# Patient Record
Sex: FEMALE | Race: WHITE | NOT HISPANIC OR LATINO | Employment: OTHER | URBAN - METROPOLITAN AREA
[De-identification: names, ages, dates, MRNs, and addresses within clinical notes are randomized per-mention and may not be internally consistent; named-entity substitution may affect disease eponyms.]

---

## 2017-02-21 ENCOUNTER — CONVERSION ENCOUNTER (OUTPATIENT)
Dept: OTHER | Facility: HOSPITAL | Age: 67
End: 2017-02-21

## 2017-02-22 ENCOUNTER — CONVERSION ENCOUNTER (OUTPATIENT)
Dept: OTHER | Facility: HOSPITAL | Age: 67
End: 2017-02-22

## 2017-05-02 ENCOUNTER — CONVERSION ENCOUNTER (OUTPATIENT)
Dept: OTHER | Facility: HOSPITAL | Age: 67
End: 2017-05-02

## 2017-06-14 ENCOUNTER — CONVERSION ENCOUNTER (OUTPATIENT)
Dept: OTHER | Facility: HOSPITAL | Age: 67
End: 2017-06-14

## 2017-06-14 ENCOUNTER — HOSPITAL ENCOUNTER (OUTPATIENT)
Dept: LAB | Facility: HOSPITAL | Age: 67
Discharge: HOME | End: 2017-06-14
Attending: NURSE PRACTITIONER | Admitting: NURSE PRACTITIONER

## 2017-06-14 LAB
ALBUMIN SERPL-MCNC: 4.1 G/DL (ref 3.5–4.8)
ALBUMIN/GLOB SERPL: 1.5 {RATIO} (ref 1–1.7)
ALP SERPL-CCNC: 117 IU/L (ref 32–91)
ALT SERPL-CCNC: 33 IU/L (ref 14–54)
ANION GAP SERPL CALC-SCNC: 14.6 MMOL/L (ref 10–20)
AST SERPL-CCNC: 35 IU/L (ref 15–41)
BASOPHILS # BLD AUTO: 0.1 10*3/UL (ref 0–0.2)
BASOPHILS NFR BLD AUTO: 1 % (ref 0–2)
BILIRUB SERPL-MCNC: 0.4 MG/DL (ref 0.3–1.2)
BUN SERPL-MCNC: 22 MG/DL (ref 8–20)
BUN/CREAT SERPL: ABNORMAL (ref 5.4–26.2)
CALCIUM SERPL-MCNC: 9.9 MG/DL (ref 8.9–10.3)
CHLORIDE SERPL-SCNC: 106 MMOL/L (ref 101–111)
CONV CO2: 28 MMOL/L (ref 22–32)
CONV TOTAL PROTEIN: 6.8 G/DL (ref 6.1–7.9)
CREAT UR-MCNC: ABNORMAL MG/DL (ref 0.4–1)
CRP SERPL-MCNC: 0.38 MG/DL (ref 0–0.7)
DIFFERENTIAL METHOD BLD: (no result)
EOSINOPHIL # BLD AUTO: 0.2 10*3/UL (ref 0–0.3)
EOSINOPHIL # BLD AUTO: 4 % (ref 0–3)
ERYTHROCYTE [DISTWIDTH] IN BLOOD BY AUTOMATED COUNT: 14.1 % (ref 11.5–14.5)
ERYTHROCYTE [SEDIMENTATION RATE] IN BLOOD BY WESTERGREN METHOD: 10 MM/HR (ref 0–30)
GLOBULIN UR ELPH-MCNC: 2.7 G/DL (ref 2.5–3.8)
GLUCOSE SERPL-MCNC: 89 MG/DL (ref 65–99)
HCT VFR BLD AUTO: 48.2 % (ref 35–49)
HGB BLD-MCNC: 16.1 G/DL (ref 12–15)
LYMPHOCYTES # BLD AUTO: 1.7 10*3/UL (ref 0.8–4.8)
LYMPHOCYTES NFR BLD AUTO: 26 % (ref 18–42)
MCH RBC QN AUTO: 31 PG (ref 26–32)
MCHC RBC AUTO-ENTMCNC: 33.4 G/DL (ref 32–36)
MCV RBC AUTO: 92.7 FL (ref 80–94)
MONOCYTES # BLD AUTO: 0.6 10*3/UL (ref 0.1–1.3)
MONOCYTES NFR BLD AUTO: 9 % (ref 2–11)
NEUTROPHILS # BLD AUTO: 4.2 10*3/UL (ref 2.3–8.6)
NEUTROPHILS NFR BLD AUTO: 60 % (ref 50–75)
NRBC BLD AUTO-RTO: 0 /100{WBCS}
NRBC/RBC NFR BLD MANUAL: 0 10*3/UL
PLATELET # BLD AUTO: 341 10*3/UL (ref 150–450)
PMV BLD AUTO: 8.9 FL (ref 7.4–10.4)
POTASSIUM SERPL-SCNC: 4.6 MMOL/L (ref 3.6–5.1)
RBC # BLD AUTO: 5.2 10*6/UL (ref 4–5.4)
SODIUM SERPL-SCNC: 144 MMOL/L (ref 136–144)
WBC # BLD AUTO: 6.8 10*3/UL (ref 4.5–11.5)

## 2017-06-16 LAB — CHROMATIN AB SERPL-ACNC: <20 [IU]/ML (ref 0–20)

## 2017-11-21 ENCOUNTER — HOSPITAL ENCOUNTER (OUTPATIENT)
Dept: ORTHOPEDIC SURGERY | Facility: CLINIC | Age: 67
Discharge: HOME | End: 2017-11-21
Attending: PHYSICIAN ASSISTANT | Admitting: PHYSICIAN ASSISTANT

## 2017-11-21 ENCOUNTER — CONVERSION ENCOUNTER (OUTPATIENT)
Dept: OTHER | Facility: HOSPITAL | Age: 67
End: 2017-11-21

## 2017-11-27 ENCOUNTER — CONVERSION ENCOUNTER (OUTPATIENT)
Dept: OTHER | Facility: HOSPITAL | Age: 67
End: 2017-11-27

## 2017-11-30 ENCOUNTER — HOSPITAL ENCOUNTER (OUTPATIENT)
Dept: CT IMAGING | Facility: HOSPITAL | Age: 67
Discharge: HOME | End: 2017-11-30
Attending: PHYSICIAN ASSISTANT | Admitting: PHYSICIAN ASSISTANT

## 2017-12-18 ENCOUNTER — CONVERSION ENCOUNTER (OUTPATIENT)
Dept: OTHER | Facility: HOSPITAL | Age: 67
End: 2017-12-18

## 2018-02-23 ENCOUNTER — HOSPITAL ENCOUNTER (OUTPATIENT)
Dept: ORTHOPEDIC SURGERY | Facility: CLINIC | Age: 68
Discharge: HOME | End: 2018-02-23
Attending: PHYSICIAN ASSISTANT | Admitting: PHYSICIAN ASSISTANT

## 2018-02-23 ENCOUNTER — CONVERSION ENCOUNTER (OUTPATIENT)
Dept: OTHER | Facility: HOSPITAL | Age: 68
End: 2018-02-23

## 2018-02-28 ENCOUNTER — CONVERSION ENCOUNTER (OUTPATIENT)
Dept: OTHER | Facility: HOSPITAL | Age: 68
End: 2018-02-28

## 2018-02-28 ENCOUNTER — HOSPITAL ENCOUNTER (OUTPATIENT)
Dept: LAB | Facility: HOSPITAL | Age: 68
Discharge: HOME | End: 2018-02-28
Attending: NURSE PRACTITIONER | Admitting: NURSE PRACTITIONER

## 2018-02-28 LAB
ALBUMIN SERPL-MCNC: 3.8 G/DL (ref 3.5–4.8)
ALBUMIN/GLOB SERPL: 1.7 {RATIO} (ref 1–1.7)
ALP SERPL-CCNC: 103 IU/L (ref 32–91)
ALT SERPL-CCNC: 23 IU/L (ref 14–54)
ANION GAP SERPL CALC-SCNC: 10.6 MMOL/L (ref 10–20)
AST SERPL-CCNC: 31 IU/L (ref 15–41)
BILIRUB SERPL-MCNC: 0.6 MG/DL (ref 0.3–1.2)
BUN SERPL-MCNC: 13 MG/DL (ref 8–20)
BUN/CREAT SERPL: 16.3 (ref 5.4–26.2)
CALCIUM SERPL-MCNC: 9.1 MG/DL (ref 8.9–10.3)
CHLORIDE SERPL-SCNC: 103 MMOL/L (ref 101–111)
CONV CO2: 29 MMOL/L (ref 22–32)
CONV TOTAL PROTEIN: 6.1 G/DL (ref 6.1–7.9)
CREAT UR-MCNC: 0.8 MG/DL (ref 0.4–1)
CRP SERPL-MCNC: 0.26 MG/DL (ref 0–0.7)
ERYTHROCYTE [DISTWIDTH] IN BLOOD BY AUTOMATED COUNT: 13.7 % (ref 11.5–14.5)
ERYTHROCYTE [SEDIMENTATION RATE] IN BLOOD BY WESTERGREN METHOD: 10 MM/HR (ref 0–30)
GLOBULIN UR ELPH-MCNC: 2.3 G/DL (ref 2.5–3.8)
GLUCOSE SERPL-MCNC: 90 MG/DL (ref 65–99)
HCT VFR BLD AUTO: 40.1 % (ref 35–49)
HGB BLD-MCNC: 13.7 G/DL (ref 12–15)
MCH RBC QN AUTO: 31.2 PG (ref 26–32)
MCHC RBC AUTO-ENTMCNC: 34.1 G/DL (ref 32–36)
MCV RBC AUTO: 91.6 FL (ref 80–94)
PLATELET # BLD AUTO: 260 10*3/UL (ref 150–450)
PMV BLD AUTO: 8.3 FL (ref 7.4–10.4)
POTASSIUM SERPL-SCNC: 3.6 MMOL/L (ref 3.6–5.1)
RBC # BLD AUTO: 4.38 10*6/UL (ref 4–5.4)
SODIUM SERPL-SCNC: 139 MMOL/L (ref 136–144)
WBC # BLD AUTO: 4.8 10*3/UL (ref 4.5–11.5)

## 2018-03-06 ENCOUNTER — CONVERSION ENCOUNTER (OUTPATIENT)
Dept: OTHER | Facility: HOSPITAL | Age: 68
End: 2018-03-06

## 2018-03-15 ENCOUNTER — CONVERSION ENCOUNTER (OUTPATIENT)
Dept: OTHER | Facility: HOSPITAL | Age: 68
End: 2018-03-15

## 2018-03-22 ENCOUNTER — CONVERSION ENCOUNTER (OUTPATIENT)
Dept: OTHER | Facility: HOSPITAL | Age: 68
End: 2018-03-22

## 2018-04-05 ENCOUNTER — HOSPITAL ENCOUNTER (OUTPATIENT)
Dept: ORTHOPEDIC SURGERY | Facility: CLINIC | Age: 68
Setting detail: SPECIMEN
Discharge: HOME | End: 2018-04-05
Attending: PHYSICIAN ASSISTANT | Admitting: PHYSICIAN ASSISTANT

## 2018-04-05 ENCOUNTER — CONVERSION ENCOUNTER (OUTPATIENT)
Dept: OTHER | Facility: HOSPITAL | Age: 68
End: 2018-04-05

## 2018-04-05 LAB
ALBUMIN SERPL-MCNC: 3.8 G/DL (ref 3.5–4.8)
ALBUMIN/GLOB SERPL: 1.7 {RATIO} (ref 1–1.7)
ALP SERPL-CCNC: 118 IU/L (ref 32–91)
ALT SERPL-CCNC: 28 IU/L (ref 14–54)
ANION GAP SERPL CALC-SCNC: 14.5 MMOL/L (ref 10–20)
AST SERPL-CCNC: 37 IU/L (ref 15–41)
BILIRUB SERPL-MCNC: 0.4 MG/DL (ref 0.3–1.2)
BUN SERPL-MCNC: 17 MG/DL (ref 8–20)
BUN/CREAT SERPL: 24.3 (ref 5.4–26.2)
CALCIUM SERPL-MCNC: 9.1 MG/DL (ref 8.9–10.3)
CHLORIDE SERPL-SCNC: 102 MMOL/L (ref 101–111)
CONV CO2: 26 MMOL/L (ref 22–32)
CONV TOTAL PROTEIN: 6.1 G/DL (ref 6.1–7.9)
CREAT UR-MCNC: 0.7 MG/DL (ref 0.4–1)
GLOBULIN UR ELPH-MCNC: 2.3 G/DL (ref 2.5–3.8)
GLUCOSE SERPL-MCNC: 97 MG/DL (ref 65–99)
POTASSIUM SERPL-SCNC: 3.5 MMOL/L (ref 3.6–5.1)
SODIUM SERPL-SCNC: 139 MMOL/L (ref 136–144)

## 2018-05-01 ENCOUNTER — CONVERSION ENCOUNTER (OUTPATIENT)
Dept: OTHER | Facility: HOSPITAL | Age: 68
End: 2018-05-01

## 2018-05-17 ENCOUNTER — CONVERSION ENCOUNTER (OUTPATIENT)
Dept: OTHER | Facility: HOSPITAL | Age: 68
End: 2018-05-17

## 2018-06-06 ENCOUNTER — HOSPITAL ENCOUNTER (OUTPATIENT)
Dept: LAB | Facility: HOSPITAL | Age: 68
Discharge: HOME | End: 2018-06-06
Attending: NURSE PRACTITIONER | Admitting: NURSE PRACTITIONER

## 2018-06-06 ENCOUNTER — CONVERSION ENCOUNTER (OUTPATIENT)
Dept: OTHER | Facility: HOSPITAL | Age: 68
End: 2018-06-06

## 2018-06-06 LAB
ALBUMIN SERPL-MCNC: 3.9 G/DL (ref 3.5–4.8)
ALBUMIN/GLOB SERPL: 1.7 {RATIO} (ref 1–1.7)
ALP SERPL-CCNC: 120 IU/L (ref 32–91)
ALT SERPL-CCNC: 27 IU/L (ref 14–54)
ANION GAP SERPL CALC-SCNC: 11 MMOL/L (ref 10–20)
AST SERPL-CCNC: 29 IU/L (ref 15–41)
BASOPHILS # BLD AUTO: 0.1 10*3/UL (ref 0–0.2)
BASOPHILS NFR BLD AUTO: 1 % (ref 0–2)
BILIRUB SERPL-MCNC: 0.4 MG/DL (ref 0.3–1.2)
BUN SERPL-MCNC: 20 MG/DL (ref 8–20)
BUN/CREAT SERPL: 22.2 (ref 5.4–26.2)
CALCIUM SERPL-MCNC: 9.2 MG/DL (ref 8.9–10.3)
CHLORIDE SERPL-SCNC: 105 MMOL/L (ref 101–111)
CONV CO2: 26 MMOL/L (ref 22–32)
CONV TOTAL PROTEIN: 6.2 G/DL (ref 6.1–7.9)
CREAT UR-MCNC: 0.9 MG/DL (ref 0.4–1)
CRP SERPL-MCNC: 0.14 MG/DL (ref 0–0.7)
DIFFERENTIAL METHOD BLD: (no result)
EOSINOPHIL # BLD AUTO: 0.2 10*3/UL (ref 0–0.3)
EOSINOPHIL # BLD AUTO: 4 % (ref 0–3)
ERYTHROCYTE [DISTWIDTH] IN BLOOD BY AUTOMATED COUNT: 13.8 % (ref 11.5–14.5)
ERYTHROCYTE [SEDIMENTATION RATE] IN BLOOD BY WESTERGREN METHOD: 9 MM/HR (ref 0–30)
GLOBULIN UR ELPH-MCNC: 2.3 G/DL (ref 2.5–3.8)
GLUCOSE SERPL-MCNC: 91 MG/DL (ref 65–99)
HCT VFR BLD AUTO: 43.2 % (ref 35–49)
HGB BLD-MCNC: 14.3 G/DL (ref 12–15)
LYMPHOCYTES # BLD AUTO: 1.6 10*3/UL (ref 0.8–4.8)
LYMPHOCYTES NFR BLD AUTO: 29 % (ref 18–42)
MCH RBC QN AUTO: 30.4 PG (ref 26–32)
MCHC RBC AUTO-ENTMCNC: 33.1 G/DL (ref 32–36)
MCV RBC AUTO: 91.8 FL (ref 80–94)
MONOCYTES # BLD AUTO: 0.6 10*3/UL (ref 0.1–1.3)
MONOCYTES NFR BLD AUTO: 12 % (ref 2–11)
NEUTROPHILS # BLD AUTO: 2.9 10*3/UL (ref 2.3–8.6)
NEUTROPHILS NFR BLD AUTO: 54 % (ref 50–75)
NRBC BLD AUTO-RTO: 0 /100{WBCS}
NRBC/RBC NFR BLD MANUAL: 0 10*3/UL
PLATELET # BLD AUTO: 284 10*3/UL (ref 150–450)
PMV BLD AUTO: 8.6 FL (ref 7.4–10.4)
POTASSIUM SERPL-SCNC: 4 MMOL/L (ref 3.6–5.1)
RBC # BLD AUTO: 4.71 10*6/UL (ref 4–5.4)
SODIUM SERPL-SCNC: 138 MMOL/L (ref 136–144)
WBC # BLD AUTO: 5.4 10*3/UL (ref 4.5–11.5)

## 2018-06-08 LAB
ANA SER QL IA: NORMAL

## 2018-09-06 ENCOUNTER — HOSPITAL ENCOUNTER (OUTPATIENT)
Dept: ORTHOPEDIC SURGERY | Facility: CLINIC | Age: 68
Setting detail: SPECIMEN
Discharge: HOME | End: 2018-09-06
Attending: PHYSICIAN ASSISTANT | Admitting: PHYSICIAN ASSISTANT

## 2018-09-06 ENCOUNTER — CONVERSION ENCOUNTER (OUTPATIENT)
Dept: OTHER | Facility: HOSPITAL | Age: 68
End: 2018-09-06

## 2018-09-06 LAB
ALBUMIN SERPL-MCNC: 3.6 G/DL (ref 3.5–4.8)
ALBUMIN/GLOB SERPL: 1.4 {RATIO} (ref 1–1.7)
ALP SERPL-CCNC: 121 IU/L (ref 32–91)
ALT SERPL-CCNC: 23 IU/L (ref 14–54)
ANION GAP SERPL CALC-SCNC: 11.6 MMOL/L (ref 10–20)
AST SERPL-CCNC: 30 IU/L (ref 15–41)
BILIRUB SERPL-MCNC: 0.1 MG/DL (ref 0.3–1.2)
BUN SERPL-MCNC: 10 MG/DL (ref 8–20)
BUN/CREAT SERPL: 12.5 (ref 5.4–26.2)
CALCIUM SERPL-MCNC: 8.9 MG/DL (ref 8.9–10.3)
CHLORIDE SERPL-SCNC: 105 MMOL/L (ref 101–111)
CONV CO2: 29 MMOL/L (ref 22–32)
CONV TOTAL PROTEIN: 6.2 G/DL (ref 6.1–7.9)
CREAT UR-MCNC: 0.8 MG/DL (ref 0.4–1)
GLOBULIN UR ELPH-MCNC: 2.6 G/DL (ref 2.5–3.8)
GLUCOSE SERPL-MCNC: 92 MG/DL (ref 65–99)
POTASSIUM SERPL-SCNC: 3.6 MMOL/L (ref 3.6–5.1)
SODIUM SERPL-SCNC: 142 MMOL/L (ref 136–144)

## 2018-10-10 ENCOUNTER — HOSPITAL ENCOUNTER (OUTPATIENT)
Dept: MAMMOGRAPHY | Facility: HOSPITAL | Age: 68
Discharge: HOME | End: 2018-10-10
Attending: PHYSICIAN ASSISTANT | Admitting: PHYSICIAN ASSISTANT

## 2018-10-22 ENCOUNTER — CONVERSION ENCOUNTER (OUTPATIENT)
Dept: OTHER | Facility: HOSPITAL | Age: 68
End: 2018-10-22

## 2018-11-14 ENCOUNTER — HOSPITAL ENCOUNTER (OUTPATIENT)
Dept: LAB | Facility: HOSPITAL | Age: 68
Discharge: HOME | End: 2018-11-14
Attending: NURSE PRACTITIONER | Admitting: NURSE PRACTITIONER

## 2018-11-14 ENCOUNTER — CONVERSION ENCOUNTER (OUTPATIENT)
Dept: OTHER | Facility: HOSPITAL | Age: 68
End: 2018-11-14

## 2018-11-14 LAB
ALBUMIN SERPL-MCNC: 3.9 G/DL (ref 3.5–4.8)
ALBUMIN/GLOB SERPL: 1.4 {RATIO} (ref 1–1.7)
ALP SERPL-CCNC: 135 IU/L (ref 32–91)
ALT SERPL-CCNC: 28 IU/L (ref 14–54)
ANION GAP SERPL CALC-SCNC: 13.9 MMOL/L (ref 10–20)
AST SERPL-CCNC: 33 IU/L (ref 15–41)
BASOPHILS # BLD AUTO: 0.1 10*3/UL (ref 0–0.2)
BASOPHILS NFR BLD AUTO: 1 % (ref 0–2)
BILIRUB SERPL-MCNC: 0.5 MG/DL (ref 0.3–1.2)
BUN SERPL-MCNC: 17 MG/DL (ref 8–20)
BUN/CREAT SERPL: 21.3 (ref 5.4–26.2)
CALCIUM SERPL-MCNC: 9.2 MG/DL (ref 8.9–10.3)
CHLORIDE SERPL-SCNC: 101 MMOL/L (ref 101–111)
CONV CO2: 27 MMOL/L (ref 22–32)
CONV TOTAL PROTEIN: 6.7 G/DL (ref 6.1–7.9)
CREAT UR-MCNC: 0.8 MG/DL (ref 0.4–1)
CRP SERPL-MCNC: 0.37 MG/DL (ref 0–0.7)
DIFFERENTIAL METHOD BLD: (no result)
EOSINOPHIL # BLD AUTO: 0.3 10*3/UL (ref 0–0.3)
EOSINOPHIL # BLD AUTO: 5 % (ref 0–3)
ERYTHROCYTE [DISTWIDTH] IN BLOOD BY AUTOMATED COUNT: 14.2 % (ref 11.5–14.5)
ERYTHROCYTE [SEDIMENTATION RATE] IN BLOOD BY WESTERGREN METHOD: 15 MM/HR (ref 0–30)
GLOBULIN UR ELPH-MCNC: 2.8 G/DL (ref 2.5–3.8)
GLUCOSE SERPL-MCNC: 98 MG/DL (ref 65–99)
HCT VFR BLD AUTO: 42.4 % (ref 35–49)
HGB BLD-MCNC: 14.3 G/DL (ref 12–15)
LYMPHOCYTES # BLD AUTO: 1.4 10*3/UL (ref 0.8–4.8)
LYMPHOCYTES NFR BLD AUTO: 30 % (ref 18–42)
MCH RBC QN AUTO: 31 PG (ref 26–32)
MCHC RBC AUTO-ENTMCNC: 33.8 G/DL (ref 32–36)
MCV RBC AUTO: 91.6 FL (ref 80–94)
MONOCYTES # BLD AUTO: 0.5 10*3/UL (ref 0.1–1.3)
MONOCYTES NFR BLD AUTO: 11 % (ref 2–11)
NEUTROPHILS # BLD AUTO: 2.6 10*3/UL (ref 2.3–8.6)
NEUTROPHILS NFR BLD AUTO: 53 % (ref 50–75)
NRBC BLD AUTO-RTO: 0 /100{WBCS}
NRBC/RBC NFR BLD MANUAL: 0 10*3/UL
PLATELET # BLD AUTO: 306 10*3/UL (ref 150–450)
PMV BLD AUTO: 8.8 FL (ref 7.4–10.4)
POTASSIUM SERPL-SCNC: 3.9 MMOL/L (ref 3.6–5.1)
RBC # BLD AUTO: 4.63 10*6/UL (ref 4–5.4)
SODIUM SERPL-SCNC: 138 MMOL/L (ref 136–144)
WBC # BLD AUTO: 4.9 10*3/UL (ref 4.5–11.5)

## 2018-11-16 LAB
ANA SER QL IA: NORMAL
CCP IGG ANTIBODIES: <0.4 U/ML
CHROMATIN AB SERPL-ACNC: <20 [IU]/ML (ref 0–20)

## 2019-03-01 ENCOUNTER — OFFICE VISIT (OUTPATIENT)
Dept: ORTHOPEDIC SURGERY | Facility: CLINIC | Age: 69
End: 2019-03-01

## 2019-03-01 VITALS — HEIGHT: 59 IN | WEIGHT: 207 LBS | TEMPERATURE: 97.4 F | BODY MASS INDEX: 41.73 KG/M2

## 2019-03-01 DIAGNOSIS — M75.102 TEAR OF LEFT ROTATOR CUFF, UNSPECIFIED TEAR EXTENT: Primary | ICD-10-CM

## 2019-03-01 PROCEDURE — 99203 OFFICE O/P NEW LOW 30 MIN: CPT | Performed by: ORTHOPAEDIC SURGERY

## 2019-03-01 RX ORDER — TRAMADOL HYDROCHLORIDE 50 MG/1
50 TABLET ORAL DAILY
COMMUNITY
Start: 2018-12-04 | End: 2019-06-14 | Stop reason: HOSPADM

## 2019-03-01 RX ORDER — GABAPENTIN 600 MG/1
600 TABLET ORAL TAKE AS DIRECTED
COMMUNITY
Start: 2018-10-21

## 2019-03-01 RX ORDER — HYDROXYCHLOROQUINE SULFATE 200 MG/1
TABLET, FILM COATED ORAL
COMMUNITY
Start: 2018-10-24 | End: 2019-06-07

## 2019-03-01 RX ORDER — AZELASTINE HYDROCHLORIDE, FLUTICASONE PROPIONATE 137; 50 UG/1; UG/1
1-2 SPRAY, METERED NASAL DAILY
COMMUNITY
Start: 2018-02-22 | End: 2021-09-02

## 2019-03-01 RX ORDER — DULOXETIN HYDROCHLORIDE 60 MG/1
CAPSULE, DELAYED RELEASE ORAL
Refills: 1 | COMMUNITY
Start: 2019-02-14 | End: 2019-06-07

## 2019-03-01 RX ORDER — ATORVASTATIN CALCIUM 40 MG/1
TABLET, FILM COATED ORAL
COMMUNITY
Start: 2018-11-02 | End: 2019-06-07

## 2019-03-01 RX ORDER — OMEPRAZOLE 20 MG/1
20 CAPSULE, DELAYED RELEASE ORAL NIGHTLY
COMMUNITY

## 2019-03-01 RX ORDER — IBUPROFEN 200 MG
1 CAPSULE ORAL
COMMUNITY

## 2019-03-01 RX ORDER — TRIAMCINOLONE ACETONIDE 0.25 MG/G
1 CREAM TOPICAL AS NEEDED
COMMUNITY

## 2019-03-01 RX ORDER — APIXABAN 5 MG/1
TABLET, FILM COATED ORAL
COMMUNITY
Start: 2019-01-18 | End: 2019-06-07

## 2019-03-01 RX ORDER — BUTALBITAL, ACETAMINOPHEN AND CAFFEINE 50; 325; 40 MG/1; MG/1; MG/1
1 TABLET ORAL EVERY 4 HOURS PRN
COMMUNITY
Start: 2018-11-15

## 2019-03-01 RX ORDER — PHENOL 1.4 %
1 AEROSOL, SPRAY (ML) MUCOUS MEMBRANE NIGHTLY
COMMUNITY

## 2019-03-01 RX ORDER — MULTIVIT WITH MINERALS/LUTEIN
1000 TABLET ORAL DAILY
COMMUNITY

## 2019-03-01 RX ORDER — ARMODAFINIL 250 MG/1
250 TABLET ORAL DAILY
Refills: 1 | COMMUNITY
Start: 2019-01-24 | End: 2019-07-26 | Stop reason: SDUPTHER

## 2019-03-01 RX ORDER — PROMETHAZINE HYDROCHLORIDE 25 MG/1
25 TABLET ORAL AS NEEDED
COMMUNITY
Start: 2019-01-18

## 2019-03-01 RX ORDER — METHOCARBAMOL 750 MG/1
1500 TABLET, FILM COATED ORAL NIGHTLY PRN
COMMUNITY
Start: 2018-11-11 | End: 2022-03-10

## 2019-03-01 NOTE — PROGRESS NOTES
New Left Shoulder      Patient: Annalisa Bhagat        YOB: 1950    Medical Record Number: [id number]        Chief Complaints: left shoulder pain  Chief Complaint   Patient presents with   • Left Shoulder - Establish Care           History of Present Illness: This is a 68-year-old female who presents complaining of left shoulder pain she is right-hand dominant been ongoing for 6+ months no history injury change in activity she seen physical therapy which helped at first but is no longer helping she has had 2 injections the first 1 helped the second 1 did not she does have night pain her symptoms are moderate to severe intermittent stabbing aching worse with sleeping better with ice rest and plus or minus with injection she is a retired RN past medical history is more for sleep apnea stroke COPD lupus rheumatoid arthritis depression anxiety      Allergies:   Allergies   Allergen Reactions   • Hydromorphone Itching and Rash   • Adhesive Tape Irritability   • Zolmitriptan Other (See Comments)     Hx of stroke    • Amoxicillin-Pot Clavulanate Itching and Rash       Medications:   Home Medications:  Current Outpatient Medications on File Prior to Visit   Medication Sig   • apixaban (ELIQUIS) 5 MG tablet tablet TAKE 1 TABLET BY MOUTH TWO TIMES DAILY   • atorvastatin (LIPITOR) 40 MG tablet TAKE 1 TABLET BY MOUTH EVERY DAY   • Azelastine-Fluticasone 137-50 MCG/ACT suspension 1-2 sprays into the nostril(s) as directed by provider.   • butalbital-acetaminophen-caffeine (FIORICET, ESGIC) -40 MG per tablet Take 1 tablet by mouth.   • Erenumab-aooe (AIMOVIG) 70 MG/ML prefilled syringe Inject 70 mg under the skin into the appropriate area as directed.   • gabapentin (NEURONTIN) 600 MG tablet Take 600 mg by mouth 4 (Four) Times a Day.   • hydrocortisone 2.5 % cream    • hydroxychloroquine (PLAQUENIL) 200 MG tablet TAKE 1 TABLET BY MOUTH TWICE A DAY   • methocarbamol (ROBAXIN) 750 MG tablet Take 750 mg by  mouth 3 (Three) Times a Day.   • promethazine (PHENERGAN) 25 MG tablet Take 25 mg by mouth.   • traMADol (ULTRAM) 50 MG tablet Take 50 mg by mouth.   • Armodafinil 250 MG tablet Take 250 mg by mouth Daily.   • B Complex Vitamins (VITAMIN B COMPLEX PO) Take 1 each by mouth Daily.   • calcium citrate (CALCITRATE) 950 MG tablet Take 1 tablet by mouth.   • DULoxetine (CYMBALTA) 60 MG capsule TAKE 1 CAPSULE BY MOUTH DAILY FOR 90 DAYS   • Melatonin 10 MG tablet Take 1 tablet by mouth Daily.   • omeprazole (priLOSEC) 20 MG capsule Take 20 mg by mouth.   • pyridoxine (VITAMIN B-6) 200 MG tablet Take 200 mg by mouth Daily.   • teriparatide (FORTEO) 600 MCG/2.4ML injection Inject 20 mcg under the skin into the appropriate area as directed Daily.   • triamcinolone (KENALOG) 0.025 % cream by Intratympanic route.   • vitamin C (ASCORBIC ACID) 250 MG tablet Take 1,000 mg by mouth Daily.     No current facility-administered medications on file prior to visit.      Current Medications:  Scheduled Meds:  Continuous Infusions:  No current facility-administered medications for this visit.   PRN Meds:.    Past Medical History:   Diagnosis Date   • Fibromyalgia    • Kidney stone    • Lupus    • Migraines    • Narcolepsy    • Numbness and tingling    • Recurrent UTI    • Rheumatoid arthritis (CMS/HCC)    • Scoliosis    • Seasonal allergies    • Sleep apnea    • Spinal stenosis    • Stress incontinence    • Stroke (CMS/HCC)         Past Surgical History:   Procedure Laterality Date   • BACK SURGERY     • CARDIAC CATHETERIZATION     • CARPAL TUNNEL RELEASE     • CYSTOSCOPY W/ URETEROSCOPY W/ LITHOTRIPSY     • HAND SURGERY     • HYSTERECTOMY     • RECTOVAGINAL FISTULA CLOSURE     • SACRAL NERVE STIMULATOR PLACEMENT          Social History     Occupational History   • Not on file   Tobacco Use   • Smoking status: Never Smoker   Substance and Sexual Activity   • Alcohol use: Yes   • Drug use: Not on file   • Sexual activity: Not on file     "  Social History     Social History Narrative   • Not on file        Family History   Problem Relation Age of Onset   • Hypotension Mother    • Hypotension Father    • Colon cancer Father    • Stroke Father    • Colon cancer Maternal Grandmother    • Heart disease Maternal Uncle    • Hypertension Paternal Aunt    • Diabetes Paternal Aunt    • Heart failure Paternal Aunt            Left  Review of Systems: 14 point review of systems are remarkable for the pertinent positives listed in the chart by the patient the remainder are negative    Review of Systems      Physical Exam: 68 y.o. female  General Appearance:    Alert, cooperative, in no acute distress                   Vitals:    03/01/19 1501   Temp: 97.4 °F (36.3 °C)   Weight: 93.9 kg (207 lb)   Height: 149.9 cm (59\")      Patient is alert and read ×3 no acute distress appears her above-listed at height weight and age.  Affect is normal respiratory rate is normal unlabored. Heart rate regular rate rhythm, sclera, dentition and hearing are normal for the purpose of this exam.    Ortho Exam Physical exam of the left shoulder reveals no overlying skin changes no lymphedema no lymphadenopathy.  Patient has active flexion 180 with mild symptoms abduction is similar external rotation is to 50 and internal rotation to the upper lumbar spine with mild symptoms.  Patient has good rotator cuff strength 4+ over 5 with isometric strength testing with pain.  Patient has a positive impingement and a positive Garcia sign.  Patient has good cervical range of motion which is full and asymptomatic no radicular symptoms.  Patient has a normal elbow exam.  Good distal pulses are presentPatient has pain with overhead activity and a positive Neer sign and a positive empty can sign  They have a positive drop arm any definitive painful arc    Procedures          Radiology:   AP, Scapular Y and Axillary Lateral of the left no compared to films these show some mild acromioclavicular " arthritis otherwise no acute bony pathology shoulder were ordered/reviewed to evauate shoulder pain.    Assessment/Plan: Left shoulder pain is been ongoing for several months she is on physical therapy injections rest strengthening all with no lasting improvement plan is to proceed with an MRI to rule out a rotator cuff tear which be high on my list

## 2019-03-14 ENCOUNTER — HOSPITAL ENCOUNTER (OUTPATIENT)
Dept: MRI IMAGING | Facility: HOSPITAL | Age: 69
Discharge: HOME | End: 2019-03-14
Admitting: ORTHOPAEDIC SURGERY

## 2019-03-14 DIAGNOSIS — M75.102 TEAR OF LEFT ROTATOR CUFF, UNSPECIFIED TEAR EXTENT: ICD-10-CM

## 2019-03-14 PROCEDURE — 73221 MRI JOINT UPR EXTREM W/O DYE: CPT

## 2019-03-20 DIAGNOSIS — M75.102 TEAR OF LEFT ROTATOR CUFF, UNSPECIFIED TEAR EXTENT: Primary | ICD-10-CM

## 2019-04-11 ENCOUNTER — CONVERSION ENCOUNTER (OUTPATIENT)
Dept: OTHER | Facility: HOSPITAL | Age: 69
End: 2019-04-11

## 2019-04-11 ENCOUNTER — HOSPITAL ENCOUNTER (OUTPATIENT)
Dept: ORTHOPEDIC SURGERY | Facility: CLINIC | Age: 69
Setting detail: SPECIMEN
Discharge: HOME | End: 2019-04-11
Attending: PHYSICIAN ASSISTANT | Admitting: PHYSICIAN ASSISTANT

## 2019-04-11 LAB
ALBUMIN SERPL-MCNC: 3.8 G/DL (ref 3.5–4.8)
ALBUMIN/GLOB SERPL: 1.7 {RATIO} (ref 1–1.7)
ALP SERPL-CCNC: 112 IU/L (ref 32–91)
ALT SERPL-CCNC: 24 IU/L (ref 14–54)
ANION GAP SERPL CALC-SCNC: 16.6 MMOL/L (ref 10–20)
AST SERPL-CCNC: 36 IU/L (ref 15–41)
BILIRUB SERPL-MCNC: 0.8 MG/DL (ref 0.3–1.2)
BUN SERPL-MCNC: 15 MG/DL (ref 8–20)
BUN/CREAT SERPL: 21.4 (ref 5.4–26.2)
CALCIUM SERPL-MCNC: 9.2 MG/DL (ref 8.9–10.3)
CHLORIDE SERPL-SCNC: 103 MMOL/L (ref 101–111)
CONV CO2: 24 MMOL/L (ref 22–32)
CONV TOTAL PROTEIN: 6.1 G/DL (ref 6.1–7.9)
CREAT UR-MCNC: 0.7 MG/DL (ref 0.4–1)
GLOBULIN UR ELPH-MCNC: 2.3 G/DL (ref 2.5–3.8)
GLUCOSE SERPL-MCNC: 89 MG/DL (ref 65–99)
POTASSIUM SERPL-SCNC: 3.6 MMOL/L (ref 3.6–5.1)
SODIUM SERPL-SCNC: 140 MMOL/L (ref 136–144)

## 2019-04-17 ENCOUNTER — OFFICE VISIT (OUTPATIENT)
Dept: ORTHOPEDIC SURGERY | Facility: CLINIC | Age: 69
End: 2019-04-17

## 2019-04-17 VITALS — TEMPERATURE: 97.2 F | HEIGHT: 59 IN | BODY MASS INDEX: 41.73 KG/M2 | WEIGHT: 207 LBS

## 2019-04-17 DIAGNOSIS — M75.102 TEAR OF LEFT ROTATOR CUFF, UNSPECIFIED TEAR EXTENT: Primary | ICD-10-CM

## 2019-04-17 PROCEDURE — 99214 OFFICE O/P EST MOD 30 MIN: CPT | Performed by: ORTHOPAEDIC SURGERY

## 2019-04-18 RX ORDER — MELOXICAM 7.5 MG/1
15 TABLET ORAL ONCE
Status: CANCELLED | OUTPATIENT
Start: 2019-04-18 | End: 2019-04-18

## 2019-04-18 RX ORDER — PREGABALIN 150 MG/1
150 CAPSULE ORAL ONCE
Status: CANCELLED | OUTPATIENT
Start: 2019-04-18 | End: 2019-04-18

## 2019-04-18 RX ORDER — ACETAMINOPHEN 325 MG/1
1000 TABLET ORAL ONCE
Status: CANCELLED | OUTPATIENT
Start: 2019-04-18 | End: 2019-04-18

## 2019-04-18 NOTE — PROGRESS NOTES
Patient: Annalisa Bhagat    YOB: 1950    Medical Record Number: [id number]    Chief Complaints:  Referral for left shoulder pain    History of Present Illness:     68 y.o. female patient who presents for evaluation of the right shoulder.  The patient is referred to me for further evaluation by Dr. Acevedo.  The patient has a long history of worsening left shoulder pain and dysfunction.  Pain is described as moderate to severe, constant and aching.  The patient has difficulty with overhead activities, reaching and lifting.  The patient has tried and failed conservative treatment including several previous injections and extensive formal physical therapy.  The patient was referred to me to discuss surgical options.    Allergies:   Allergies   Allergen Reactions   • Hydromorphone Itching and Rash   • Adhesive Tape Irritability   • Zolmitriptan Other (See Comments)     Hx of stroke    • Amoxicillin-Pot Clavulanate Itching and Rash       Home Medications:    Current Outpatient Medications:   •  apixaban (ELIQUIS) 5 MG tablet tablet, TAKE 1 TABLET BY MOUTH TWO TIMES DAILY, Disp: , Rfl:   •  Armodafinil 250 MG tablet, Take 250 mg by mouth Daily., Disp: , Rfl: 1  •  atorvastatin (LIPITOR) 40 MG tablet, TAKE 1 TABLET BY MOUTH EVERY DAY, Disp: , Rfl:   •  Azelastine-Fluticasone 137-50 MCG/ACT suspension, 1-2 sprays into the nostril(s) as directed by provider., Disp: , Rfl:   •  B Complex Vitamins (VITAMIN B COMPLEX PO), Take 1 each by mouth Daily., Disp: , Rfl:   •  butalbital-acetaminophen-caffeine (FIORICET, ESGIC) -40 MG per tablet, Take 1 tablet by mouth., Disp: , Rfl:   •  calcium citrate (CALCITRATE) 950 MG tablet, Take 1 tablet by mouth., Disp: , Rfl:   •  DULoxetine (CYMBALTA) 60 MG capsule, TAKE 1 CAPSULE BY MOUTH DAILY FOR 90 DAYS, Disp: , Rfl: 1  •  Erenumab-aooe (AIMOVIG) 70 MG/ML prefilled syringe, Inject 70 mg under the skin into the appropriate area as directed., Disp: , Rfl:   •   gabapentin (NEURONTIN) 600 MG tablet, Take 600 mg by mouth 4 (Four) Times a Day., Disp: , Rfl:   •  hydrocortisone 2.5 % cream, , Disp: , Rfl:   •  hydroxychloroquine (PLAQUENIL) 200 MG tablet, TAKE 1 TABLET BY MOUTH TWICE A DAY, Disp: , Rfl:   •  Melatonin 10 MG tablet, Take 1 tablet by mouth Daily., Disp: , Rfl:   •  methocarbamol (ROBAXIN) 750 MG tablet, Take 750 mg by mouth 3 (Three) Times a Day., Disp: , Rfl:   •  omeprazole (priLOSEC) 20 MG capsule, Take 20 mg by mouth., Disp: , Rfl:   •  promethazine (PHENERGAN) 25 MG tablet, Take 25 mg by mouth., Disp: , Rfl:   •  pyridoxine (VITAMIN B-6) 200 MG tablet, Take 200 mg by mouth Daily., Disp: , Rfl:   •  teriparatide (FORTEO) 600 MCG/2.4ML injection, Inject 20 mcg under the skin into the appropriate area as directed Daily., Disp: , Rfl:   •  traMADol (ULTRAM) 50 MG tablet, Take 50 mg by mouth., Disp: , Rfl:   •  triamcinolone (KENALOG) 0.025 % cream, by Intratympanic route., Disp: , Rfl:   •  vitamin C (ASCORBIC ACID) 250 MG tablet, Take 1,000 mg by mouth Daily., Disp: , Rfl:     Past Medical History:   Diagnosis Date   • Fibromyalgia    • Kidney stone    • Lupus    • Migraines    • Narcolepsy    • Numbness and tingling    • Recurrent UTI    • Rheumatoid arthritis (CMS/HCC)    • Scoliosis    • Seasonal allergies    • Sleep apnea    • Spinal stenosis    • Stress incontinence    • Stroke (CMS/HCC)        Past Surgical History:   Procedure Laterality Date   • BACK SURGERY     • CARDIAC CATHETERIZATION     • CARPAL TUNNEL RELEASE     • CYSTOSCOPY W/ URETEROSCOPY W/ LITHOTRIPSY     • HAND SURGERY     • HYSTERECTOMY     • RECTOVAGINAL FISTULA CLOSURE     • SACRAL NERVE STIMULATOR PLACEMENT         Social History     Occupational History   • Not on file   Tobacco Use   • Smoking status: Never Smoker   Substance and Sexual Activity   • Alcohol use: Yes   • Drug use: No   • Sexual activity: Not on file      Social History     Social History Narrative   • Not on file  "      Family History   Problem Relation Age of Onset   • Hypotension Mother    • Hypotension Father    • Colon cancer Father    • Stroke Father    • Colon cancer Maternal Grandmother    • Heart disease Maternal Uncle    • Hypertension Paternal Aunt    • Diabetes Paternal Aunt    • Heart failure Paternal Aunt        Review of Systems:      Constitutional: Denies fever, shaking or chills   Eyes: Denies change in visual acuity   HEENT: Denies nasal congestion or sore throat   Respiratory: Denies cough or shortness of breath   Cardiovascular: Denies chest pain or edema  Endocrine: Denies tremors, palpitations, intolerance of heat or cold, polyuria, polydipsia.  GI: Denies abdominal pain, nausea, vomiting, bloody stools or diarrhea  : Denies frequency, urgency, incontinence, retention, or nocturia.  Musculoskeletal: Denies numbness, tingling or loss of motor function except as above  Integument: Denies rash, lesion or ulceration   Neurologic: Denies headache or focal weakness, deficits  Heme: Denies spontaneous or excessive bleeding, epistaxis, hematuria, melena, fatigue, enlarged or tender lymph nodes.      All other pertinent positives and negatives as noted above in HPI.    Physical Exam: 68 y.o. female    Vitals:    04/17/19 1511   Temp: 97.2 °F (36.2 °C)   Weight: 93.9 kg (207 lb)   Height: 149.9 cm (59\")     General:  Patient is awake and alert.  Appears in no acute distress or discomfort.    Psych:  Affect and demeanor are appropriate.    Eyes:  Conjunctiva and sclera appear grossly normal.  Eyes track well and EOM seem to be intact.    Ears:  No gross abnormalities.  Hearing adequate for the exam.    Cardiovascular:  Regular rate and rhythm.    Lungs:  Good chest expansion.  Breathing unlabored.    Extremities: Left shoulder is examined. Skin is benign.  No obvious gross abnormalities.  No palpable masses or adenopathy.  Moderate tenderness noted over anterior glenohumeral joint and rotator interval.  Motion is " near full.  She struggles with mid arc elevation..  No instability.  She has pain and weakness with elevation in the scapular plane and external rotation.  Negative external rotation lag sign.  Good motor and sensory function in lower arm and hand.  Brisk capillary refill in hand.  Palpable radial pulse.  Good skin turgor.    Imaging:  Previous x-rays including AP, scapular Y and axillary views of the left shoulder are reviewed.  The x-rays show only mild degenerative changes.  MRI of the left shoulder is reviewed along with the associated report.  Findings are listed below.    quadrilateral space, nor in the visualized axilla.     IMPRESSION:  There is advanced, chronic-appearing degenerative change at  the left glenohumeral joint with a complex degenerative tear of the  glenoid labrum. There also appears to be a chronic tear of the long head  biceps tendon.     There is a full-thickness tear of the supraspinatus tendon with  partial-thickness tear extending along the more posterior portion of the  tendon. No musculotendinous retraction or muscle atrophy is present.    Assessment/Plan:  Left shoulder osteoarthritis and associated rotator cuff tear    We had a long discussion about conservative and surgical treatment options.  We talked about surgical options in detail and all that that would potentially entail in terms of surgery itself, rehabilitation and recovery.  Specifically, we discussed both an attempted arthroscopy with repair versus an arthroplasty.  Both options were presented because I really think that the scope is reasonable for her.  Her arthritis does not appear that bad on the x-rays although the MRI suggests that it is worse.  If that is the case, she may end up with an arthroplasty despite the scope.  Her son is just now going through rotator cuff repair and she does not feel that she is up for that.  She wants to pursue the arthroplasty.  She has friends who have had that and done quite well.   The risks, benefits and alternatives were discussed.  She wants to pursue that.  We will look at getting that scheduled for her.  I want to see her back for a preoperative visit.    Brice Mayes MD    04/17/2019

## 2019-04-23 ENCOUNTER — CONVERSION ENCOUNTER (OUTPATIENT)
Dept: OTHER | Facility: HOSPITAL | Age: 69
End: 2019-04-23

## 2019-05-15 ENCOUNTER — HOSPITAL ENCOUNTER (OUTPATIENT)
Dept: LAB | Facility: HOSPITAL | Age: 69
Discharge: HOME | End: 2019-05-15
Attending: NURSE PRACTITIONER | Admitting: NURSE PRACTITIONER

## 2019-05-15 LAB
CRP SERPL-MCNC: 0.25 MG/DL (ref 0–0.7)
ERYTHROCYTE [DISTWIDTH] IN BLOOD BY AUTOMATED COUNT: 14.6 % (ref 11.5–14.5)
HCT VFR BLD AUTO: 41.2 % (ref 35–49)
HGB BLD-MCNC: 13.5 G/DL (ref 12–15)
MCH RBC QN AUTO: 30.3 PG (ref 26–32)
MCHC RBC AUTO-ENTMCNC: 32.8 G/DL (ref 32–36)
MCV RBC AUTO: 92.4 FL (ref 80–94)
PLATELET # BLD AUTO: 320 10*3/UL (ref 150–450)
PMV BLD AUTO: 8.6 FL (ref 7.4–10.4)
RBC # BLD AUTO: 4.46 10*6/UL (ref 4–5.4)
T3FREE SERPL-MCNC: 3.01 PG/ML (ref 2.39–6.79)
T4 SERPL-MCNC: 5.16 UG/DL (ref 6.1–12.2)
THYROGLOB AB SERPL-ACNC: 1 [IU]/ML (ref 0–4)
THYROPEROXIDASE AB SERPL-ACNC: <1 [IU]/ML (ref 0–9)
TSH SERPL-ACNC: 3.21 UIU/ML (ref 0.34–5.6)
WBC # BLD AUTO: 5 10*3/UL (ref 4.5–11.5)

## 2019-05-17 LAB
C3 SERPL-MCNC: 121 MG/DL (ref 79–152)
C4 SERPL-MCNC: 22.1 MG/DL (ref 18–55)

## 2019-05-23 ENCOUNTER — HOSPITAL ENCOUNTER (OUTPATIENT)
Dept: LAB | Facility: HOSPITAL | Age: 69
Discharge: HOME | End: 2019-05-23
Attending: NURSE PRACTITIONER | Admitting: NURSE PRACTITIONER

## 2019-05-24 LAB — PTH-INTACT SERPL-MCNC: 43 PG/ML (ref 11–72)

## 2019-05-31 ENCOUNTER — TELEPHONE (OUTPATIENT)
Dept: ORTHOPEDIC SURGERY | Facility: CLINIC | Age: 69
End: 2019-05-31

## 2019-06-03 ENCOUNTER — PREP FOR SURGERY (OUTPATIENT)
Dept: OTHER | Facility: HOSPITAL | Age: 69
End: 2019-06-03

## 2019-06-03 DIAGNOSIS — M75.102 TEAR OF LEFT ROTATOR CUFF, UNSPECIFIED TEAR EXTENT: Primary | ICD-10-CM

## 2019-06-03 RX ORDER — CEFAZOLIN SODIUM 2 G/100ML
2 INJECTION, SOLUTION INTRAVENOUS ONCE
Status: CANCELLED | OUTPATIENT
Start: 2019-06-13 | End: 2019-06-03

## 2019-06-03 RX ORDER — MELOXICAM 15 MG/1
15 TABLET ORAL ONCE
Status: CANCELLED | OUTPATIENT
Start: 2019-06-13 | End: 2019-06-03

## 2019-06-03 RX ORDER — ACETAMINOPHEN 500 MG
1000 TABLET ORAL ONCE
Status: CANCELLED | OUTPATIENT
Start: 2019-06-13 | End: 2019-06-03

## 2019-06-03 RX ORDER — PREGABALIN 75 MG/1
150 CAPSULE ORAL ONCE
Status: CANCELLED | OUTPATIENT
Start: 2019-06-13 | End: 2019-06-03

## 2019-06-04 VITALS
DIASTOLIC BLOOD PRESSURE: 76 MMHG | HEIGHT: 62 IN | SYSTOLIC BLOOD PRESSURE: 122 MMHG | HEART RATE: 86 BPM | HEIGHT: 62 IN | BODY MASS INDEX: 36.44 KG/M2 | WEIGHT: 204 LBS | HEART RATE: 76 BPM | HEART RATE: 91 BPM | WEIGHT: 205 LBS | HEIGHT: 62 IN | DIASTOLIC BLOOD PRESSURE: 79 MMHG | HEIGHT: 62 IN | HEART RATE: 80 BPM | WEIGHT: 201 LBS | HEART RATE: 90 BPM | WEIGHT: 206 LBS | HEART RATE: 102 BPM | HEART RATE: 101 BPM | SYSTOLIC BLOOD PRESSURE: 149 MMHG | HEIGHT: 62 IN | SYSTOLIC BLOOD PRESSURE: 145 MMHG | SYSTOLIC BLOOD PRESSURE: 138 MMHG | BODY MASS INDEX: 36.99 KG/M2 | SYSTOLIC BLOOD PRESSURE: 140 MMHG | HEART RATE: 87 BPM | BODY MASS INDEX: 36.69 KG/M2 | SYSTOLIC BLOOD PRESSURE: 129 MMHG | DIASTOLIC BLOOD PRESSURE: 87 MMHG | HEIGHT: 62 IN | BODY MASS INDEX: 37.02 KG/M2 | HEIGHT: 62 IN | WEIGHT: 201 LBS | WEIGHT: 202 LBS | HEIGHT: 62 IN | SYSTOLIC BLOOD PRESSURE: 127 MMHG | WEIGHT: 203.5 LBS | SYSTOLIC BLOOD PRESSURE: 133 MMHG | BODY MASS INDEX: 36.25 KG/M2 | HEART RATE: 89 BPM | DIASTOLIC BLOOD PRESSURE: 80 MMHG | HEART RATE: 93 BPM | DIASTOLIC BLOOD PRESSURE: 104 MMHG | WEIGHT: 201 LBS | HEART RATE: 93 BPM | HEIGHT: 62 IN | SYSTOLIC BLOOD PRESSURE: 148 MMHG | HEIGHT: 62 IN | DIASTOLIC BLOOD PRESSURE: 78 MMHG | HEIGHT: 62 IN | DIASTOLIC BLOOD PRESSURE: 72 MMHG | HEART RATE: 93 BPM | DIASTOLIC BLOOD PRESSURE: 87 MMHG | SYSTOLIC BLOOD PRESSURE: 125 MMHG | WEIGHT: 204 LBS | BODY MASS INDEX: 37.54 KG/M2 | WEIGHT: 202.4 LBS | WEIGHT: 202 LBS | DIASTOLIC BLOOD PRESSURE: 84 MMHG | BODY MASS INDEX: 37.91 KG/M2 | SYSTOLIC BLOOD PRESSURE: 130 MMHG | BODY MASS INDEX: 37.54 KG/M2 | DIASTOLIC BLOOD PRESSURE: 80 MMHG | DIASTOLIC BLOOD PRESSURE: 81 MMHG | HEART RATE: 99 BPM | DIASTOLIC BLOOD PRESSURE: 77 MMHG | WEIGHT: 206.38 LBS | BODY MASS INDEX: 37.54 KG/M2 | HEART RATE: 93 BPM | SYSTOLIC BLOOD PRESSURE: 132 MMHG | DIASTOLIC BLOOD PRESSURE: 96 MMHG | BODY MASS INDEX: 37.17 KG/M2 | DIASTOLIC BLOOD PRESSURE: 83 MMHG | SYSTOLIC BLOOD PRESSURE: 136 MMHG | HEART RATE: 101 BPM | DIASTOLIC BLOOD PRESSURE: 89 MMHG | WEIGHT: 197 LBS | SYSTOLIC BLOOD PRESSURE: 134 MMHG | HEIGHT: 62 IN | WEIGHT: 204 LBS | HEART RATE: 83 BPM | DIASTOLIC BLOOD PRESSURE: 76 MMHG | BODY MASS INDEX: 37.25 KG/M2 | WEIGHT: 203 LBS | WEIGHT: 199.38 LBS | HEART RATE: 97 BPM | DIASTOLIC BLOOD PRESSURE: 83 MMHG | WEIGHT: 202.4 LBS | SYSTOLIC BLOOD PRESSURE: 127 MMHG | BODY MASS INDEX: 37.36 KG/M2 | HEIGHT: 62 IN | BODY MASS INDEX: 37.25 KG/M2 | DIASTOLIC BLOOD PRESSURE: 75 MMHG | DIASTOLIC BLOOD PRESSURE: 81 MMHG | SYSTOLIC BLOOD PRESSURE: 144 MMHG | SYSTOLIC BLOOD PRESSURE: 150 MMHG | BODY MASS INDEX: 37.17 KG/M2 | BODY MASS INDEX: 37.98 KG/M2 | SYSTOLIC BLOOD PRESSURE: 150 MMHG | HEART RATE: 99 BPM | BODY MASS INDEX: 37.45 KG/M2 | HEIGHT: 62 IN | HEART RATE: 86 BPM | WEIGHT: 204 LBS | SYSTOLIC BLOOD PRESSURE: 143 MMHG | WEIGHT: 198 LBS | HEIGHT: 62 IN | SYSTOLIC BLOOD PRESSURE: 137 MMHG | HEIGHT: 62 IN | HEART RATE: 97 BPM | BODY MASS INDEX: 36.99 KG/M2 | HEIGHT: 62 IN | DIASTOLIC BLOOD PRESSURE: 85 MMHG

## 2019-06-04 PROBLEM — M75.102 TEAR OF LEFT ROTATOR CUFF: Status: ACTIVE | Noted: 2019-06-04

## 2019-06-07 ENCOUNTER — APPOINTMENT (OUTPATIENT)
Dept: PREADMISSION TESTING | Facility: HOSPITAL | Age: 69
End: 2019-06-07

## 2019-06-07 VITALS
BODY MASS INDEX: 42.13 KG/M2 | RESPIRATION RATE: 18 BRPM | SYSTOLIC BLOOD PRESSURE: 124 MMHG | DIASTOLIC BLOOD PRESSURE: 71 MMHG | HEIGHT: 59 IN | HEART RATE: 96 BPM | TEMPERATURE: 98.3 F | OXYGEN SATURATION: 98 % | WEIGHT: 209 LBS

## 2019-06-07 DIAGNOSIS — M75.102 TEAR OF LEFT ROTATOR CUFF, UNSPECIFIED TEAR EXTENT: ICD-10-CM

## 2019-06-07 LAB
ANION GAP SERPL CALCULATED.3IONS-SCNC: 13.3 MMOL/L
BUN BLD-MCNC: 15 MG/DL (ref 8–23)
BUN/CREAT SERPL: 19.7 (ref 7–25)
CALCIUM SPEC-SCNC: 9.6 MG/DL (ref 8.6–10.5)
CHLORIDE SERPL-SCNC: 104 MMOL/L (ref 98–107)
CO2 SERPL-SCNC: 26.7 MMOL/L (ref 22–29)
CREAT BLD-MCNC: 0.76 MG/DL (ref 0.57–1)
DEPRECATED RDW RBC AUTO: 48.6 FL (ref 37–54)
ERYTHROCYTE [DISTWIDTH] IN BLOOD BY AUTOMATED COUNT: 14 % (ref 12.3–15.4)
GFR SERPL CREATININE-BSD FRML MDRD: 75 ML/MIN/1.73
GLUCOSE BLD-MCNC: 92 MG/DL (ref 65–99)
HCT VFR BLD AUTO: 39.6 % (ref 34–46.6)
HGB BLD-MCNC: 12.2 G/DL (ref 12–15.9)
MCH RBC QN AUTO: 29.3 PG (ref 26.6–33)
MCHC RBC AUTO-ENTMCNC: 30.8 G/DL (ref 31.5–35.7)
MCV RBC AUTO: 95.2 FL (ref 79–97)
PLATELET # BLD AUTO: 316 10*3/MM3 (ref 140–450)
PMV BLD AUTO: 10.1 FL (ref 6–12)
POTASSIUM BLD-SCNC: 4 MMOL/L (ref 3.5–5.2)
RBC # BLD AUTO: 4.16 10*6/MM3 (ref 3.77–5.28)
SODIUM BLD-SCNC: 144 MMOL/L (ref 136–145)
WBC NRBC COR # BLD: 6.47 10*3/MM3 (ref 3.4–10.8)

## 2019-06-07 PROCEDURE — 93005 ELECTROCARDIOGRAM TRACING: CPT

## 2019-06-07 PROCEDURE — 36415 COLL VENOUS BLD VENIPUNCTURE: CPT

## 2019-06-07 PROCEDURE — 93010 ELECTROCARDIOGRAM REPORT: CPT | Performed by: INTERNAL MEDICINE

## 2019-06-07 PROCEDURE — 85027 COMPLETE CBC AUTOMATED: CPT | Performed by: ORTHOPAEDIC SURGERY

## 2019-06-07 PROCEDURE — 80048 BASIC METABOLIC PNL TOTAL CA: CPT | Performed by: ORTHOPAEDIC SURGERY

## 2019-06-07 RX ORDER — HYDROXYCHLOROQUINE SULFATE 200 MG/1
TABLET, FILM COATED ORAL 2 TIMES DAILY
COMMUNITY

## 2019-06-07 RX ORDER — ATORVASTATIN CALCIUM 40 MG/1
40 TABLET, FILM COATED ORAL DAILY
COMMUNITY

## 2019-06-07 RX ORDER — CHLORHEXIDINE GLUCONATE 500 MG/1
1 CLOTH TOPICAL TAKE AS DIRECTED
COMMUNITY
End: 2019-06-14 | Stop reason: HOSPADM

## 2019-06-07 RX ORDER — DULOXETIN HYDROCHLORIDE 60 MG/1
60 CAPSULE, DELAYED RELEASE ORAL NIGHTLY
COMMUNITY

## 2019-06-07 NOTE — DISCHARGE INSTRUCTIONS
Take the following medications the morning of surgery with a small sip of water:  GABAPENTIN    ARRIVE AT 0900.        General Instructions:  • Do not eat solid food after midnight the night before surgery.  • You may drink clear liquids day of surgery but must stop at least one hour before your hospital arrival time.  • It is beneficial for you to have a clear drink that contains carbohydrates the day of surgery.  We suggest a 12 to 20 ounce bottle of Gatorade or Powerade for non-diabetic patients or a 12 to 20 ounce bottle of G2 or Powerade Zero for diabetic patients. (Pediatric patients, are not advised to drink a 12 to 20 ounce carbohydrate drink)    Clear liquids are liquids you can see through.  Nothing red in color.     Plain water                               Sports drinks  Sodas                                   Gelatin (Jell-O)  Fruit juices without pulp such as white grape juice and apple juice  Popsicles that contain no fruit or yogurt  Tea or coffee (no cream or milk added)  Gatorade / Powerade  G2 / Powerade Zero    • Infants may have breast milk up to four hours before surgery.  • Infants drinking formula may drink formula up to six hours before surgery.   • Patients who avoid smoking, chewing tobacco and alcohol for 4 weeks prior to surgery have a reduced risk of post-operative complications.  Quit smoking as many days before surgery as you can.  • Do not smoke, use chewing tobacco or drink alcohol the day of surgery.   • If applicable bring your C-PAP/ BI-PAP machine.  • Bring any papers given to you in the doctor’s office.  • Wear clean comfortable clothes and socks.  • Do not wear contact lenses, false eyelashes or make-up.  Bring a case for your glasses.   • Bring crutches or walker if applicable.  • Remove all piercings.  Leave jewelry and any other valuables at home.  • Hair extensions with metal clips must be removed prior to surgery.  • The Pre-Admission Testing nurse will instruct you to  bring medications if unable to obtain an accurate list in Pre-Admission Testing.        If you were given a blood bank ID arm band remember to bring it with you the day of surgery.    Preventing a Surgical Site Infection:  • For 2 to 3 days before surgery, avoid shaving with a razor because the razor can irritate skin and make it easier to develop an infection.    • Any areas of open skin can increase the risk of a post-operative wound infection by allowing bacteria to enter and travel throughout the body.  Notify your surgeon if you have any skin wounds / rashes even if it is not near the expected surgical site.  The area will need assessed to determine if surgery should be delayed until it is healed.  • The night prior to surgery sleep in a clean bed with clean clothing.  Do not allow pets to sleep with you.  • Shower on the morning of surgery using a fresh bar of anti-bacterial soap (such as Dial) and clean washcloth.  Dry with a clean towel and dress in clean clothing.  • Ask your surgeon if you will be receiving antibiotics prior to surgery.  • Make sure you, your family, and all healthcare providers clean their hands with soap and water or an alcohol based hand  before caring for you or your wound.    Day of surgery:  Upon arrival, a Pre-op nurse and Anesthesiologist will review your health history, obtain vital signs, and answer questions you may have.  The only belongings needed at this time will be a list of your home medications and if applicable your C-PAP/BI-PAP machine.  If you are staying overnight your family can leave the rest of your belongings in the car and bring them to your room later.  A Pre-op nurse will start an IV and you may receive medication in preparation for surgery, including something to help you relax.  Your family will be able to see you in the Pre-op area.  While you are in surgery your family should notify the waiting room  if they leave the waiting room area  and provide a contact phone number.    Please be aware that surgery does come with discomfort.  We want to make every effort to control your discomfort so please discuss any uncontrolled symptoms with your nurse.   Your doctor will most likely have prescribed pain medications.      If you are going home after surgery you will receive individualized written care instructions before being discharged.  A responsible adult must drive you to and from the hospital on the day of your surgery and stay with you for 24 hours.    If you are staying overnight following surgery, you will be transported to your hospital room following the recovery period.  [facility] has all private rooms.    You have received a list of surgical assistants for your reference.  If you have any questions please call Pre-Admission Testing at [phone].  Deductibles and co-payments are collected on the day of service. Please be prepared to pay the required co-pay, deductible or deposit on the day of service as defined by your plan.    2% CHLORAHEXIDINE GLUCONATE* CLOTH  Preparing or “prepping” skin before surgery can reduce the risk of infection at the surgical site. To make the process easier, [facility] has chosen disposable cloths moistened with a rinse-free, 2% Chlorhexidine Gluconate (CHG) antiseptic solution. The steps below outline the prepping process and should be carefully followed.        Use the prep cloth on the area that is circled in the diagram             Directions Night before Surgery  1) Shower using a fresh bar of anti-bacterial soap (such as Dial) and clean washcloth.  Use a clean towel to completely dry your skin.  2) Do not use any lotions, oils or creams on your skin.  3) Open the package and remove 1 cloth, wipe your skin for 30 seconds in a circular motion.  Allow to dry for 3 minutes.  4) Repeat #3 with second cloth.  5) Do not touch your eyes, ears, or mouth with the prep cloth.  6) Allow the  wet prep solution to air dry.  7) Discard the prep cloth and wash your hands with soap and water.   8) Dress in clean bed clothes and sleep on fresh clean bed sheets.   9) You may experience some temporary itching after the prep.    Directions Day of Surgery  1) Repeat steps 1,2,3,4,5,6,7, and 9.   2) Dress in clean clothes before coming to the hospital.    BACTROBAN NASAL OINTMENT  There are many germs normally in your nose. Bactroban is an ointment that will help reduce these germs. Please follow these instructions for Bactroban use:      ____The day before surgery in the morning  Date________    ____The day before surgery in the evening              Date________    ____The day of surgery in the morning    Date________    **Squirt ½ package of Bactroban Ointment onto a cotton applicator and apply to inside of 1st nostril.  Squirt the remaining Bactroban and apply to the inside of the other nostril.    PERIDEX- ORAL:  Use only if your surgeon has ordered  Use the night before and morning of surgery - Swish, gargle, and spit - do not swallow.

## 2019-06-10 ENCOUNTER — TELEPHONE (OUTPATIENT)
Dept: ORTHOPEDIC SURGERY | Facility: CLINIC | Age: 69
End: 2019-06-10

## 2019-06-10 NOTE — TELEPHONE ENCOUNTER
PT has a UTI and is being treated by her PCP. She was questioning if she could still have surgery Thursday. PLease call.

## 2019-06-11 NOTE — TELEPHONE ENCOUNTER
Treatment is typically 3 days.  Will her treatment be completed by then?  If not, we should delay her surgery.  If her treatment will be completed then we do not have to reschedule.

## 2019-06-12 ENCOUNTER — OFFICE VISIT (OUTPATIENT)
Dept: ORTHOPEDIC SURGERY | Facility: CLINIC | Age: 69
End: 2019-06-12

## 2019-06-12 VITALS — BODY MASS INDEX: 41.33 KG/M2 | HEIGHT: 59 IN | WEIGHT: 205 LBS | TEMPERATURE: 98.3 F

## 2019-06-12 DIAGNOSIS — Z01.818 PREOP EXAMINATION: Primary | ICD-10-CM

## 2019-06-12 PROCEDURE — S0260 H&P FOR SURGERY: HCPCS | Performed by: NURSE PRACTITIONER

## 2019-06-12 RX ORDER — NITROFURANTOIN 25; 75 MG/1; MG/1
100 CAPSULE ORAL 2 TIMES DAILY
COMMUNITY
Start: 2019-06-10 | End: 2019-06-17

## 2019-06-12 NOTE — PROGRESS NOTES
History & Physical         Patient: Annalisa Bhagat    YOB: 1950    Medical Record Number: [id number]    Chief Complaints: No chief complaint on file.      History of Present Illness: 69 y.o. female comes in today of upcoming shoulder replacement surgery. Reports a long history of progressively worsening pain, motion loss, and dysfunction.  Describes current pain as severe.  Has failed prolonged conservative treatment.  Denies any new complaints or issues.    Allergies:   Allergies   Allergen Reactions   • Hydromorphone Itching and Rash   • Adhesive Tape Irritability   • Zolmitriptan Other (See Comments)     Hx of stroke    • Amoxicillin-Pot Clavulanate Itching and Rash       Medications:   Home Medications:    Current Outpatient Medications:   •  apixaban (ELIQUIS) 5 MG tablet tablet, Take 5 mg by mouth 2 (Two) Times a Day. HOLD PER MD INSTR, Disp: , Rfl:   •  Armodafinil 250 MG tablet, Take 250 mg by mouth Daily., Disp: , Rfl: 1  •  atorvastatin (LIPITOR) 40 MG tablet, Take 40 mg by mouth Daily., Disp: , Rfl:   •  Azelastine-Fluticasone 137-50 MCG/ACT suspension, 1-2 sprays into the nostril(s) as directed by provider., Disp: , Rfl:   •  B Complex Vitamins (VITAMIN B COMPLEX PO), Take 1 each by mouth Daily., Disp: , Rfl:   •  butalbital-acetaminophen-caffeine (FIORICET, ESGIC) -40 MG per tablet, Take 1 tablet by mouth Every 4 (Four) Hours As Needed., Disp: , Rfl:   •  calcium citrate (CALCITRATE) 950 MG tablet, Take 1 tablet by mouth., Disp: , Rfl:   •  Chlorhexidine Gluconate Cloth 2 % pads, Apply  topically., Disp: , Rfl:   •  DULoxetine (CYMBALTA) 60 MG capsule, Take 60 mg by mouth Every Night., Disp: , Rfl:   •  Erenumab-aooe (AIMOVIG) 70 MG/ML prefilled syringe, Inject 70 mg under the skin into the appropriate area as directed. HASN'T STARTED YET., Disp: , Rfl:   •  gabapentin (NEURONTIN) 600 MG tablet, Take 600 mg by mouth 4 (Four) Times a Day., Disp: , Rfl:   •  hydrocortisone 2.5 %  cream, , Disp: , Rfl:   •  hydroxychloroquine (PLAQUENIL) 200 MG tablet, Take  by mouth 2 (Two) Times a Day., Disp: , Rfl:   •  Melatonin 10 MG tablet, Take 1 tablet by mouth Daily., Disp: , Rfl:   •  methocarbamol (ROBAXIN) 750 MG tablet, Take 750 mg by mouth 3 (Three) Times a Day., Disp: , Rfl:   •  mupirocin (BACTROBAN) 2 % nasal ointment, into the nostril(s) as directed by provider 2 (Two) Times a Day., Disp: , Rfl:   •  omeprazole (priLOSEC) 20 MG capsule, Take 20 mg by mouth Daily., Disp: , Rfl:   •  promethazine (PHENERGAN) 25 MG tablet, Take 25 mg by mouth As Needed (MIGRAINES)., Disp: , Rfl:   •  pyridoxine (VITAMIN B-6) 200 MG tablet, Take 200 mg by mouth Daily., Disp: , Rfl:   •  teriparatide (FORTEO) 600 MCG/2.4ML injection, Inject 20 mcg under the skin into the appropriate area as directed Daily., Disp: , Rfl:   •  traMADol (ULTRAM) 50 MG tablet, Take 50 mg by mouth Daily., Disp: , Rfl:   •  triamcinolone (KENALOG) 0.025 % cream, by Intratympanic route., Disp: , Rfl:   •  vitamin C (ASCORBIC ACID) 250 MG tablet, Take 1,000 mg by mouth Daily., Disp: , Rfl:   No current facility-administered medications for this visit.     Facility-Administered Medications Ordered in Other Visits:   •  mupirocin (BACTROBAN) 2 % nasal ointment, , Nasal, BID, Joeleston, Lindy L, APRN    Past Medical History:   Diagnosis Date   • Fibromyalgia    • GERD (gastroesophageal reflux disease)    • Hyperlipidemia    • Kidney stone    • Lupus    • Migraines    • Narcolepsy    • Numbness and tingling    • Recurrent UTI    • Rheumatoid arthritis (CMS/HCC)    • Scoliosis    • Seasonal allergies    • Shoulder pain, left    • Sleep apnea    • Spinal stenosis    • Stress incontinence    • Stroke (CMS/HCC)         Past Surgical History:   Procedure Laterality Date   • BACK SURGERY     • CARDIAC CATHETERIZATION     • CARPAL TUNNEL RELEASE     • CYSTOSCOPY W/ URETEROSCOPY W/ LITHOTRIPSY     • HAND SURGERY     • HYSTERECTOMY     • RECTOVAGINAL  "FISTULA CLOSURE     • SACRAL NERVE STIMULATOR PLACEMENT          Social History     Occupational History   • Not on file   Tobacco Use   • Smoking status: Never Smoker   Substance and Sexual Activity   • Alcohol use: Yes     Comment: OCC   • Drug use: No   • Sexual activity: Not on file      Social History     Social History Narrative   • Not on file          Family History   Problem Relation Age of Onset   • Hypotension Mother    • Hypotension Father    • Colon cancer Father    • Stroke Father    • Colon cancer Maternal Grandmother    • Heart disease Maternal Uncle    • Hypertension Paternal Aunt    • Diabetes Paternal Aunt    • Heart failure Paternal Aunt    • Malig Hyperthermia Neg Hx        Review of Systems:     Constitutional:  Denies fever, shaking or chills   Eyes:  Denies change in visual acuity   HEENT:  Denies nasal congestion or sore throat   Respiratory:  Denies cough or shortness of breath   Cardiovascular:  Denies chest pain or edema   GI:  Denies abdominal pain, nausea, vomiting, bloody stools or diarrhea   Musculoskeletal:  Denies numbness tingling or loss of motor function except as outlined above in history of present illness.  Integument:  Denies rash, lesion or ulceration   Neurologic:  Denies headache or focal weakness, deficits      All other pertinent positives and negatives as noted above in HPI.    Physical Exam: 69 y.o. female    Vitals:    06/12/19 1127   Temp: 98.3 °F (36.8 °C)   Weight: 93 kg (205 lb)   Height: 149.9 cm (59\")     General:  Patient is awake and alert.  Appears in no acute distress or discomfort.    Psych:  Affect and demeanor are appropriate.    Eyes:  Conjunctiva and sclera appear grossly normal.  Eyes track well and EOM seem to be intact.    Ears:  No gross abnormalities.  Hearing adequate for the exam.    Cardiovascular:  Regular rate and rhythm.    Lungs:  Good chest expansion.  Breathing unlabored.    Lymph:  No palpable adenopathy about neck or axilla.    Neck:  " Supple.  Normal ROM.  Negative Spurling's for shoulder or arm pain.    Left upper extremity:  Skin benign and intact without evidence for swelling, masses or atrophy.  No palpable masses.  Moderate anterior tenderness.  Shoulder ROM limited and uncomfortable.  No evident instability or apprehension.  Weakness with elevation in the scapular plane and external rotation.  Good strength in deltoid, wrist and hand.  Intact sensation in arm, hand.  Palpable radial pulse with brisk cap refill.    Diagnostic Tests:  Lab Results   Component Value Date    GLUCOSE 92 06/07/2019    CALCIUM 9.6 06/07/2019     06/07/2019    K 4.0 06/07/2019    CO2 26.7 06/07/2019     06/07/2019    BUN 15 06/07/2019    CREATININE 0.76 06/07/2019    EGFRIFAFRI >60 10/17/2017    EGFRIFNONA 75 06/07/2019    BCR 19.7 06/07/2019    ANIONGAP 13.3 06/07/2019     Lab Results   Component Value Date    WBC 6.47 06/07/2019    HGB 12.2 06/07/2019    HCT 39.6 06/07/2019    MCV 95.2 06/07/2019     06/07/2019     Lab Results   Component Value Date    INR 1.0 10/19/2017    INR 1.0 (L) 10/19/2017    INR 0.9 10/17/2017    PROTIME 11.1 10/19/2017    PROTIME 11.1 (L) 10/19/2017    PROTIME 10.3 10/17/2017     Imaging:  Previous x-rays of the shoulder are reviewed.  The x-rays show only mild degenerative changes.  MRI of the left shoulder is reviewed along with the associated report.  Findings are listed below.     IMPRESSION:  There is advanced, chronic-appearing degenerative change at  the left glenohumeral joint with a complex degenerative tear of the  glenoid labrum. There also appears to be a chronic tear of the long head  biceps tendon.     There is a full-thickness tear of the supraspinatus tendon with  partial-thickness tear extending along the more posterior portion of the  tendon. No musculotendinous retraction or muscle atrophy is present.    Assessment:  Left shoulder rotator cuff tear arthropathy    Plan: We had a thorough discussion  regarding the risks, benefits and alternatives to an arthroplasty and non-surgical management versus surgery.  I explained that surgical risks include infection, hematoma, hardware related complications including failure of fixation, loosening, fracture, persistent pain and/or loss of motion, iatrogenic nerve and/or blood vessel injury resulting in permanent weakness, numbness or dysfunction, DVT, PE, positioning related neuropraxia, and anesthesia related complications resulting in death.  We discussed the indication for a reverse as opposed to a standard total shoulder and the risks inherent to the reverse including notching, glenoid loosening, instability, and traction related neuropraxia, any of which could result in persistent pain or problems requiring further surgery.  Lastly, we discussed the rehab and all that will be expected of the patient post operatively to ensure an optimal outcome.  The patient voiced understanding of the risks, benefits, and alternative forms of treatment that were discussed and the patient consents to proceed with the reverse arthroplasty.      Dr. Mayes spoke to the patient briefly and is aware of her recent diagnosis of UTI and treatment.  He is okay to proceed with surgery as she is no longer symptomatic.    Date: 6/12/2019    JENNY Nuñez

## 2019-06-12 NOTE — H&P (VIEW-ONLY)
History & Physical         Patient: Annalisa Bhagat    YOB: 1950    Medical Record Number: [id number]    Chief Complaints: No chief complaint on file.      History of Present Illness: 69 y.o. female comes in today of upcoming shoulder replacement surgery. Reports a long history of progressively worsening pain, motion loss, and dysfunction.  Describes current pain as severe.  Has failed prolonged conservative treatment.  Denies any new complaints or issues.    Allergies:   Allergies   Allergen Reactions   • Hydromorphone Itching and Rash   • Adhesive Tape Irritability   • Zolmitriptan Other (See Comments)     Hx of stroke    • Amoxicillin-Pot Clavulanate Itching and Rash       Medications:   Home Medications:    Current Outpatient Medications:   •  apixaban (ELIQUIS) 5 MG tablet tablet, Take 5 mg by mouth 2 (Two) Times a Day. HOLD PER MD INSTR, Disp: , Rfl:   •  Armodafinil 250 MG tablet, Take 250 mg by mouth Daily., Disp: , Rfl: 1  •  atorvastatin (LIPITOR) 40 MG tablet, Take 40 mg by mouth Daily., Disp: , Rfl:   •  Azelastine-Fluticasone 137-50 MCG/ACT suspension, 1-2 sprays into the nostril(s) as directed by provider., Disp: , Rfl:   •  B Complex Vitamins (VITAMIN B COMPLEX PO), Take 1 each by mouth Daily., Disp: , Rfl:   •  butalbital-acetaminophen-caffeine (FIORICET, ESGIC) -40 MG per tablet, Take 1 tablet by mouth Every 4 (Four) Hours As Needed., Disp: , Rfl:   •  calcium citrate (CALCITRATE) 950 MG tablet, Take 1 tablet by mouth., Disp: , Rfl:   •  Chlorhexidine Gluconate Cloth 2 % pads, Apply  topically., Disp: , Rfl:   •  DULoxetine (CYMBALTA) 60 MG capsule, Take 60 mg by mouth Every Night., Disp: , Rfl:   •  Erenumab-aooe (AIMOVIG) 70 MG/ML prefilled syringe, Inject 70 mg under the skin into the appropriate area as directed. HASN'T STARTED YET., Disp: , Rfl:   •  gabapentin (NEURONTIN) 600 MG tablet, Take 600 mg by mouth 4 (Four) Times a Day., Disp: , Rfl:   •  hydrocortisone 2.5 %  cream, , Disp: , Rfl:   •  hydroxychloroquine (PLAQUENIL) 200 MG tablet, Take  by mouth 2 (Two) Times a Day., Disp: , Rfl:   •  Melatonin 10 MG tablet, Take 1 tablet by mouth Daily., Disp: , Rfl:   •  methocarbamol (ROBAXIN) 750 MG tablet, Take 750 mg by mouth 3 (Three) Times a Day., Disp: , Rfl:   •  mupirocin (BACTROBAN) 2 % nasal ointment, into the nostril(s) as directed by provider 2 (Two) Times a Day., Disp: , Rfl:   •  omeprazole (priLOSEC) 20 MG capsule, Take 20 mg by mouth Daily., Disp: , Rfl:   •  promethazine (PHENERGAN) 25 MG tablet, Take 25 mg by mouth As Needed (MIGRAINES)., Disp: , Rfl:   •  pyridoxine (VITAMIN B-6) 200 MG tablet, Take 200 mg by mouth Daily., Disp: , Rfl:   •  teriparatide (FORTEO) 600 MCG/2.4ML injection, Inject 20 mcg under the skin into the appropriate area as directed Daily., Disp: , Rfl:   •  traMADol (ULTRAM) 50 MG tablet, Take 50 mg by mouth Daily., Disp: , Rfl:   •  triamcinolone (KENALOG) 0.025 % cream, by Intratympanic route., Disp: , Rfl:   •  vitamin C (ASCORBIC ACID) 250 MG tablet, Take 1,000 mg by mouth Daily., Disp: , Rfl:   No current facility-administered medications for this visit.     Facility-Administered Medications Ordered in Other Visits:   •  mupirocin (BACTROBAN) 2 % nasal ointment, , Nasal, BID, Joeleston, Lindy L, APRN    Past Medical History:   Diagnosis Date   • Fibromyalgia    • GERD (gastroesophageal reflux disease)    • Hyperlipidemia    • Kidney stone    • Lupus    • Migraines    • Narcolepsy    • Numbness and tingling    • Recurrent UTI    • Rheumatoid arthritis (CMS/HCC)    • Scoliosis    • Seasonal allergies    • Shoulder pain, left    • Sleep apnea    • Spinal stenosis    • Stress incontinence    • Stroke (CMS/HCC)         Past Surgical History:   Procedure Laterality Date   • BACK SURGERY     • CARDIAC CATHETERIZATION     • CARPAL TUNNEL RELEASE     • CYSTOSCOPY W/ URETEROSCOPY W/ LITHOTRIPSY     • HAND SURGERY     • HYSTERECTOMY     • RECTOVAGINAL  "FISTULA CLOSURE     • SACRAL NERVE STIMULATOR PLACEMENT          Social History     Occupational History   • Not on file   Tobacco Use   • Smoking status: Never Smoker   Substance and Sexual Activity   • Alcohol use: Yes     Comment: OCC   • Drug use: No   • Sexual activity: Not on file      Social History     Social History Narrative   • Not on file          Family History   Problem Relation Age of Onset   • Hypotension Mother    • Hypotension Father    • Colon cancer Father    • Stroke Father    • Colon cancer Maternal Grandmother    • Heart disease Maternal Uncle    • Hypertension Paternal Aunt    • Diabetes Paternal Aunt    • Heart failure Paternal Aunt    • Malig Hyperthermia Neg Hx        Review of Systems:     Constitutional:  Denies fever, shaking or chills   Eyes:  Denies change in visual acuity   HEENT:  Denies nasal congestion or sore throat   Respiratory:  Denies cough or shortness of breath   Cardiovascular:  Denies chest pain or edema   GI:  Denies abdominal pain, nausea, vomiting, bloody stools or diarrhea   Musculoskeletal:  Denies numbness tingling or loss of motor function except as outlined above in history of present illness.  Integument:  Denies rash, lesion or ulceration   Neurologic:  Denies headache or focal weakness, deficits      All other pertinent positives and negatives as noted above in HPI.    Physical Exam: 69 y.o. female    Vitals:    06/12/19 1127   Temp: 98.3 °F (36.8 °C)   Weight: 93 kg (205 lb)   Height: 149.9 cm (59\")     General:  Patient is awake and alert.  Appears in no acute distress or discomfort.    Psych:  Affect and demeanor are appropriate.    Eyes:  Conjunctiva and sclera appear grossly normal.  Eyes track well and EOM seem to be intact.    Ears:  No gross abnormalities.  Hearing adequate for the exam.    Cardiovascular:  Regular rate and rhythm.    Lungs:  Good chest expansion.  Breathing unlabored.    Lymph:  No palpable adenopathy about neck or axilla.    Neck:  " Supple.  Normal ROM.  Negative Spurling's for shoulder or arm pain.    Left upper extremity:  Skin benign and intact without evidence for swelling, masses or atrophy.  No palpable masses.  Moderate anterior tenderness.  Shoulder ROM limited and uncomfortable.  No evident instability or apprehension.  Weakness with elevation in the scapular plane and external rotation.  Good strength in deltoid, wrist and hand.  Intact sensation in arm, hand.  Palpable radial pulse with brisk cap refill.    Diagnostic Tests:  Lab Results   Component Value Date    GLUCOSE 92 06/07/2019    CALCIUM 9.6 06/07/2019     06/07/2019    K 4.0 06/07/2019    CO2 26.7 06/07/2019     06/07/2019    BUN 15 06/07/2019    CREATININE 0.76 06/07/2019    EGFRIFAFRI >60 10/17/2017    EGFRIFNONA 75 06/07/2019    BCR 19.7 06/07/2019    ANIONGAP 13.3 06/07/2019     Lab Results   Component Value Date    WBC 6.47 06/07/2019    HGB 12.2 06/07/2019    HCT 39.6 06/07/2019    MCV 95.2 06/07/2019     06/07/2019     Lab Results   Component Value Date    INR 1.0 10/19/2017    INR 1.0 (L) 10/19/2017    INR 0.9 10/17/2017    PROTIME 11.1 10/19/2017    PROTIME 11.1 (L) 10/19/2017    PROTIME 10.3 10/17/2017     Imaging:  Previous x-rays of the shoulder are reviewed.  The x-rays show only mild degenerative changes.  MRI of the left shoulder is reviewed along with the associated report.  Findings are listed below.     IMPRESSION:  There is advanced, chronic-appearing degenerative change at  the left glenohumeral joint with a complex degenerative tear of the  glenoid labrum. There also appears to be a chronic tear of the long head  biceps tendon.     There is a full-thickness tear of the supraspinatus tendon with  partial-thickness tear extending along the more posterior portion of the  tendon. No musculotendinous retraction or muscle atrophy is present.    Assessment:  Left shoulder rotator cuff tear arthropathy    Plan: We had a thorough discussion  regarding the risks, benefits and alternatives to an arthroplasty and non-surgical management versus surgery.  I explained that surgical risks include infection, hematoma, hardware related complications including failure of fixation, loosening, fracture, persistent pain and/or loss of motion, iatrogenic nerve and/or blood vessel injury resulting in permanent weakness, numbness or dysfunction, DVT, PE, positioning related neuropraxia, and anesthesia related complications resulting in death.  We discussed the indication for a reverse as opposed to a standard total shoulder and the risks inherent to the reverse including notching, glenoid loosening, instability, and traction related neuropraxia, any of which could result in persistent pain or problems requiring further surgery.  Lastly, we discussed the rehab and all that will be expected of the patient post operatively to ensure an optimal outcome.  The patient voiced understanding of the risks, benefits, and alternative forms of treatment that were discussed and the patient consents to proceed with the reverse arthroplasty.      Dr. Mayes spoke to the patient briefly and is aware of her recent diagnosis of UTI and treatment.  He is okay to proceed with surgery as she is no longer symptomatic.    Date: 6/12/2019    JENNY Nuñez

## 2019-06-13 ENCOUNTER — ANESTHESIA EVENT (OUTPATIENT)
Dept: PERIOP | Facility: HOSPITAL | Age: 69
End: 2019-06-13

## 2019-06-13 ENCOUNTER — APPOINTMENT (OUTPATIENT)
Dept: GENERAL RADIOLOGY | Facility: HOSPITAL | Age: 69
End: 2019-06-13

## 2019-06-13 ENCOUNTER — HOSPITAL ENCOUNTER (INPATIENT)
Facility: HOSPITAL | Age: 69
LOS: 1 days | Discharge: HOME | End: 2019-06-14
Attending: ORTHOPAEDIC SURGERY | Admitting: ORTHOPAEDIC SURGERY

## 2019-06-13 ENCOUNTER — ANESTHESIA (OUTPATIENT)
Dept: PERIOP | Facility: HOSPITAL | Age: 69
End: 2019-06-13

## 2019-06-13 DIAGNOSIS — M75.102 TEAR OF LEFT ROTATOR CUFF, UNSPECIFIED TEAR EXTENT: ICD-10-CM

## 2019-06-13 PROCEDURE — 63710000001 PROMETHAZINE PER 25 MG: Performed by: ORTHOPAEDIC SURGERY

## 2019-06-13 PROCEDURE — 25010000003 CEFAZOLIN IN DEXTROSE 2-4 GM/100ML-% SOLUTION: Performed by: NURSE PRACTITIONER

## 2019-06-13 PROCEDURE — 25010000002 ONDANSETRON PER 1 MG: Performed by: ANESTHESIOLOGY

## 2019-06-13 PROCEDURE — 25010000002 PHENYLEPHRINE PER 1 ML: Performed by: ANESTHESIOLOGY

## 2019-06-13 PROCEDURE — 25010000002 MIDAZOLAM PER 1 MG: Performed by: ANESTHESIOLOGY

## 2019-06-13 PROCEDURE — C1776 JOINT DEVICE (IMPLANTABLE): HCPCS | Performed by: ORTHOPAEDIC SURGERY

## 2019-06-13 PROCEDURE — 73020 X-RAY EXAM OF SHOULDER: CPT

## 2019-06-13 PROCEDURE — 25010000002 FENTANYL CITRATE (PF) 100 MCG/2ML SOLUTION: Performed by: ANESTHESIOLOGY

## 2019-06-13 PROCEDURE — 23472 RECONSTRUCT SHOULDER JOINT: CPT | Performed by: ORTHOPAEDIC SURGERY

## 2019-06-13 PROCEDURE — 25010000002 PROPOFOL 10 MG/ML EMULSION: Performed by: ANESTHESIOLOGY

## 2019-06-13 PROCEDURE — 25010000003 CEFAZOLIN IN DEXTROSE 2-4 GM/100ML-% SOLUTION: Performed by: ORTHOPAEDIC SURGERY

## 2019-06-13 PROCEDURE — C9290 INJ, BUPIVACAINE LIPOSOME: HCPCS | Performed by: ANESTHESIOLOGY

## 2019-06-13 PROCEDURE — 25010000002 VANCOMYCIN 10 G RECONSTITUTED SOLUTION: Performed by: NURSE PRACTITIONER

## 2019-06-13 PROCEDURE — C1713 ANCHOR/SCREW BN/BN,TIS/BN: HCPCS | Performed by: ORTHOPAEDIC SURGERY

## 2019-06-13 PROCEDURE — 25010000002 VANCOMYCIN 10 G RECONSTITUTED SOLUTION: Performed by: ORTHOPAEDIC SURGERY

## 2019-06-13 PROCEDURE — 25010000002 NEOSTIGMINE PER 0.5 MG: Performed by: ANESTHESIOLOGY

## 2019-06-13 PROCEDURE — 25010000003 MEPIVACAINE PER 10 ML: Performed by: ANESTHESIOLOGY

## 2019-06-13 PROCEDURE — 25010000002 ROPIVACAINE PER 1 MG: Performed by: ANESTHESIOLOGY

## 2019-06-13 PROCEDURE — 25010000003 BUPIVACAINE LIPOSOME 1.3 % SUSPENSION: Performed by: ANESTHESIOLOGY

## 2019-06-13 PROCEDURE — 0RRK00Z REPLACEMENT OF LEFT SHOULDER JOINT WITH REVERSE BALL AND SOCKET SYNTHETIC SUBSTITUTE, OPEN APPROACH: ICD-10-PCS | Performed by: ORTHOPAEDIC SURGERY

## 2019-06-13 DEVICE — SCRW COMPRNSV CNTRL 6.5X35MM REUS: Type: IMPLANTABLE DEVICE | Site: SHOULDER | Status: FUNCTIONAL

## 2019-06-13 DEVICE — TRY HUM/SHLDR COMPREHENSIVE/REVERSE MINI COCR STD PLS3MM 40M: Type: IMPLANTABLE DEVICE | Site: SHOULDER | Status: FUNCTIONAL

## 2019-06-13 DEVICE — SCRW FIX LK HEX 4.75X20MM: Type: IMPLANTABLE DEVICE | Site: SHOULDER | Status: FUNCTIONAL

## 2019-06-13 DEVICE — BASEPLT GLEN COMPR MINI W TPR ADAPTR 25: Type: IMPLANTABLE DEVICE | Site: SHOULDER | Status: FUNCTIONAL

## 2019-06-13 DEVICE — IMPLANTABLE DEVICE: Type: IMPLANTABLE DEVICE | Site: SHOULDER | Status: FUNCTIONAL

## 2019-06-13 DEVICE — GLENOSPHERE VERSA DIAL STD TI 36MM: Type: IMPLANTABLE DEVICE | Site: SHOULDER | Status: FUNCTIONAL

## 2019-06-13 DEVICE — BEAR HUM PROLNG STD 36MM: Type: IMPLANTABLE DEVICE | Site: SHOULDER | Status: FUNCTIONAL

## 2019-06-13 DEVICE — SCRW FIX LK HEX 4.75X15MM: Type: IMPLANTABLE DEVICE | Site: SHOULDER | Status: FUNCTIONAL

## 2019-06-13 DEVICE — STEM HUM/SHLDR COMPREHENSIVE MINI 11X83MM: Type: IMPLANTABLE DEVICE | Site: SHOULDER | Status: FUNCTIONAL

## 2019-06-13 RX ORDER — CEFAZOLIN SODIUM 2 G/100ML
2 INJECTION, SOLUTION INTRAVENOUS ONCE
Status: COMPLETED | OUTPATIENT
Start: 2019-06-13 | End: 2019-06-13

## 2019-06-13 RX ORDER — MIDAZOLAM HYDROCHLORIDE 1 MG/ML
2 INJECTION INTRAMUSCULAR; INTRAVENOUS
Status: DISCONTINUED | OUTPATIENT
Start: 2019-06-13 | End: 2019-06-13 | Stop reason: HOSPADM

## 2019-06-13 RX ORDER — ONDANSETRON 4 MG/1
4 TABLET, FILM COATED ORAL EVERY 6 HOURS PRN
Status: DISCONTINUED | OUTPATIENT
Start: 2019-06-13 | End: 2019-06-14 | Stop reason: HOSPADM

## 2019-06-13 RX ORDER — NALOXONE HCL 0.4 MG/ML
0.1 VIAL (ML) INJECTION
Status: DISCONTINUED | OUTPATIENT
Start: 2019-06-13 | End: 2019-06-14 | Stop reason: HOSPADM

## 2019-06-13 RX ORDER — SODIUM CHLORIDE 0.9 % (FLUSH) 0.9 %
1-10 SYRINGE (ML) INJECTION AS NEEDED
Status: DISCONTINUED | OUTPATIENT
Start: 2019-06-13 | End: 2019-06-13 | Stop reason: HOSPADM

## 2019-06-13 RX ORDER — DIPHENHYDRAMINE HYDROCHLORIDE 50 MG/ML
12.5 INJECTION INTRAMUSCULAR; INTRAVENOUS
Status: DISCONTINUED | OUTPATIENT
Start: 2019-06-13 | End: 2019-06-13 | Stop reason: HOSPADM

## 2019-06-13 RX ORDER — MEPERIDINE HYDROCHLORIDE 25 MG/ML
12.5 INJECTION INTRAMUSCULAR; INTRAVENOUS; SUBCUTANEOUS
Status: DISCONTINUED | OUTPATIENT
Start: 2019-06-13 | End: 2019-06-13 | Stop reason: HOSPADM

## 2019-06-13 RX ORDER — ACETAMINOPHEN 325 MG/1
650 TABLET ORAL EVERY 4 HOURS PRN
Status: DISCONTINUED | OUTPATIENT
Start: 2019-06-13 | End: 2019-06-14 | Stop reason: HOSPADM

## 2019-06-13 RX ORDER — ROPIVACAINE HYDROCHLORIDE 5 MG/ML
INJECTION, SOLUTION EPIDURAL; INFILTRATION; PERINEURAL
Status: COMPLETED | OUTPATIENT
Start: 2019-06-13 | End: 2019-06-13

## 2019-06-13 RX ORDER — LIDOCAINE HYDROCHLORIDE 10 MG/ML
0.5 INJECTION, SOLUTION EPIDURAL; INFILTRATION; INTRACAUDAL; PERINEURAL ONCE AS NEEDED
Status: DISCONTINUED | OUTPATIENT
Start: 2019-06-13 | End: 2019-06-13 | Stop reason: HOSPADM

## 2019-06-13 RX ORDER — LIDOCAINE HYDROCHLORIDE 20 MG/ML
INJECTION, SOLUTION INFILTRATION; PERINEURAL AS NEEDED
Status: DISCONTINUED | OUTPATIENT
Start: 2019-06-13 | End: 2019-06-13 | Stop reason: SURG

## 2019-06-13 RX ORDER — PROPOFOL 10 MG/ML
VIAL (ML) INTRAVENOUS AS NEEDED
Status: DISCONTINUED | OUTPATIENT
Start: 2019-06-13 | End: 2019-06-13 | Stop reason: SURG

## 2019-06-13 RX ORDER — GLYCOPYRROLATE 0.2 MG/ML
INJECTION INTRAMUSCULAR; INTRAVENOUS AS NEEDED
Status: DISCONTINUED | OUTPATIENT
Start: 2019-06-13 | End: 2019-06-13 | Stop reason: SURG

## 2019-06-13 RX ORDER — MAGNESIUM HYDROXIDE 1200 MG/15ML
LIQUID ORAL AS NEEDED
Status: DISCONTINUED | OUTPATIENT
Start: 2019-06-13 | End: 2019-06-13 | Stop reason: HOSPADM

## 2019-06-13 RX ORDER — PROMETHAZINE HYDROCHLORIDE 25 MG/1
25 TABLET ORAL AS NEEDED
Status: DISCONTINUED | OUTPATIENT
Start: 2019-06-13 | End: 2019-06-14 | Stop reason: HOSPADM

## 2019-06-13 RX ORDER — ACETAMINOPHEN 325 MG/1
650 TABLET ORAL ONCE AS NEEDED
Status: DISCONTINUED | OUTPATIENT
Start: 2019-06-13 | End: 2019-06-13 | Stop reason: HOSPADM

## 2019-06-13 RX ORDER — ONDANSETRON 2 MG/ML
4 INJECTION INTRAMUSCULAR; INTRAVENOUS EVERY 6 HOURS PRN
Status: DISCONTINUED | OUTPATIENT
Start: 2019-06-13 | End: 2019-06-14 | Stop reason: HOSPADM

## 2019-06-13 RX ORDER — ROCURONIUM BROMIDE 10 MG/ML
INJECTION, SOLUTION INTRAVENOUS AS NEEDED
Status: DISCONTINUED | OUTPATIENT
Start: 2019-06-13 | End: 2019-06-13 | Stop reason: SURG

## 2019-06-13 RX ORDER — FAMOTIDINE 10 MG/ML
20 INJECTION, SOLUTION INTRAVENOUS ONCE
Status: COMPLETED | OUTPATIENT
Start: 2019-06-13 | End: 2019-06-13

## 2019-06-13 RX ORDER — HYDROCODONE BITARTRATE AND ACETAMINOPHEN 7.5; 325 MG/1; MG/1
1 TABLET ORAL EVERY 4 HOURS PRN
Status: DISCONTINUED | OUTPATIENT
Start: 2019-06-13 | End: 2019-06-14 | Stop reason: HOSPADM

## 2019-06-13 RX ORDER — PANTOPRAZOLE SODIUM 40 MG/1
40 TABLET, DELAYED RELEASE ORAL EVERY MORNING
Status: DISCONTINUED | OUTPATIENT
Start: 2019-06-14 | End: 2019-06-14 | Stop reason: HOSPADM

## 2019-06-13 RX ORDER — MIDAZOLAM HYDROCHLORIDE 1 MG/ML
1 INJECTION INTRAMUSCULAR; INTRAVENOUS
Status: DISCONTINUED | OUTPATIENT
Start: 2019-06-13 | End: 2019-06-13 | Stop reason: HOSPADM

## 2019-06-13 RX ORDER — PREGABALIN 75 MG/1
150 CAPSULE ORAL ONCE
Status: COMPLETED | OUTPATIENT
Start: 2019-06-13 | End: 2019-06-13

## 2019-06-13 RX ORDER — HYDROXYCHLOROQUINE SULFATE 200 MG/1
200 TABLET, FILM COATED ORAL 2 TIMES DAILY
Status: DISCONTINUED | OUTPATIENT
Start: 2019-06-13 | End: 2019-06-14 | Stop reason: HOSPADM

## 2019-06-13 RX ORDER — LABETALOL HYDROCHLORIDE 5 MG/ML
5 INJECTION, SOLUTION INTRAVENOUS
Status: DISCONTINUED | OUTPATIENT
Start: 2019-06-13 | End: 2019-06-13 | Stop reason: HOSPADM

## 2019-06-13 RX ORDER — FENTANYL CITRATE 50 UG/ML
50 INJECTION, SOLUTION INTRAMUSCULAR; INTRAVENOUS
Status: DISCONTINUED | OUTPATIENT
Start: 2019-06-13 | End: 2019-06-13 | Stop reason: HOSPADM

## 2019-06-13 RX ORDER — MELOXICAM 15 MG/1
15 TABLET ORAL ONCE
Status: COMPLETED | OUTPATIENT
Start: 2019-06-13 | End: 2019-06-13

## 2019-06-13 RX ORDER — DULOXETIN HYDROCHLORIDE 60 MG/1
60 CAPSULE, DELAYED RELEASE ORAL NIGHTLY
Status: DISCONTINUED | OUTPATIENT
Start: 2019-06-13 | End: 2019-06-14 | Stop reason: HOSPADM

## 2019-06-13 RX ORDER — ACETAMINOPHEN 500 MG
1000 TABLET ORAL ONCE
Status: COMPLETED | OUTPATIENT
Start: 2019-06-13 | End: 2019-06-13

## 2019-06-13 RX ORDER — HYDROMORPHONE HYDROCHLORIDE 1 MG/ML
0.5 INJECTION, SOLUTION INTRAMUSCULAR; INTRAVENOUS; SUBCUTANEOUS
Status: DISCONTINUED | OUTPATIENT
Start: 2019-06-13 | End: 2019-06-14 | Stop reason: HOSPADM

## 2019-06-13 RX ORDER — METHOCARBAMOL 750 MG/1
1500 TABLET, FILM COATED ORAL 2 TIMES DAILY
Status: DISCONTINUED | OUTPATIENT
Start: 2019-06-13 | End: 2019-06-14 | Stop reason: HOSPADM

## 2019-06-13 RX ORDER — NITROFURANTOIN 25; 75 MG/1; MG/1
100 CAPSULE ORAL 2 TIMES DAILY
Status: DISCONTINUED | OUTPATIENT
Start: 2019-06-13 | End: 2019-06-14 | Stop reason: HOSPADM

## 2019-06-13 RX ORDER — GABAPENTIN 300 MG/1
600 CAPSULE ORAL NIGHTLY
Status: DISCONTINUED | OUTPATIENT
Start: 2019-06-13 | End: 2019-06-14 | Stop reason: HOSPADM

## 2019-06-13 RX ORDER — HYDROCODONE BITARTRATE AND ACETAMINOPHEN 7.5; 325 MG/1; MG/1
2 TABLET ORAL EVERY 4 HOURS PRN
Status: DISCONTINUED | OUTPATIENT
Start: 2019-06-13 | End: 2019-06-14 | Stop reason: HOSPADM

## 2019-06-13 RX ORDER — TRANEXAMIC ACID 100 MG/ML
INJECTION, SOLUTION INTRAVENOUS AS NEEDED
Status: DISCONTINUED | OUTPATIENT
Start: 2019-06-13 | End: 2019-06-13 | Stop reason: SURG

## 2019-06-13 RX ORDER — SODIUM CHLORIDE, SODIUM LACTATE, POTASSIUM CHLORIDE, CALCIUM CHLORIDE 600; 310; 30; 20 MG/100ML; MG/100ML; MG/100ML; MG/100ML
100 INJECTION, SOLUTION INTRAVENOUS CONTINUOUS
Status: DISCONTINUED | OUTPATIENT
Start: 2019-06-13 | End: 2019-06-14 | Stop reason: HOSPADM

## 2019-06-13 RX ORDER — ONDANSETRON 2 MG/ML
INJECTION INTRAMUSCULAR; INTRAVENOUS AS NEEDED
Status: DISCONTINUED | OUTPATIENT
Start: 2019-06-13 | End: 2019-06-13 | Stop reason: SURG

## 2019-06-13 RX ORDER — CEFAZOLIN SODIUM 2 G/100ML
2 INJECTION, SOLUTION INTRAVENOUS EVERY 8 HOURS
Status: COMPLETED | OUTPATIENT
Start: 2019-06-13 | End: 2019-06-14

## 2019-06-13 RX ORDER — BUTALBITAL, ACETAMINOPHEN AND CAFFEINE 50; 325; 40 MG/1; MG/1; MG/1
1 TABLET ORAL EVERY 4 HOURS PRN
Status: DISCONTINUED | OUTPATIENT
Start: 2019-06-13 | End: 2019-06-14 | Stop reason: HOSPADM

## 2019-06-13 RX ORDER — BISACODYL 10 MG
10 SUPPOSITORY, RECTAL RECTAL DAILY PRN
Status: DISCONTINUED | OUTPATIENT
Start: 2019-06-13 | End: 2019-06-14 | Stop reason: HOSPADM

## 2019-06-13 RX ORDER — DOCUSATE SODIUM 100 MG/1
100 CAPSULE, LIQUID FILLED ORAL 2 TIMES DAILY
Status: DISCONTINUED | OUTPATIENT
Start: 2019-06-13 | End: 2019-06-14 | Stop reason: HOSPADM

## 2019-06-13 RX ORDER — HYDRALAZINE HYDROCHLORIDE 20 MG/ML
5 INJECTION INTRAMUSCULAR; INTRAVENOUS
Status: DISCONTINUED | OUTPATIENT
Start: 2019-06-13 | End: 2019-06-13 | Stop reason: HOSPADM

## 2019-06-13 RX ORDER — SODIUM CHLORIDE, SODIUM LACTATE, POTASSIUM CHLORIDE, CALCIUM CHLORIDE 600; 310; 30; 20 MG/100ML; MG/100ML; MG/100ML; MG/100ML
9 INJECTION, SOLUTION INTRAVENOUS CONTINUOUS
Status: DISCONTINUED | OUTPATIENT
Start: 2019-06-13 | End: 2019-06-13 | Stop reason: HOSPADM

## 2019-06-13 RX ADMIN — MELOXICAM 15 MG: 15 TABLET ORAL at 09:44

## 2019-06-13 RX ADMIN — PHENYLEPHRINE HYDROCHLORIDE 100 MCG: 10 INJECTION INTRAVENOUS at 11:24

## 2019-06-13 RX ADMIN — BUPIVACAINE 10 ML: 13.3 INJECTION, SUSPENSION, LIPOSOMAL INFILTRATION at 10:04

## 2019-06-13 RX ADMIN — DOCUSATE SODIUM 100 MG: 100 CAPSULE, LIQUID FILLED ORAL at 20:19

## 2019-06-13 RX ADMIN — GABAPENTIN 600 MG: 300 CAPSULE ORAL at 20:19

## 2019-06-13 RX ADMIN — LIDOCAINE HYDROCHLORIDE 40 MG: 20 INJECTION, SOLUTION INFILTRATION; PERINEURAL at 10:51

## 2019-06-13 RX ADMIN — PHENYLEPHRINE HYDROCHLORIDE 200 MCG: 10 INJECTION INTRAVENOUS at 11:45

## 2019-06-13 RX ADMIN — ACETAMINOPHEN 1000 MG: 500 TABLET, FILM COATED ORAL at 09:45

## 2019-06-13 RX ADMIN — VANCOMYCIN HYDROCHLORIDE 1250 MG: 10 INJECTION, POWDER, LYOPHILIZED, FOR SOLUTION INTRAVENOUS at 09:35

## 2019-06-13 RX ADMIN — PHENYLEPHRINE HYDROCHLORIDE 100 MCG: 10 INJECTION INTRAVENOUS at 11:09

## 2019-06-13 RX ADMIN — PROMETHAZINE HYDROCHLORIDE 25 MG: 25 TABLET ORAL at 22:27

## 2019-06-13 RX ADMIN — NITROFURANTOIN MONOHYDRATE/MACROCRYSTALLINE 100 MG: 25; 75 CAPSULE ORAL at 20:19

## 2019-06-13 RX ADMIN — PHENYLEPHRINE HYDROCHLORIDE 100 MCG: 10 INJECTION INTRAVENOUS at 10:59

## 2019-06-13 RX ADMIN — ONDANSETRON 4 MG: 2 INJECTION INTRAMUSCULAR; INTRAVENOUS at 12:00

## 2019-06-13 RX ADMIN — PHENYLEPHRINE HYDROCHLORIDE 100 MCG: 10 INJECTION INTRAVENOUS at 12:03

## 2019-06-13 RX ADMIN — PROPOFOL 150 MG: 10 INJECTION, EMULSION INTRAVENOUS at 10:51

## 2019-06-13 RX ADMIN — HYDROXYCHLOROQUINE SULFATE 200 MG: 200 TABLET, FILM COATED ORAL at 20:19

## 2019-06-13 RX ADMIN — METHOCARBAMOL TABLETS 1500 MG: 750 TABLET, COATED ORAL at 20:19

## 2019-06-13 RX ADMIN — VANCOMYCIN HYDROCHLORIDE 1500 MG: 10 INJECTION, POWDER, LYOPHILIZED, FOR SOLUTION INTRAVENOUS at 22:23

## 2019-06-13 RX ADMIN — SODIUM CHLORIDE, POTASSIUM CHLORIDE, SODIUM LACTATE AND CALCIUM CHLORIDE 100 ML/HR: 600; 310; 30; 20 INJECTION, SOLUTION INTRAVENOUS at 18:11

## 2019-06-13 RX ADMIN — TRANEXAMIC ACID 1000 MG: 100 INJECTION, SOLUTION INTRAVENOUS at 11:05

## 2019-06-13 RX ADMIN — PHENYLEPHRINE HYDROCHLORIDE 100 MCG: 10 INJECTION INTRAVENOUS at 12:11

## 2019-06-13 RX ADMIN — ROCURONIUM BROMIDE 50 MG: 10 INJECTION INTRAVENOUS at 10:51

## 2019-06-13 RX ADMIN — ROPIVACAINE HYDROCHLORIDE 10 ML: 5 INJECTION, SOLUTION EPIDURAL; INFILTRATION; PERINEURAL at 10:04

## 2019-06-13 RX ADMIN — CEFAZOLIN SODIUM 2 G: 2 INJECTION, SOLUTION INTRAVENOUS at 10:55

## 2019-06-13 RX ADMIN — NEOSTIGMINE METHYLSULFATE 3 MG: 1 INJECTION INTRAMUSCULAR; INTRAVENOUS; SUBCUTANEOUS at 12:11

## 2019-06-13 RX ADMIN — FAMOTIDINE 20 MG: 10 INJECTION INTRAVENOUS at 10:11

## 2019-06-13 RX ADMIN — PREGABALIN 150 MG: 75 CAPSULE ORAL at 09:45

## 2019-06-13 RX ADMIN — SODIUM CHLORIDE, POTASSIUM CHLORIDE, SODIUM LACTATE AND CALCIUM CHLORIDE: 600; 310; 30; 20 INJECTION, SOLUTION INTRAVENOUS at 09:33

## 2019-06-13 RX ADMIN — FENTANYL CITRATE 50 MCG: 50 INJECTION INTRAMUSCULAR; INTRAVENOUS at 10:10

## 2019-06-13 RX ADMIN — GLYCOPYRROLATE 0.4 MG: 0.2 INJECTION INTRAMUSCULAR; INTRAVENOUS at 12:11

## 2019-06-13 RX ADMIN — CEFAZOLIN SODIUM 2 G: 2 INJECTION, SOLUTION INTRAVENOUS at 18:11

## 2019-06-13 RX ADMIN — MIDAZOLAM 2 MG: 1 INJECTION INTRAMUSCULAR; INTRAVENOUS at 10:11

## 2019-06-13 RX ADMIN — PHENYLEPHRINE HYDROCHLORIDE 100 MCG: 10 INJECTION INTRAVENOUS at 11:30

## 2019-06-13 RX ADMIN — DULOXETINE HYDROCHLORIDE 60 MG: 60 CAPSULE, DELAYED RELEASE ORAL at 20:19

## 2019-06-13 RX ADMIN — MEPIVACAINE HYDROCHLORIDE 10 ML: 15 INJECTION, SOLUTION EPIDURAL; INFILTRATION at 10:04

## 2019-06-13 RX ADMIN — BUTALBITAL, ACETAMINOPHEN, AND CAFFEINE 1 TABLET: 50; 325; 40 TABLET ORAL at 23:56

## 2019-06-13 RX ADMIN — MUPIROCIN 1 APPLICATION: 20 OINTMENT TOPICAL at 20:19

## 2019-06-13 NOTE — PERIOPERATIVE NURSING NOTE
Dr. Mayes notified of Amoxicillin allergy and ok for surgery today per MD.  Pt denies s/s UTI today.

## 2019-06-13 NOTE — ANESTHESIA PREPROCEDURE EVALUATION
Anesthesia Evaluation     Patient summary reviewed and Nursing notes reviewed                Airway   Mallampati: III  Possible difficult intubation  Dental    (+) upper dentures and partials    Pulmonary    (+) sleep apnea,   Cardiovascular     ECG reviewed  Rhythm: regular  Rate: normal    (+) hyperlipidemia,       Neuro/Psych  (+) CVA, headaches, numbness,     GI/Hepatic/Renal/Endo    (+) morbid obesity, GERD,  renal disease stones,     Musculoskeletal     Abdominal    Substance History - negative use     OB/GYN negative ob/gyn ROS         Other   (+) arthritis                     Anesthesia Plan    ASA 3     general with block   (BMI  Partial upper dentures out  Left ISB with exparel PSR)  intravenous induction   Anesthetic plan, all risks, benefits, and alternatives have been provided, discussed and informed consent has been obtained with: patient.

## 2019-06-13 NOTE — BRIEF OP NOTE
TOTAL SHOULDER REVERSE ARTHROPLASTY  Progress Note    Annalisa Bhagat  6/13/2019    Pre-op Diagnosis:   Tear of left rotator cuff, unspecified tear extent [M75.102]       Post-Op Diagnosis Codes:     * Tear of left rotator cuff, unspecified tear extent [M75.102]    Procedure/CPT® Codes:      Procedure(s):  TOTAL SHOULDER REVERSE ARTHROPLASTY    Surgeon(s):  Brice Mayes MD    Anesthesia: General with Block    Staff:   Circulator: Yaritza Doyle RN  Scrub Person: Sommer Amaya  Vendor Representative: Jamel Curtis  Assistant: Mitch Reynoso CSA    Estimated Blood Loss: 100ml    Urine Voided: * No values recorded between 6/13/2019 10:40 AM and 6/13/2019 12:13 PM *    Specimens:                None          Drains:      Findings: see dictation    Complications: none      Brice Mayes MD     Date: 6/13/2019  Time: 12:13 PM

## 2019-06-13 NOTE — ANESTHESIA POSTPROCEDURE EVALUATION
Patient: Annalisa Bhagat    Procedure Summary     Date:  06/13/19 Room / Location:  [facility] OR  / [facility] MAIN OR    Anesthesia Start:  1045 Anesthesia Stop:  1229    Procedure:  TOTAL SHOULDER REVERSE ARTHROPLASTY (Left Shoulder) Diagnosis:       Tear of left rotator cuff, unspecified tear extent      (Tear of left rotator cuff, unspecified tear extent [M75.102])    Surgeon:  Brice Mayes MD Provider:  Dany Perdue MD    Anesthesia Type:  general with block ASA Status:  3          Anesthesia Type: general with block  Last vitals  BP   119/58 (06/13/19 1245)   Temp   36.6 °C (97.8 °F) (06/13/19 1225)   Pulse   74 (06/13/19 1245)   Resp   16 (06/13/19 1245)     SpO2   97 % (06/13/19 1245)     Post Anesthesia Care and Evaluation    Patient location during evaluation: PACU  Patient participation: complete - patient participated  Level of consciousness: awake and alert  Pain management: adequate  Airway patency: patent  Anesthetic complications: No anesthetic complications    Cardiovascular status: acceptable  Respiratory status: acceptable  Hydration status: acceptable    Comments: --------------------            06/13/19               1245     --------------------   BP:       119/58     Pulse:      74       Resp:       16       Temp:                SpO2:      97%      --------------------

## 2019-06-13 NOTE — ANESTHESIA PROCEDURE NOTES
Airway  Urgency: elective      General Information and Staff    Patient location during procedure: OR  Anesthesiologist: Dany Perdue MD    Indications and Patient Condition  Indications for airway management: airway protection    Preoxygenated: yes  MILS maintained throughout  Mask difficulty assessment: 1 - vent by mask    Final Airway Details  Final airway type: endotracheal airway      Successful airway: ETT  Cuffed: yes   Successful intubation technique: direct laryngoscopy  Endotracheal tube insertion site: oral  Blade: Mckayla  Blade size: 3  ETT size (mm): 7.0  Cormack-Lehane Classification: grade I - full view of glottis  Placement verified by: chest auscultation   Measured from: lips  ETT to lips (cm): 21  Number of attempts at approach: 1

## 2019-06-13 NOTE — ANESTHESIA PROCEDURE NOTES
Peripheral Block    Pre-sedation assessment completed: 6/13/2019 10:04 AM    Patient reassessed immediately prior to procedure    Patient location during procedure: holding area  Start time: 6/13/2019 10:04 AM  Stop time: 6/13/2019 10:14 AM  Reason for block: at surgeon's request and post-op pain management  Performed by  Anesthesiologist: Desmond Shea MD  Preanesthetic Checklist  Completed: patient identified, site marked, surgical consent, pre-op evaluation, timeout performed, IV checked, risks and benefits discussed and monitors and equipment checked  Prep:  Sterile barriers:cap, gloves, gown, mask and sterile barriers  Prep: ChloraPrep  Patient monitoring: blood pressure monitoring, continuous pulse oximetry and EKG  Procedure  Sedation:yes    Guidance:ultrasound guided  ULTRASOUND INTERPRETATION.  Using ultrasound guidance a 21 G gauge needle was placed in close proximity to the brachial plexus nerve, at which point, under ultrasound guidance anesthetic was injected in the area of the nerve and spread of the anesthesia was seen on ultrasound in close proximity thereto.  There were no abnormalities seen on ultrasound; a digital image was taken; and the patient tolerated the procedure with no complications. Images:still images obtained    Laterality:left  Block Type:interscalene  Injection Technique:single-shot  Needle Type:echogenic  Needle Gauge:21 G      Medications Used: mepivacaine (CARBOCAINE) 1.5 % injection, 10 mL  bupivacaine liposome (EXPAREL) 1.3 % injection, 10 mL  ropivacaine (NAROPIN) 0.5 % injection, 10 mL      Post Assessment  Injection Assessment: negative aspiration for heme, no paresthesia on injection and incremental injection  Patient Tolerance:comfortable throughout block  Complications:no

## 2019-06-13 NOTE — PLAN OF CARE
Problem: Patient Care Overview  Goal: Plan of Care Review  Outcome: Ongoing (interventions implemented as appropriate)   06/13/19 1500 06/13/19 0600   Plan of Care Review   Progress --  improving   OTHER   Outcome Summary --  pt admitted to unit from PACU at 1500. sling in place. block in effect. VSS. Cap refill WNL. pt voiding per BRP. pain is controlled no pain medication given at this time, pt plans to D/C home in am. Educated on the importance of O2 Monitoring and the use of CPAP as needed for HO LILY. Verbalized understanding.    Coping/Psychosocial   Plan of Care Reviewed With patient --      Goal: Individualization and Mutuality  Outcome: Ongoing (interventions implemented as appropriate)    Goal: Discharge Needs Assessment  Outcome: Ongoing (interventions implemented as appropriate)    Goal: Interprofessional Rounds/Family Conf  Outcome: Ongoing (interventions implemented as appropriate)      Problem: Pain, Chronic (Adult)  Goal: Identify Related Risk Factors and Signs and Symptoms  Outcome: Ongoing (interventions implemented as appropriate)    Goal: Acceptable Pain/Comfort Level and Functional Ability  Outcome: Ongoing (interventions implemented as appropriate)      Problem: Fall Risk (Adult)  Goal: Identify Related Risk Factors and Signs and Symptoms  Outcome: Ongoing (interventions implemented as appropriate)    Goal: Absence of Fall  Outcome: Ongoing (interventions implemented as appropriate)      Problem: Shoulder Arthroplasty (Adult)  Goal: Signs and Symptoms of Listed Potential Problems Will be Absent, Minimized or Managed (Shoulder Arthroplasty)  Outcome: Ongoing (interventions implemented as appropriate)    Goal: Anesthesia/Sedation Recovery  Outcome: Ongoing (interventions implemented as appropriate)

## 2019-06-14 ENCOUNTER — TELEPHONE (OUTPATIENT)
Dept: ORTHOPEDIC SURGERY | Facility: CLINIC | Age: 69
End: 2019-06-14

## 2019-06-14 VITALS
SYSTOLIC BLOOD PRESSURE: 167 MMHG | OXYGEN SATURATION: 95 % | DIASTOLIC BLOOD PRESSURE: 89 MMHG | HEART RATE: 84 BPM | TEMPERATURE: 99.3 F | HEIGHT: 59 IN | BODY MASS INDEX: 41.69 KG/M2 | RESPIRATION RATE: 16 BRPM | WEIGHT: 206.79 LBS

## 2019-06-14 PROCEDURE — 63710000001 PROMETHAZINE PER 25 MG: Performed by: ORTHOPAEDIC SURGERY

## 2019-06-14 PROCEDURE — 97161 PT EVAL LOW COMPLEX 20 MIN: CPT

## 2019-06-14 PROCEDURE — 25010000003 CEFAZOLIN IN DEXTROSE 2-4 GM/100ML-% SOLUTION: Performed by: ORTHOPAEDIC SURGERY

## 2019-06-14 RX ORDER — PSEUDOEPHEDRINE HCL 30 MG
100 TABLET ORAL 2 TIMES DAILY
Qty: 60 EACH | Refills: 0 | Status: SHIPPED | OUTPATIENT
Start: 2019-06-14 | End: 2019-10-08

## 2019-06-14 RX ORDER — GABAPENTIN 300 MG/1
600 CAPSULE ORAL ONCE
Status: COMPLETED | OUTPATIENT
Start: 2019-06-14 | End: 2019-06-14

## 2019-06-14 RX ORDER — HYDROCODONE BITARTRATE AND ACETAMINOPHEN 7.5; 325 MG/1; MG/1
TABLET ORAL
Qty: 42 TABLET | Refills: 0
Start: 2019-06-14 | End: 2019-10-08

## 2019-06-14 RX ADMIN — POLYETHYLENE GLYCOL 3350 17 G: 17 POWDER, FOR SOLUTION ORAL at 08:02

## 2019-06-14 RX ADMIN — PROMETHAZINE HYDROCHLORIDE 25 MG: 25 TABLET ORAL at 08:03

## 2019-06-14 RX ADMIN — GABAPENTIN 600 MG: 300 CAPSULE ORAL at 09:42

## 2019-06-14 RX ADMIN — CEFAZOLIN SODIUM 2 G: 2 INJECTION, SOLUTION INTRAVENOUS at 02:33

## 2019-06-14 RX ADMIN — HYDROCODONE BITARTRATE AND ACETAMINOPHEN 2 TABLET: 7.5; 325 TABLET ORAL at 15:42

## 2019-06-14 RX ADMIN — BUTALBITAL, ACETAMINOPHEN, AND CAFFEINE 1 TABLET: 50; 325; 40 TABLET ORAL at 08:03

## 2019-06-14 RX ADMIN — NITROFURANTOIN MONOHYDRATE/MACROCRYSTALLINE 100 MG: 25; 75 CAPSULE ORAL at 08:03

## 2019-06-14 RX ADMIN — PANTOPRAZOLE SODIUM 40 MG: 40 TABLET, DELAYED RELEASE ORAL at 04:49

## 2019-06-14 RX ADMIN — METHOCARBAMOL TABLETS 1500 MG: 750 TABLET, COATED ORAL at 08:03

## 2019-06-14 RX ADMIN — HYDROCODONE BITARTRATE AND ACETAMINOPHEN 2 TABLET: 7.5; 325 TABLET ORAL at 10:58

## 2019-06-14 RX ADMIN — DOCUSATE SODIUM 100 MG: 100 CAPSULE, LIQUID FILLED ORAL at 08:03

## 2019-06-14 RX ADMIN — MUPIROCIN 1 APPLICATION: 20 OINTMENT TOPICAL at 08:03

## 2019-06-14 RX ADMIN — HYDROXYCHLOROQUINE SULFATE 200 MG: 200 TABLET, FILM COATED ORAL at 08:03

## 2019-06-14 RX ADMIN — HYDROCODONE BITARTRATE AND ACETAMINOPHEN 1 TABLET: 7.5; 325 TABLET ORAL at 04:50

## 2019-06-14 NOTE — THERAPY EVALUATION
Acute Care - Physical Therapy Initial Evaluation  [facility]     Patient Name: Annalisa Bhagat  : 1950  MRN: [id number]  Today's Date: 2019   Onset of Illness/Injury or Date of Surgery: 19  Date of Referral to PT: 19  Referring Physician: Gerber      Admit Date: 2019    Visit Dx:     ICD-10-CM ICD-9-CM   1. Tear of left rotator cuff, unspecified tear extent M75.102 840.4     Patient Active Problem List   Diagnosis   • Tear of left rotator cuff     Past Medical History:   Diagnosis Date   • Fibromyalgia    • GERD (gastroesophageal reflux disease)    • Hyperlipidemia    • Kidney stone    • Lupus    • Migraines    • Narcolepsy    • Numbness and tingling    • Recurrent UTI    • Rheumatoid arthritis (CMS/HCC)    • Scoliosis    • Seasonal allergies    • Shoulder pain, left    • Sleep apnea    • Spinal stenosis    • Stress incontinence    • Stroke (CMS/HCC)      Past Surgical History:   Procedure Laterality Date   • BACK SURGERY     • CARDIAC CATHETERIZATION     • CARPAL TUNNEL RELEASE     • CYSTOSCOPY W/ URETEROSCOPY W/ LITHOTRIPSY     • HAND SURGERY     • HYSTERECTOMY     • RECTOVAGINAL FISTULA CLOSURE     • SACRAL NERVE STIMULATOR PLACEMENT          PT ASSESSMENT (last 12 hours)      Physical Therapy Evaluation     Row Name 19 1056          PT Evaluation Time/Intention    Subjective Information  complains of;pain  -SV     Document Type  evaluation  -SV     Mode of Treatment  individual therapy;physical therapy  -SV     Patient Effort  good  -SV     Symptoms Noted During/After Treatment  none  -SV     Comment  hx scoliosis  -SV     Row Name 19 1053          General Information    Patient Profile Reviewed?  yes  -SV     Onset of Illness/Injury or Date of Surgery  19  -SV     Referring Physician  Gerber  -SV     Patient Observations  alert;cooperative;agree to therapy  -SV     Patient/Family Observations  no family present  -SV     General Observations of Patient  sling  LUE  -SV     Prior Level of Function  independent:  -SV     Equipment Currently Used at Home  cane, straight;rollator  -SV     Pertinent History of Current Functional Problem  Pt hx of left RTC tear and is now s/p left shoulder Reverse TSA.   -SV     Existing Precautions/Restrictions  -- NWB LUE  -SV     Row Name 06/14/19 1056          Cognitive Assessment/Intervention- PT/OT    Orientation Status (Cognition)  oriented x 4  -SV     Follows Commands (Cognition)  WFL  -SV     Row Name 06/14/19 1056          Safety Issues, Functional Mobility    Comment, Safety Issues/Impairments (Mobility)  NWB LUE  -SV     Row Name 06/14/19 1056          Bed Mobility Assessment/Treatment    Comment (Bed Mobility)  N T due to up to eob with nsg  -SV     Row Name 06/14/19 1056          Transfer Assessment/Treatment    Transfer Assessment/Treatment  sit-stand transfer;stand-sit transfer  -SV     Sit-Stand Rives (Transfers)  supervision;contact guard  -SV     Stand-Sit Rives (Transfers)  supervision;contact guard  -SV     Row Name 06/14/19 1056          Gait/Stairs Assessment/Training    Rives Level (Gait)  contact guard  -SV     Assistive Device (Gait)  -- HHA  -SV     Distance in Feet (Gait)  20   -SV     Row Name 06/14/19 1056          General ROM    LT Upper Ext  -- shoulder NT , elbow limited 25%, wrist/hand wfl  -SV     GENERAL ROM COMMENTS  BLE arom wfl as observed with mobility  -SV     Row Name 06/14/19 1056          MMT (Manual Muscle Testing)    Lt Upper Ext  -- shoulder NT, elbow 3-/5, hand 3/5  -SV     General MMT Comments  BLE and RUE appear >3/5 grossly  -SV     Row Name 06/14/19 1056          Motor Assessment/Intervention    Additional Documentation  -- 10 reps TSA protocol performed with vc/sup  -SV     Row Name 06/14/19 1056          Pain Assessment    Additional Documentation  -- increased pain with ex  -SV     Row Name             Wound 06/13/19 1152 Left shoulder incision    Wound - Properties  "Group Date first assessed: 06/13/19  -TL Time first assessed: 1152  -TL Side: Left  -TL Location: shoulder  -TL Type: incision  -TL    Row Name 06/14/19 1056          Physical Therapy Clinical Impression    Date of Referral to PT  06/13/19  -SV     Functional Level at Time of Evaluation (PT Clinical Impression)  -- cga HHA 20' inside room, also amb to BR with nsg  -SV     Row Name 06/14/19 1056          Vital Signs    Pre Patient Position  Sitting  -SV     Intra Patient Position  Standing  -SV     Post Patient Position  Sitting  -SV       User Key  (r) = Recorded By, (t) = Taken By, (c) = Cosigned By    Initials Name Provider Type    Yaritza Fowler, RN Registered Nurse    Zeynep Trinidad, PT Physical Therapist          PT Recommendation and Plan  Therapy Frequency (PT Clinical Impression): evaluation only  Plan of Care Reviewed With: (P) patient  Outcome Summary: (P) Pt is s/p left reverse TSA with PT orders for pendulum ex . Pt reports amb with rwx in community and stc inside her home. She was educated on sling usage/fit and NWB LUE. Pt will be unable to use rolling wx due to weight bearing restriction. Pt advised to use stc for limited distances. Pt edcuated on TSA HEP with pendulum ex as well as elbow/wrist/hand ex. She was able to perform 10 reps each with sup/vc. Pt reports her 90 year old mother will be \"helping\" her at home. Pt may benefit from cont PT/OT at home to make sure all needs are addressed.      Time Calculation:   PT Charges     Row Name 06/14/19 1405             Time Calculation    Start Time  1056  -SV      Stop Time  1113  -SV      Time Calculation (min)  17 min  -SV      PT Received On  06/14/19  -SV        User Key  (r) = Recorded By, (t) = Taken By, (c) = Cosigned By    Initials Name Provider Type    Zeynep Trinidad, PT Physical Therapist        Therapy Charges for Today     Code Description Service Date Service Provider Modifiers Qty    [id number]  PT EVAL LOW " COMPLEXITY 1 6/14/2019 Zeynep Chong, PT GP 1                 Zeynep Chong, PT  6/14/2019

## 2019-06-14 NOTE — PLAN OF CARE
Problem: Patient Care Overview  Goal: Plan of Care Review  Outcome: Ongoing (interventions implemented as appropriate)   06/14/19 0157 06/14/19 0803 06/14/19 1118   Plan of Care Review   Progress improving --  --    OTHER   Outcome Summary --  --  pt POD 1 L shoulder. Sling in place. Dressing CDI. NVI. Voiding per BRP. C/O H/A medication given. Mild pain to shoulder. Controlled with PO pain meds. Plan to D/C home today. Awaiting PT eval. HV D/C'd minimal drainage noted. Educted on the importance of C Pap use with H/O LILY. Verbalized understanding. will monitor.   Coping/Psychosocial   Plan of Care Reviewed With --  patient --      Goal: Individualization and Mutuality  Outcome: Ongoing (interventions implemented as appropriate)    Goal: Discharge Needs Assessment  Outcome: Ongoing (interventions implemented as appropriate)    Goal: Interprofessional Rounds/Family Conf  Outcome: Ongoing (interventions implemented as appropriate)      Problem: Fall Risk (Adult)  Goal: Identify Related Risk Factors and Signs and Symptoms  Outcome: Ongoing (interventions implemented as appropriate)    Goal: Absence of Fall  Outcome: Ongoing (interventions implemented as appropriate)      Problem: Shoulder Arthroplasty (Adult)  Goal: Signs and Symptoms of Listed Potential Problems Will be Absent, Minimized or Managed (Shoulder Arthroplasty)  Outcome: Ongoing (interventions implemented as appropriate)    Goal: Anesthesia/Sedation Recovery  Outcome: Ongoing (interventions implemented as appropriate)

## 2019-06-14 NOTE — DISCHARGE SUMMARY
Date of Admission:  6/13/2019    Date of Discharge:  6/14/2019    Discharge Diagnosis: s/p Left reverse total shoulder arthroplasty    Admitting Physician: Brice Mayes    Consults: none    DETAILS OF HOSPITAL STAY:  Patient is a 69 y.o. female was admitted to the floor following a reverse total shoulder arthroplasty.  Post-operatively the patient was transferred to the post-operative floor where the patient underwent physical therapy including both active and passive ROM exercises. Opioids were titrated to achieve appropriate pain management to allow for participation in mobilization exercises. Vital signs are now stable. The incision is benign without signs or symptoms of infection. Operative extremity neurovascular status remains intact. Appropriate education re: incision care, activity levels, medications, and follow up visits was completed and all questions were answered. The patient is now deemed stable for discharge to home.    Condition on Discharge:  Stable    Discharge Medications     Discharge Medications      New Medications      Instructions Start Date   docusate sodium 100 MG capsule   100 mg, Oral, 2 Times Daily      HYDROcodone-acetaminophen 7.5-325 MG per tablet  Commonly known as:  NORCO   Take 1-2 tabs po q 4 hours prn pain         Changes to Medications      Instructions Start Date   apixaban 5 MG tablet tablet  Commonly known as:  ELIQUIS  What changed:    · when to take this  · additional instructions   5 mg, Oral, Every 12 Hours Scheduled, Resume 6/15/29         Continue These Medications      Instructions Start Date   Armodafinil 250 MG tablet   250 mg, Oral, Daily      atorvastatin 40 MG tablet  Commonly known as:  LIPITOR   40 mg, Oral, Daily      Azelastine-Fluticasone 137-50 MCG/ACT suspension   1-2 sprays, Nasal, Daily      butalbital-acetaminophen-caffeine -40 MG per tablet  Commonly known as:  FIORICET, ESGIC   1 tablet, Oral, Every 4 Hours PRN      calcium citrate 950 MG  tablet  Commonly known as:  CALCITRATE   1 tablet, Oral      DULoxetine 60 MG capsule  Commonly known as:  CYMBALTA   60 mg, Oral, Nightly      Erenumab-aooe 70 MG/ML prefilled syringe  Commonly known as:  AIMOVIG   70 mg, Subcutaneous, HASN'T STARTED YET.      FORTEO 600 MCG/2.4ML injection  Generic drug:  teriparatide   20 mcg, Subcutaneous, Nightly      gabapentin 600 MG tablet  Commonly known as:  NEURONTIN   600 mg, Oral, 4 Times Daily      hydrocortisone 2.5 % cream   1 application, Topical, As Needed      hydroxychloroquine 200 MG tablet  Commonly known as:  PLAQUENIL   Oral, 2 Times Daily      Melatonin 10 MG tablet   1 tablet, Oral, Nightly      methocarbamol 750 MG tablet  Commonly known as:  ROBAXIN   1,500 mg, Oral, 2 Times Daily      nitrofurantoin (macrocrystal-monohydrate) 100 MG capsule  Commonly known as:  MACROBID   100 mg, Oral, 2 Times Daily, Taking for UTI.      omeprazole 20 MG capsule  Commonly known as:  priLOSEC   20 mg, Oral, Nightly      promethazine 25 MG tablet  Commonly known as:  PHENERGAN   25 mg, Oral, As Needed      pyridoxine 200 MG tablet  Commonly known as:  VITAMIN B-6   200 mg, Oral, Daily      triamcinolone 0.025 % cream  Commonly known as:  KENALOG   1 application, Topical, As Needed      VITAMIN B COMPLEX PO   1 each, Oral, Daily      vitamin C 250 MG tablet  Commonly known as:  ASCORBIC ACID   1,000 mg, Oral, Daily         Stop These Medications    Chlorhexidine Gluconate Cloth 2 % pads     mupirocin 2 % nasal ointment  Commonly known as:  BACTROBAN     traMADol 50 MG tablet  Commonly known as:  ULTRAM            Discharge Diet: regular    Activity at Discharge: as tolerated    Discharge Instructions:   1)  Patient is to continue with physical therapy exercises daily and continue working with the physical therapist as ordered.  2)  Continue to follow precautions as instructed.   3)  Patient is instructed to continue use of the sling except when performing their physical  therapy exercising and while dressing or showering.  4)  Continue to ice regularly. Patient also instructed on incentive spirometer during hospitalization and encouraged to continue to use at home regularly.   5)  The dressing should be left in place. If waterproof dressing is intact the patient may shower immediately following discharge. If the dressing becomes disloged or saturated it should be changed and patient must wait until POD #5 to shower. If dressing is changed, apply dry sterile dressing after showering.  6)  Follow up appointment in 2 weeks - patient to call the office at [phone] to schedule.     Follow-up Appointments  2 weeks with Dr Gerber Chávez, ROSANNA  06/14/19  10:11 AM

## 2019-06-14 NOTE — PLAN OF CARE
Problem: Patient Care Overview  Goal: Plan of Care Review  Outcome: Ongoing (interventions implemented as appropriate)   06/14/19 0157   Plan of Care Review   Progress improving   OTHER   Outcome Summary patient ambulating with assistance, pain controlled at this time, patient having migraine this evening, MD on call notified and reordered patient's home Fioricet, migraine decreased at this time, educated on CPAP use at night   Coping/Psychosocial   Plan of Care Reviewed With patient     Goal: Individualization and Mutuality  Outcome: Ongoing (interventions implemented as appropriate)    Goal: Discharge Needs Assessment  Outcome: Ongoing (interventions implemented as appropriate)    Goal: Interprofessional Rounds/Family Conf  Outcome: Ongoing (interventions implemented as appropriate)      Problem: Fall Risk (Adult)  Goal: Identify Related Risk Factors and Signs and Symptoms  Outcome: Outcome(s) achieved Date Met: 06/14/19    Goal: Absence of Fall  Outcome: Ongoing (interventions implemented as appropriate)      Problem: Shoulder Arthroplasty (Adult)  Goal: Signs and Symptoms of Listed Potential Problems Will be Absent, Minimized or Managed (Shoulder Arthroplasty)  Outcome: Ongoing (interventions implemented as appropriate)    Goal: Anesthesia/Sedation Recovery  Outcome: Ongoing (interventions implemented as appropriate)

## 2019-06-14 NOTE — PLAN OF CARE
"Problem: Patient Care Overview  Goal: Plan of Care Review   06/14/19 1400   OTHER   Outcome Summary Pt is s/p left reverse TSA with PT orders for pendulum ex . Pt reports amb with rwx in community and stc inside her home. She was educated on sling usage/fit and NWB LUE. Pt will be unable to use rolling wx due to weight bearing restriction. Pt advised to use stc for limited distances. Pt edcuated on TSA HEP with pendulum ex as well as elbow/wrist/hand ex. She was able to perform 10 reps each with sup/vc. Pt reports her 90 year old mother will be \"helping\" her at home. Pt may benefit from cont PT/OT at home to make sure all needs are addressed.    Coping/Psychosocial   Plan of Care Reviewed With   06/14/19 1400   OTHER   Outcome Summary Pt is s/p left reverse TSA with PT orders for pendulum ex . Pt reports amb with rwx in community and stc inside her home. She was educated on sling usage/fit and NWB LUE. Pt will be unable to use rolling wx due to weight bearing restriction. Pt advised to use stc for limited distances. Pt edcuated on TSA HEP with pendulum ex as well as elbow/wrist/hand ex. She was able to perform 10 reps each with sup/vc. Pt reports her 90 year old mother will be \"helping\" her at home. Pt may benefit from cont PT/OT at home to make sure all needs are addressed.    Coping/Psychosocial   Plan of Care Reviewed With patient    patient         "

## 2019-06-17 ENCOUNTER — TELEPHONE (OUTPATIENT)
Dept: ORTHOPEDIC SURGERY | Facility: CLINIC | Age: 69
End: 2019-06-17

## 2019-06-17 DIAGNOSIS — Z01.818 PREOP EXAMINATION: ICD-10-CM

## 2019-06-17 DIAGNOSIS — M75.102 TEAR OF LEFT ROTATOR CUFF, UNSPECIFIED TEAR EXTENT: Primary | ICD-10-CM

## 2019-06-17 RX ORDER — FLUCONAZOLE 150 MG/1
150 TABLET ORAL ONCE
Qty: 1 TABLET | Refills: 0 | Status: SHIPPED | OUTPATIENT
Start: 2019-06-17 | End: 2019-06-17

## 2019-06-28 ENCOUNTER — OFFICE VISIT (OUTPATIENT)
Dept: ORTHOPEDIC SURGERY | Facility: CLINIC | Age: 69
End: 2019-06-28

## 2019-06-28 VITALS — BODY MASS INDEX: 41.33 KG/M2 | HEIGHT: 59 IN | WEIGHT: 205 LBS | TEMPERATURE: 98.2 F

## 2019-06-28 DIAGNOSIS — Z96.612 HISTORY OF LEFT SHOULDER REPLACEMENT: ICD-10-CM

## 2019-06-28 DIAGNOSIS — Z09 SURGERY FOLLOW-UP: ICD-10-CM

## 2019-06-28 DIAGNOSIS — M25.512 CHRONIC LEFT SHOULDER PAIN: Primary | ICD-10-CM

## 2019-06-28 DIAGNOSIS — G89.29 CHRONIC LEFT SHOULDER PAIN: Primary | ICD-10-CM

## 2019-06-28 PROCEDURE — 73030 X-RAY EXAM OF SHOULDER: CPT | Performed by: ORTHOPAEDIC SURGERY

## 2019-06-28 PROCEDURE — 99024 POSTOP FOLLOW-UP VISIT: CPT | Performed by: ORTHOPAEDIC SURGERY

## 2019-06-28 RX ORDER — TRAMADOL HYDROCHLORIDE 50 MG/1
100 TABLET ORAL
COMMUNITY

## 2019-06-28 RX ORDER — METHYLPREDNISOLONE 4 MG/1
TABLET ORAL
Qty: 21 TABLET | Refills: 0 | Status: SHIPPED | OUTPATIENT
Start: 2019-06-28 | End: 2019-10-08

## 2019-06-29 NOTE — PROGRESS NOTES
"Annalisa Bhagta : 1950 MRN: [id number] DATE: 2019    DIAGNOSIS:  2 week follow up left shoulder arthroplasty      SUBJECTIVE:  Patient returns today for 2 week follow up of left shoulder replacement. Patient reports doing well with no unusual complaints.      OBJECTIVE:    Temp 98.2 °F (36.8 °C) (Temporal)   Ht 149.9 cm (59\")   Wt 93 kg (205 lb)   BMI 41.40 kg/m²     Exam:. The dressing was then placed and removed.  She appears to have had an allergic reaction to the Telfa.  There is a rash in the exact pattern of the Telfa bandage. No erythema extending beyond this.  No purulence or drainage. Shoulder moves fluidly with pendulums . The arm is soft and nontender.  Good strength in hand and wrist.  Distal sensation intact.  Palpable radial pulse.    DIAGNOSTIC STUDIES    Xrays: 2 views of the left shoulder (AP, scapular Y) were ordered and reviewed for evaluation of shoulder replacement.  They demonstrate a well positioned, well aligned replacement without complicating factors noted.  In comparison with previous films, there has been no change.    ASSESSMENT: 2 week follow up left shoulder replacement.    PLAN:   1.  Begin PT per protocol--prescription given along with 2 copies of my protocol.  2.  Continue sling until next visit.  3.  Counseled patient about appropriate activity modifications and restrictions, including no driving at this point.  4.  This does not appear to be an infection but rather an allergic reaction.  I also gave her a prescription for Medrol Dosepak.  I will see her back in 3 weeks.    Brice Mayes MD    "

## 2019-07-26 ENCOUNTER — OFFICE VISIT (OUTPATIENT)
Dept: ORTHOPEDIC SURGERY | Facility: CLINIC | Age: 69
End: 2019-07-26

## 2019-07-26 VITALS — HEIGHT: 59 IN | TEMPERATURE: 97.2 F | WEIGHT: 205 LBS | BODY MASS INDEX: 41.33 KG/M2

## 2019-07-26 DIAGNOSIS — Z96.612 S/P SHOULDER REPLACEMENT, LEFT: Primary | ICD-10-CM

## 2019-07-26 PROCEDURE — 99024 POSTOP FOLLOW-UP VISIT: CPT | Performed by: NURSE PRACTITIONER

## 2019-07-26 PROCEDURE — 73030 X-RAY EXAM OF SHOULDER: CPT | Performed by: NURSE PRACTITIONER

## 2019-07-26 NOTE — PROGRESS NOTES
"Annalisa Bhagat : 1950 MRN: [id number] DATE: 2019    DIAGNOSIS: 6 week follow up left shoulder arthroplasty      SUBJECTIVE:  Patient returns today for 6 week follow up of left shoulder replacement. Patient reports doing well with no unusual complaints.     OBJECTIVE:    Temp 97.2 °F (36.2 °C) (Temporal)   Ht 149.9 cm (59\")   Wt 93 kg (205 lb)   BMI 41.40 kg/m²     Exam:. The incision is well healed. No erythema or drainage. Shoulder moves fluidly with pendulums.  Motion is on track per protocol.  The arm is soft and nontender.  Good motor and sensory function.  Palpable distal pulses.     DIAGNOSTIC STUDIES    Xrays: 2 views of the left shoulder (AP, scapular Y) were ordered and reviewed for evaluation of shoulder replacement. They demonstrate a well positioned, well aligned replacement without complicating factors noted. In comparison with previous films there has been no change.    ASSESSMENT: 6 week follow up left shoulder replacement.    PLAN:   1.  Continue PT per protocol.  2.  Discontinue sling and begin working on progressing ROM as tolerated.  3.  Counseled patient about appropriate activity modifications and restrictions--released to drive at this point.  4.  Follow up in 6 weeks.     JENNY Nuñez     2019      "

## 2019-07-30 RX ORDER — ARMODAFINIL 250 MG/1
TABLET ORAL
Qty: 90 TABLET | Refills: 1 | Status: SHIPPED | OUTPATIENT
Start: 2019-07-30 | End: 2020-01-27

## 2019-09-06 ENCOUNTER — OFFICE VISIT (OUTPATIENT)
Dept: ORTHOPEDIC SURGERY | Facility: CLINIC | Age: 69
End: 2019-09-06

## 2019-09-06 VITALS — WEIGHT: 200 LBS | BODY MASS INDEX: 40.32 KG/M2 | TEMPERATURE: 98.1 F | HEIGHT: 59 IN

## 2019-09-06 DIAGNOSIS — Z96.612 S/P SHOULDER REPLACEMENT, LEFT: Primary | ICD-10-CM

## 2019-09-06 PROCEDURE — 99024 POSTOP FOLLOW-UP VISIT: CPT | Performed by: ORTHOPAEDIC SURGERY

## 2019-09-06 PROCEDURE — 73030 X-RAY EXAM OF SHOULDER: CPT | Performed by: ORTHOPAEDIC SURGERY

## 2019-09-06 NOTE — PROGRESS NOTES
"Annalisa Bhagat : 1950 MRN: [id number] DATE: 2019    DIAGNOSIS: 3 month follow up left shoulder arthroplasty      SUBJECTIVE:  Patient returns today for 3 month follow up of left shoulder replacement. Patient reports doing well with no unusual complaints. Still in PT and reports making good progress.    OBJECTIVE:    Temp 98.1 °F (36.7 °C) (Temporal)   Ht 149.9 cm (59\")   Wt 90.7 kg (200 lb)   BMI 40.40 kg/m²     Review of Systems negative except as above    Exam:. The incision is well healed. No effusion.  Range of motion is measured at , ER 50, IR to back pocket. The arm is soft and nontender.  Good strength with elevation and abduction.  Sensation intact distally.  Brisk cap refill.    DIAGNOSTIC STUDIES    Xrays: 2 views of the left shoulder (AP, scapular Y) were ordered and reviewed for evaluation of shoulder replacement. They demonstrate a well positioned, well aligned replacement without complicating factors noted. In comparison with previous films there has been no change.    ASSESSMENT: 3 month follow up left shoulder replacement.    PLAN:   1. Continue activities as tolerated.  2.  Continue PT per protocol.  3.  I explained that one can expect continued improvement in motion, function, and any residual discomfort for up to 1 year after surgery.  4.  Follow up inn 6 months unless experiencing any new or concerning symptoms.    Brice Mayes MD    "

## 2019-09-18 ENCOUNTER — APPOINTMENT (OUTPATIENT)
Dept: GENERAL RADIOLOGY | Facility: HOSPITAL | Age: 69
End: 2019-09-18

## 2019-09-18 ENCOUNTER — HOSPITAL ENCOUNTER (EMERGENCY)
Facility: HOSPITAL | Age: 69
Discharge: HOME | End: 2019-09-18
Admitting: EMERGENCY MEDICINE

## 2019-09-18 VITALS
HEART RATE: 98 BPM | WEIGHT: 205.25 LBS | TEMPERATURE: 98.1 F | HEIGHT: 60 IN | SYSTOLIC BLOOD PRESSURE: 123 MMHG | RESPIRATION RATE: 14 BRPM | DIASTOLIC BLOOD PRESSURE: 74 MMHG | BODY MASS INDEX: 40.3 KG/M2 | OXYGEN SATURATION: 98 %

## 2019-09-18 DIAGNOSIS — S63.045A DISLOCATION OF CARPOMETACARPAL JOINT OF LEFT THUMB, INITIAL ENCOUNTER: ICD-10-CM

## 2019-09-18 DIAGNOSIS — M25.522 LEFT ELBOW PAIN: ICD-10-CM

## 2019-09-18 DIAGNOSIS — M25.532 LEFT WRIST PAIN: Primary | ICD-10-CM

## 2019-09-18 PROCEDURE — 73030 X-RAY EXAM OF SHOULDER: CPT

## 2019-09-18 PROCEDURE — 73070 X-RAY EXAM OF ELBOW: CPT

## 2019-09-18 PROCEDURE — 99283 EMERGENCY DEPT VISIT LOW MDM: CPT

## 2019-09-18 PROCEDURE — 73080 X-RAY EXAM OF ELBOW: CPT

## 2019-09-18 PROCEDURE — 73110 X-RAY EXAM OF WRIST: CPT

## 2019-09-18 RX ORDER — HYDROCODONE BITARTRATE AND ACETAMINOPHEN 7.5; 325 MG/1; MG/1
1-2 TABLET ORAL EVERY 6 HOURS PRN
Qty: 6 TABLET | Refills: 0 | Status: SHIPPED | OUTPATIENT
Start: 2019-09-18 | End: 2019-10-08

## 2019-09-18 RX ORDER — HYDROCODONE BITARTRATE AND ACETAMINOPHEN 5; 325 MG/1; MG/1
1 TABLET ORAL ONCE AS NEEDED
Status: DISCONTINUED | OUTPATIENT
Start: 2019-09-18 | End: 2019-09-18 | Stop reason: HOSPADM

## 2019-09-18 RX ADMIN — HYDROCODONE BITARTRATE AND ACETAMINOPHEN 1 TABLET: 5; 325 TABLET ORAL at 14:31

## 2019-09-18 NOTE — DISCHARGE INSTRUCTIONS
Patient was given short-term supply of Norco for pain relief.  Dates that she has taken this before and has previously previously tolerated it.  Patient may use Tylenol also for pain.  Patient to keep splint on, keep wrist elevated.  May apply ice in 20-minute on and off increments.    Patient to follow-up with primary care this week.  Patient to call Dr. Maec's office today to schedule appointment to be seen tomorrow.     May return to the ER for new or worsening symptoms, increased pain, swelling, numbness or tingling.

## 2019-09-18 NOTE — ED PROVIDER NOTES
Subjective   Patient is a 69-year-old female who reports the ER complaining of left wrist pain.  She states that she fell backwards 2 days ago landing on her bottom but catching herself by putting her arms back behind her.  She states that she had some pain on Monday but the pain has worsened over the past 2 days.  She is complaining of some swelling in her left wrist and pain with movement.  She is also complaining of some pain with movement of her left elbow and her left shoulder.  She reports that she had a left shoulder replacement in June of this year.  She states that she takes tramadol 50 mg and Norco 5/325 regularly for pain.  She states she took her tramadol and 2 Norco's earlier this morning because of the pain, but states there was little relief. Reports hitting her head when she fell, no syncope, no loss of consciousness.  She has no other complaints        History provided by:  Patient      Review of Systems   Constitutional: Negative for fever.   HENT: Negative for sore throat and trouble swallowing.    Respiratory: Negative for shortness of breath and wheezing.    Cardiovascular: Negative for chest pain.   Gastrointestinal: Negative for abdominal pain.   Genitourinary: Negative for dysuria.   Musculoskeletal: Positive for arthralgias and joint swelling. Negative for back pain, neck pain and neck stiffness.        Left wrist pain and swelling, left elbow pain with movement, left shoulder tenderness   Skin: Negative for color change and rash.        No ecchymosis   Neurological: Negative for dizziness, syncope, weakness and headaches.   Psychiatric/Behavioral: Negative for behavioral problems.   All other systems reviewed and are negative.      Past Medical History:   Diagnosis Date   • Fibromyalgia    • GERD (gastroesophageal reflux disease)    • Hyperlipidemia    • Kidney stone    • Lupus    • Migraines    • Narcolepsy    • Numbness and tingling    • Recurrent UTI    • Rheumatoid arthritis (CMS/[facility])     • Scoliosis    • Seasonal allergies    • Shoulder pain, left    • Sleep apnea    • Spinal stenosis    • Stress incontinence    • Stroke (CMS/HCC)        Allergies   Allergen Reactions   • Zolmitriptan Other (See Comments)     Hx of stroke    • Adhesive Tape Itching     Paper tape - redness and itching.    • Hydromorphone Itching and Rash   • Amoxicillin-Pot Clavulanate Itching and Rash       Past Surgical History:   Procedure Laterality Date   • BACK SURGERY     • CARDIAC CATHETERIZATION     • CARPAL TUNNEL RELEASE     • CYSTOSCOPY W/ URETEROSCOPY W/ LITHOTRIPSY     • HAND SURGERY     • HYSTERECTOMY     • RECTOVAGINAL FISTULA CLOSURE     • SACRAL NERVE STIMULATOR PLACEMENT     • TOTAL SHOULDER ARTHROPLASTY W/ DISTAL CLAVICLE EXCISION Left 6/13/2019    Procedure: TOTAL SHOULDER REVERSE ARTHROPLASTY;  Surgeon: Brice Mayes MD;  Location: [facility];  Service: Orthopedics       Family History   Problem Relation Age of Onset   • Hypotension Mother    • Hypotension Father    • Colon cancer Father    • Stroke Father    • Colon cancer Maternal Grandmother    • Heart disease Maternal Uncle    • Hypertension Paternal Aunt    • Diabetes Paternal Aunt    • Heart failure Paternal Aunt    • Malig Hyperthermia Neg Hx        Social History     Socioeconomic History   • Marital status:      Spouse name: Not on file   • Number of children: Not on file   • Years of education: Not on file   • Highest education level: Not on file   Tobacco Use   • Smoking status: Never Smoker   • Smokeless tobacco: Never Used   Substance and Sexual Activity   • Alcohol use: Yes     Comment: OCC   • Drug use: No   • Sexual activity: Defer           Objective   Physical Exam    Splint - Cast - Strapping  Date/Time: 9/18/2019 4:14 PM  Performed by: Mary Ellen Kauffman PA  Authorized by: Mary Ellen Kauffman PA     Consent:     Consent obtained:  Verbal    Consent given by:  Patient    Risks discussed:  Discoloration, numbness, pain and  "swelling    Alternatives discussed:  No treatment  Pre-procedure details:     Sensation:  Normal  Procedure details:     Laterality:  Left    Location:  Wrist    Wrist:  L wrist    Splint type:  Thumb spica  Post-procedure details:     Pain:  Unchanged    Sensation:  Normal    Patient tolerance of procedure:  Tolerated well, no immediate complications  Comments:      Patient was neurovascularly intact post thumb spica placement.                 ED Course    /78 (BP Location: Left arm, Patient Position: Sitting)   Pulse 101   Temp 98.6 °F (37 °C) (Oral)   Resp 14   Ht 152.4 cm (60\")   Wt 93.1 kg (205 lb 4 oz)   SpO2 96%   BMI 40.08 kg/m²   Labs Reviewed - No data to display  Medications   HYDROcodone-acetaminophen (NORCO) 5-325 MG per tablet 1 tablet (1 tablet Oral Given 9/18/19 1431)     Xr Shoulder 2+ View Left    Result Date: 9/18/2019  No acute findings.  Electronically Signed By-Panchito Funes On:9/18/2019 3:39 PM This report was finalized on [id number] by  Panchito Funes, .    Xr Elbow 2 View Left    Result Date: 9/18/2019  No acute findings.  Electronically Signed By-Panchito Funes On:9/18/2019 3:40 PM This report was finalized on [id number] by  Panchito Funes, .    Xr Wrist 3+ View Left    Result Date: 9/18/2019  Fracture dislocation of the first carpal metacarpal joint, with slight radial subluxation of the base of the first metacarpal.  Small bony fragment interposed between the first carpal metacarpal joint may represent a cortical avulsion of unknown donor site.  Advanced degenerative change of the first carpal metacarpal joint.  Electronically Signed By-Dr. Alisha Dennison MD On:9/18/2019 3:40 PM This report was finalized on [id number] by Dr. Alisha Dennison MD.                  MDM  Number of Diagnoses or Management Options  Dislocation of carpometacarpal joint of left thumb, initial encounter:   Left elbow pain:   Left wrist pain:   Diagnosis management comments: MEDICAL DECISION  Comorbidities: " Fibromyalgia, spinal stenosis, history of stroke, hx left shoulder replacement  Differentials: Wrist fracture, wrist strain, elbow fracture; this list is not all inclusive and does not constitute the entirety of considered causes  Radiology interpretation:  Images reviewed by me and interpreted by radiologist,   PROCEDURE:  XR WRIST 3+ VW LEFT-     INDICATIONS:  Fall. Pain.     COMPARISON:  No Comparisons Available     TECHNIQUE:   A minimum of three radiologic views of the left wrist were obtained.     FINDINGS:  Acute fracture and dislocation at the first carpal metacarpal joint. The  proximal first metacarpal is subluxed in a radial direction, and there  is a free fracture fragment measuring 5 mm adjacent to the joint,  suggesting cortical avulsion of unknown donor site. Advanced  osteoarthritic changes are also suggested at the first carpometacarpal  joint with osteophyte formation.    PROCEDURE:  XR ELBOW 2 VW LEFT-     INDICATIONS:  Fall, left elbow pain.     COMPARISON:  No Comparisons Available     TECHNIQUE:   Two Radiologic views of the left elbow were obtained.     FINDINGS:  There is no acute fracture or dislocation. There are no displaced fat  pads indicating a joint effusion. There are mild degenerative changes of  the radial-capitellar joint.     PROCEDURE:  XR SHOULDER 2+ VW LEFT-     INDICATIONS:  Fall, left shoulder pain, previous arthroplasty.     COMPARISON:  2/23/2018.     TECHNIQUE:   A minimum of two radiologic views of the left shoulder were obtained.    FINDINGS:  The patient has an intact reverse left shoulder arthroplasty. The  acromioclavicular joint is intact. There is no acute fracture or  dislocation. The soft tissues are unremarkable.     Patient's x-rays were reviewed and consult to Dr. Mace was called.  Spoke with Dr. Mace regarding fracture dislocation of the first carpometacarpal joint with slight radial subluxation of the base of the first metacarpal.  Dr. Mace recommended  placing the patient in splint and have her follow-up at his office tomorrow.  Patient's x-ray imaging was shown to Dr. Snell.  It was felt that since the injury happened 2 days ago and the nature of injury, that reduction was not needed.  It was felt that the placement of the left thumb and carpal joint may not stay reduced due to nature of injury.  Patient was placed in a thumb spica splint.  Patient tolerated procedure, patient was distally neurovascularly intact following splint placement.  Capillary refill is less than 2 seconds.  She will follow-up with Dr. Mace this week.  Inspect was queried.  She will be discharged with short-term Norco for pain.         Amount and/or Complexity of Data Reviewed  Tests in the radiology section of CPT®: reviewed and ordered        Final diagnoses:   Left wrist pain   Left elbow pain   Dislocation of carpometacarpal joint of left thumb, initial encounter              Mary Ellen Kauffman PA  09/18/19 8491

## 2019-09-18 NOTE — ED NOTES
Stepped backwards and lost her balance. Fell backwards but tried to catch herself with left hand. Pain continues to her left wrist     Katelyn Roe RN  09/18/19 9838

## 2019-09-23 ENCOUNTER — TELEPHONE (OUTPATIENT)
Dept: ORTHOPEDIC SURGERY | Facility: CLINIC | Age: 69
End: 2019-09-23

## 2019-09-23 RX ORDER — CLINDAMYCIN HYDROCHLORIDE 300 MG/1
CAPSULE ORAL
Qty: 2 CAPSULE | Refills: 2 | Status: SHIPPED | OUTPATIENT
Start: 2019-09-23 | End: 2020-04-20

## 2019-09-23 NOTE — TELEPHONE ENCOUNTER
Patient had LEFT TSRA 6/13/19 by [facility]. Patient has a dental appointment tomorrow Tues 9/24/19 to make an impression of her tooth - patient unclear if she needs antibiotics for tomorrow's impression - as well as dental appointment Wed 9/25/19 for a dental cleaning. Patient stated she is Allergic to Amoxicillin and uses [id number] in [place], [place] phone: [phone] for antibiotic premeds. Patient can be reached at [phone] cell but can leave a message at home number [phone] Thanks /srh

## 2019-09-23 NOTE — TELEPHONE ENCOUNTER
Prescribed a new prescription to [facility] pharmacy at [phone] clindamycin 300 mg, #2, directions are to take both capsules 1 hour prior to her procedure.  Refill x2 per [facility]

## 2019-09-23 NOTE — TELEPHONE ENCOUNTER
Notified patient that [facility] Prescribed a new prescription to [facility] pharmacy at [phone] clindamycin 300 mg, #2, directions are to take both capsules 1 hour prior to her procedure.  Refill x2 per [facility]     Confirmed with clinical medical assistant CLD that patient does not need to take antibiotic medication prior to tomorrow's dental appointment to make an impression of her tooth / teeth, but that patient does Need to take antibiotics prior to Wednesday 9/25 dental appointment.     Patient verbalized understanding, stated she thought the same thing & appreciated the call.

## 2019-10-08 ENCOUNTER — LAB (OUTPATIENT)
Dept: LAB | Facility: HOSPITAL | Age: 69
End: 2019-10-08

## 2019-10-08 ENCOUNTER — OFFICE VISIT (OUTPATIENT)
Dept: ORTHOPEDIC SURGERY | Facility: CLINIC | Age: 69
End: 2019-10-08

## 2019-10-08 VITALS
WEIGHT: 202.2 LBS | HEIGHT: 59 IN | DIASTOLIC BLOOD PRESSURE: 80 MMHG | SYSTOLIC BLOOD PRESSURE: 121 MMHG | HEART RATE: 91 BPM | BODY MASS INDEX: 40.76 KG/M2

## 2019-10-08 DIAGNOSIS — Z79.899 HIGH RISK MEDICATION USE: ICD-10-CM

## 2019-10-08 DIAGNOSIS — Z87.311 HX OF PATHOLOGICAL FRACTURE: ICD-10-CM

## 2019-10-08 DIAGNOSIS — M81.0 AGE-RELATED OSTEOPOROSIS WITHOUT CURRENT PATHOLOGICAL FRACTURE: Primary | ICD-10-CM

## 2019-10-08 DIAGNOSIS — E55.9 VITAMIN D DEFICIENCY: ICD-10-CM

## 2019-10-08 DIAGNOSIS — M81.0 AGE-RELATED OSTEOPOROSIS WITHOUT CURRENT PATHOLOGICAL FRACTURE: ICD-10-CM

## 2019-10-08 PROBLEM — R06.83 SNORING: Status: ACTIVE | Noted: 2019-10-08

## 2019-10-08 PROBLEM — G47.30 SLEEP APNEA: Status: ACTIVE | Noted: 2019-10-08

## 2019-10-08 PROBLEM — R53.83 FATIGUE: Status: ACTIVE | Noted: 2017-02-23

## 2019-10-08 PROBLEM — Z51.81 ENCOUNTER FOR THERAPEUTIC DRUG MONITORING: Status: ACTIVE | Noted: 2018-04-05

## 2019-10-08 PROBLEM — G62.9 NEUROPATHY: Status: ACTIVE | Noted: 2019-10-08

## 2019-10-08 PROBLEM — M75.102 ROTATOR CUFF SYNDROME, LEFT: Status: ACTIVE | Noted: 2017-11-21

## 2019-10-08 PROBLEM — L40.9 PSORIASIS: Status: ACTIVE | Noted: 2017-06-14

## 2019-10-08 PROBLEM — R89.9 ABNORMAL LABORATORY TEST: Status: ACTIVE | Noted: 2019-05-15

## 2019-10-08 PROBLEM — F32.A DEPRESSION: Status: ACTIVE | Noted: 2019-10-08

## 2019-10-08 PROBLEM — M48.061 SPINAL STENOSIS, LUMBAR: Status: ACTIVE | Noted: 2019-10-08

## 2019-10-08 PROBLEM — M06.9 RHEUMATOID ARTHRITIS: Status: ACTIVE | Noted: 2019-10-08

## 2019-10-08 PROBLEM — M41.9 SCOLIOSIS: Status: ACTIVE | Noted: 2019-10-08

## 2019-10-08 PROBLEM — M79.7 FIBROMYALGIA: Status: ACTIVE | Noted: 2018-06-06

## 2019-10-08 PROBLEM — Z86.73 HISTORY OF STROKE: Status: ACTIVE | Noted: 2017-11-27

## 2019-10-08 PROBLEM — Z82.3 FAMILY HISTORY OF CEREBROVASCULAR ACCIDENT (CVA): Status: ACTIVE | Noted: 2019-10-08

## 2019-10-08 PROBLEM — N39.3 STRESS INCONTINENCE: Status: ACTIVE | Noted: 2019-10-08

## 2019-10-08 PROBLEM — M54.2 NECK PAIN: Status: ACTIVE | Noted: 2017-11-21

## 2019-10-08 PROBLEM — Z83.3 FAMILY HISTORY OF DIABETES MELLITUS: Status: ACTIVE | Noted: 2019-10-08

## 2019-10-08 PROBLEM — N39.0 RECURRENT UTI: Status: ACTIVE | Noted: 2019-10-08

## 2019-10-08 PROBLEM — G47.419 NARCOLEPSY: Status: ACTIVE | Noted: 2019-10-08

## 2019-10-08 PROBLEM — M25.511 SHOULDER PAIN, RIGHT: Status: ACTIVE | Noted: 2019-02-05

## 2019-10-08 PROBLEM — T84.298D: Status: ACTIVE | Noted: 2017-11-21

## 2019-10-08 PROBLEM — G43.909 MIGRAINE HEADACHE: Status: ACTIVE | Noted: 2019-10-08

## 2019-10-08 PROBLEM — R09.02 HYPOXEMIA: Status: ACTIVE | Noted: 2017-12-04

## 2019-10-08 PROBLEM — E66.9 OBESITY: Status: ACTIVE | Noted: 2019-10-08

## 2019-10-08 PROBLEM — G47.10 HYPERSOMNIA: Status: ACTIVE | Noted: 2017-05-02

## 2019-10-08 PROBLEM — N20.0 NEPHROLITHIASIS: Status: ACTIVE | Noted: 2019-10-08

## 2019-10-08 PROBLEM — Z99.89 CPAP (CONTINUOUS POSITIVE AIRWAY PRESSURE) DEPENDENCE: Status: ACTIVE | Noted: 2019-10-08

## 2019-10-08 PROBLEM — J30.2 SEASONAL ALLERGIES: Status: ACTIVE | Noted: 2019-10-08

## 2019-10-08 PROBLEM — K59.00 CONSTIPATION: Status: ACTIVE | Noted: 2019-10-08

## 2019-10-08 PROBLEM — I34.1 MITRAL PROLAPSE: Status: ACTIVE | Noted: 2019-10-08

## 2019-10-08 PROBLEM — R32 INCONTINENCE: Status: ACTIVE | Noted: 2019-10-08

## 2019-10-08 PROBLEM — IMO0002 PSEUDOARTHROSIS: Status: ACTIVE | Noted: 2017-11-21

## 2019-10-08 LAB
ALBUMIN SERPL-MCNC: 3.7 G/DL (ref 3.5–4.8)
ALBUMIN/GLOB SERPL: 1.5 G/DL (ref 1–1.7)
ALP SERPL-CCNC: 122 U/L (ref 32–91)
ALT SERPL W P-5'-P-CCNC: 26 U/L (ref 14–54)
ANION GAP SERPL CALCULATED.3IONS-SCNC: 13.9 MMOL/L (ref 5–15)
AST SERPL-CCNC: 31 U/L (ref 15–41)
BILIRUB SERPL-MCNC: 0.6 MG/DL (ref 0.3–1.2)
BUN BLD-MCNC: 12 MG/DL (ref 8–20)
BUN/CREAT SERPL: 20 (ref 5.4–26.2)
CALCIUM SPEC-SCNC: 9.4 MG/DL (ref 8.9–10.3)
CHLORIDE SERPL-SCNC: 103 MMOL/L (ref 101–111)
CO2 SERPL-SCNC: 29 MMOL/L (ref 22–32)
CREAT BLD-MCNC: 0.6 MG/DL (ref 0.4–1)
GFR SERPL CREATININE-BSD FRML MDRD: 99 ML/MIN/1.73
GLOBULIN UR ELPH-MCNC: 2.5 GM/DL (ref 2.5–3.8)
GLUCOSE BLD-MCNC: 93 MG/DL (ref 65–99)
POTASSIUM BLD-SCNC: 3.9 MMOL/L (ref 3.6–5.1)
PROT SERPL-MCNC: 6.2 G/DL (ref 6.1–7.9)
SODIUM BLD-SCNC: 142 MMOL/L (ref 136–144)

## 2019-10-08 PROCEDURE — 82306 VITAMIN D 25 HYDROXY: CPT

## 2019-10-08 PROCEDURE — 80053 COMPREHEN METABOLIC PANEL: CPT

## 2019-10-08 PROCEDURE — 36415 COLL VENOUS BLD VENIPUNCTURE: CPT

## 2019-10-08 PROCEDURE — 99214 OFFICE O/P EST MOD 30 MIN: CPT | Performed by: PHYSICIAN ASSISTANT

## 2019-10-08 NOTE — PROGRESS NOTES
ORTHO FOLLOW UP       Subjective:    HPI:   Annalisa Bhagat is a 69 y.o. female who presents in follow-up for osteoporosis.  She is currently on Forteo and reports that she is doing well with no noticeable side effects.  She does report a recent increase in back pain, however she has had a recent long car ride, recent fall, and recent increase activity with her grandchildren.  She is not yet seen her spine surgeon for follow-up for this increased back pain.  She does continue her calcium and vitamin D.  She denies any new fractures since last office visit.  Previous labs were reviewed.  She is due for DEXA scan.    Past Medical History:   Diagnosis Date   • Fibromyalgia    • GERD (gastroesophageal reflux disease)    • Hyperlipidemia    • Kidney stone    • Lupus (CMS/HCC)    • Migraines    • Narcolepsy    • Numbness and tingling    • Recurrent UTI    • Rheumatoid arthritis (CMS/HCC)    • Scoliosis    • Seasonal allergies    • Shoulder pain, left    • Sleep apnea    • Spinal stenosis    • Stress incontinence    • Stroke (CMS/HCC)        Past Surgical History:   Procedure Laterality Date   • BACK SURGERY     • CARDIAC CATHETERIZATION     • CARPAL TUNNEL RELEASE     • CYSTOSCOPY W/ URETEROSCOPY W/ LITHOTRIPSY     • HAND SURGERY     • HYSTERECTOMY     • RECTOVAGINAL FISTULA CLOSURE     • SACRAL NERVE STIMULATOR PLACEMENT     • TOTAL SHOULDER ARTHROPLASTY W/ DISTAL CLAVICLE EXCISION Left 6/13/2019    Procedure: TOTAL SHOULDER REVERSE ARTHROPLASTY;  Surgeon: Brice Mayes MD;  Location: [facility];  Service: Orthopedics       Social History     Occupational History   • Not on file   Tobacco Use   • Smoking status: Never Smoker   • Smokeless tobacco: Never Used   Substance and Sexual Activity   • Alcohol use: Yes     Comment: OCC   • Drug use: No   • Sexual activity: Defer        Medications:    Current Outpatient Medications:   •  apixaban (ELIQUIS) 5 MG tablet tablet, Take 1 tablet by mouth Every 12 (Twelve)  Hours. Resume 6/15/29, Disp: 60 tablet, Rfl:   •  Armodafinil 250 MG tablet, TAKE 1 TABLET BY MOUTH EVERY DAY, Disp: 90 tablet, Rfl: 1  •  atorvastatin (LIPITOR) 40 MG tablet, Take 40 mg by mouth Daily., Disp: , Rfl:   •  Azelastine-Fluticasone 137-50 MCG/ACT suspension, 1-2 sprays into the nostril(s) as directed by provider Daily., Disp: , Rfl:   •  B Complex Vitamins (VITAMIN B COMPLEX PO), Take 1 each by mouth Daily., Disp: , Rfl:   •  butalbital-acetaminophen-caffeine (FIORICET, ESGIC) -40 MG per tablet, Take 1 tablet by mouth Every 4 (Four) Hours As Needed., Disp: , Rfl:   •  calcium citrate (CALCITRATE) 950 MG tablet, Take 1 tablet by mouth., Disp: , Rfl:   •  clindamycin (CLEOCIN) 300 MG capsule, Take both caps 1 hour prior to procedure, Disp: 2 capsule, Rfl: 2  •  DULoxetine (CYMBALTA) 60 MG capsule, Take 60 mg by mouth Every Night., Disp: , Rfl:   •  Erenumab-aooe (AIMOVIG) 70 MG/ML prefilled syringe, Inject 70 mg under the skin into the appropriate area as directed. HASN'T STARTED YET., Disp: , Rfl:   •  gabapentin (NEURONTIN) 600 MG tablet, Take 600 mg by mouth 4 (Four) Times a Day., Disp: , Rfl:   •  hydrocortisone 2.5 % cream, Apply 1 application topically to the appropriate area as directed As Needed., Disp: , Rfl:   •  hydroxychloroquine (PLAQUENIL) 200 MG tablet, Take  by mouth 2 (Two) Times a Day., Disp: , Rfl:   •  Melatonin 10 MG tablet, Take 1 tablet by mouth Every Night., Disp: , Rfl:   •  methocarbamol (ROBAXIN) 750 MG tablet, Take 1,500 mg by mouth 2 (Two) Times a Day., Disp: , Rfl:   •  omeprazole (priLOSEC) 20 MG capsule, Take 20 mg by mouth Every Night., Disp: , Rfl:   •  promethazine (PHENERGAN) 25 MG tablet, Take 25 mg by mouth As Needed (MIGRAINES)., Disp: , Rfl:   •  pyridoxine (VITAMIN B-6) 200 MG tablet, Take 200 mg by mouth Daily., Disp: , Rfl:   •  teriparatide (FORTEO) 600 MCG/2.4ML injection, Inject 20 mcg under the skin into the appropriate area as directed Every Night.,  "Disp: , Rfl:   •  traMADol (ULTRAM) 50 MG tablet, Take 50 mg by mouth Every 6 (Six) Hours As Needed for Moderate Pain ., Disp: , Rfl:   •  triamcinolone (KENALOG) 0.025 % cream, Apply 1 application topically to the appropriate area as directed As Needed., Disp: , Rfl:   •  vitamin C (ASCORBIC ACID) 250 MG tablet, Take 1,000 mg by mouth Daily., Disp: , Rfl:   No current facility-administered medications for this visit.     Facility-Administered Medications Ordered in Other Visits:   •  mupirocin (BACTROBAN) 2 % nasal ointment, , Nasal, BID, Hueston, Lindy L, APRN    Allergies:  Allergies   Allergen Reactions   • Zolmitriptan Other (See Comments)     Hx of stroke    • Adhesive Tape Itching     Paper tape - redness and itching.    • Hydromorphone Itching and Rash   • Amoxicillin-Pot Clavulanate Itching and Rash       Review of Systems:  Gen -no fever, chills , sweats, headache   Eyes - no irritation or discharge   ENT -  no ear pain , runny nose , sore throat , difficulty swallowing   Resp - no cough , congestion , excessive expectoration   CVS - no chest pain , palpitations.   Abd - no pain , nausea , vomiting , diarrhea   Skin - no rash , lesions.   Neuro - no dizziness    Please see HPI for any other pertinent positives.  All other systems were reviewed and are negative.       Objective   Objective:    /80   Pulse 91   Ht 149.9 cm (59\")   Wt 91.7 kg (202 lb 3.2 oz)   BMI 40.84 kg/m²     Physical Examination:  Morbidly obese individual in no acute distress, patient is alert and cooperative with the exam, appears to have normal mood and affect with a normal attention span and concentration, ambulating with the assistance of a walker  normocephalic, atraumatic, extraocular movements intact, conjunctiva and sclera are clear without nystagmus, normal canals with grossly normal hearing, no nasal deformity, discharge, inflammation, or lesions, no mouth deformity or lesions  no lymphadenopathy or " thyromegaly  normal respiratory effort  abdomen is nontender, without guarding or rebound and nondistended  Appears slightly kyphotic  pulses normal in all 4 extremities, no clubbing, cyanosis, or edema noted  cranial nerves II-XII grossly intact with no focal defects  skin intact without lesions or rashes visible             Imaging:  no diagnostic testing performed this visit            Assessment:  1. Age-related osteoporosis without current pathological fracture    2. Hx of pathological fracture    3. Vitamin D deficiency    4. High risk medication use    History of loosening/broken spinal hardware    Currently on Forteo for 19 months          Plan:  Today, we will check a CMP and vitamin D, as well as schedule her for a new DEXA scan.  She will continue her Forteo daily, as well as her calcium and vitamin D.  We have discussed starting Prolia in 5 months when she completes her Forteo.  We also discussed starting the sooner based on her DEXA results.  Today we did discuss the risks and benefits of this medication, including osteonecrosis of the jaw, atypical femur fractures, and risk of multiple vertebral fractures upon discontinuation, and she voiced understanding.  She is in agreement would like to proceed.  I will plan to see her back in 5 months for recheck.             CHEL Diehl  10/08/19  2:50 PM

## 2019-10-08 NOTE — PATIENT INSTRUCTIONS
Osteoporosis    Osteoporosis happens when your bones get thin and weak. This can cause your bones to break (fracture) more easily. You can do things at home to make your bones stronger.  Follow these instructions at home:    Activity  · Exercise as told by your doctor. Ask your doctor what activities are safe for you. You should do:  ? Exercises that make your muscles work to hold your body weight up (weight-bearing exercises). These include augustine chi, yoga, and walking.  ? Exercises to make your muscles stronger. One example is lifting weights.  Lifestyle  · Limit alcohol intake to no more than 1 drink a day for nonpregnant women and 2 drinks a day for men. One drink equals 12 oz of beer, 5 oz of wine, or 1½ oz of hard liquor.  · Do not use any products that have nicotine or tobacco in them. These include cigarettes and e-cigarettes. If you need help quitting, ask your doctor.  Preventing falls  · Use tools to help you move around (mobility aids) as needed. These include canes, walkers, scooters, and crutches.  · Keep rooms well-lit and free of clutter.  · Put away things that could make you trip. These include cords and rugs.  · Install safety rails on stairs. Install grab bars in bathrooms.  · Use rubber mats in slippery areas, like bathrooms.  · Wear shoes that:  ? Fit you well.  ? Support your feet.  ? Have closed toes.  ? Have rubber soles or low heels.  · Tell your doctor about all of the medicines you are taking. Some medicines can make you more likely to fall.  General instructions  · Eat plenty of calcium and vitamin D. These nutrients are good for your bones. Good sources of calcium and vitamin D include:  ? Some fatty fish, such as salmon and tuna.  ? Foods that have calcium and vitamin D added to them (fortified foods). For example, some breakfast cereals are fortified with calcium and vitamin D.  ? Egg yolks.  ? Cheese.  ? Liver.  · Take over-the-counter and prescription medicines only as told by your  doctor.  · Keep all follow-up visits as told by your doctor. This is important.  Contact a doctor if:  · You have not been tested (screened) for osteoporosis and you are:  ? A woman who is age 65 or older.  ? A man who is age 70 or older.  Get help right away if:  · You fall.  · You get hurt.  Summary  · Osteoporosis happens when your bones get thin and weak.  · Weak bones can break (fracture) more easily.  · Eat plenty of calcium and vitamin D. These nutrients are good for your bones.  · Tell your doctor about all of the medicines that you take.  This information is not intended to replace advice given to you by your health care provider. Make sure you discuss any questions you have with your health care provider.  Document Released: 03/11/2013 Document Revised: 10/12/2018 Document Reviewed: 10/12/2018  [employer] Interactive Patient Education © 2019 [employer]

## 2019-10-09 LAB — 25(OH)D3 SERPL-MCNC: 49.6 NG/ML (ref 30–100)

## 2019-10-18 ENCOUNTER — HOSPITAL ENCOUNTER (OUTPATIENT)
Dept: BONE DENSITY | Facility: HOSPITAL | Age: 69
Discharge: HOME | End: 2019-10-18
Admitting: PHYSICIAN ASSISTANT

## 2019-10-18 DIAGNOSIS — E55.9 VITAMIN D DEFICIENCY: ICD-10-CM

## 2019-10-18 DIAGNOSIS — M81.0 AGE-RELATED OSTEOPOROSIS WITHOUT CURRENT PATHOLOGICAL FRACTURE: ICD-10-CM

## 2019-10-18 DIAGNOSIS — Z87.311 HX OF PATHOLOGICAL FRACTURE: ICD-10-CM

## 2019-10-18 DIAGNOSIS — Z79.899 HIGH RISK MEDICATION USE: ICD-10-CM

## 2019-10-18 PROCEDURE — 77080 DXA BONE DENSITY AXIAL: CPT

## 2019-11-05 ENCOUNTER — OFFICE VISIT (OUTPATIENT)
Dept: ORTHOPEDIC SURGERY | Facility: CLINIC | Age: 69
End: 2019-11-05

## 2019-11-05 VITALS
HEART RATE: 106 BPM | HEIGHT: 59 IN | WEIGHT: 202 LBS | SYSTOLIC BLOOD PRESSURE: 133 MMHG | BODY MASS INDEX: 40.72 KG/M2 | DIASTOLIC BLOOD PRESSURE: 80 MMHG

## 2019-11-05 DIAGNOSIS — M40.14 OTHER SECONDARY KYPHOSIS, THORACIC REGION: Primary | ICD-10-CM

## 2019-11-05 DIAGNOSIS — T84.9XXA COMPLICATIONS OF INTERNAL ORTHOPEDIC DEVICE, IMPLANT, AND GRAFT (HCC): ICD-10-CM

## 2019-11-05 DIAGNOSIS — M96.0 PSEUDARTHROSIS AFTER FUSION OR ARTHRODESIS: ICD-10-CM

## 2019-11-05 PROCEDURE — 99214 OFFICE O/P EST MOD 30 MIN: CPT | Performed by: PHYSICIAN ASSISTANT

## 2019-11-05 RX ORDER — LORATADINE 10 MG/1
10 TABLET ORAL AS NEEDED
COMMUNITY

## 2019-11-05 RX ORDER — LANOLIN ALCOHOL/MO/W.PET/CERES
1000 CREAM (GRAM) TOPICAL
COMMUNITY

## 2019-11-05 RX ORDER — PHENAZOPYRIDINE HYDROCHLORIDE 100 MG/1
100 TABLET, FILM COATED ORAL 3 TIMES DAILY PRN
COMMUNITY

## 2019-11-05 RX ORDER — FUROSEMIDE 40 MG/1
40 TABLET ORAL 2 TIMES DAILY PRN
COMMUNITY

## 2019-11-05 NOTE — PROGRESS NOTES
Orthopedic Spine Follow Up    Referring Provider: No ref. provider found  Primary Care Provider: Caleb Whitney MD    Patient Name: Annalisa Bhagat  Patient Age: 69 y.o.  Chief Complaint: had concerns including Pain and Follow-up of the Lumbar Spine and Pain and Follow-up of the Right Leg.    History of Present Illness: Annalisa Bhagat is a 69 y.o. year old female here in  Follow up today with chief complaint of had concerns including Pain and Follow-up of the Lumbar Spine and Pain and Follow-up of the Right Leg..status post thoracic fusion for burst fracture of T12. Has known fracccture of cony at T12. She has some worsening back pain which she describes as terrible. Has had gradual increase kyphosis around the area of her fracture. Has hx of stroke. Her main concern is her back pain. She has had some right leg pain in the past but that is improved. No weakness in legs. numbness in her right foot changes with change in position of her back.     Imaging:  X-rays of thoracic spine show worsening focal kyphosis around her corpectomy cage with lateral line fracture, the lateral cony  has backed out of the screws above the fracture area.      Assessment:   1. Other secondary kyphosis, thoracic region    2. Pseudarthrosis after fusion or arthrodesis    3. Complications of internal orthopedic device, implant, and graft (CMS/HCC)        Plan:  Because this patient's pain is worsening, her kyphosis is worsening and her implants are obviously failed plan is to proceed with a CT scan of thoracic spine for surgical planning.  Return after CAT scan.    Subjective     Review of Systems   Constitutional: Positive for activity change. Negative for fatigue and fever.   HENT: Negative for sore throat.    Eyes: Negative for double vision.   Respiratory: Negative for choking.    Gastrointestinal: Negative for abdominal pain and constipation.   Genitourinary: Negative for dysuria.   Musculoskeletal: Positive for back pain and gait  problem.   Skin: Negative for rash.   Neurological: Positive for weakness and numbness.   Hematological: Does not bruise/bleed easily.   Psychiatric/Behavioral: Positive for sleep disturbance.       The following portions of the patient's history were reviewed and updated as appropriate: allergies, current medications, past family history, past medical history, past social history, past surgical history and problem list.    Objective     Physical Exam   Constitutional: She is oriented to person, place, and time. She appears well-developed.   HENT:   Head: Normocephalic and atraumatic.   Eyes: Conjunctivae are normal.   Neck: Normal range of motion.   Cardiovascular: Normal rate.   Pulmonary/Chest: Effort normal.   Abdominal: There is no guarding.   Musculoskeletal: She exhibits tenderness.        Right hip: Normal.        Left hip: Normal.        Lumbar back: She exhibits decreased range of motion, tenderness and pain.        Arms:  Neurological: She is oriented to person, place, and time.   Skin: Skin is warm.   Psychiatric: Her behavior is normal.   Vitals reviewed.    Ortho Exam      1. Other secondary kyphosis, thoracic region    2. Pseudarthrosis after fusion or arthrodesis    3. Complications of internal orthopedic device, implant, and graft (CMS/HCC)         Orders Placed This Encounter   Procedures   • XR Spine Thoracic 2 View     Scheduling Instructions:      Ap and lateral     Order Specific Question:   Reason for Exam:     Answer:   kyphosis   • CT Thoracic Spine Without Contrast     Standing Status:   Future     Standing Expiration Date:   11/5/2020       Procedures

## 2019-11-06 ENCOUNTER — HOSPITAL ENCOUNTER (OUTPATIENT)
Dept: CT IMAGING | Facility: HOSPITAL | Age: 69
Discharge: HOME | End: 2019-11-06
Admitting: PHYSICIAN ASSISTANT

## 2019-11-06 DIAGNOSIS — M40.14 OTHER SECONDARY KYPHOSIS, THORACIC REGION: ICD-10-CM

## 2019-11-06 DIAGNOSIS — M96.0 PSEUDARTHROSIS AFTER FUSION OR ARTHRODESIS: ICD-10-CM

## 2019-11-06 DIAGNOSIS — T84.9XXA COMPLICATIONS OF INTERNAL ORTHOPEDIC DEVICE, IMPLANT, AND GRAFT (HCC): ICD-10-CM

## 2019-11-06 PROCEDURE — 72128 CT CHEST SPINE W/O DYE: CPT

## 2019-11-13 ENCOUNTER — OFFICE VISIT (OUTPATIENT)
Dept: RHEUMATOLOGY | Facility: CLINIC | Age: 69
End: 2019-11-13

## 2019-11-13 ENCOUNTER — LAB (OUTPATIENT)
Dept: LAB | Facility: HOSPITAL | Age: 69
End: 2019-11-13

## 2019-11-13 VITALS
HEIGHT: 59 IN | BODY MASS INDEX: 41.12 KG/M2 | HEART RATE: 84 BPM | SYSTOLIC BLOOD PRESSURE: 120 MMHG | WEIGHT: 204 LBS | DIASTOLIC BLOOD PRESSURE: 64 MMHG

## 2019-11-13 DIAGNOSIS — M06.4 INFLAMMATORY POLYARTHROPATHY (HCC): ICD-10-CM

## 2019-11-13 DIAGNOSIS — R53.83 OTHER FATIGUE: ICD-10-CM

## 2019-11-13 DIAGNOSIS — M35.01 SJOGREN'S SYNDROME WITH KERATOCONJUNCTIVITIS SICCA (HCC): ICD-10-CM

## 2019-11-13 DIAGNOSIS — M51.36 DEGENERATION OF INTERVERTEBRAL DISC OF LUMBAR REGION: ICD-10-CM

## 2019-11-13 DIAGNOSIS — M19.90 INFLAMMATORY ARTHRITIS: ICD-10-CM

## 2019-11-13 DIAGNOSIS — R76.8 POSITIVE ANA (ANTINUCLEAR ANTIBODY): ICD-10-CM

## 2019-11-13 DIAGNOSIS — M19.90 INFLAMMATORY ARTHRITIS: Primary | ICD-10-CM

## 2019-11-13 LAB
ALBUMIN SERPL-MCNC: 4 G/DL (ref 3.5–5.2)
ALBUMIN/GLOB SERPL: 1.7 G/DL
ALP SERPL-CCNC: 137 U/L (ref 39–117)
ALT SERPL W P-5'-P-CCNC: 22 U/L (ref 1–33)
ANION GAP SERPL CALCULATED.3IONS-SCNC: 11.2 MMOL/L (ref 5–15)
AST SERPL-CCNC: 22 U/L (ref 1–32)
BASOPHILS # BLD AUTO: 0.05 10*3/MM3 (ref 0–0.2)
BASOPHILS NFR BLD AUTO: 1 % (ref 0–1.5)
BILIRUB SERPL-MCNC: 0.2 MG/DL (ref 0.2–1.2)
BUN BLD-MCNC: 14 MG/DL (ref 8–23)
BUN/CREAT SERPL: 22.2 (ref 7–25)
C3 SERPL-MCNC: 140 MG/DL (ref 82–167)
C4 SERPL-MCNC: 27 MG/DL (ref 14–44)
CALCIUM SPEC-SCNC: 9.1 MG/DL (ref 8.6–10.5)
CHLORIDE SERPL-SCNC: 99 MMOL/L (ref 98–107)
CO2 SERPL-SCNC: 28.8 MMOL/L (ref 22–29)
CREAT BLD-MCNC: 0.63 MG/DL (ref 0.57–1)
CRP SERPL-MCNC: 0.5 MG/DL (ref 0–0.5)
DEPRECATED RDW RBC AUTO: 45.1 FL (ref 37–54)
EOSINOPHIL # BLD AUTO: 0.36 10*3/MM3 (ref 0–0.4)
EOSINOPHIL NFR BLD AUTO: 6.9 % (ref 0.3–6.2)
ERYTHROCYTE [DISTWIDTH] IN BLOOD BY AUTOMATED COUNT: 14.3 % (ref 12.3–15.4)
ERYTHROCYTE [SEDIMENTATION RATE] IN BLOOD: 21 MM/HR (ref 0–30)
GFR SERPL CREATININE-BSD FRML MDRD: 93 ML/MIN/1.73
GLOBULIN UR ELPH-MCNC: 2.4 GM/DL
GLUCOSE BLD-MCNC: 90 MG/DL (ref 65–99)
HCT VFR BLD AUTO: 34.2 % (ref 34–46.6)
HGB BLD-MCNC: 11.4 G/DL (ref 12–15.9)
IMM GRANULOCYTES # BLD AUTO: 0.02 10*3/MM3 (ref 0–0.05)
IMM GRANULOCYTES NFR BLD AUTO: 0.4 % (ref 0–0.5)
LYMPHOCYTES # BLD AUTO: 1.08 10*3/MM3 (ref 0.7–3.1)
LYMPHOCYTES NFR BLD AUTO: 20.8 % (ref 19.6–45.3)
MCH RBC QN AUTO: 29.2 PG (ref 26.6–33)
MCHC RBC AUTO-ENTMCNC: 33.3 G/DL (ref 31.5–35.7)
MCV RBC AUTO: 87.7 FL (ref 79–97)
MONOCYTES # BLD AUTO: 0.58 10*3/MM3 (ref 0.1–0.9)
MONOCYTES NFR BLD AUTO: 11.2 % (ref 5–12)
NEUTROPHILS # BLD AUTO: 3.11 10*3/MM3 (ref 1.7–7)
NEUTROPHILS NFR BLD AUTO: 59.7 % (ref 42.7–76)
NRBC BLD AUTO-RTO: 0 /100 WBC (ref 0–0.2)
PLATELET # BLD AUTO: 295 10*3/MM3 (ref 140–450)
PMV BLD AUTO: 10.2 FL (ref 6–12)
POTASSIUM BLD-SCNC: 3.9 MMOL/L (ref 3.5–5.2)
PROT SERPL-MCNC: 6.4 G/DL (ref 6–8.5)
RBC # BLD AUTO: 3.9 10*6/MM3 (ref 3.77–5.28)
SODIUM BLD-SCNC: 139 MMOL/L (ref 136–145)
TSH SERPL DL<=0.05 MIU/L-ACNC: 2.01 UIU/ML (ref 0.27–4.2)
WBC NRBC COR # BLD: 5.2 10*3/MM3 (ref 3.4–10.8)

## 2019-11-13 PROCEDURE — 36415 COLL VENOUS BLD VENIPUNCTURE: CPT

## 2019-11-13 PROCEDURE — 85652 RBC SED RATE AUTOMATED: CPT

## 2019-11-13 PROCEDURE — 84443 ASSAY THYROID STIM HORMONE: CPT

## 2019-11-13 PROCEDURE — 86160 COMPLEMENT ANTIGEN: CPT

## 2019-11-13 PROCEDURE — 80053 COMPREHEN METABOLIC PANEL: CPT

## 2019-11-13 PROCEDURE — 86140 C-REACTIVE PROTEIN: CPT

## 2019-11-13 PROCEDURE — 85025 COMPLETE CBC W/AUTO DIFF WBC: CPT

## 2019-11-13 PROCEDURE — 99214 OFFICE O/P EST MOD 30 MIN: CPT | Performed by: NURSE PRACTITIONER

## 2019-11-13 PROCEDURE — 81001 URINALYSIS AUTO W/SCOPE: CPT

## 2019-11-13 RX ORDER — CYCLOBENZAPRINE HCL 10 MG
10 TABLET ORAL
COMMUNITY
Start: 2019-11-11 | End: 2019-11-21

## 2019-11-13 NOTE — PATIENT INSTRUCTIONS
Stable in terms of her inflammatory arthritis, will continue plaquenil and check labs.   Discussed the importance of eye examination with plaquenil use. Pt verbalizes understanding.   RTO in 5-6 months or sooner if needed.

## 2019-11-13 NOTE — PROGRESS NOTES
Subjective   Annalisa Bhagat is a 69 y.o. female.     History of Present Illness     Pt is a 69 y.o. female pt here for follow up today for the management of her inflammatory arthritis and positive CHET. She has osteoporosis and is on forteo, she follows osteoporosis clinic.   . She is taking plaquenil 1 tab po BID and gabapentin 800 mg po TID for neuropathic pain. Takes tramadol for her arthralgias.       Pt was last seen in May 2019 and is here for follow up today. She is up to date on her eye examination. Reports that in terms of her arthritis she is stable. She is following spine for her low back & scoliosis. She took a fall in September and reports this exacerbated her back pain. She had recent CT & plans to follow up w/ spine. When falling in September she fractured her left thumb; seen Dr. Mace is now better & healed.      AM stiffness lasts 15 min. Low back hurts. Denies joint swelling. She did have left shoulder surgery in June  & is better. Reports her pain is 'much better'     Review of systems: She denies fever/chills, nasal or oral sores. Denies chest pain or SOA. Energy is low, wondering about her thyroid. Family history of thyroid disease. She has swelling in the legs & will take lasix as needed. Denies hairloss/weightloss. No bloody foamy urine. The remainder of the review of systems reviewed and (-) Follows neurology for her sleep apnea. She is followed at the osteoporosis clinic for her boneloss.   Taking forteo.          The following portions of the patient's history were reviewed and updated as appropriate: allergies, current medications, past family history, past medical history, past social history, past surgical history and problem list.    Review of Systems   Constitutional:        See HPI       Objective   Physical Exam   Constitutional: She is oriented to person, place, and time. She appears well-developed and well-nourished.   HENT:   Head: Normocephalic and atraumatic.   Nose: Nose  normal.   Dry mucus membranes   Eyes: EOM are normal. Pupils are equal, round, and reactive to light.   Neck: Normal range of motion. No thyromegaly present.   Cardiovascular: Normal rate and regular rhythm.   Pulmonary/Chest: Effort normal and breath sounds normal.   Musculoskeletal:   She has mild decreased ROM over the bilateral shoulders, lumbar spine,  She is tender over the lumbar spine and paraspinal muscles.   No synovitis is noted. She has mild BLE on exam.   Neurological: She is alert and oriented to person, place, and time.   Skin: Skin is warm and dry. No rash noted.   Psychiatric: She has a normal mood and affect.         Assessment/Plan   Annalisa was seen today for joint pain.    Diagnoses and all orders for this visit:    Inflammatory arthritis  Comments:  Due for labs today  Stable, no synovitis on exam  She will continue plaquenil  Orders:  -     CBC & Differential; Future  -     C-reactive Protein; Future  -     Comprehensive Metabolic Panel; Future  -     Sedimentation Rate; Future  -     C3 Complement; Future  -     C4 Complement; Future  -     Urinalysis With Culture If Indicated -; Future    Positive CHET (antinuclear antibody)  Comments:  Check labs today  Continue plaquenil 400 mg/d  Orders:  -     C3 Complement; Future  -     C4 Complement; Future  -     Urinalysis With Culture If Indicated -; Future    Inflammatory polyarthropathy (CMS/[facility])   -     CBC & Differential; Future    Other fatigue  Comments:  Labs today + TSH  Orders:  -     TSH; Future    Degeneration of intervertebral disc of lumbar region  Comments:  She is under the care of spine- follow up as planned    Sjogren's syndrome with keratoconjunctivitis sicca (CMS/HCC)  Comments:  May use eye gtts as discussed, continue biotene.        Patient Instructions   Stable in terms of her inflammatory arthritis, will continue plaquenil and check labs.   Discussed the importance of eye examination with plaquenil use. Pt verbalizes  understanding.   RTO in 5-6 months or sooner if needed.

## 2019-11-14 LAB
BACTERIA UR QL AUTO: ABNORMAL /HPF
BILIRUB UR QL STRIP: NEGATIVE
CLARITY UR: CLEAR
COD CRY URNS QL: ABNORMAL /HPF
COLOR UR: YELLOW
GLUCOSE UR STRIP-MCNC: NEGATIVE MG/DL
HGB UR QL STRIP.AUTO: NEGATIVE
HYALINE CASTS UR QL AUTO: ABNORMAL /LPF
KETONES UR QL STRIP: ABNORMAL
LEUKOCYTE ESTERASE UR QL STRIP.AUTO: ABNORMAL
NITRITE UR QL STRIP: NEGATIVE
PH UR STRIP.AUTO: 5.5 [PH] (ref 5–8)
PROT UR QL STRIP: ABNORMAL
RBC # UR: ABNORMAL /HPF
REF LAB TEST METHOD: ABNORMAL
SP GR UR STRIP: 1.03 (ref 1–1.03)
SQUAMOUS #/AREA URNS HPF: ABNORMAL /HPF
UROBILINOGEN UR QL STRIP: ABNORMAL
WBC UR QL AUTO: ABNORMAL /HPF

## 2019-11-18 ENCOUNTER — TELEPHONE (OUTPATIENT)
Dept: RHEUMATOLOGY | Facility: CLINIC | Age: 69
End: 2019-11-18

## 2019-11-18 NOTE — TELEPHONE ENCOUNTER
Patient called about med she used to take for her mouth. States she spoke with you at last visit about it. Medication is clobetasol propionate gel 0.05%

## 2019-11-18 NOTE — TELEPHONE ENCOUNTER
I have never prescribed that for sores in the mouth. This is something that I will need to look into more. She may discuss this with who prescribed this to her before.

## 2019-11-27 ENCOUNTER — OFFICE VISIT (OUTPATIENT)
Dept: ORTHOPEDIC SURGERY | Facility: CLINIC | Age: 69
End: 2019-11-27

## 2019-11-27 VITALS
HEIGHT: 59 IN | WEIGHT: 205 LBS | HEART RATE: 88 BPM | DIASTOLIC BLOOD PRESSURE: 74 MMHG | BODY MASS INDEX: 41.33 KG/M2 | SYSTOLIC BLOOD PRESSURE: 126 MMHG

## 2019-11-27 DIAGNOSIS — M96.0 PSEUDARTHROSIS AFTER FUSION OR ARTHRODESIS: ICD-10-CM

## 2019-11-27 DIAGNOSIS — T84.9XXA COMPLICATIONS OF INTERNAL ORTHOPEDIC DEVICE, IMPLANT, AND GRAFT (HCC): ICD-10-CM

## 2019-11-27 DIAGNOSIS — M40.14 OTHER SECONDARY KYPHOSIS, THORACIC REGION: Primary | ICD-10-CM

## 2019-11-27 PROCEDURE — 99213 OFFICE O/P EST LOW 20 MIN: CPT | Performed by: PHYSICIAN ASSISTANT

## 2019-12-01 NOTE — PROGRESS NOTES
Orthopedic Spine Follow Up    Referring Provider: No ref. provider found  Primary Care Provider: Caleb Whitney MD    Patient Name: Annalisa Bhagat  Patient Age: 69 y.o.  Chief Complaint: had concerns including Pain and Follow-up of the Thoracic Spine; Pain of the Left Shoulder; and Pain of the Right Shoulder.    History of Present Illness: Annalisa Bhagat is a 69 y.o. year old female here in  Follow up today with chief complaint of had concerns including Pain and Follow-up of the Thoracic Spine; Pain of the Left Shoulder; and Pain of the Right Shoulder..status post thoracic fusion for burst fracture of T12. Has known fracture of cony at T12. She has some worsening back pain which she describes as terrible. Has had gradual increase kyphosis around the area of her fracture. Has hx of stroke. Her main concern is her back pain. She has had some right leg pain in the past but that is improved. No weakness in legs. numbness in her right foot changes with change in position of her back.  She uses a walker at all times for support.  She states that her back pain is actually a little bit better than it has been at her last visit today.  She was sent for a CT scan of thoracic spine and is here today to review that.  Has known osteoporosis currently undergoing treatment by Cynthia Carvalho.    Imaging:  We reviewed her CT scan of thoracic spine which shows some bony trabeculation in the area of her T12 corpectomy.  She has a fractured cony posterior to T12 on the right side.  Significant lucency around the left-sided pedicle screw at T10 and T11.    Assessment:   1. Other secondary kyphosis, thoracic region    2. Complications of internal orthopedic device, implant, and graft (CMS/[facility])    3. Pseudarthrosis after fusion or arthrodesis        Plan:  The patient would like to get through the holiday season and then return to consider surgical intervention at that time.  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    Subjective     Review of Systems   Constitutional: Positive for activity change. Negative for fatigue and fever.   HENT: Negative for sore throat.    Eyes: Negative for double vision.   Respiratory: Negative for choking.    Gastrointestinal: Negative for abdominal pain and constipation.   Genitourinary: Negative for dysuria.   Musculoskeletal: Positive for back pain and gait problem.   Skin: Negative for rash.   Neurological: Positive for weakness and numbness.   Hematological: Does not bruise/bleed easily.   Psychiatric/Behavioral: Positive for sleep disturbance.       The following portions of the patient's history were reviewed and updated as appropriate: allergies, current medications, past family history, past medical history, past social history, past surgical history and problem list.    Objective     Physical Exam   Constitutional: She is oriented to person, place, and time. She appears well-developed.   HENT:   Head: Normocephalic and atraumatic.   Eyes: Conjunctivae are normal.   Neck: Normal range of motion.   Cardiovascular: Normal rate.   Pulmonary/Chest: Effort normal.   Abdominal: There is no guarding.   Musculoskeletal: She exhibits tenderness.        Right hip: Normal.        Left hip: Normal.        Lumbar back: She exhibits decreased range of motion, tenderness and pain.        Arms:  Neurological: She is oriented to person, place, and time.   Skin: Skin is warm.   Psychiatric: Her behavior is normal.   Vitals reviewed.    Ortho Exam      1. Other secondary kyphosis, thoracic region    2. Complications of internal orthopedic device, implant, and graft (CMS/HCC)    3. Pseudarthrosis after fusion or arthrodesis         No orders of the defined types were placed in this encounter.      Procedures

## 2019-12-06 ENCOUNTER — OFFICE VISIT (OUTPATIENT)
Dept: ORTHOPEDIC SURGERY | Facility: CLINIC | Age: 69
End: 2019-12-06

## 2019-12-06 VITALS — HEIGHT: 59 IN | BODY MASS INDEX: 41.33 KG/M2 | WEIGHT: 205 LBS | TEMPERATURE: 98.5 F

## 2019-12-06 DIAGNOSIS — M75.100 NONTRAUMATIC TEAR OF ROTATOR CUFF, UNSPECIFIED LATERALITY, UNSPECIFIED TEAR EXTENT: ICD-10-CM

## 2019-12-06 DIAGNOSIS — Z96.612 S/P SHOULDER REPLACEMENT, LEFT: Primary | ICD-10-CM

## 2019-12-06 PROCEDURE — 73030 X-RAY EXAM OF SHOULDER: CPT | Performed by: ORTHOPAEDIC SURGERY

## 2019-12-06 PROCEDURE — 99212 OFFICE O/P EST SF 10 MIN: CPT | Performed by: ORTHOPAEDIC SURGERY

## 2019-12-06 NOTE — PROGRESS NOTES
Chief Complaint: Follow-up now 6 months status post left reverse total shoulder arthroplasty    HPI:  Ms. Bhagat follows up today for her left shoulder.  She says it is doing great.  She reports that she has recovered full motion relative to the contralateral side.  She is very happy with her outcome.  Denies any complaints with respect to the left side.  She tells me that she has been experiencing right shoulder pain lately.  She says it is starting to feel like the left did before surgery.    Exam: Left shoulder is examined.  Skin is benign and her incision is healed.  No tenderness.  She has good motion.  Forward elevation about 160 degrees, external rotation about 45 degrees and internal rotation to roughly L2.  Good strength with abduction and elevation.    Imaging: AP and scapular Y views of the left shoulder are ordered and reviewed.  These are compared to previous x-rays.  Components appear well fixed and well-positioned.  No complicating process noted.    Assessment: 6-month status post left reverse total shoulder arthroplasty    Plan: She seems to be doing well with respect to the left shoulder.  We discussed antibiotic prophylaxis recommendations.  I will see her back in another 6 months.  We discussed options for the right shoulder.  She says is not bad enough at this time to justify an injection but she is going to keep an eye on it and let us know if anything changes.    Brice Mayes MD  12/06/2019

## 2020-01-27 DIAGNOSIS — G47.33 OBSTRUCTIVE SLEEP APNEA SYNDROME: Primary | ICD-10-CM

## 2020-01-27 RX ORDER — ARMODAFINIL 250 MG/1
TABLET ORAL
Qty: 90 TABLET | Refills: 1 | Status: SHIPPED | OUTPATIENT
Start: 2020-01-27 | End: 2020-08-03

## 2020-02-27 ENCOUNTER — TELEPHONE (OUTPATIENT)
Dept: ORTHOPEDIC SURGERY | Facility: CLINIC | Age: 70
End: 2020-02-27

## 2020-02-28 ENCOUNTER — CLINICAL SUPPORT (OUTPATIENT)
Dept: ORTHOPEDIC SURGERY | Facility: CLINIC | Age: 70
End: 2020-02-28

## 2020-02-28 VITALS
WEIGHT: 203 LBS | BODY MASS INDEX: 40.92 KG/M2 | HEIGHT: 59 IN | DIASTOLIC BLOOD PRESSURE: 68 MMHG | SYSTOLIC BLOOD PRESSURE: 136 MMHG

## 2020-02-28 DIAGNOSIS — M81.0 AGE-RELATED OSTEOPOROSIS WITHOUT CURRENT PATHOLOGICAL FRACTURE: Primary | ICD-10-CM

## 2020-02-28 PROCEDURE — 96372 THER/PROPH/DIAG INJ SC/IM: CPT | Performed by: PHYSICIAN ASSISTANT

## 2020-02-28 NOTE — PROGRESS NOTES
"Patient presents for prolia injection.  Patient was asked to wait 15 minutes due to this being her first injection. Most recent calcium is 9.1.  Patient tolerated injection well.      BP Readings from Last 3 Encounters:   02/28/20 136/68   11/27/19 126/74   11/13/19 120/64      Wt Readings from Last 3 Encounters:   02/28/20 92.1 kg (203 lb)   12/06/19 93 kg (205 lb)   11/27/19 93 kg (205 lb)      BMI: Estimated body mass index is 41 kg/m² as calculated from the following:    Height as of this encounter: 149.9 cm (59\").    Weight as of this encounter: 92.1 kg (203 lb).    BSA: Estimated body surface area is 1.86 meters squared as calculated from the following:    Height as of this encounter: 149.9 cm (59\").    Weight as of this encounter: 92.1 kg (203 lb).  "

## 2020-03-10 ENCOUNTER — LAB (OUTPATIENT)
Dept: LAB | Facility: HOSPITAL | Age: 70
End: 2020-03-10

## 2020-03-10 ENCOUNTER — OFFICE VISIT (OUTPATIENT)
Dept: ORTHOPEDIC SURGERY | Facility: CLINIC | Age: 70
End: 2020-03-10

## 2020-03-10 VITALS
HEART RATE: 92 BPM | SYSTOLIC BLOOD PRESSURE: 148 MMHG | WEIGHT: 205 LBS | DIASTOLIC BLOOD PRESSURE: 83 MMHG | HEIGHT: 59 IN | BODY MASS INDEX: 41.33 KG/M2

## 2020-03-10 DIAGNOSIS — E55.9 VITAMIN D DEFICIENCY: ICD-10-CM

## 2020-03-10 DIAGNOSIS — M81.0 AGE-RELATED OSTEOPOROSIS WITHOUT CURRENT PATHOLOGICAL FRACTURE: ICD-10-CM

## 2020-03-10 DIAGNOSIS — M06.9 RHEUMATOID ARTHRITIS, INVOLVING UNSPECIFIED SITE, UNSPECIFIED RHEUMATOID FACTOR PRESENCE: ICD-10-CM

## 2020-03-10 DIAGNOSIS — M81.0 AGE-RELATED OSTEOPOROSIS WITHOUT CURRENT PATHOLOGICAL FRACTURE: Primary | ICD-10-CM

## 2020-03-10 DIAGNOSIS — M32.9 LUPUS (HCC): ICD-10-CM

## 2020-03-10 DIAGNOSIS — Z79.899 HIGH RISK MEDICATION USE: ICD-10-CM

## 2020-03-10 DIAGNOSIS — G47.33 OBSTRUCTIVE SLEEP APNEA SYNDROME: ICD-10-CM

## 2020-03-10 DIAGNOSIS — Z87.311 HX OF PATHOLOGICAL FRACTURE: ICD-10-CM

## 2020-03-10 LAB
25(OH)D3 SERPL-MCNC: 79 NG/ML (ref 30–100)
ALBUMIN SERPL-MCNC: 4.2 G/DL (ref 3.5–5.2)
ALBUMIN/GLOB SERPL: 2 G/DL
ALP SERPL-CCNC: 129 U/L (ref 39–117)
ALT SERPL W P-5'-P-CCNC: 20 U/L (ref 1–33)
ANION GAP SERPL CALCULATED.3IONS-SCNC: 13.3 MMOL/L (ref 5–15)
AST SERPL-CCNC: 23 U/L (ref 1–32)
BILIRUB SERPL-MCNC: 0.2 MG/DL (ref 0.2–1.2)
BUN BLD-MCNC: 17 MG/DL (ref 8–23)
BUN/CREAT SERPL: 28.3 (ref 7–25)
CALCIUM SPEC-SCNC: 8.6 MG/DL (ref 8.6–10.5)
CHLORIDE SERPL-SCNC: 103 MMOL/L (ref 98–107)
CO2 SERPL-SCNC: 27.7 MMOL/L (ref 22–29)
CREAT BLD-MCNC: 0.6 MG/DL (ref 0.57–1)
GFR SERPL CREATININE-BSD FRML MDRD: 99 ML/MIN/1.73
GLOBULIN UR ELPH-MCNC: 2.1 GM/DL
GLUCOSE BLD-MCNC: 102 MG/DL (ref 65–99)
POTASSIUM BLD-SCNC: 4 MMOL/L (ref 3.5–5.2)
PROT SERPL-MCNC: 6.3 G/DL (ref 6–8.5)
SODIUM BLD-SCNC: 144 MMOL/L (ref 136–145)

## 2020-03-10 PROCEDURE — 80053 COMPREHEN METABOLIC PANEL: CPT

## 2020-03-10 PROCEDURE — 82306 VITAMIN D 25 HYDROXY: CPT

## 2020-03-10 PROCEDURE — 36415 COLL VENOUS BLD VENIPUNCTURE: CPT

## 2020-03-10 PROCEDURE — 99214 OFFICE O/P EST MOD 30 MIN: CPT | Performed by: PHYSICIAN ASSISTANT

## 2020-03-10 NOTE — PATIENT INSTRUCTIONS
Osteoporosis    Osteoporosis happens when your bones get thin and weak. This can cause your bones to break (fracture) more easily. You can do things at home to make your bones stronger.  Follow these instructions at home:    Activity  · Exercise as told by your doctor. Ask your doctor what activities are safe for you. You should do:  ? Exercises that make your muscles work to hold your body weight up (weight-bearing exercises). These include augustine chi, yoga, and walking.  ? Exercises to make your muscles stronger. One example is lifting weights.  Lifestyle  · Limit alcohol intake to no more than 1 drink a day for nonpregnant women and 2 drinks a day for men. One drink equals 12 oz of beer, 5 oz of wine, or 1½ oz of hard liquor.  · Do not use any products that have nicotine or tobacco in them. These include cigarettes and e-cigarettes. If you need help quitting, ask your doctor.  Preventing falls  · Use tools to help you move around (mobility aids) as needed. These include canes, walkers, scooters, and crutches.  · Keep rooms well-lit and free of clutter.  · Put away things that could make you trip. These include cords and rugs.  · Install safety rails on stairs. Install grab bars in bathrooms.  · Use rubber mats in slippery areas, like bathrooms.  · Wear shoes that:  ? Fit you well.  ? Support your feet.  ? Have closed toes.  ? Have rubber soles or low heels.  · Tell your doctor about all of the medicines you are taking. Some medicines can make you more likely to fall.  General instructions  · Eat plenty of calcium and vitamin D. These nutrients are good for your bones. Good sources of calcium and vitamin D include:  ? Some fatty fish, such as salmon and tuna.  ? Foods that have calcium and vitamin D added to them (fortified foods). For example, some breakfast cereals are fortified with calcium and vitamin D.  ? Egg yolks.  ? Cheese.  ? Liver.  · Take over-the-counter and prescription medicines only as told by your  doctor.  · Keep all follow-up visits as told by your doctor. This is important.  Contact a doctor if:  · You have not been tested (screened) for osteoporosis and you are:  ? A woman who is age 65 or older.  ? A man who is age 70 or older.  Get help right away if:  · You fall.  · You get hurt.  Summary  · Osteoporosis happens when your bones get thin and weak.  · Weak bones can break (fracture) more easily.  · Eat plenty of calcium and vitamin D. These nutrients are good for your bones.  · Tell your doctor about all of the medicines that you take.  This information is not intended to replace advice given to you by your health care provider. Make sure you discuss any questions you have with your health care provider.  Document Released: 03/11/2013 Document Revised: 10/12/2018 Document Reviewed: 10/12/2018  [employer] Patient Education © 2020 [employer]

## 2020-03-10 NOTE — PROGRESS NOTES
ORTHO FOLLOW UP       Subjective:    HPI:   Annalisa Bhagat is a 69 y.o. female who presents in follow-up for osteoporosis.  She is currently on Forteo and prolia and reports that she is doing well with no noticeable side effects.  She missed her Forteo in January and February, and was given 2 sample pens on 2/28/2020 to finish out HER-2 years.  She had her first Prolia injection on 2/28/2020.  She also continues her calcium and vitamin D, she is taking 600 mg of calcium and 10,000 international units of D3 daily.  She denies any new fractures since last office visit.  She reports she is getting very little physical activity.  She reports that she has had 2 falls in the last year, often feels unsteady with walking, and is worried about falling.  She denies any new pain or any change in her existing pain.  She does wear glasses.  Recent and previous labs were reviewed.  She did have a DEXA scan on 10/18/2019 at [facility], revealing a T score of -2.6 in the one third radius.  When compared to 2018, this did show an increase in bone density of 6.7% in the one third radius and no significant change in the hips.    Past Medical History:   Diagnosis Date   • Fibromyalgia    • GERD (gastroesophageal reflux disease)    • Hyperlipidemia    • Kidney stone    • Lupus (CMS/HCC)    • Migraines    • Narcolepsy    • Numbness and tingling    • Osteoporosis 2/23/2018    Forteo(3/2018), on prolia(2/28/2020)   • Recurrent UTI    • Rheumatoid arthritis (CMS/HCC)    • Scoliosis    • Seasonal allergies    • Shoulder pain, left    • Sleep apnea    • Spinal stenosis    • Stress incontinence    • Stroke (CMS/HCC)        Past Surgical History:   Procedure Laterality Date   • BACK SURGERY     • CARDIAC CATHETERIZATION     • CARPAL TUNNEL RELEASE     • CYSTOSCOPY W/ URETEROSCOPY W/ LITHOTRIPSY     • HAND SURGERY     • HYSTERECTOMY     • RECTOVAGINAL FISTULA CLOSURE     • SACRAL NERVE STIMULATOR PLACEMENT     • TOTAL SHOULDER  ARTHROPLASTY W/ DISTAL CLAVICLE EXCISION Left 6/13/2019    Procedure: TOTAL SHOULDER REVERSE ARTHROPLASTY;  Surgeon: Brice Mayes MD;  Location: [facility];  Service: Orthopedics       Social History     Occupational History   • Not on file   Tobacco Use   • Smoking status: Never Smoker   • Smokeless tobacco: Never Used   Substance and Sexual Activity   • Alcohol use: Yes     Comment: OCC   • Drug use: No   • Sexual activity: Defer        Medications:    Current Outpatient Medications:   •  apixaban (ELIQUIS) 5 MG tablet tablet, Take 1 tablet by mouth Every 12 (Twelve) Hours. Resume 6/15/29, Disp: 60 tablet, Rfl:   •  Armodafinil 250 MG tablet, TAKE 1 TABLET BY MOUTH EVERY DAY, Disp: 90 tablet, Rfl: 1  •  atorvastatin (LIPITOR) 40 MG tablet, Take 40 mg by mouth Daily., Disp: , Rfl:   •  Azelastine-Fluticasone 137-50 MCG/ACT suspension, 1-2 sprays into the nostril(s) as directed by provider Daily., Disp: , Rfl:   •  B Complex Vitamins (VITAMIN B COMPLEX PO), Take 1 each by mouth Daily., Disp: , Rfl:   •  butalbital-acetaminophen-caffeine (FIORICET, ESGIC) -40 MG per tablet, Take 1 tablet by mouth Every 4 (Four) Hours As Needed., Disp: , Rfl:   •  calcium citrate (CALCITRATE) 950 MG tablet, Take 1 tablet by mouth., Disp: , Rfl:   •  Cholecalciferol (VITAMIN D3) 125 MCG (5000 UT) capsule capsule, Take 5,000 Units by mouth Daily., Disp: , Rfl:   •  clindamycin (CLEOCIN) 300 MG capsule, Take both caps 1 hour prior to procedure, Disp: 2 capsule, Rfl: 2  •  DULoxetine (CYMBALTA) 60 MG capsule, Take 60 mg by mouth Every Night., Disp: , Rfl:   •  Erenumab-aooe (AIMOVIG) 70 MG/ML prefilled syringe, Inject 70 mg under the skin into the appropriate area as directed. HASN'T STARTED YET., Disp: , Rfl:   •  furosemide (LASIX) 40 MG tablet, Take 40 mg by mouth 2 (Two) Times a Day., Disp: , Rfl:   •  gabapentin (NEURONTIN) 600 MG tablet, Take 600 mg by mouth 4 (Four) Times a Day., Disp: , Rfl:   •  hydrocortisone 2.5  % cream, Apply 1 application topically to the appropriate area as directed As Needed., Disp: , Rfl:   •  hydroxychloroquine (PLAQUENIL) 200 MG tablet, Take  by mouth 2 (Two) Times a Day., Disp: , Rfl:   •  Insulin Pen Needle 31G X 5 MM misc, Use with daily forteo injections, Disp: 30 each, Rfl: 1  •  loratadine (CLARITIN) 10 MG tablet, Take 10 mg by mouth Daily., Disp: , Rfl:   •  Melatonin 10 MG tablet, Take 1 tablet by mouth Every Night., Disp: , Rfl:   •  methocarbamol (ROBAXIN) 750 MG tablet, Take 1,500 mg by mouth 2 (Two) Times a Day., Disp: , Rfl:   •  Multiple Vitamins-Minerals (PRESERVISION AREDS 2 PO), Take  by mouth., Disp: , Rfl:   •  omeprazole (priLOSEC) 20 MG capsule, Take 20 mg by mouth Every Night., Disp: , Rfl:   •  phenazopyridine (PYRIDIUM) 100 MG tablet, Take 100 mg by mouth 3 (Three) Times a Day As Needed for Bladder Spasms., Disp: , Rfl:   •  promethazine (PHENERGAN) 25 MG tablet, Take 25 mg by mouth As Needed (MIGRAINES)., Disp: , Rfl:   •  pyridoxine (VITAMIN B-6) 200 MG tablet, Take 200 mg by mouth Daily., Disp: , Rfl:   •  teriparatide (FORTEO) 600 MCG/2.4ML injection, Inject 20 mcg under the skin into the appropriate area as directed Every Night., Disp: , Rfl:   •  traMADol (ULTRAM) 50 MG tablet, Take 50 mg by mouth Every 6 (Six) Hours As Needed for Moderate Pain ., Disp: , Rfl:   •  triamcinolone (KENALOG) 0.025 % cream, Apply 1 application topically to the appropriate area as directed As Needed., Disp: , Rfl:   •  TURMERIC PO, Take  by mouth., Disp: , Rfl:   •  vitamin B-12 (CYANOCOBALAMIN) 1000 MCG tablet, Take 1,000 mcg by mouth Daily., Disp: , Rfl:   •  vitamin C (ASCORBIC ACID) 250 MG tablet, Take 1,000 mg by mouth Daily., Disp: , Rfl:   No current facility-administered medications for this visit.     Facility-Administered Medications Ordered in Other Visits:   •  mupirocin (BACTROBAN) 2 % nasal ointment, , Nasal, BID, Lindy Ly, APRN    Allergies:  Allergies   Allergen  "Reactions   • Zolmitriptan Other (See Comments)     Hx of stroke    • Adhesive Tape Itching     Paper tape - redness and itching.    • Hydromorphone Itching and Rash   • Amoxicillin-Pot Clavulanate Itching and Rash       Review of Systems:  Gen -no fever, chills , sweats, headache   Eyes - no irritation or discharge   ENT -  no ear pain , runny nose , sore throat , difficulty swallowing   Resp - no cough , congestion , excessive expectoration   CVS - no chest pain , palpitations.   Abd - no pain , nausea , vomiting , diarrhea   Skin - no rash , lesions.   Neuro - no dizziness    Please see HPI for any other pertinent positives.  All other systems were reviewed and are negative.       Objective   Objective:    /83 (BP Location: Left arm, Patient Position: Sitting, Cuff Size: Adult)   Pulse 92   Ht 149.9 cm (59\")   Wt 93 kg (205 lb)   BMI 41.40 kg/m²     Physical Examination:  Morbidly obese individual in no acute distress, patient is alert and cooperative with the exam, appears to have normal mood and affect with a normal attention span and concentration, ambulating with the assistance of a walker  normocephalic, atraumatic, extraocular movements intact, conjunctiva and sclera are clear without nystagmus, normal canals with grossly normal hearing, no nasal deformity, discharge, inflammation, or lesions, no mouth deformity or lesions  no lymphadenopathy or thyromegaly  normal respiratory effort  abdomen is nontender, without guarding or rebound and nondistended  Appears kyphotic  pulses normal in all 4 extremities, no clubbing, cyanosis, or edema noted  cranial nerves II-XII grossly intact with no focal defects  skin intact without lesions or rashes visible             Imaging:  no diagnostic testing performed this visit            Assessment:  1. Age-related osteoporosis without current pathological fracture    2. Hx of pathological fracture    3. Vitamin D deficiency    4. High risk medication use    5. " Obstructive sleep apnea syndrome    6. Rheumatoid arthritis, involving unspecified site, unspecified rheumatoid factor presence (CMS/[facility])    7. Lupus (CMS/[facility])    History of loosening/broken spinal hardware    Currently on Forteo for 22 months and prolia since 2/28/2020          Plan:  Today, we will check a CMP and vitamin D.  She should continue her calcium, her vitamin D may need to be adjusted based her pending labs.  She is at high fall risk.  She should finish out the rest of her Forteo, as well as continue her Prolia injections every 6 months.  She will be given a referral today for PT for osteoporosis and fall prevention.  She will also be given home exercises for weightbearing exercise and education concerning fall prevention, which we reviewed today.  I will plan to see her back in 1 year, and she is already scheduled for her next Prolia injection.  She should call with any questions or concerns.          CHEL Diehl  03/10/20  16:34

## 2020-04-21 ENCOUNTER — OFFICE VISIT (OUTPATIENT)
Dept: RHEUMATOLOGY | Facility: CLINIC | Age: 70
End: 2020-04-21

## 2020-04-21 DIAGNOSIS — R76.8 POSITIVE ANA (ANTINUCLEAR ANTIBODY): Primary | ICD-10-CM

## 2020-04-21 DIAGNOSIS — R53.83 OTHER FATIGUE: ICD-10-CM

## 2020-04-21 DIAGNOSIS — D64.9 ANEMIA, UNSPECIFIED TYPE: ICD-10-CM

## 2020-04-21 DIAGNOSIS — M19.90 INFLAMMATORY ARTHRITIS: ICD-10-CM

## 2020-04-21 PROCEDURE — 99442 PR PHYS/QHP TELEPHONE EVALUATION 11-20 MIN: CPT | Performed by: NURSE PRACTITIONER

## 2020-04-21 NOTE — PATIENT INSTRUCTIONS
Stable. She reports no significant change. Intermittent joint pain, nothing consistent. Continue therapy: plaquenil 400 mg/d.  Discussed the importance of eye examination with plaquenil use. Pt verbalizes understanding.   Labs in 1 month  RTO in 4 months or sooner if needed.

## 2020-04-21 NOTE — PROGRESS NOTES
Subjective   Annalisa Bhagat is a 69 y.o. female.     History of Present Illness     TELEPHONE VISIT    This visit has been rescheduled as a phone visit to comply with patient safety concerns in accordance with CDC recommendations. Total time of discussion was 20 minutes.    You have chosen to receive care through a telephone visit. Do you consent to use a telephone visit for your medical care today? Yes      Labs in November 2019: C3/C4, ESR, CRP & TSH all normal. CBC shows mild anemia w/ hgb at 11.4     Pt is a 69 y.o. female with inflammatory arthritis and positive CHET. She has osteoporosis and is on forteo, she follows osteoporosis clinic.  She is taking plaquenil 1 tab po BID and gabapentin 600 mg po TID for neuropathic pain. Takes tramadol for her arthralgias.  Up to date on eye examination.     Pt was last seen in November 2019. Labs in November 2019: C3/C4, ESR, CRP & TSH  normal. CBC showed mild anemia w/ hgb at 11.4. Has chronic anemia and reports in the past she took iron supplement.    Having more aches and pains because of the weather. Jaws will ache, have had problems in the past with this. Denies JOSÉ or blurred vision. No jaw claudication.  Thumbs are achy, above right knee (intermittent). Pain just comes and goes- not consistent.  She is following spine for her low back & scoliosis. Reports that she needed surgery. Setting up apt with new spine but is delayed w/ everything with the virus.  AM stiffness lasts 30-60 min. .thumbs ache (rt>lt) denies joint swelling.  Feet swell at the end of the day, depends how much she is up, worse at the end of the day     Review of systems: She denies fever/chills, nasal or oral sores. Denies dry eyes/mouth.  Denies chest pain or SOA. Energy is low, wondering about her thyroid. Family history of thyroid disease. She has swelling in the legs & will take lasix as needed. Denies hairloss/weightloss. No bloody foamy urine. The remainder of the review of systems reviewed  and (-) Follows neurology for her sleep apnea.    Rheumatological history:  Inflammatory arthritis/ +CHET: on plaquenil therapy    The following portions of the patient's history were reviewed and updated as appropriate: allergies, current medications, past family history, past medical history, past social history, past surgical history and problem list.    Review of Systems  See HPI    Objective   Physical Exam   Constitutional:   Telephone visit         Assessment/Plan   Annalisa was seen today for joint pain.    Diagnoses and all orders for this visit:    Positive CHET (antinuclear antibody)  Comments:  on plaquenil 400 mg/d  Pt to continue therapy    Orders:  -     CBC & Differential; Future  -     Comprehensive Metabolic Panel; Future  -     C-reactive Protein; Future  -     Sedimentation Rate; Future  -     Vitamin D 25 Hydroxy; Future  -     C3 Complement; Future  -     C4 Complement; Future  -     Urinalysis With Culture If Indicated -; Future  -     Iron Profile; Future    Inflammatory arthritis  Comments:  Continue plaquenil; labs in November 2019: normal inflammatory markers. She reports intermittent joint pain and denies joint swelling.   Labs  Orders:  -     CBC & Differential; Future  -     Comprehensive Metabolic Panel; Future  -     C-reactive Protein; Future  -     Sedimentation Rate; Future  -     Vitamin D 25 Hydroxy; Future  -     C3 Complement; Future  -     C4 Complement; Future  -     Urinalysis With Culture If Indicated -; Future  -     Iron Profile; Future    Other fatigue    Anemia, unspecified type  Comments:  Check CBC  iron profile  Orders:  -     CBC & Differential; Future  -     Iron Profile; Future        Patient Instructions   Stable. She reports no significant change. Intermittent joint pain, nothing consistent. Continue therapy: plaquenil 400 mg/d.  Discussed the importance of eye examination with plaquenil use. Pt verbalizes understanding.   Labs in 1 month  RTO in 4 months or sooner  if needed.

## 2020-06-23 ENCOUNTER — TELEPHONE (OUTPATIENT)
Dept: NEUROLOGY | Facility: CLINIC | Age: 70
End: 2020-06-23

## 2020-06-23 DIAGNOSIS — G47.33 OBSTRUCTIVE SLEEP APNEA: Primary | ICD-10-CM

## 2020-06-23 NOTE — TELEPHONE ENCOUNTER
----- Message from Joseph F Seipel, MD sent at 6/22/2020  3:02 PM EDT -----  nasal pillows  and goes through [employer] for supplies

## 2020-08-03 DIAGNOSIS — G47.33 OBSTRUCTIVE SLEEP APNEA SYNDROME: ICD-10-CM

## 2020-08-03 RX ORDER — ARMODAFINIL 250 MG/1
TABLET ORAL
Qty: 30 TABLET | Refills: 5 | Status: SHIPPED | OUTPATIENT
Start: 2020-08-03 | End: 2021-01-27

## 2020-08-31 ENCOUNTER — CLINICAL SUPPORT (OUTPATIENT)
Dept: ORTHOPEDIC SURGERY | Facility: CLINIC | Age: 70
End: 2020-08-31

## 2020-08-31 VITALS
BODY MASS INDEX: 41.12 KG/M2 | HEART RATE: 84 BPM | DIASTOLIC BLOOD PRESSURE: 76 MMHG | WEIGHT: 204 LBS | SYSTOLIC BLOOD PRESSURE: 147 MMHG | HEIGHT: 59 IN

## 2020-08-31 DIAGNOSIS — M81.0 AGE-RELATED OSTEOPOROSIS WITHOUT CURRENT PATHOLOGICAL FRACTURE: Primary | ICD-10-CM

## 2020-08-31 PROCEDURE — 96372 THER/PROPH/DIAG INJ SC/IM: CPT | Performed by: PHYSICIAN ASSISTANT

## 2020-08-31 NOTE — PROGRESS NOTES
Patient presents for Prolia injection. Patient had no issues with the last Prolia injection. Patient last Calcium was 8.6mg/dL. Injection administered and patient tolerated without complication.

## 2021-01-27 DIAGNOSIS — G47.33 OBSTRUCTIVE SLEEP APNEA SYNDROME: ICD-10-CM

## 2021-01-27 RX ORDER — ARMODAFINIL 250 MG/1
TABLET ORAL
Qty: 30 TABLET | Refills: 3 | Status: SHIPPED | OUTPATIENT
Start: 2021-01-27 | End: 2021-06-07

## 2021-02-12 ENCOUNTER — TELEPHONE (OUTPATIENT)
Dept: ORTHOPEDIC SURGERY | Facility: CLINIC | Age: 71
End: 2021-02-12

## 2021-02-12 DIAGNOSIS — E55.9 VITAMIN D DEFICIENCY: Primary | ICD-10-CM

## 2021-02-12 DIAGNOSIS — Z51.81 ENCOUNTER FOR THERAPEUTIC DRUG MONITORING: ICD-10-CM

## 2021-02-24 ENCOUNTER — TELEPHONE (OUTPATIENT)
Dept: NEUROLOGY | Facility: CLINIC | Age: 71
End: 2021-02-24

## 2021-02-24 NOTE — TELEPHONE ENCOUNTER
Provider: DR. SEIPEL  Caller: DAVID TALBERT  Relationship to Patient: SELF    Reason for Call: PT WANTS TO CONFIRMED WHAT HER PRESSURES ARE SET AT. PT STATES THAT IT SEEMS THAT SHE NOTICED A DIFFERENCE A DAY AFTER THE OV 6-. BUT PT REALLY NOTICED A BIG CHANGE TO THE PRESSURE ON 2-17 OR 18TH. PT CALLED [facility] AND THEY SAID THAT THEY SEE A CHANGE TO THE SETTINGS WAS MADE ON 2-.  PT IS GOING TO HAVE [facility] LOOK AT THE MACHINE ON 3- TO SEE IF ANYTHING IS WRONG WITH IT. PT WANTING TO KNOW IF A CHANGE TO THE PRESSURE HER MACHINE WAS MADE BY DR. SEIPEL ON THE 2- OR IF HE MADE ANY CHANGES? SHE ALSO WANTS TO CHECK ON THE REQUIREMENTS OF GETTING A NEW MACHINE.      PLEASE CALL HER BACK [phone]

## 2021-02-25 NOTE — TELEPHONE ENCOUNTER
SPOKE TO PATIENT, MACHINE WAS RECEIVED IN 2018, IS NOT OLD ENOUGH TO REPLACE AT THIS TIME BUT [facility] CAN TRY AND REPAIR, IF REPLACEMENT IS REQUIRED SHE WILL HAVE TO SEE US SOONER, LAST VISIT GREATER THAN 6 MO AGO

## 2021-03-04 ENCOUNTER — OFFICE VISIT (OUTPATIENT)
Dept: ORTHOPEDIC SURGERY | Facility: CLINIC | Age: 71
End: 2021-03-04

## 2021-03-04 VITALS
DIASTOLIC BLOOD PRESSURE: 79 MMHG | HEART RATE: 93 BPM | HEIGHT: 59 IN | SYSTOLIC BLOOD PRESSURE: 154 MMHG | BODY MASS INDEX: 41.12 KG/M2 | WEIGHT: 204 LBS

## 2021-03-04 DIAGNOSIS — E55.9 VITAMIN D DEFICIENCY: ICD-10-CM

## 2021-03-04 DIAGNOSIS — Z51.81 MEDICATION MONITORING ENCOUNTER: ICD-10-CM

## 2021-03-04 DIAGNOSIS — M06.9 RHEUMATOID ARTHRITIS, INVOLVING UNSPECIFIED SITE, UNSPECIFIED WHETHER RHEUMATOID FACTOR PRESENT (HCC): ICD-10-CM

## 2021-03-04 DIAGNOSIS — Z87.311 HX OF PATHOLOGICAL FRACTURE: ICD-10-CM

## 2021-03-04 DIAGNOSIS — F32.A DEPRESSION, UNSPECIFIED DEPRESSION TYPE: ICD-10-CM

## 2021-03-04 DIAGNOSIS — M81.0 AGE-RELATED OSTEOPOROSIS WITHOUT CURRENT PATHOLOGICAL FRACTURE: Primary | ICD-10-CM

## 2021-03-04 DIAGNOSIS — M32.9 LUPUS (HCC): ICD-10-CM

## 2021-03-04 PROCEDURE — 96372 THER/PROPH/DIAG INJ SC/IM: CPT | Performed by: PHYSICIAN ASSISTANT

## 2021-03-04 PROCEDURE — 99214 OFFICE O/P EST MOD 30 MIN: CPT | Performed by: PHYSICIAN ASSISTANT

## 2021-03-04 RX ORDER — FLUOCINONIDE GEL 0.5 MG/G
GEL TOPICAL
COMMUNITY
Start: 2021-02-17

## 2021-03-04 NOTE — PATIENT INSTRUCTIONS
Osteoporosis    Osteoporosis happens when your bones get thin and weak. This can cause your bones to break (fracture) more easily. You can do things at home to make your bones stronger.  Follow these instructions at home:    Activity  · Exercise as told by your doctor. Ask your doctor what activities are safe for you. You should do:  ? Exercises that make your muscles work to hold your body weight up (weight-bearing exercises). These include augustine chi, yoga, and walking.  ? Exercises to make your muscles stronger. One example is lifting weights.  Lifestyle  · Limit alcohol intake to no more than 1 drink a day for nonpregnant women and 2 drinks a day for men. One drink equals 12 oz of beer, 5 oz of wine, or 1½ oz of hard liquor.  · Do not use any products that have nicotine or tobacco in them. These include cigarettes and e-cigarettes. If you need help quitting, ask your doctor.  Preventing falls  · Use tools to help you move around (mobility aids) as needed. These include canes, walkers, scooters, and crutches.  · Keep rooms well-lit and free of clutter.  · Put away things that could make you trip. These include cords and rugs.  · Install safety rails on stairs. Install grab bars in bathrooms.  · Use rubber mats in slippery areas, like bathrooms.  · Wear shoes that:  ? Fit you well.  ? Support your feet.  ? Have closed toes.  ? Have rubber soles or low heels.  · Tell your doctor about all of the medicines you are taking. Some medicines can make you more likely to fall.  General instructions  · Eat plenty of calcium and vitamin D. These nutrients are good for your bones. Good sources of calcium and vitamin D include:  ? Some fatty fish, such as salmon and tuna.  ? Foods that have calcium and vitamin D added to them (fortified foods). For example, some breakfast cereals are fortified with calcium and vitamin D.  ? Egg yolks.  ? Cheese.  ? Liver.  · Take over-the-counter and prescription medicines only as told by your  doctor.  · Keep all follow-up visits as told by your doctor. This is important.  Contact a doctor if:  · You have not been tested (screened) for osteoporosis and you are:  ? A woman who is age 65 or older.  ? A man who is age 70 or older.  Get help right away if:  · You fall.  · You get hurt.  Summary  · Osteoporosis happens when your bones get thin and weak.  · Weak bones can break (fracture) more easily.  · Eat plenty of calcium and vitamin D. These nutrients are good for your bones.  · Tell your doctor about all of the medicines that you take.  This information is not intended to replace advice given to you by your health care provider. Make sure you discuss any questions you have with your health care provider.  Document Revised: 11/30/2018 Document Reviewed: 10/12/2018  Elsevier Patient Education © 2020 Elsevier [employer]      Fall Prevention in the Home, Adult  Falls can cause injuries. They can happen to people of all ages. There are many things you can do to make your home safe and to help prevent falls. Ask for help when making these changes, if needed.  What actions can I take to prevent falls?  General Instructions  · Use good lighting in all rooms. Replace any light bulbs that burn out.  · Turn on the lights when you go into a dark area. Use night-lights.  · Keep items that you use often in easy-to-reach places. Lower the shelves around your home if necessary.  · Set up your furniture so you have a clear path. Avoid moving your furniture around.  · Do not have throw rugs and other things on the floor that can make you trip.  · Avoid walking on wet floors.  · If any of your floors are uneven, fix them.  · Add color or contrast paint or tape to clearly clara and help you see:  ? Any grab bars or handrails.  ? First and last steps of stairways.  ? Where the edge of each step is.  · If you use a stepladder:  ? Make sure that it is fully opened. Do not climb a closed stepladder.  ? Make sure that both sides of the  stepladder are locked into place.  ? Ask someone to hold the stepladder for you while you use it.  · If there are any pets around you, be aware of where they are.  What can I do in the bathroom?         · Keep the floor dry. Clean up any water that spills onto the floor as soon as it happens.  · Remove soap buildup in the tub or shower regularly.  · Use non-skid mats or decals on the floor of the tub or shower.  · Attach bath mats securely with double-sided, non-slip rug tape.  · If you need to sit down in the shower, use a plastic, non-slip stool.  · Install grab bars by the toilet and in the tub and shower. Do not use towel bars as grab bars.  What can I do in the bedroom?  · Make sure that you have a light by your bed that is easy to reach.  · Do not use any sheets or blankets that are too big for your bed. They should not hang down onto the floor.  · Have a firm chair that has side arms. You can use this for support while you get dressed.  What can I do in the kitchen?  · Clean up any spills right away.  · If you need to reach something above you, use a strong step stool that has a grab bar.  · Keep electrical cords out of the way.  · Do not use floor polish or wax that makes floors slippery. If you must use wax, use non-skid floor wax.  What can I do with my stairs?  · Do not leave any items on the stairs.  · Make sure that you have a light switch at the top of the stairs and the bottom of the stairs. If you do not have them, ask someone to add them for you.  · Make sure that there are handrails on both sides of the stairs, and use them. Fix handrails that are broken or loose. Make sure that handrails are as long as the stairways.  · Install non-slip stair treads on all stairs in your home.  · Avoid having throw rugs at the top or bottom of the stairs. If you do have throw rugs, attach them to the floor with carpet tape.  · Choose a carpet that does not hide the edge of the steps on the stairway.  · Check any  carpeting to make sure that it is firmly attached to the stairs. Fix any carpet that is loose or worn.  What can I do on the outside of my home?  · Use bright outdoor lighting.  · Regularly fix the edges of walkways and driveways and fix any cracks.  · Remove anything that might make you trip as you walk through a door, such as a raised step or threshold.  · Trim any bushes or trees on the path to your home.  · Regularly check to see if handrails are loose or broken. Make sure that both sides of any steps have handrails.  · Install guardrails along the edges of any raised decks and porches.  · Clear walking paths of anything that might make someone trip, such as tools or rocks.  · Have any leaves, snow, or ice cleared regularly.  · Use sand or salt on walking paths during winter.  · Clean up any spills in your garage right away. This includes grease or oil spills.  What other actions can I take?  · Wear shoes that:  ? Have a low heel. Do not wear high heels.  ? Have rubber bottoms.  ? Are comfortable and fit you well.  ? Are closed at the toe. Do not wear open-toe sandals.  · Use tools that help you move around (mobility aids) if they are needed. These include:  ? Canes.  ? Walkers.  ? Scooters.  ? Crutches.  · Review your medicines with your doctor. Some medicines can make you feel dizzy. This can increase your chance of falling.  Ask your doctor what other things you can do to help prevent falls.  Where to find more information  · Centers for Disease Control and Prevention, [place]AD[employer]: https://cdc.gov  · National [place] [employer] Aging: https://iu9mytz.stefanie.nih.gov  Contact a doctor if:  · You are afraid of falling at home.  · You feel weak, drowsy, or dizzy at home.  · You fall at home.  Summary  · There are many simple things that you can do to make your home safe and to help prevent falls.  · Ways to make your home safe include removing tripping hazards and installing grab bars in the bathroom.  · Ask for help when  making these changes in your home.  This information is not intended to replace advice given to you by your health care provider. Make sure you discuss any questions you have with your health care provider.  Document Revised: 04/09/2020 Document Reviewed: 08/02/2018  Elsevier Patient Education © 2020 Elsevier Inc.      Sit-to-Stand Exercise    The sit-to-stand exercise (also known as the chair stand or chair rise exercise) strengthens your lower body and helps you maintain or improve your mobility and independence. The goal is to do the sit-to-stand exercise without using your hands. This will be easier as you become stronger. You should always talk with your health care provider before starting any exercise program, especially if you have had recent surgery.  Do the exercise exactly as told by your health care provider and adjust it as directed. It is normal to feel mild stretching, pulling, tightness, or discomfort as you do this exercise, but you should stop right away if you feel sudden pain or your pain gets worse. Do not begin doing this exercise until told by your health care provider.  What the sit-to-stand exercise does  The sit-to-stand exercise helps to strengthen the muscles in your thighs and the muscles in the center of your body that give you stability (core muscles). This exercise is especially helpful if:  · You have had knee or hip surgery.  · You have trouble getting up from a chair, out of a car, or off the toilet.  How to do the sit-to-stand exercise  1. Sit toward the front edge of a sturdy chair without armrests. Your knees should be bent and your feet should be flat on the floor and shoulder-width apart.  2. Place your hands lightly on each side of the seat. Keep your back and neck as straight as possible, with your chest slightly forward.  3. Breathe in slowly. Lean forward and slightly shift your weight to the front of your feet.  4. Breathe out as you slowly stand up. Use your hands as  little as possible.  5. Stand and pause for a full breath in and out.  6. Breathe in as you sit down slowly. Tighten your core and abdominal muscles to control your lowering as much as possible.  7. Breathe out slowly.  8. Do this exercise 10-15 times. If needed, do it fewer times until you build up strength.  9. Rest for 1 minute, then do another set of 10-15 repetitions.  To change the difficulty of the sit-to-stand exercise  · If the exercise is too difficult, use a chair with sturdy armrests, and push off the armrests to help you come to the standing position. You can also use the armrests to help slowly lower yourself back to sitting. As this gets easier, try to use your arms less. You can also place a firm cushion or pillow on the chair to make the surface higher.  · If this exercise is too easy, do not use your arms to help raise or lower yourself. You can also wear a weighted vest, use hand weights, increase your repetitions, or try a lower chair.  General tips  · You may feel tired when starting an exercise routine. This is normal.  · You may have muscle soreness that lasts a few days. This is normal. As you get stronger, you may not feel muscle soreness.  · Use smooth, steady movements.  · Do not  hold your breath during strength exercises. This can cause unsafe changes in your blood pressure.  · Breathe in slowly through your nose, and breathe out slowly through your mouth.  Summary  · Strengthening your lower body is an important step to help you move safely and independently.  · The sit-to-stand exercise helps strengthen the muscles in your thighs and core.  · You should always talk with your health care provider before starting any exercise program, especially if you have had recent surgery.  This information is not intended to replace advice given to you by your health care provider. Make sure you discuss any questions you have with your health care provider.  Document Revised: 10/16/2019 Document  Reviewed: 02/08/2018  Elsevier Patient Education © 2020 Elsevier Inc.      Advance Care Planning and Advance Directives     You make decisions on a daily basis - decisions about where you want to live, your career, your home, your life. Perhaps one of the most important decisions you face is your choice for future medical care. Take time to talk with your family and your healthcare team and start planning today.  Advance Care Planning is a process that can help you:  · Understand possible future healthcare decisions in light of your own experiences  · Reflect on those decision in light of your goals and values  · Discuss your decisions with those closest to you and the healthcare professionals that care for you  · Make a plan by creating a document that reflects your wishes    Surrogate Decision Maker  In the event of a medical emergency, which has left you unable to communicate or to make your own decisions, you would need someone to make decisions for you.  It is important to discuss your preferences for medical treatment with this person while you are in good health.     Qualities of a surrogate decision maker:  • Willing to take on this role and responsibility  • Knows what you want for future medical care  • Willing to follow your wishes even if they don't agree with them  • Able to make difficult medical decisions under stressful circumstances    Advance Directives  These are legal documents you can create that will guide your healthcare team and decision maker(s) when needed. These documents can be stored in the electronic medical record.    · Living Will - a legal document to guide your care if you have a terminal condition or a serious illness and are unable to communicate. States vary by statute in document names/types, but most forms may include one or more of the following:        -  Directions regarding life-prolonging treatments        -  Directions regarding artificially provided  nutrition/hydration        -  Choosing a healthcare decision maker        -  Direction regarding organ/tissue donation    · Durable Power of  for Healthcare - this document names an -in-fact to make medical decisions for you, but it may also allow this person to make personal and financial decisions for you. Please seek the advice of an  if you need this type of document.    **Advance Directives are not required and no one may discriminate against you if you do not sign one.    Medical Orders  Many states allow specific forms/orders signed by your physician to record your wishes for medical treatment in your current state of health. This form, signed in personal communication with your physician, addresses resuscitation and other medical interventions that you may or may not want.      For more information or to schedule a time with a [facility] Advance Care Planning Facilitator contact: [email or web address] or call [phone] and someone will contact you directly.

## 2021-03-04 NOTE — TELEPHONE ENCOUNTER
Labs reviewed from  rheumatology done on 2/25/2021  Serum calcium 9.5  Albumin 4.3  Alk phos 103  Creatinine 0.81  GFR 73

## 2021-03-23 NOTE — PROGRESS NOTES
ORTHO FOLLOW UP       Subjective:    HPI:   Annalisa Bhagat is a 70 y.o. female who presents in follow-up for osteoporosis.  She is currently on Prolia and reports that she is doing well with no noticeable side effects.  Her last injection was on 8/31/2020.  She also continues 600 mg of calcium daily and 50,000 international units of vitamin D weekly.  She denies any new fractures since last office visit.  She reports she is getting very little physical activity.  She states that she has fallen in the last year and does at times feel unsteady with walking, which is why she uses a walker.  She has had a recent increase in pain in her hands.  Recent and previous labs were reviewed today.  Her DEXA scan is currently up-to-date.    Past Medical History:   Diagnosis Date   • Fibromyalgia    • GERD (gastroesophageal reflux disease)    • Hyperlipidemia    • Kidney stone    • Lupus (CMS/HCC)    • Migraines    • Narcolepsy    • Numbness and tingling    • Osteoporosis 02/23/2018    Forteo x 2yrs(2/2018-12/2019,3/20-4/2020), on prolia(2/28/2020)   • Recurrent UTI    • Rheumatoid arthritis (CMS/HCC)    • Scoliosis    • Seasonal allergies    • Shoulder pain, left    • Sleep apnea    • Spinal stenosis    • Stress incontinence    • Stroke (CMS/HCC)        Past Surgical History:   Procedure Laterality Date   • BACK SURGERY     • CARDIAC CATHETERIZATION     • CARPAL TUNNEL RELEASE     • CATARACT EXTRACTION, BILATERAL     • CYSTOSCOPY W/ URETEROSCOPY W/ LITHOTRIPSY     • HAND SURGERY     • HYSTERECTOMY     • RECTOVAGINAL FISTULA CLOSURE     • SACRAL NERVE STIMULATOR PLACEMENT     • TOTAL SHOULDER ARTHROPLASTY W/ DISTAL CLAVICLE EXCISION Left 6/13/2019    Procedure: TOTAL SHOULDER REVERSE ARTHROPLASTY;  Surgeon: Brice Mayes MD;  Location: [facility];  Service: Orthopedics       Social History     Occupational History   • Not on file   Tobacco Use   • Smoking status: Never Smoker   • Smokeless tobacco: Never Used    Substance and Sexual Activity   • Alcohol use: Yes     Comment: OCC   • Drug use: No   • Sexual activity: Defer        Medications:    Current Outpatient Medications:   •  apixaban (ELIQUIS) 5 MG tablet tablet, Take 1 tablet by mouth Every 12 (Twelve) Hours. Resume 6/15/29, Disp: 60 tablet, Rfl:   •  Armodafinil 250 MG tablet, TAKE 1 TABLET BY MOUTH EVERY DAY, Disp: 30 tablet, Rfl: 3  •  atorvastatin (LIPITOR) 40 MG tablet, Take 40 mg by mouth Daily., Disp: , Rfl:   •  Azelastine-Fluticasone 137-50 MCG/ACT suspension, 1-2 sprays into the nostril(s) as directed by provider Daily., Disp: , Rfl:   •  B Complex Vitamins (VITAMIN B COMPLEX PO), Take 1 each by mouth Daily., Disp: , Rfl:   •  butalbital-acetaminophen-caffeine (FIORICET, ESGIC) -40 MG per tablet, Take 1 tablet by mouth Every 4 (Four) Hours As Needed., Disp: , Rfl:   •  calcium citrate (CALCITRATE) 950 MG tablet, Take 1 tablet by mouth., Disp: , Rfl:   •  Cholecalciferol (VITAMIN D3) 125 MCG (5000 UT) capsule capsule, Take 5,000 Units by mouth Daily., Disp: , Rfl:   •  DULoxetine (CYMBALTA) 60 MG capsule, Take 60 mg by mouth Every Night., Disp: , Rfl:   •  Erenumab-aooe (AIMOVIG) 70 MG/ML prefilled syringe, Inject 70 mg under the skin into the appropriate area as directed. HASN'T STARTED YET., Disp: , Rfl:   •  fluocinonide (LIDEX) 0.05 % gel, APPLY TO AFFECTED AREA TWICE A DAY, Disp: , Rfl:   •  furosemide (LASIX) 40 MG tablet, Take 40 mg by mouth 2 (Two) Times a Day., Disp: , Rfl:   •  gabapentin (NEURONTIN) 600 MG tablet, Take 600 mg by mouth 4 (Four) Times a Day., Disp: , Rfl:   •  hydrocortisone 2.5 % cream, Apply 1 application topically to the appropriate area as directed As Needed., Disp: , Rfl:   •  hydroxychloroquine (PLAQUENIL) 200 MG tablet, Take  by mouth 2 (Two) Times a Day., Disp: , Rfl:   •  loratadine (CLARITIN) 10 MG tablet, Take 10 mg by mouth Daily., Disp: , Rfl:   •  Melatonin 10 MG tablet, Take 1 tablet by mouth Every Night.,  Disp: , Rfl:   •  methocarbamol (ROBAXIN) 750 MG tablet, Take 1,500 mg by mouth 2 (Two) Times a Day., Disp: , Rfl:   •  Multiple Vitamins-Minerals (PRESERVISION AREDS 2 PO), Take  by mouth., Disp: , Rfl:   •  omeprazole (priLOSEC) 20 MG capsule, Take 20 mg by mouth Every Night., Disp: , Rfl:   •  phenazopyridine (PYRIDIUM) 100 MG tablet, Take 100 mg by mouth 3 (Three) Times a Day As Needed for Bladder Spasms., Disp: , Rfl:   •  promethazine (PHENERGAN) 25 MG tablet, Take 25 mg by mouth As Needed (MIGRAINES)., Disp: , Rfl:   •  pyridoxine (VITAMIN B-6) 200 MG tablet, Take 200 mg by mouth Daily., Disp: , Rfl:   •  traMADol (ULTRAM) 50 MG tablet, Take 50 mg by mouth Every 6 (Six) Hours As Needed for Moderate Pain ., Disp: , Rfl:   •  triamcinolone (KENALOG) 0.025 % cream, Apply 1 application topically to the appropriate area as directed As Needed., Disp: , Rfl:   •  vitamin B-12 (CYANOCOBALAMIN) 1000 MCG tablet, Take 1,000 mcg by mouth Daily., Disp: , Rfl:   •  vitamin C (ASCORBIC ACID) 250 MG tablet, Take 1,000 mg by mouth Daily., Disp: , Rfl:     Current Facility-Administered Medications:   •  denosumab (PROLIA) syringe 60 mg, 60 mg, Subcutaneous, Q6 Months, Cynthia Carvalho PA, 60 mg at 03/04/21 1412    Facility-Administered Medications Ordered in Other Visits:   •  mupirocin (BACTROBAN) 2 % nasal ointment, , Nasal, BID, Lindy Ly, APRN    Allergies:  Allergies   Allergen Reactions   • Shingrix [Zoster Vac Recomb Adjuvanted] Hallucinations     Fever, chills, redness swelling at injection site   • Zolmitriptan Hemorrhagic stroke     Hx of stroke    • Adhesive Tape Itching     Paper tape - redness and itching.    • Hydromorphone Itching and Rash   • Amoxicillin-Pot Clavulanate Itching and Rash       Review of Systems:  Gen -no fever, chills , sweats, headache   Eyes - no irritation or discharge   ENT -  no ear pain , runny nose , sore throat , difficulty swallowing   Resp - no cough , congestion , excessive  "expectoration   CVS - no chest pain , palpitations.   Abd - no pain , nausea , vomiting , diarrhea   Skin - no rash , lesions.   Neuro - no dizziness    Please see HPI for any other pertinent positives.  All other systems were reviewed and are negative.       Objective   Objective:    /79 (BP Location: Right arm, Patient Position: Sitting, Cuff Size: Large Adult)   Pulse 93   Ht 149.9 cm (59\")   Wt 92.5 kg (204 lb)   BMI 41.20 kg/m²     Physical Examination:  Morbidly obese individual in no acute distress, patient is alert and cooperative with the exam, appears to have normal mood and affect with a normal attention span and concentration, ambulating with the assistance of a walker  normocephalic, atraumatic, extraocular movements intact, conjunctiva and sclera are clear without nystagmus, normal canals with grossly normal hearing, no nasal deformity, discharge, inflammation, or lesions  no lymphadenopathy or thyromegaly  normal respiratory effort  abdomen is nontender, without guarding or rebound and nondistended  Appears kyphotic with short torso  pulses normal in all 4 extremities, no clubbing, cyanosis, or edema noted  cranial nerves II-XII grossly intact with no focal defects  skin intact without lesions or rashes visible         Imaging:  no diagnostic testing performed this visit  2/2018-patient started on Forteo  10/10/2018-DEXA scan at [facility], revealed a T score of -3.0 in the left one third radius.  10/18/2019-DEXA scan at [facility], revealed a T score of -2.6 in the left one third radius.  When compared to 2018, this did show an increase in bone density of 6.7% in the left forearm and no significant change in the hips.  1/20-2/2020-patient missed Forteo treatment  2/28/2020-patient started on Prolia  3/20-4/2020-patient finished last 2 months of Forteo treatment with samples          Assessment:  1. Age-related osteoporosis without current pathological fracture    2. Hx " of pathological fracture    3. Vitamin D deficiency    4. Medication monitoring encounter    5. Rheumatoid arthritis, involving unspecified site, unspecified whether rheumatoid factor present (CMS/[facility])    6. Lupus (CMS/[facility])    7. Depression, unspecified depression type    History of loosening/broken spinal hardware    Currently on prolia since 2/28/2020          Plan:  Today, we will check a vitamin D, as well as order a new DEXA scan for October of this year.  Prolia injection today.  With her history, she continues to be a very high risk for fracture.  At this time, I am recommending that she continue her Prolia injections every 6 months, as well as her calcium and vitamin D daily.  We did review weightbearing exercises and fall prevention today.  She has deferred physical therapy at this time.  I will plan to see her back in 1 year and as needed, and she will be scheduled for her next Prolia injection in 6 months.  She should call with any questions or concerns.    Advance Care Planning   ACP discussion was held with the patient during this visit. Patient has an advance directive (not in EMR), copy requested.          CHEL Diehl  03/23/21  13:27 EDT

## 2021-06-04 DIAGNOSIS — G47.33 OBSTRUCTIVE SLEEP APNEA SYNDROME: ICD-10-CM

## 2021-06-07 RX ORDER — ARMODAFINIL 250 MG/1
TABLET ORAL
Qty: 30 TABLET | Refills: 5 | Status: SHIPPED | OUTPATIENT
Start: 2021-06-07 | End: 2022-06-23 | Stop reason: SDUPTHER

## 2021-06-22 ENCOUNTER — OFFICE VISIT (OUTPATIENT)
Dept: NEUROLOGY | Facility: CLINIC | Age: 71
End: 2021-06-22

## 2021-06-22 VITALS
OXYGEN SATURATION: 96 % | RESPIRATION RATE: 18 BRPM | SYSTOLIC BLOOD PRESSURE: 135 MMHG | DIASTOLIC BLOOD PRESSURE: 83 MMHG | WEIGHT: 200 LBS | HEART RATE: 94 BPM | TEMPERATURE: 98 F | HEIGHT: 59 IN | BODY MASS INDEX: 40.32 KG/M2

## 2021-06-22 DIAGNOSIS — G47.33 OBSTRUCTIVE SLEEP APNEA SYNDROME: Primary | ICD-10-CM

## 2021-06-22 DIAGNOSIS — G47.10 HYPERSOMNOLENCE: ICD-10-CM

## 2021-06-22 DIAGNOSIS — G43.009 MIGRAINE WITHOUT AURA AND WITHOUT STATUS MIGRAINOSUS, NOT INTRACTABLE: ICD-10-CM

## 2021-06-22 PROCEDURE — 99214 OFFICE O/P EST MOD 30 MIN: CPT | Performed by: PSYCHIATRY & NEUROLOGY

## 2021-06-22 NOTE — PROGRESS NOTES
Chief Complaint  Sleep Apnea    Subjective          Annalisa Bhagat presents to [facility] NEUROLOGY  History of Present Illness  Yearly CPAP compliance f/u, patient states doing well with pap therapy. Pt. uses a nasal pillows  and goes through [employer] for supplies  Patient states her pressure on her machine needs to be adjusted because she doesn't feel any pressure.    Patient states her migraines are a lot better and less frequent she currently takes fiorcet to manage her last migraine was approx 1 week ago and lasted approx. 1 hr. Relieved with one Fioricet, usually ha gone in one hour    Patient states her narcolepsy has been under good with her taking nuvigil and notices a big difference if she misses a dose.    Sleep testing history:    On NPSG at [facility] , 1- patient had Moderate obstructive sleep apnea syndrome with apnea-hypopnea index of 15.4 per sleep hour, minimum SpO2 of 73%    PAP download:  The patient is on CPAP therapy at 13-18 cm/H2O.   Data indicates Moderate compliance. With 76% usage for more than 4 hours with an average usage of 7 hours 49 minutes. AHI down to 3.9 .  Average pressures 13.5.  Average large leak 39.     The patient's hypersomnia has improved       [place] Sleepiness Scale:  Sitting and reading 2 WatchingTV 1  Sitting, inactive, in a public place 2  As a passenger in a car for 1 hour w/o a break  2  Lying down to rest in the afternoon  3  Sitting and talking to someone  1  Sitting quietly after a lunch  1  In a car, while stopped for traffic or a light  0  Total 12    =====================DATE OF SERVICE: 6/22/2020 TELE VISIT TELEPHONE DR SEIPLE=================================  TELE VISIT TELEPHONE  You have chosen to receive care through a telephone visit. Do you consent to use a telephone visit for your medical care today? Yes  TIME: 8MIN 7 SECONDS  Yearly CPAP compliance f/u, patient states doing well with pap therapy. Pt. uses a nasal  "pillows  and goes through [employer] for supplies     PT TAKES NUVIGIL FOR SOMNOLENCE, WITH THIS DOES STAY AWAKE BETTER  .      SLEEP TESTING HISTORY:    On NPSG at [facility] , 1- patient had Moderate obstructive sleep apnea syndrome with apnea-hypopnea index of 15.4 per sleep hour, minimum SpO2 of 73%     The compliance data reviewed and the patient is on CPAP therapy at 13-18 cm/H2O and compliance data indicates excellent compliance with 73% usage for more than 4 hours with an average usage of 6 hours 12 minutes. AHI down to 10 with CPAP therapy and mean CPAP pressure 16.2 cm of water. [place] Sleepiness Scale score of 3 with NUVIGIL AND CPAP therapy.  The patient feels great and is benefiting from it and is compliant WITH CPAP..   ====================================================  Review of Systems   Constitutional: Positive for fatigue. Negative for chills.   HENT: Negative for facial swelling and nosebleeds.    Eyes: Negative for pain and visual disturbance.   Respiratory: Negative for shortness of breath and wheezing.    Cardiovascular: Positive for leg swelling.   Gastrointestinal: Negative for abdominal pain and nausea.   Endocrine: Positive for heat intolerance. Negative for cold intolerance.   Genitourinary: Positive for decreased urine volume and frequency. Negative for urgency.   Neurological: Negative for dizziness and headaches.   Psychiatric/Behavioral: Negative for agitation and confusion.       Objective   Vital Signs:   /83 (BP Location: Left arm, Patient Position: Sitting, Cuff Size: Large Adult)   Pulse 94   Temp 98 °F (36.7 °C) (Temporal)   Resp 18   Ht 149.9 cm (59\")   Wt 90.7 kg (200 lb)   SpO2 96%   BMI 40.40 kg/m²     Physical Exam  Vitals reviewed.   HENT:      Head: Normocephalic.   Neurological:      General: No focal deficit present.      Mental Status: She is alert and oriented to person, place, and time.   Psychiatric:         Mood and Affect: Mood " normal.        Result Review :                 Assessment and Plan    Diagnoses and all orders for this visit:    1. Obstructive sleep apnea syndrome (Primary)    2. Hypersomnolence    3. Migraine without aura and without status migrainosus, not intractable      Will increase min pressure to 14-18  The patient is compliant with and benefiting from PAP therapy.    Continue Nuvigil for the hypersomnolence     Continue aimovig for the migraine, and Fioricet       Follow Up   Return in about 1 year (around 6/22/2022).  Patient was given instructions and counseling regarding her condition or for health maintenance advice. Please see specific information pulled into the AVS if appropriate.

## 2021-06-25 ENCOUNTER — TELEPHONE (OUTPATIENT)
Dept: ORTHOPEDIC SURGERY | Facility: CLINIC | Age: 71
End: 2021-06-25

## 2021-06-25 NOTE — TELEPHONE ENCOUNTER
Pt was advised by front office need to contact hospital since provider is no longer part of [facility].

## 2021-06-25 NOTE — TELEPHONE ENCOUNTER
Caller: Annalisa Bhagat    Relationship: Self    Best call back number:[phone]  What form or medical record are you requesting:  XRAY SPINE THORACIC ON 11/5/19 & XRAY REPORT  Who is requesting this form or medical record from you: PROVIDER    How would you like to receive the form or medical records (pick-up, mail, fax): PICK-UP  If fax, what is the fax number:   If mail, what is the address:   If pick-up, provide patient with address and location details    Timeframe paperwork needed: ASAP    Additional notes: PATIENT ADVISED SHE NEEDS THE XRAY FILM FOR SPINE THORACIC THAT WAS DON ONE 11/5/19 AND THE XRAY REPORT. PATIENT ADVISED SHE NEEDS THIS FOR HER APPOINTMENT ON 6/29/21. PLEASE CALL PATIENT WHEN READY TO PICK-UP.

## 2021-07-01 ENCOUNTER — TRANSCRIBE ORDERS (OUTPATIENT)
Dept: ADMINISTRATIVE | Facility: HOSPITAL | Age: 71
End: 2021-07-01

## 2021-07-01 DIAGNOSIS — M54.16 LUMBAR RADICULOPATHY: Primary | ICD-10-CM

## 2021-07-01 DIAGNOSIS — M96.0 PSEUDARTHROSIS AFTER FUSION OR ARTHRODESIS: ICD-10-CM

## 2021-07-15 ENCOUNTER — TRANSCRIBE ORDERS (OUTPATIENT)
Dept: ADMINISTRATIVE | Facility: HOSPITAL | Age: 71
End: 2021-07-15

## 2021-07-15 ENCOUNTER — HOSPITAL ENCOUNTER (OUTPATIENT)
Dept: CT IMAGING | Facility: HOSPITAL | Age: 71
Discharge: HOME | End: 2021-07-15

## 2021-07-15 ENCOUNTER — HOSPITAL ENCOUNTER (OUTPATIENT)
Dept: GENERAL RADIOLOGY | Facility: HOSPITAL | Age: 71
Discharge: HOME | End: 2021-07-15

## 2021-07-15 ENCOUNTER — LAB (OUTPATIENT)
Dept: LAB | Facility: HOSPITAL | Age: 71
End: 2021-07-15

## 2021-07-15 DIAGNOSIS — M96.0 PSEUDARTHROSIS AFTER FUSION OR ARTHRODESIS: ICD-10-CM

## 2021-07-15 DIAGNOSIS — Z51.81 MEDICATION MONITORING ENCOUNTER: ICD-10-CM

## 2021-07-15 DIAGNOSIS — Z87.311 HX OF PATHOLOGICAL FRACTURE: ICD-10-CM

## 2021-07-15 DIAGNOSIS — M54.16 LUMBAR RADICULOPATHY: Primary | ICD-10-CM

## 2021-07-15 DIAGNOSIS — M54.16 LUMBAR RADICULOPATHY: ICD-10-CM

## 2021-07-15 DIAGNOSIS — E55.9 VITAMIN D DEFICIENCY: ICD-10-CM

## 2021-07-15 DIAGNOSIS — M81.0 AGE-RELATED OSTEOPOROSIS WITHOUT CURRENT PATHOLOGICAL FRACTURE: ICD-10-CM

## 2021-07-15 LAB — 25(OH)D3 SERPL-MCNC: 84.7 NG/ML

## 2021-07-15 PROCEDURE — 82306 VITAMIN D 25 HYDROXY: CPT

## 2021-07-15 PROCEDURE — 72081 X-RAY EXAM ENTIRE SPI 1 VW: CPT

## 2021-07-15 PROCEDURE — 72128 CT CHEST SPINE W/O DYE: CPT

## 2021-07-15 PROCEDURE — 72131 CT LUMBAR SPINE W/O DYE: CPT

## 2021-07-16 ENCOUNTER — TELEPHONE (OUTPATIENT)
Dept: ORTHOPEDIC SURGERY | Facility: CLINIC | Age: 71
End: 2021-07-16

## 2021-09-02 ENCOUNTER — CLINICAL SUPPORT (OUTPATIENT)
Dept: ORTHOPEDIC SURGERY | Facility: CLINIC | Age: 71
End: 2021-09-02

## 2021-09-02 VITALS
HEIGHT: 59 IN | WEIGHT: 199.8 LBS | SYSTOLIC BLOOD PRESSURE: 123 MMHG | BODY MASS INDEX: 40.28 KG/M2 | DIASTOLIC BLOOD PRESSURE: 74 MMHG | HEART RATE: 79 BPM

## 2021-09-02 DIAGNOSIS — M81.0 AGE-RELATED OSTEOPOROSIS WITHOUT CURRENT PATHOLOGICAL FRACTURE: ICD-10-CM

## 2021-09-02 PROCEDURE — 96372 THER/PROPH/DIAG INJ SC/IM: CPT | Performed by: PHYSICIAN ASSISTANT

## 2021-09-27 ENCOUNTER — OFFICE (AMBULATORY)
Dept: URBAN - METROPOLITAN AREA PATHOLOGY 4 | Facility: PATHOLOGY | Age: 71
End: 2021-09-27
Payer: COMMERCIAL

## 2021-09-27 ENCOUNTER — ON CAMPUS - OUTPATIENT (AMBULATORY)
Dept: URBAN - METROPOLITAN AREA HOSPITAL 2 | Facility: HOSPITAL | Age: 71
End: 2021-09-27
Payer: MEDICARE

## 2021-09-27 ENCOUNTER — OFFICE (AMBULATORY)
Dept: URBAN - METROPOLITAN AREA PATHOLOGY 4 | Facility: PATHOLOGY | Age: 71
End: 2021-09-27

## 2021-09-27 VITALS
HEART RATE: 73 BPM | RESPIRATION RATE: 16 BRPM | WEIGHT: 200 LBS | DIASTOLIC BLOOD PRESSURE: 77 MMHG | SYSTOLIC BLOOD PRESSURE: 133 MMHG | HEART RATE: 86 BPM | SYSTOLIC BLOOD PRESSURE: 103 MMHG | RESPIRATION RATE: 18 BRPM | SYSTOLIC BLOOD PRESSURE: 117 MMHG | SYSTOLIC BLOOD PRESSURE: 134 MMHG | HEART RATE: 72 BPM | HEART RATE: 77 BPM | SYSTOLIC BLOOD PRESSURE: 104 MMHG | OXYGEN SATURATION: 99 % | DIASTOLIC BLOOD PRESSURE: 89 MMHG | SYSTOLIC BLOOD PRESSURE: 140 MMHG | HEART RATE: 76 BPM | HEART RATE: 85 BPM | DIASTOLIC BLOOD PRESSURE: 87 MMHG | HEIGHT: 62 IN | DIASTOLIC BLOOD PRESSURE: 69 MMHG | RESPIRATION RATE: 14 BRPM | RESPIRATION RATE: 12 BRPM | OXYGEN SATURATION: 98 % | OXYGEN SATURATION: 100 % | HEART RATE: 79 BPM | SYSTOLIC BLOOD PRESSURE: 136 MMHG | DIASTOLIC BLOOD PRESSURE: 73 MMHG | OXYGEN SATURATION: 97 % | SYSTOLIC BLOOD PRESSURE: 143 MMHG | SYSTOLIC BLOOD PRESSURE: 135 MMHG | DIASTOLIC BLOOD PRESSURE: 67 MMHG | DIASTOLIC BLOOD PRESSURE: 70 MMHG | SYSTOLIC BLOOD PRESSURE: 123 MMHG | TEMPERATURE: 96.6 F | DIASTOLIC BLOOD PRESSURE: 59 MMHG | DIASTOLIC BLOOD PRESSURE: 51 MMHG | HEART RATE: 78 BPM | SYSTOLIC BLOOD PRESSURE: 130 MMHG | DIASTOLIC BLOOD PRESSURE: 76 MMHG

## 2021-09-27 DIAGNOSIS — D12.8 BENIGN NEOPLASM OF RECTUM: ICD-10-CM

## 2021-09-27 DIAGNOSIS — D12.2 BENIGN NEOPLASM OF ASCENDING COLON: ICD-10-CM

## 2021-09-27 DIAGNOSIS — D12.5 BENIGN NEOPLASM OF SIGMOID COLON: ICD-10-CM

## 2021-09-27 DIAGNOSIS — K64.1 SECOND DEGREE HEMORRHOIDS: ICD-10-CM

## 2021-09-27 DIAGNOSIS — K57.30 DIVERTICULOSIS OF LARGE INTESTINE WITHOUT PERFORATION OR ABS: ICD-10-CM

## 2021-09-27 DIAGNOSIS — D12.3 BENIGN NEOPLASM OF TRANSVERSE COLON: ICD-10-CM

## 2021-09-27 DIAGNOSIS — Z86.010 PERSONAL HISTORY OF COLONIC POLYPS: ICD-10-CM

## 2021-09-27 DIAGNOSIS — Z08 ENCOUNTER FOR FOLLOW-UP EXAMINATION AFTER COMPLETED TREATMEN: ICD-10-CM

## 2021-09-27 DIAGNOSIS — K62.1 RECTAL POLYP: ICD-10-CM

## 2021-09-27 DIAGNOSIS — K64.4 RESIDUAL HEMORRHOIDAL SKIN TAGS: ICD-10-CM

## 2021-09-27 PROBLEM — K63.5 POLYP OF COLON: Status: ACTIVE | Noted: 2021-09-27

## 2021-09-27 LAB
GI HISTOLOGY: A. UNSPECIFIED: (no result)
GI HISTOLOGY: B. UNSPECIFIED: (no result)
GI HISTOLOGY: C. UNSPECIFIED: (no result)
GI HISTOLOGY: D. UNSPECIFIED: (no result)
GI HISTOLOGY: E. UNSPECIFIED: (no result)
GI HISTOLOGY: PDF REPORT: (no result)

## 2021-09-27 PROCEDURE — 88305 TISSUE EXAM BY PATHOLOGIST: CPT | Mod: 26 | Performed by: INTERNAL MEDICINE

## 2021-09-27 PROCEDURE — 45385 COLONOSCOPY W/LESION REMOVAL: CPT | Mod: PT | Performed by: INTERNAL MEDICINE

## 2021-10-20 ENCOUNTER — HOSPITAL ENCOUNTER (OUTPATIENT)
Dept: BONE DENSITY | Facility: HOSPITAL | Age: 71
Discharge: HOME | End: 2021-10-20
Admitting: PHYSICIAN ASSISTANT

## 2021-10-20 DIAGNOSIS — M06.9 RHEUMATOID ARTHRITIS, INVOLVING UNSPECIFIED SITE, UNSPECIFIED WHETHER RHEUMATOID FACTOR PRESENT (HCC): ICD-10-CM

## 2021-10-20 DIAGNOSIS — Z87.311 HX OF PATHOLOGICAL FRACTURE: ICD-10-CM

## 2021-10-20 DIAGNOSIS — E55.9 VITAMIN D DEFICIENCY: ICD-10-CM

## 2021-10-20 DIAGNOSIS — Z51.81 MEDICATION MONITORING ENCOUNTER: ICD-10-CM

## 2021-10-20 DIAGNOSIS — M81.0 AGE-RELATED OSTEOPOROSIS WITHOUT CURRENT PATHOLOGICAL FRACTURE: ICD-10-CM

## 2021-10-20 PROCEDURE — 77080 DXA BONE DENSITY AXIAL: CPT

## 2022-02-15 ENCOUNTER — TELEPHONE (OUTPATIENT)
Dept: ORTHOPEDIC SURGERY | Facility: CLINIC | Age: 72
End: 2022-02-15

## 2022-02-15 DIAGNOSIS — Z51.81 MEDICATION MONITORING ENCOUNTER: Primary | ICD-10-CM

## 2022-02-15 DIAGNOSIS — E55.9 VITAMIN D DEFICIENCY: ICD-10-CM

## 2022-02-15 DIAGNOSIS — M81.0 AGE-RELATED OSTEOPOROSIS WITHOUT CURRENT PATHOLOGICAL FRACTURE: ICD-10-CM

## 2022-02-15 NOTE — TELEPHONE ENCOUNTER
Patient is scheduled for next Prolia injection 3/3/2022, updated labs needed prior to injection.     Call placed to patient, advised of need for updated labs for upcoming Prolia injection. Advised need to go to [facility] in the next two weeks and where the lab is now located. Patient states she has had some major issues with her back and that she would let us know if she was unable to make it to get labs and appt.

## 2022-03-07 ENCOUNTER — TELEPHONE (OUTPATIENT)
Dept: ORTHOPEDIC SURGERY | Facility: CLINIC | Age: 72
End: 2022-03-07

## 2022-03-07 NOTE — TELEPHONE ENCOUNTER
Call placed to patient advised need to reschedule appt. Patient apologized for missing appt and rescheduled appt as per patient preference.

## 2022-03-10 ENCOUNTER — LAB (OUTPATIENT)
Dept: LAB | Facility: HOSPITAL | Age: 72
End: 2022-03-10

## 2022-03-10 ENCOUNTER — OFFICE VISIT (OUTPATIENT)
Dept: ORTHOPEDIC SURGERY | Facility: CLINIC | Age: 72
End: 2022-03-10

## 2022-03-10 VITALS
HEART RATE: 86 BPM | SYSTOLIC BLOOD PRESSURE: 130 MMHG | DIASTOLIC BLOOD PRESSURE: 80 MMHG | HEIGHT: 59 IN | WEIGHT: 195.2 LBS | BODY MASS INDEX: 39.35 KG/M2

## 2022-03-10 DIAGNOSIS — Z51.81 MEDICATION MONITORING ENCOUNTER: ICD-10-CM

## 2022-03-10 DIAGNOSIS — M81.0 AGE-RELATED OSTEOPOROSIS WITHOUT CURRENT PATHOLOGICAL FRACTURE: ICD-10-CM

## 2022-03-10 DIAGNOSIS — M32.9 LUPUS: ICD-10-CM

## 2022-03-10 DIAGNOSIS — M06.9 RHEUMATOID ARTHRITIS, INVOLVING UNSPECIFIED SITE, UNSPECIFIED WHETHER RHEUMATOID FACTOR PRESENT: ICD-10-CM

## 2022-03-10 DIAGNOSIS — M81.0 AGE-RELATED OSTEOPOROSIS WITHOUT CURRENT PATHOLOGICAL FRACTURE: Primary | ICD-10-CM

## 2022-03-10 DIAGNOSIS — E55.9 VITAMIN D DEFICIENCY: ICD-10-CM

## 2022-03-10 DIAGNOSIS — F32.A DEPRESSION, UNSPECIFIED DEPRESSION TYPE: ICD-10-CM

## 2022-03-10 DIAGNOSIS — Z87.81 HX OF COMPRESSION FRACTURE OF SPINE: ICD-10-CM

## 2022-03-10 LAB
25(OH)D3 SERPL-MCNC: 60.2 NG/ML (ref 30–100)
ALBUMIN SERPL-MCNC: 4.3 G/DL (ref 3.5–5.2)
ALBUMIN/GLOB SERPL: 2 G/DL
ALP SERPL-CCNC: 123 U/L (ref 39–117)
ALT SERPL W P-5'-P-CCNC: 20 U/L (ref 1–33)
ANION GAP SERPL CALCULATED.3IONS-SCNC: 8.9 MMOL/L (ref 5–15)
AST SERPL-CCNC: 28 U/L (ref 1–32)
BILIRUB SERPL-MCNC: 0.4 MG/DL (ref 0–1.2)
BUN SERPL-MCNC: 12 MG/DL (ref 8–23)
BUN/CREAT SERPL: 14.6 (ref 7–25)
CALCIUM SPEC-SCNC: 9.3 MG/DL (ref 8.6–10.5)
CHLORIDE SERPL-SCNC: 102 MMOL/L (ref 98–107)
CO2 SERPL-SCNC: 30.1 MMOL/L (ref 22–29)
CREAT SERPL-MCNC: 0.82 MG/DL (ref 0.57–1)
EGFRCR SERPLBLD CKD-EPI 2021: 75.6 ML/MIN/1.73
GLOBULIN UR ELPH-MCNC: 2.1 GM/DL
GLUCOSE SERPL-MCNC: 93 MG/DL (ref 65–99)
POTASSIUM SERPL-SCNC: 4.3 MMOL/L (ref 3.5–5.2)
PROT SERPL-MCNC: 6.4 G/DL (ref 6–8.5)
SODIUM SERPL-SCNC: 141 MMOL/L (ref 136–145)

## 2022-03-10 PROCEDURE — 99214 OFFICE O/P EST MOD 30 MIN: CPT | Performed by: PHYSICIAN ASSISTANT

## 2022-03-10 PROCEDURE — 96372 THER/PROPH/DIAG INJ SC/IM: CPT | Performed by: PHYSICIAN ASSISTANT

## 2022-03-10 PROCEDURE — 80053 COMPREHEN METABOLIC PANEL: CPT

## 2022-03-10 PROCEDURE — 82306 VITAMIN D 25 HYDROXY: CPT

## 2022-03-10 PROCEDURE — 36415 COLL VENOUS BLD VENIPUNCTURE: CPT

## 2022-03-10 RX ORDER — METHOCARBAMOL 750 MG/1
2 TABLET, FILM COATED ORAL NIGHTLY
COMMUNITY
Start: 2022-02-14

## 2022-03-10 RX ORDER — GALCANEZUMAB 120 MG/ML
120 INJECTION, SOLUTION SUBCUTANEOUS
COMMUNITY

## 2022-03-10 NOTE — PROGRESS NOTES
ORTHO FOLLOW UP       Subjective:    HPI:   Annalisa Bhagat is a 71 y.o. female who presents in follow-up for osteoporosis.  She is currently on Prolia and reports that she is doing well with no noticeable side effects.  This medication was started on 2/28/2020 with her last injection on 9/2/2021.  She also continues 600 mg of calcium daily and 10,000 international units of D3 3 times per week.  She denies any new fractures since last office visit.  She reports she is getting very little physical activity.  She denies any falls in the last year, however does at times feel unsteady with walking, which is why she uses a walker.  She denies any new pain or change in her existing pain.  Recent labs were reviewed.  She did have a DEXA scan on 10/20/2021 at [facility], which revealed a T score of -2.7 in the left one third radius.  When compared to 2019, this did show an increase in bone density of 5.6% in the left hip.    Past Medical History:   Diagnosis Date   • Fibromyalgia    • GERD (gastroesophageal reflux disease)    • Hx of compression fracture of spine     T12   • Hyperlipidemia    • Kidney stone    • Loosening of hardware in spine ([facility])    • Lupus ([facility])    • Migraines    • Narcolepsy    • Numbness and tingling    • Osteoporosis 02/23/2018    Forteo x 2yrs(2/2018-12/2019,3/20-4/2020), on prolia(2/28/2020)   • Recurrent UTI    • Rheumatoid arthritis ([facility])    • Scoliosis    • Seasonal allergies    • Shoulder pain, left    • Sleep apnea    • Spinal stenosis    • Stress incontinence    • Stroke ([facility])        Past Surgical History:   Procedure Laterality Date   • BACK SURGERY     • CARDIAC CATHETERIZATION     • CARPAL TUNNEL RELEASE     • CATARACT EXTRACTION, BILATERAL     • CYSTOSCOPY W/ URETEROSCOPY W/ LITHOTRIPSY     • HAND SURGERY     • HYSTERECTOMY     • RECTOVAGINAL FISTULA CLOSURE     • SACRAL NERVE STIMULATOR PLACEMENT     • TOTAL SHOULDER ARTHROPLASTY W/ DISTAL CLAVICLE EXCISION Left 6/13/2019     Procedure: TOTAL SHOULDER REVERSE ARTHROPLASTY;  Surgeon: Brice Mayes MD;  Location: [facility];  Service: Orthopedics       Social History     Occupational History   • Not on file   Tobacco Use   • Smoking status: Never Smoker   • Smokeless tobacco: Never Used   Substance and Sexual Activity   • Alcohol use: Yes     Comment: OCC   • Drug use: No   • Sexual activity: Defer        Medications:    Current Outpatient Medications:   •  apixaban (ELIQUIS) 5 MG tablet tablet, Take 1 tablet by mouth Every 12 (Twelve) Hours. Resume 6/15/29, Disp: 60 tablet, Rfl:   •  Armodafinil 250 MG tablet, TAKE 1 TABLET BY MOUTH EVERY DAY, Disp: 30 tablet, Rfl: 5  •  atorvastatin (LIPITOR) 40 MG tablet, Take 40 mg by mouth Daily., Disp: , Rfl:   •  B Complex Vitamins (VITAMIN B COMPLEX PO), Take 1 each by mouth Daily., Disp: , Rfl:   •  calcium citrate (CALCITRATE) 950 MG tablet, Take 1 tablet by mouth., Disp: , Rfl:   •  Cholecalciferol (VITAMIN D3) 125 MCG (5000 UT) capsule capsule, Take 5,000 Units by mouth Daily., Disp: , Rfl:   •  DULoxetine (CYMBALTA) 60 MG capsule, Take 60 mg by mouth Every Night., Disp: , Rfl:   •  fluocinonide (LIDEX) 0.05 % gel, PRN, Disp: , Rfl:   •  furosemide (LASIX) 40 MG tablet, Take 40 mg by mouth 2 (Two) Times a Day As Needed., Disp: , Rfl:   •  gabapentin (NEURONTIN) 600 MG tablet, Take 600 mg by mouth Take As Directed. 1 PO Q AM, 2 PO Q PM, Disp: , Rfl:   •  galcanezumab-gnlm (Emgality) 120 MG/ML auto-injector pen, Inject 120 mg under the skin into the appropriate area as directed. AS DIRECTED DAILY, Disp: , Rfl:   •  hydrocortisone 2.5 % cream, Apply 1 application topically to the appropriate area as directed As Needed., Disp: , Rfl:   •  hydroxychloroquine (PLAQUENIL) 200 MG tablet, Take  by mouth 2 (Two) Times a Day., Disp: , Rfl:   •  loratadine (CLARITIN) 10 MG tablet, Take 10 mg by mouth As Needed., Disp: , Rfl:   •  Melatonin 10 MG tablet, Take 1 tablet by mouth Every Night., Disp: ,  Rfl:   •  methocarbamol (ROBAXIN) 750 MG tablet, Take 2 tablets by mouth Every Night., Disp: , Rfl:   •  Multiple Vitamins-Minerals (PRESERVISION AREDS 2 PO), Take  by mouth., Disp: , Rfl:   •  omeprazole (priLOSEC) 20 MG capsule, Take 20 mg by mouth Every Night., Disp: , Rfl:   •  phenazopyridine (PYRIDIUM) 100 MG tablet, Take 100 mg by mouth 3 (Three) Times a Day As Needed for Bladder Spasms., Disp: , Rfl:   •  promethazine (PHENERGAN) 25 MG tablet, Take 25 mg by mouth As Needed (MIGRAINES)., Disp: , Rfl:   •  pyridoxine (VITAMIN B-6) 200 MG tablet, Take 200 mg by mouth Daily., Disp: , Rfl:   •  traMADol (ULTRAM) 50 MG tablet, Take 100 mg by mouth Every Morning. AND PRN BACK PAIN, Disp: , Rfl:   •  triamcinolone (KENALOG) 0.025 % cream, Apply 1 application topically to the appropriate area as directed As Needed., Disp: , Rfl:   •  vitamin B-12 (CYANOCOBALAMIN) 1000 MCG tablet, Take 1,000 mcg by mouth. 2 TABS DAILY, Disp: , Rfl:   •  vitamin C (ASCORBIC ACID) 250 MG tablet, Take 1,000 mg by mouth Daily., Disp: , Rfl:   •  butalbital-acetaminophen-caffeine (FIORICET, ESGIC) -40 MG per tablet, Take 1 tablet by mouth Every 4 (Four) Hours As Needed., Disp: , Rfl:     Current Facility-Administered Medications:   •  denosumab (PROLIA) syringe 60 mg, 60 mg, Subcutaneous, Q6 Months, Cynthia Carvalho PA, 60 mg at 03/10/22 1550    Facility-Administered Medications Ordered in Other Visits:   •  mupirocin (BACTROBAN) 2 % nasal ointment, , Nasal, BID, Lindy Ly APRN    Allergies:  Allergies   Allergen Reactions   • Shingrix [Zoster Vac Recomb Adjuvanted] Hallucinations     Fever, chills, redness swelling at injection site   • Zolmitriptan Hemorrhagic stroke     Hx of stroke    • Adhesive Tape Itching     Paper tape - redness and itching.    • Hydromorphone Itching and Rash   • Amoxicillin-Pot Clavulanate Itching and Rash       Review of Systems:  Gen -no fever, chills , sweats, headache   Eyes - no irritation or  "discharge   ENT -  no ear pain , runny nose , sore throat , difficulty swallowing   Resp - no cough , congestion , excessive expectoration   CVS - no chest pain , palpitations.   Abd - no pain , nausea , vomiting , diarrhea   Skin - no rash , lesions.   Neuro - no dizziness    Please see HPI for any other pertinent positives.  All other systems were reviewed and are negative.       Objective   Objective:    /80 (BP Location: Left arm, Patient Position: Sitting, Cuff Size: Large Adult)   Pulse 86   Ht 149.9 cm (59\")   Wt 88.5 kg (195 lb 3.2 oz)   BMI 39.43 kg/m²     Physical Examination:  Obese individual in no acute distress, patient is alert and cooperative with the exam, appears to have normal mood and affect with a normal attention span and concentration, ambulating with the assistance of a walker  normocephalic, atraumatic, extraocular movements intact, conjunctiva and sclera are clear, grossly normal hearing  no lymphadenopathy or thyromegaly  normal respiratory effort  abdomen is nontender, without guarding or rebound and nondistended  Appears kyphotic with a short torso  Bilateral LE edema noted  cranial nerves II-XII grossly intact with no focal defects  skin intact without lesions or rashes visible         Imaging:  History of T12 compression fracture  2/2018-patient started on Forteo  10/10/2018-DEXA scan at [facility], revealed a T score of -3.0 in the left one third radius.  10/18/2019-DEXA scan at [facility], revealed a T score of -2.6 in the left one third radius.  When compared to 2018, this did show an increase in bone density of 6.7% in the left forearm and no significant change in the hips.  1/20-2/2020-patient missed Forteo treatment  2/28/2020-patient started on Prolia  3/20-4/2020-patient finished last 2 months of Forteo treatment with samples  10/20/2021-DEXA scan at [facility], revealed a T score of -2.7 in the left one third radius, -1.9 in the left " femoral neck, and -1.0 in the left total hip.  When compared to 2019, this did show an increase in bone density of 5.6% in the left hip.          Assessment:  1. Age-related osteoporosis without current pathological fracture    2. Hx of compression fracture of spine    3. Vitamin D deficiency    4. Medication monitoring encounter    5. Rheumatoid arthritis, involving unspecified site, unspecified whether rheumatoid factor present (HCC)    6. Depression, unspecified depression type    7. Lupus (HCC)    History of loosening/broken spinal hardware    Currently on prolia since 2/28/2020          Plan:  Prolia injection today.  She will go to the hospital today for CMP and vitamin D.  With her history, she continues to be at very high risk for fracture.  She is seeing positive results with Prolia.  At this time, I am recommending that she continue her Prolia injections every 6 months, as well as her calcium and vitamin D daily.  We did review weightbearing exercises and fall prevention today.  She has deferred physical therapy at this time.  I will plan to see her back in 1 year and as needed, and she will be scheduled for her next Prolia injection in 6 months.  She should call with any questions or concerns.    Advance Care Planning   ACP discussion was held with the patient during this visit. Patient has an advance directive (not in EMR), copy requested.          CHEL Diehl  03/10/22  16:18 EST

## 2022-03-10 NOTE — PATIENT INSTRUCTIONS
Osteoporosis    Osteoporosis is when the bones get thin and weak. This can cause your bones to break (fracture) more easily.  What are the causes?  The exact cause of this condition is not known.  What increases the risk?  Having family members with this condition.  Not eating enough healthy foods.  Taking certain medicines.  Being female.  Being age 50 or older.  Smoking or using other products that contain nicotine or tobacco, such as e-cigarettes or chewing tobacco.  Not exercising.  Being of  or  ancestry.  Having a small body frame.  What are the signs or symptoms?  A broken bone might be the first sign, especially if the break results from a fall or injury that usually would not cause a bone to break.  Other signs and symptoms include:  Pain in the neck or low back.  Being hunched over (stooped posture).  Getting shorter.  How is this treated?  Eating more foods with more calcium and vitamin D in them.  Doing exercises.  Stopping tobacco use.  Limiting how much alcohol you drink.  Taking medicines to slow bone loss or help make the bones stronger.  Taking supplements of calcium and vitamin D every day.  Taking medicines to replace chemicals in the body (hormone replacement medicines).  Monitoring your levels of calcium and vitamin D.  The goal of treatment is to strengthen your bones and lower your risk for a bone break.  Follow these instructions at home:  Eating and drinking  Eat plenty of calcium and vitamin D. These nutrients are good for your bones. Good sources of calcium and vitamin D include:  Some fish, such as salmon and tuna.  Foods that have calcium and vitamin D added to them (fortified foods), such as some breakfast cereals.  Egg yolks.  Cheese.  Liver.    Activity  Do exercises as told by your doctor. Ask your doctor what exercises are safe for you. You should do:  Exercises that make your muscles work to hold your body weight up (weight-bearing exercises). These include augustine chi,  yoga, and walking.  Exercises to make your muscles stronger. One example is lifting weights.  Lifestyle  Do not drink alcohol if:  Your doctor tells you not to drink.  You are pregnant, may be pregnant, or are planning to become pregnant.  If you drink alcohol:  Limit how much you use to:  0-1 drink a day for women.  0-2 drinks a day for men.  Know how much alcohol is in your drink. In the [place] one drink equals one 12 oz bottle of beer (355 mL), one 5 oz glass of wine (148 mL), or one 1½ oz glass of hard liquor (44 mL).  Do not smoke or use any products that contain nicotine or tobacco. If you need help quitting, ask your doctor.  Preventing falls  Use tools to help you move around (mobility aids) as needed. These include canes, walkers, scooters, and crutches.  Keep rooms well-lit.  Put away things on the floor that could make you trip. These include cords and rugs.  Install safety rails on stairs. Install grab bars in bathrooms.  Use rubber mats in slippery areas, like bathrooms.  Wear shoes that:  Fit you well.  Support your feet.  Have closed toes.  Have rubber soles or low heels.  Tell your doctor about all of the medicines you are taking. Some medicines can make you more likely to fall.  General instructions  Take over-the-counter and prescription medicines only as told by your doctor.  Keep all follow-up visits.  Contact a doctor if:  You have not been tested (screened) for osteoporosis and you are:  A woman who is age 65 or older.  A man who is age 70 or older.  Get help right away if:  You fall.  You get hurt.  Summary  Osteoporosis happens when your bones get thin and weak.  Weak bones can break (fracture) more easily.  Eat plenty of calcium and vitamin D. These are good for your bones.  Tell your doctor about all of the medicines that you take.  This information is not intended to replace advice given to you by your health care provider. Make sure you discuss any questions you have with your health care  provider.  Document Revised: 06/03/2021 Document Reviewed: 06/03/2021  [employer] Patient Education © 2021 [employer]  Fall Prevention in the Home, Adult  Falls can cause injuries. They can happen to people of all ages. There are many things you can do to make your home safe and to help prevent falls. Ask for help when making these changes, if needed.  What actions can I take to prevent falls?  General Instructions  Use good lighting in all rooms. Replace any light bulbs that burn out.  Turn on the lights when you go into a dark area. Use night-lights.  Keep items that you use often in easy-to-reach places. Lower the shelves around your home if necessary.  Set up your furniture so you have a clear path. Avoid moving your furniture around.  Do not have throw rugs and other things on the floor that can make you trip.  Avoid walking on wet floors.  If any of your floors are uneven, fix them.  Add color or contrast paint or tape to clearly clara and help you see:  Any grab bars or handrails.  First and last steps of stairways.  Where the edge of each step is.  If you use a stepladder:  Make sure that it is fully opened. Do not climb a closed stepladder.  Make sure that both sides of the stepladder are locked into place.  Ask someone to hold the stepladder for you while you use it.  If there are any pets around you, be aware of where they are.  What can I do in the bathroom?         Keep the floor dry. Clean up any water that spills onto the floor as soon as it happens.  Remove soap buildup in the tub or shower regularly.  Use non-skid mats or decals on the floor of the tub or shower.  Attach bath mats securely with double-sided, non-slip rug tape.  If you need to sit down in the shower, use a plastic, non-slip stool.  Install grab bars by the toilet and in the tub and shower. Do not use towel bars as grab bars.  What can I do in the bedroom?  Make sure that you have a light by your bed that is easy to reach.  Do not use  any sheets or blankets that are too big for your bed. They should not hang down onto the floor.  Have a firm chair that has side arms. You can use this for support while you get dressed.  What can I do in the kitchen?  Clean up any spills right away.  If you need to reach something above you, use a strong step stool that has a grab bar.  Keep electrical cords out of the way.  Do not use floor polish or wax that makes floors slippery. If you must use wax, use non-skid floor wax.  What can I do with my stairs?  Do not leave any items on the stairs.  Make sure that you have a light switch at the top of the stairs and the bottom of the stairs. If you do not have them, ask someone to add them for you.  Make sure that there are handrails on both sides of the stairs, and use them. Fix handrails that are broken or loose. Make sure that handrails are as long as the stairways.  Install non-slip stair treads on all stairs in your home.  Avoid having throw rugs at the top or bottom of the stairs. If you do have throw rugs, attach them to the floor with carpet tape.  Choose a carpet that does not hide the edge of the steps on the stairway.  Check any carpeting to make sure that it is firmly attached to the stairs. Fix any carpet that is loose or worn.  What can I do on the outside of my home?  Use bright outdoor lighting.  Regularly fix the edges of walkways and driveways and fix any cracks.  Remove anything that might make you trip as you walk through a door, such as a raised step or threshold.  Trim any bushes or trees on the path to your home.  Regularly check to see if handrails are loose or broken. Make sure that both sides of any steps have handrails.  Install guardrails along the edges of any raised decks and porches.  Clear walking paths of anything that might make someone trip, such as tools or rocks.  Have any leaves, snow, or ice cleared regularly.  Use sand or salt on walking paths during winter.  Clean up any spills  in your garage right away. This includes grease or oil spills.  What other actions can I take?  Wear shoes that:  Have a low heel. Do not wear high heels.  Have rubber bottoms.  Are comfortable and fit you well.  Are closed at the toe. Do not wear open-toe sandals.  Use tools that help you move around (mobility aids) if they are needed. These include:  Canes.  Walkers.  Scooters.  Crutches.  Review your medicines with your doctor. Some medicines can make you feel dizzy. This can increase your chance of falling.  Ask your doctor what other things you can do to help prevent falls.  Where to find more information  Centers for Disease Control and Prevention, STEADI: https://cdc.gov  National [place] on Aging: https://ou0ttho.stefanie.nih.gov  Contact a doctor if:  You are afraid of falling at home.  You feel weak, drowsy, or dizzy at home.  You fall at home.  Summary  There are many simple things that you can do to make your home safe and to help prevent falls.  Ways to make your home safe include removing tripping hazards and installing grab bars in the bathroom.  Ask for help when making these changes in your home.  This information is not intended to replace advice given to you by your health care provider. Make sure you discuss any questions you have with your health care provider.  Document Revised: 04/09/2020 Document Reviewed: 08/02/2018  [employer] Patient Education © 2021 [employer]  Sit-to-Stand Exercise    The sit-to-stand exercise (also known as the chair stand or chair rise exercise) strengthens your lower body and helps you maintain or improve your mobility and independence. The goal is to do the sit-to-stand exercise without using your hands. This will be easier as you become stronger. You should always talk with your health care provider before starting any exercise program, especially if you have had recent surgery.  Do the exercise exactly as told by your health care provider and adjust it as directed.  It is normal to feel mild stretching, pulling, tightness, or discomfort as you do this exercise, but you should stop right away if you feel sudden pain or your pain gets worse. Do not begin doing this exercise until told by your health care provider.  What the sit-to-stand exercise does  The sit-to-stand exercise helps to strengthen the muscles in your thighs and the muscles in the center of your body that give you stability (core muscles). This exercise is especially helpful if:  You have had knee or hip surgery.  You have trouble getting up from a chair, out of a car, or off the toilet.  How to do the sit-to-stand exercise  Sit toward the front edge of a sturdy chair without armrests. Your knees should be bent and your feet should be flat on the floor and shoulder-width apart.  Place your hands lightly on each side of the seat. Keep your back and neck as straight as possible, with your chest slightly forward.  Breathe in slowly. Lean forward and slightly shift your weight to the front of your feet.  Breathe out as you slowly stand up. Use your hands as little as possible.  Stand and pause for a full breath in and out.  Breathe in as you sit down slowly. Tighten your core and abdominal muscles to control your lowering as much as possible.  Breathe out slowly.  Do this exercise 10-15 times. If needed, do it fewer times until you build up strength.  Rest for 1 minute, then do another set of 10-15 repetitions.  To change the difficulty of the sit-to-stand exercise  If the exercise is too difficult, use a chair with sturdy armrests, and push off the armrests to help you come to the standing position. You can also use the armrests to help slowly lower yourself back to sitting. As this gets easier, try to use your arms less. You can also place a firm cushion or pillow on the chair to make the surface higher.  If this exercise is too easy, do not use your arms to help raise or lower yourself. You can also wear a weighted  vest, use hand weights, increase your repetitions, or try a lower chair.  General tips  You may feel tired when starting an exercise routine. This is normal.  You may have muscle soreness that lasts a few days. This is normal. As you get stronger, you may not feel muscle soreness.  Use smooth, steady movements.  Do not  hold your breath during strength exercises. This can cause unsafe changes in your blood pressure.  Breathe in slowly through your nose, and breathe out slowly through your mouth.  Summary  Strengthening your lower body is an important step to help you move safely and independently.  The sit-to-stand exercise helps strengthen the muscles in your thighs and core.  You should always talk with your health care provider before starting any exercise program, especially if you have had recent surgery.  This information is not intended to replace advice given to you by your health care provider. Make sure you discuss any questions you have with your health care provider.  Document Revised: 10/16/2019 Document Reviewed: 02/08/2018  [employer] Patient Education © 2021 [employer].  Preventing Health Risks of Being Overweight  Maintaining a healthy body weight is an important part of your overall health. Your healthy body weight depends on your age, gender, and height. Being overweight puts you at risk for many health problems, including:  Heart disease.  Diabetes.  Problems sleeping.  Joint problems.  You can make changes to your diet and lifestyle to prevent these risks. Consider working with a health care provider or a dietitian to make these changes.  What nutrition changes can be made?    Eat only as much as your body needs. In most cases, this is about 2,000 calories a day, but the amount varies depending on your height, gender, and activity level. Ask your health care provider how many calories you should have each day. Eating more than your body needs on a regular basis can cause you to become overweight  or obese.  Eat slowly, and stop eating when you feel full.  Choose healthy foods, including:  Fruits and vegetables.  Lean meats.  Low-fat dairy products.  High-fiber foods, such as whole grains and beans.  Healthy snacks like vegetable sticks, a piece of fruit, or a small amount of yogurt or cheese.  Avoid foods and drinks that are high in sugar, salt (sodium), saturated fat, or trans fat. This includes:  Many desserts such as candy, cookies, and ice cream.  Soda.  Fried foods.  Processed meats such as hot dogs or lunch meats.  Prepackaged snack foods.  What lifestyle changes can be made?    Exercise for at least 150 minutes a week to prevent weight gain, or as often as recommended by your health care provider. Do moderate-intensity exercise, such as brisk walking.  Spread it out by exercising for 30 minutes 5 days a week, or in short 10-minute bursts several times a day.  Find other ways to stay active and burn calories, such as yard work or a hobby that involves physical activity.  Get at least 8 hours of sleep each night. When you are well-rested, you are more likely to be active and make healthy choices during the day. To sleep better:  Try to go to bed and wake up at about the same time every day.  Keep your bedroom dark, quiet, and cool.  Make sure that your bed is comfortable.  Avoid stimulating activities, such as watching television or exercising, for at least one hour before bedtime.  Why are these changes important?  Eating healthy and being active helps you lose weight and prevent health problems caused by being overweight. Making these changes can also help you manage stress, feel better mentally, and connect with friends and family.  What can happen if changes are not made?  Being overweight can affect you for your entire life. You may develop joint or bone problems that make it painful or difficult for you to play sports or do activities you enjoy. Being overweight puts stress on your heart and lungs  and can lead to medical problems like diabetes, heart disease, and sleeping problems.  Where to find support  You can get support for preventing health risks of being overweight from:  Your health care provider or a dietitian. They can provide guidance about healthy eating and healthy lifestyle choices.  Weight loss support groups, online or in-person.  Where to find more information  MyPlate: www.choosemyplate.gov  This an online tool that provides personalized recommendations about foods to eat each day.  The Centers for Disease Control and Prevention: www.cdc.gov/healthyweight  This resource gives tips for managing weight and having an active lifestyle.  Summary  To prevent unhealthy weight gain, it is important to maintain a healthy diet high in vegetables and whole grains, exercise regularly, and get at least 8 hours of sleep each night.  Making these changes helps prevent many long-term (chronic) health conditions that can shorten your life, such as diabetes, heart disease, and stroke.  This information is not intended to replace advice given to you by your health care provider. Make sure you discuss any questions you have with your health care provider.  Document Revised: 04/15/2021 Document Reviewed: 04/15/2021  [employer] Patient Education © 2021 [employer]    Advance Care Planning and Advance Directives     You make decisions on a daily basis - decisions about where you want to live, your career, your home, your life. Perhaps one of the most important decisions you face is your choice for future medical care. Take time to talk with your family and your healthcare team and start planning today.  Advance Care Planning is a process that can help you:  Understand possible future healthcare decisions in light of your own experiences  Reflect on those decision in light of your goals and values  Discuss your decisions with those closest to you and the healthcare professionals that care for you  Make a plan by  creating a document that reflects your wishes    Surrogate Decision Maker  In the event of a medical emergency, which has left you unable to communicate or to make your own decisions, you would need someone to make decisions for you.  It is important to discuss your preferences for medical treatment with this person while you are in good health.     Qualities of a surrogate decision maker:  Willing to take on this role and responsibility  Knows what you want for future medical care  Willing to follow your wishes even if they don't agree with them  Able to make difficult medical decisions under stressful circumstances    Advance Directives  These are legal documents you can create that will guide your healthcare team and decision maker(s) when needed. These documents can be stored in the electronic medical record.    Living Will - a legal document to guide your care if you have a terminal condition or a serious illness and are unable to communicate. States vary by statute in document names/types, but most forms may include one or more of the following:        -  Directions regarding life-prolonging treatments        -  Directions regarding artificially provided nutrition/hydration        -  Choosing a healthcare decision maker        -  Direction regarding organ/tissue donation    Durable Power of  for Healthcare - this document names an -in-fact to make medical decisions for you, but it may also allow this person to make personal and financial decisions for you. Please seek the advice of an  if you need this type of document.    **Advance Directives are not required and no one may discriminate against you if you do not sign one.    Medical Orders  Many states allow specific forms/orders signed by your physician to record your wishes for medical treatment in your current state of health. This form, signed in personal communication with your physician, addresses resuscitation and other medical  interventions that you may or may not want.      For more information or to schedule a time with a [facility] Advance Care Planning Facilitator contact: [facility]/ACP or call [phone] and someone will contact you directly.

## 2022-06-22 NOTE — PROGRESS NOTES
Chief Complaint  narcolepsy, Sleep Apnea, and Migraine    Subjective          Annalisa Bhagat presents to [facility] NEUROLOG[facility]  History of Present Illness  Yearly CPAP compliance f/u, patient states doing well with pap therapy. Pt. uses a nasal pillows  and goes through [facility] for supplies.patient not sure if machine is recalled.    Narcolepsy- Patient states her narcolepsy has been under good with her taking nuvigil and notices a big difference if she misses a dose.    Migraines better she states dr malone took her off aimovig and put her on emgality and she has noticed a big difference.    Patient states her migraines are a lot better and less frequent she currently takes fiorcet to manage her last migraine was approx 1 week ago and lasted approx. 1 hr. Relieved with one Fioricet, usually ha gone in one hour     Sleep testing history:    On NPSG at [facility] , 1- patient had Moderate obstructive sleep apnea syndrome with apnea-hypopnea index of 15.4 per sleep hour, minimum SpO2 of 73%  PAP download:  The patient is on CPAP therapy at 14-18 cm/H2O.   Data indicates Excellent compliance. With 86% usage for more than 4 hours with an average usage of 7 hours 7 minutes. AHI down to 2.9 .  Average pressures 14.4.  Average large leak 16min.     The patient's hypersomnia has resolved       [place] Sleepiness Scale:  Sitting and reading 3 WatchingTV 2  Sitting, inactive, in a public place 2  As a passenger in a car for 1 hour w/o a break  3  Lying down to rest in the afternoon  3  Sitting and talking to someone  1  Sitting quietly after a lunch  2  In a car, while stopped for traffic or a light  1  Total 17    Review of Systems   Constitutional: Positive for fatigue.   HENT: Positive for dental problem, postnasal drip and sneezing.    Endocrine: Positive for heat intolerance.   Musculoskeletal: Positive for arthralgias, back pain, gait problem and myalgias.   Allergic/Immunologic:  "Positive for environmental allergies and immunocompromised state.   Neurological: Positive for weakness and headaches.   Psychiatric/Behavioral: The patient is nervous/anxious.    All other systems reviewed and are negative.        Objective   Vital Signs:   /58   Pulse 85   Temp 97.7 °F (36.5 °C) (Temporal)   Resp 18   Ht 149.9 cm (59\")   Wt 88.5 kg (195 lb)   BMI 39.39 kg/m²     Physical Exam  Vitals reviewed.   Cardiovascular:      Rate and Rhythm: Normal rate.   Pulmonary:      Effort: Pulmonary effort is normal. No respiratory distress.   Neurological:      Mental Status: She is alert and oriented to person, place, and time.   Psychiatric:         Mood and Affect: Mood normal.        Result Review :                 Assessment and Plan    Diagnoses and all orders for this visit:    1. Obstructive sleep apnea syndrome (Primary)    2. Hypersomnolence    3. Migraine without aura and without status migrainosus, not intractable    continue cpap min 14 max 18  The patient is compliant with and benefiting from PAP therapy.    Continue Nuvigil for the sleepiness.     Continue aimovig or Emgaility for the migraine      Follow Up   Return in about 1 year (around 6/23/2023).    Patient was given instructions and counseling regarding her condition or for health maintenance advice. Please see specific information pulled into the AVS if appropriate.       This document has been electronically signed by Joseph Seipel, MD on June 23, 2022 16:37 EDT  "

## 2022-06-23 ENCOUNTER — OFFICE VISIT (OUTPATIENT)
Dept: NEUROLOGY | Facility: CLINIC | Age: 72
End: 2022-06-23

## 2022-06-23 VITALS
SYSTOLIC BLOOD PRESSURE: 110 MMHG | HEART RATE: 85 BPM | BODY MASS INDEX: 39.31 KG/M2 | HEIGHT: 59 IN | RESPIRATION RATE: 18 BRPM | DIASTOLIC BLOOD PRESSURE: 58 MMHG | TEMPERATURE: 97.7 F | WEIGHT: 195 LBS

## 2022-06-23 DIAGNOSIS — G43.009 MIGRAINE WITHOUT AURA AND WITHOUT STATUS MIGRAINOSUS, NOT INTRACTABLE: ICD-10-CM

## 2022-06-23 DIAGNOSIS — G47.33 OBSTRUCTIVE SLEEP APNEA SYNDROME: Primary | ICD-10-CM

## 2022-06-23 DIAGNOSIS — G47.10 HYPERSOMNOLENCE: ICD-10-CM

## 2022-06-23 PROCEDURE — 99214 OFFICE O/P EST MOD 30 MIN: CPT | Performed by: PSYCHIATRY & NEUROLOGY

## 2022-06-23 RX ORDER — ARMODAFINIL 250 MG/1
250 TABLET ORAL DAILY
Qty: 30 TABLET | Refills: 5 | Status: SHIPPED | OUTPATIENT
Start: 2022-06-23

## 2022-08-09 ENCOUNTER — TRANSCRIBE ORDERS (OUTPATIENT)
Dept: ADMINISTRATIVE | Facility: HOSPITAL | Age: 72
End: 2022-08-09

## 2022-08-09 DIAGNOSIS — M96.0 PSEUDARTHROSIS AFTER FUSION OR ARTHRODESIS: Primary | ICD-10-CM

## 2022-08-22 ENCOUNTER — APPOINTMENT (OUTPATIENT)
Dept: CT IMAGING | Facility: HOSPITAL | Age: 72
End: 2022-08-22

## 2022-08-22 ENCOUNTER — HOSPITAL ENCOUNTER (OUTPATIENT)
Dept: CT IMAGING | Facility: HOSPITAL | Age: 72
Discharge: HOME | End: 2022-08-22
Admitting: ORTHOPAEDIC SURGERY

## 2022-08-22 DIAGNOSIS — M96.0 PSEUDARTHROSIS AFTER FUSION OR ARTHRODESIS: ICD-10-CM

## 2022-08-22 PROCEDURE — 72128 CT CHEST SPINE W/O DYE: CPT

## 2022-08-22 PROCEDURE — 72131 CT LUMBAR SPINE W/O DYE: CPT

## 2022-08-24 ENCOUNTER — TELEPHONE (OUTPATIENT)
Dept: ORTHOPEDIC SURGERY | Facility: CLINIC | Age: 72
End: 2022-08-24

## 2022-08-24 DIAGNOSIS — M81.0 AGE-RELATED OSTEOPOROSIS WITHOUT CURRENT PATHOLOGICAL FRACTURE: ICD-10-CM

## 2022-08-24 DIAGNOSIS — Z51.81 MEDICATION MONITORING ENCOUNTER: Primary | ICD-10-CM

## 2022-09-20 ENCOUNTER — TELEPHONE (OUTPATIENT)
Dept: ORTHOPEDIC SURGERY | Facility: CLINIC | Age: 72
End: 2022-09-20

## 2022-09-20 NOTE — TELEPHONE ENCOUNTER
Call placed to patient advised that we had called her last month about getting updated labs prior to her Prolia appt. States she did not get message had been in hospital then rehab after back surgery. Advised would need her to go in the next couple of days to have results back before her appt on Monday. Patient expressed understanding and advised where new lab area was at [facility].

## 2022-09-21 ENCOUNTER — LAB (OUTPATIENT)
Dept: LAB | Facility: HOSPITAL | Age: 72
End: 2022-09-21

## 2022-09-21 ENCOUNTER — TRANSCRIBE ORDERS (OUTPATIENT)
Dept: ADMINISTRATIVE | Facility: HOSPITAL | Age: 72
End: 2022-09-21

## 2022-09-21 DIAGNOSIS — M54.51 VERTEBROGENIC LOW BACK PAIN: ICD-10-CM

## 2022-09-21 DIAGNOSIS — R53.83 TIREDNESS: ICD-10-CM

## 2022-09-21 DIAGNOSIS — R76.9 ABNORMAL IMMUNOLOGICAL FINDING IN SERUM: ICD-10-CM

## 2022-09-21 DIAGNOSIS — M05.79 SEROPOSITIVE RHEUMATOID ARTHRITIS OF MULTIPLE SITES: Primary | ICD-10-CM

## 2022-09-21 DIAGNOSIS — M05.79 SEROPOSITIVE RHEUMATOID ARTHRITIS OF MULTIPLE SITES: ICD-10-CM

## 2022-09-21 DIAGNOSIS — Z51.81 MEDICATION MONITORING ENCOUNTER: ICD-10-CM

## 2022-09-21 DIAGNOSIS — M65.30 ACQUIRED TRIGGER FINGER: ICD-10-CM

## 2022-09-21 DIAGNOSIS — M81.0 AGE-RELATED OSTEOPOROSIS WITHOUT CURRENT PATHOLOGICAL FRACTURE: ICD-10-CM

## 2022-09-21 LAB
BASOPHILS # BLD AUTO: 0.05 10*3/MM3 (ref 0–0.2)
BASOPHILS NFR BLD AUTO: 0.9 % (ref 0–1.5)
CRP SERPL-MCNC: 1.76 MG/DL (ref 0–0.5)
DEPRECATED RDW RBC AUTO: 51 FL (ref 37–54)
EOSINOPHIL # BLD AUTO: 0.22 10*3/MM3 (ref 0–0.4)
EOSINOPHIL NFR BLD AUTO: 3.8 % (ref 0.3–6.2)
ERYTHROCYTE [DISTWIDTH] IN BLOOD BY AUTOMATED COUNT: 16.6 % (ref 12.3–15.4)
ERYTHROCYTE [SEDIMENTATION RATE] IN BLOOD: 48 MM/HR (ref 0–30)
HCT VFR BLD AUTO: 35.8 % (ref 34–46.6)
HGB BLD-MCNC: 11.4 G/DL (ref 12–15.9)
IMM GRANULOCYTES # BLD AUTO: 0.01 10*3/MM3 (ref 0–0.05)
IMM GRANULOCYTES NFR BLD AUTO: 0.2 % (ref 0–0.5)
LYMPHOCYTES # BLD AUTO: 1.52 10*3/MM3 (ref 0.7–3.1)
LYMPHOCYTES NFR BLD AUTO: 26.1 % (ref 19.6–45.3)
MCH RBC QN AUTO: 26.8 PG (ref 26.6–33)
MCHC RBC AUTO-ENTMCNC: 31.8 G/DL (ref 31.5–35.7)
MCV RBC AUTO: 84.2 FL (ref 79–97)
MONOCYTES # BLD AUTO: 0.62 10*3/MM3 (ref 0.1–0.9)
MONOCYTES NFR BLD AUTO: 10.7 % (ref 5–12)
NEUTROPHILS NFR BLD AUTO: 3.4 10*3/MM3 (ref 1.7–7)
NEUTROPHILS NFR BLD AUTO: 58.3 % (ref 42.7–76)
NRBC BLD AUTO-RTO: 0 /100 WBC (ref 0–0.2)
PLATELET # BLD AUTO: 423 10*3/MM3 (ref 140–450)
PMV BLD AUTO: 9.8 FL (ref 6–12)
RBC # BLD AUTO: 4.25 10*6/MM3 (ref 3.77–5.28)
WBC NRBC COR # BLD: 5.82 10*3/MM3 (ref 3.4–10.8)

## 2022-09-21 PROCEDURE — 86140 C-REACTIVE PROTEIN: CPT

## 2022-09-21 PROCEDURE — 36415 COLL VENOUS BLD VENIPUNCTURE: CPT

## 2022-09-21 PROCEDURE — 85025 COMPLETE CBC W/AUTO DIFF WBC: CPT

## 2022-09-21 PROCEDURE — 85652 RBC SED RATE AUTOMATED: CPT

## 2022-09-26 ENCOUNTER — LAB (OUTPATIENT)
Dept: LAB | Facility: HOSPITAL | Age: 72
End: 2022-09-26

## 2022-09-26 PROCEDURE — 36415 COLL VENOUS BLD VENIPUNCTURE: CPT

## 2022-09-26 PROCEDURE — 80053 COMPREHEN METABOLIC PANEL: CPT

## 2022-09-27 LAB
ALBUMIN SERPL-MCNC: 3.7 G/DL (ref 3.5–5.2)
ALBUMIN/GLOB SERPL: 1.4 G/DL
ALP SERPL-CCNC: 169 U/L (ref 39–117)
ALT SERPL W P-5'-P-CCNC: 18 U/L (ref 1–33)
ANION GAP SERPL CALCULATED.3IONS-SCNC: 8.8 MMOL/L (ref 5–15)
AST SERPL-CCNC: 27 U/L (ref 1–32)
BILIRUB SERPL-MCNC: 0.2 MG/DL (ref 0–1.2)
BUN SERPL-MCNC: 8 MG/DL (ref 8–23)
BUN/CREAT SERPL: 15.7 (ref 7–25)
CALCIUM SPEC-SCNC: 9.8 MG/DL (ref 8.6–10.5)
CHLORIDE SERPL-SCNC: 99 MMOL/L (ref 98–107)
CO2 SERPL-SCNC: 32.2 MMOL/L (ref 22–29)
CREAT SERPL-MCNC: 0.51 MG/DL (ref 0.57–1)
EGFRCR SERPLBLD CKD-EPI 2021: 98.1 ML/MIN/1.73
GLOBULIN UR ELPH-MCNC: 2.7 GM/DL
GLUCOSE SERPL-MCNC: 92 MG/DL (ref 65–99)
POTASSIUM SERPL-SCNC: 3.8 MMOL/L (ref 3.5–5.2)
PROT SERPL-MCNC: 6.4 G/DL (ref 6–8.5)
SODIUM SERPL-SCNC: 140 MMOL/L (ref 136–145)

## 2022-09-29 ENCOUNTER — CLINICAL SUPPORT (OUTPATIENT)
Dept: ORTHOPEDIC SURGERY | Facility: CLINIC | Age: 72
End: 2022-09-29

## 2022-09-29 VITALS
HEART RATE: 98 BPM | WEIGHT: 184.6 LBS | HEIGHT: 60 IN | BODY MASS INDEX: 36.24 KG/M2 | SYSTOLIC BLOOD PRESSURE: 132 MMHG | DIASTOLIC BLOOD PRESSURE: 80 MMHG

## 2022-09-29 DIAGNOSIS — M81.0 AGE-RELATED OSTEOPOROSIS WITHOUT CURRENT PATHOLOGICAL FRACTURE: Primary | ICD-10-CM

## 2022-09-29 PROCEDURE — 96372 THER/PROPH/DIAG INJ SC/IM: CPT | Performed by: PHYSICIAN ASSISTANT

## 2023-01-11 ENCOUNTER — TRANSCRIBE ORDERS (OUTPATIENT)
Dept: ADMINISTRATIVE | Facility: HOSPITAL | Age: 73
End: 2023-01-11
Payer: MEDICARE

## 2023-01-11 ENCOUNTER — HOSPITAL ENCOUNTER (OUTPATIENT)
Dept: GENERAL RADIOLOGY | Facility: HOSPITAL | Age: 73
Discharge: HOME | End: 2023-01-11
Payer: MEDICARE

## 2023-01-11 DIAGNOSIS — M51.24 DISPLACEMENT OF THORACIC INTERVERTEBRAL DISC WITHOUT MYELOPATHY: ICD-10-CM

## 2023-01-11 DIAGNOSIS — M51.24 DISPLACEMENT OF THORACIC INTERVERTEBRAL DISC WITHOUT MYELOPATHY: Primary | ICD-10-CM

## 2023-01-11 PROCEDURE — 72072 X-RAY EXAM THORAC SPINE 3VWS: CPT

## 2023-01-11 PROCEDURE — 72100 X-RAY EXAM L-S SPINE 2/3 VWS: CPT

## 2023-05-03 ENCOUNTER — TELEPHONE (OUTPATIENT)
Dept: ENDOCRINOLOGY | Facility: CLINIC | Age: 73
End: 2023-05-03
Payer: MEDICARE

## 2023-05-03 DIAGNOSIS — M81.0 OSTEOPOROSIS, UNSPECIFIED OSTEOPOROSIS TYPE, UNSPECIFIED PATHOLOGICAL FRACTURE PRESENCE: Primary | ICD-10-CM

## 2023-05-03 NOTE — TELEPHONE ENCOUNTER
Previous pt of Cynthia Carvalho on Prolia- due 3/30/23. NP appt scheduled 2/1/23 with Dr. Qiu. Per SOB no PA or copay required. Sw pt and scheduled lab for 5/4/23. Order entered and Rx pended.

## 2023-05-04 ENCOUNTER — LAB (OUTPATIENT)
Dept: LAB | Facility: HOSPITAL | Age: 73
End: 2023-05-04
Payer: MEDICARE

## 2023-05-04 DIAGNOSIS — M81.0 OSTEOPOROSIS, UNSPECIFIED OSTEOPOROSIS TYPE, UNSPECIFIED PATHOLOGICAL FRACTURE PRESENCE: ICD-10-CM

## 2023-05-04 PROCEDURE — 36415 COLL VENOUS BLD VENIPUNCTURE: CPT

## 2023-05-04 PROCEDURE — 80053 COMPREHEN METABOLIC PANEL: CPT

## 2023-05-05 LAB
ALBUMIN SERPL-MCNC: 4.2 G/DL (ref 3.5–5.2)
ALBUMIN/GLOB SERPL: 1.7 G/DL
ALP SERPL-CCNC: 140 U/L (ref 39–117)
ALT SERPL W P-5'-P-CCNC: 33 U/L (ref 1–33)
ANION GAP SERPL CALCULATED.3IONS-SCNC: 11.3 MMOL/L (ref 5–15)
AST SERPL-CCNC: 37 U/L (ref 1–32)
BILIRUB SERPL-MCNC: <0.2 MG/DL (ref 0–1.2)
BUN SERPL-MCNC: 15 MG/DL (ref 8–23)
BUN/CREAT SERPL: 19.7 (ref 7–25)
CALCIUM SPEC-SCNC: 9.9 MG/DL (ref 8.6–10.5)
CHLORIDE SERPL-SCNC: 102 MMOL/L (ref 98–107)
CO2 SERPL-SCNC: 29.7 MMOL/L (ref 22–29)
CREAT SERPL-MCNC: 0.76 MG/DL (ref 0.57–1)
EGFRCR SERPLBLD CKD-EPI 2021: 82.3 ML/MIN/1.73
GLOBULIN UR ELPH-MCNC: 2.5 GM/DL
GLUCOSE SERPL-MCNC: 88 MG/DL (ref 65–99)
POTASSIUM SERPL-SCNC: 3.9 MMOL/L (ref 3.5–5.2)
PROT SERPL-MCNC: 6.7 G/DL (ref 6–8.5)
SODIUM SERPL-SCNC: 143 MMOL/L (ref 136–145)

## 2023-05-31 ENCOUNTER — CLINICAL SUPPORT (OUTPATIENT)
Dept: ENDOCRINOLOGY | Facility: CLINIC | Age: 73
End: 2023-05-31

## 2023-05-31 DIAGNOSIS — M81.0 OSTEOPOROSIS, UNSPECIFIED OSTEOPOROSIS TYPE, UNSPECIFIED PATHOLOGICAL FRACTURE PRESENCE: Primary | ICD-10-CM

## 2023-12-04 ENCOUNTER — TRANSCRIBE ORDERS (OUTPATIENT)
Dept: ADMINISTRATIVE | Facility: HOSPITAL | Age: 73
End: 2023-12-04
Payer: MEDICARE

## 2023-12-04 DIAGNOSIS — M81.0 AGE RELATED OSTEOPOROSIS, UNSPECIFIED PATHOLOGICAL FRACTURE PRESENCE: Primary | ICD-10-CM

## 2023-12-04 NOTE — PROGRESS NOTES
"Chief Complaint  Follow-up (LILY AND NARCOLEPY)    Subjective          Annalisa Bhagat presents to [facility] NEUROLOGY  History of Present Illness    Sleep     Narcolepsy-   Patient states her narcolepsy has been under good with her taking nuvigil and notices a big difference if she misses a dose.    lily  CPAP compliance f/u, patient states doing well with pap therapy.   Pt. uses a nasal pillows  and goes through [facility] for supplies. Patient states she feels like she needs more pressure  On NPSG at [facility] , 1- patient had Moderate obstructive sleep apnea syndrome with apnea-hypopnea index of 15.4 per sleep hour, minimum SpO2 of 73%  PAP download:  The patient is on CPAP therapy at 14-18 cm/H2O.   Data indicates Excellent compliance. With 86% usage for more than 4 hours with an average usage of 7 hours 25 minutes. AHI down to 1.7 .  Average pressures 14.3.  Average large leak 18MIN.   The patient's hypersomnia has stayed the same     [place] Sleepiness Scale:  Sitting and reading 2 WatchingTV 2  Sitting, inactive, in a public place 1  As a passenger in a car for 1 hour w/o a break  1  Lying down to rest in the afternoon  3  Sitting and talking to someone  3  Sitting quietly after a lunch  1  In a car, while stopped for traffic or a light  1  Total 14       Migraines( treated by  dr malone)     Review of Systems   Constitutional:  Negative for fatigue.   Respiratory:  Negative for shortness of breath.    Psychiatric/Behavioral:  Negative for sleep disturbance.          Objective   Vital Signs:   /71   Pulse 73   Resp 18   Ht 151.8 cm (59.75\")   Wt 90.7 kg (200 lb)   BMI 39.39 kg/m²     Physical Exam  Vitals reviewed.   Constitutional:       Appearance: She is obese.   Neurological:      Mental Status: She is alert and oriented to person, place, and time.      Comments: Walks with walker   Psychiatric:         Mood and Affect: Mood normal.        Result Review : "                 Assessment and Plan    Diagnoses and all orders for this visit:    1. Obstructive sleep apnea syndrome (Primary)  -     PAP Therapy  -     Armodafinil 250 MG tablet; Take 1 tablet by mouth Daily.  Dispense: 30 tablet; Refill: 5    2. Primary narcolepsy without cataplexy      Continue CPAP at current setting   The patient is compliant with and benefiting from PAP therapy.    Continue nuvigil 250mg daily      Follow Up   Return in about 1 year (around 12/6/2024).    Patient was given instructions and counseling regarding her condition or for health maintenance advice. Please see specific information pulled into the AVS if appropriate.       This document has been electronically signed by Joseph Seipel, MD on December 6, 2023 17:14 EST

## 2023-12-06 ENCOUNTER — OFFICE VISIT (OUTPATIENT)
Dept: NEUROLOGY | Facility: CLINIC | Age: 73
End: 2023-12-06
Payer: MEDICARE

## 2023-12-06 VITALS
RESPIRATION RATE: 18 BRPM | HEART RATE: 73 BPM | SYSTOLIC BLOOD PRESSURE: 135 MMHG | HEIGHT: 60 IN | DIASTOLIC BLOOD PRESSURE: 71 MMHG | WEIGHT: 200 LBS | BODY MASS INDEX: 39.27 KG/M2

## 2023-12-06 DIAGNOSIS — G47.33 OBSTRUCTIVE SLEEP APNEA SYNDROME: Primary | ICD-10-CM

## 2023-12-06 DIAGNOSIS — G47.419 PRIMARY NARCOLEPSY WITHOUT CATAPLEXY: ICD-10-CM

## 2023-12-06 PROCEDURE — 99214 OFFICE O/P EST MOD 30 MIN: CPT | Performed by: PSYCHIATRY & NEUROLOGY

## 2023-12-06 PROCEDURE — 1159F MED LIST DOCD IN RCRD: CPT | Performed by: PSYCHIATRY & NEUROLOGY

## 2023-12-06 PROCEDURE — 1160F RVW MEDS BY RX/DR IN RCRD: CPT | Performed by: PSYCHIATRY & NEUROLOGY

## 2023-12-06 RX ORDER — BUPROPION HYDROCHLORIDE 150 MG/1
150 TABLET ORAL DAILY
COMMUNITY
Start: 2023-11-17

## 2023-12-06 RX ORDER — ARMODAFINIL 250 MG/1
250 TABLET ORAL DAILY
Qty: 30 TABLET | Refills: 5 | Status: SHIPPED | OUTPATIENT
Start: 2023-12-06

## 2023-12-06 RX ORDER — HYDROXYCHLOROQUINE SULFATE 200 MG/1
1 TABLET, FILM COATED ORAL 2 TIMES DAILY
COMMUNITY
Start: 2023-08-08

## 2023-12-06 RX ORDER — BUPROPION HYDROCHLORIDE 300 MG/1
300 TABLET ORAL DAILY
COMMUNITY
Start: 2023-11-20 | End: 2024-11-19

## 2023-12-06 RX ORDER — HYDROXYZINE HYDROCHLORIDE 25 MG/1
25 TABLET, FILM COATED ORAL
COMMUNITY
Start: 2023-06-27

## 2023-12-06 RX ORDER — AZELASTINE HYDROCHLORIDE, FLUTICASONE PROPIONATE 137; 50 UG/1; UG/1
1-2 SPRAY, METERED NASAL
COMMUNITY
Start: 2023-11-14

## 2023-12-13 ENCOUNTER — TELEPHONE (OUTPATIENT)
Dept: ENDOCRINOLOGY | Facility: CLINIC | Age: 73
End: 2023-12-13
Payer: MEDICARE

## 2023-12-13 NOTE — TELEPHONE ENCOUNTER
SPOKE TO PATIENT REFARDING HER PROLIA INJECTIONS, SHE IS GETTING THEM THROUGH HER [facility] OFFICE.

## 2024-01-22 ENCOUNTER — HOSPITAL ENCOUNTER (OUTPATIENT)
Dept: BONE DENSITY | Facility: HOSPITAL | Age: 74
Discharge: HOME | End: 2024-01-22
Admitting: NURSE PRACTITIONER
Payer: MEDICARE

## 2024-01-22 DIAGNOSIS — M81.0 AGE RELATED OSTEOPOROSIS, UNSPECIFIED PATHOLOGICAL FRACTURE PRESENCE: ICD-10-CM

## 2024-01-22 PROCEDURE — 77080 DXA BONE DENSITY AXIAL: CPT

## 2024-10-15 ENCOUNTER — HOSPITAL ENCOUNTER (OUTPATIENT)
Dept: GENERAL RADIOLOGY | Facility: HOSPITAL | Age: 74
Discharge: HOME | End: 2024-10-15
Admitting: ORTHOPAEDIC SURGERY
Payer: MEDICARE

## 2024-10-15 ENCOUNTER — TRANSCRIBE ORDERS (OUTPATIENT)
Dept: ADMINISTRATIVE | Facility: HOSPITAL | Age: 74
End: 2024-10-15
Payer: MEDICARE

## 2024-10-15 DIAGNOSIS — M41.20 OTHER IDIOPATHIC SCOLIOSIS, UNSPECIFIED SPINAL REGION: ICD-10-CM

## 2024-10-15 DIAGNOSIS — M54.16 LUMBAR RADICULOPATHY: ICD-10-CM

## 2024-10-15 DIAGNOSIS — M54.16 LUMBAR RADICULOPATHY: Primary | ICD-10-CM

## 2024-10-15 PROCEDURE — 72082 X-RAY EXAM ENTIRE SPI 2/3 VW: CPT

## 2025-01-02 ENCOUNTER — HOSPITAL ENCOUNTER (INPATIENT)
Facility: HOSPITAL | Age: 75
LOS: 1 days | Discharge: HOME | DRG: 194 | End: 2025-01-04
Attending: EMERGENCY MEDICINE | Admitting: STUDENT IN AN ORGANIZED HEALTH CARE EDUCATION/TRAINING PROGRAM
Payer: MEDICARE

## 2025-01-02 ENCOUNTER — APPOINTMENT (OUTPATIENT)
Dept: GENERAL RADIOLOGY | Facility: HOSPITAL | Age: 75
DRG: 194 | End: 2025-01-02
Payer: MEDICARE

## 2025-01-02 DIAGNOSIS — U07.1 COVID: ICD-10-CM

## 2025-01-02 DIAGNOSIS — J11.1 INFLUENZA: ICD-10-CM

## 2025-01-02 DIAGNOSIS — Z87.81 HX OF COMPRESSION FRACTURE OF SPINE: ICD-10-CM

## 2025-01-02 DIAGNOSIS — J10.1 INFLUENZA A: ICD-10-CM

## 2025-01-02 DIAGNOSIS — J20.9 ACUTE BRONCHITIS, UNSPECIFIED ORGANISM: Primary | ICD-10-CM

## 2025-01-02 LAB
ALBUMIN SERPL-MCNC: 3.9 G/DL (ref 3.5–5.2)
ALBUMIN/GLOB SERPL: 1.3 G/DL
ALP SERPL-CCNC: 119 U/L (ref 39–117)
ALT SERPL W P-5'-P-CCNC: 23 U/L (ref 1–33)
ANION GAP SERPL CALCULATED.3IONS-SCNC: 11.2 MMOL/L (ref 5–15)
AST SERPL-CCNC: 35 U/L (ref 1–32)
B PARAPERT DNA SPEC QL NAA+PROBE: NOT DETECTED
B PERT DNA SPEC QL NAA+PROBE: NOT DETECTED
BASOPHILS # BLD AUTO: 0.03 10*3/MM3 (ref 0–0.2)
BASOPHILS NFR BLD AUTO: 0.4 % (ref 0–1.5)
BILIRUB SERPL-MCNC: 0.3 MG/DL (ref 0–1.2)
BILIRUB UR QL STRIP: NEGATIVE
BUN SERPL-MCNC: 13 MG/DL (ref 8–23)
BUN/CREAT SERPL: 18.3 (ref 7–25)
C PNEUM DNA NPH QL NAA+NON-PROBE: NOT DETECTED
CALCIUM SPEC-SCNC: 9 MG/DL (ref 8.6–10.5)
CHLORIDE SERPL-SCNC: 97 MMOL/L (ref 98–107)
CLARITY UR: CLEAR
CO2 SERPL-SCNC: 29.8 MMOL/L (ref 22–29)
COLOR UR: YELLOW
CREAT SERPL-MCNC: 0.71 MG/DL (ref 0.57–1)
D-LACTATE SERPL-SCNC: 1.3 MMOL/L (ref 0.3–2)
DEPRECATED RDW RBC AUTO: 45.8 FL (ref 37–54)
EGFRCR SERPLBLD CKD-EPI 2021: 88.2 ML/MIN/1.73
EOSINOPHIL # BLD AUTO: 0.29 10*3/MM3 (ref 0–0.4)
EOSINOPHIL NFR BLD AUTO: 3.8 % (ref 0.3–6.2)
ERYTHROCYTE [DISTWIDTH] IN BLOOD BY AUTOMATED COUNT: 13.2 % (ref 12.3–15.4)
FLUAV H3 RNA NPH QL NAA+PROBE: DETECTED
FLUBV RNA ISLT QL NAA+PROBE: NOT DETECTED
GEN 5 1HR TROPONIN T REFLEX: 17 NG/L
GLOBULIN UR ELPH-MCNC: 3 GM/DL
GLUCOSE SERPL-MCNC: 133 MG/DL (ref 65–99)
GLUCOSE UR STRIP-MCNC: NEGATIVE MG/DL
HADV DNA SPEC NAA+PROBE: NOT DETECTED
HCOV 229E RNA SPEC QL NAA+PROBE: NOT DETECTED
HCOV HKU1 RNA SPEC QL NAA+PROBE: NOT DETECTED
HCOV NL63 RNA SPEC QL NAA+PROBE: DETECTED
HCOV OC43 RNA SPEC QL NAA+PROBE: NOT DETECTED
HCT VFR BLD AUTO: 45.2 % (ref 34–46.6)
HGB BLD-MCNC: 14.3 G/DL (ref 12–15.9)
HGB UR QL STRIP.AUTO: NEGATIVE
HMPV RNA NPH QL NAA+NON-PROBE: NOT DETECTED
HOLD SPECIMEN: NORMAL
HPIV1 RNA ISLT QL NAA+PROBE: NOT DETECTED
HPIV2 RNA SPEC QL NAA+PROBE: NOT DETECTED
HPIV3 RNA NPH QL NAA+PROBE: NOT DETECTED
HPIV4 P GENE NPH QL NAA+PROBE: NOT DETECTED
IMM GRANULOCYTES # BLD AUTO: 0.03 10*3/MM3 (ref 0–0.05)
IMM GRANULOCYTES NFR BLD AUTO: 0.4 % (ref 0–0.5)
KETONES UR QL STRIP: NEGATIVE
LEUKOCYTE ESTERASE UR QL STRIP.AUTO: NEGATIVE
LYMPHOCYTES # BLD AUTO: 1.2 10*3/MM3 (ref 0.7–3.1)
LYMPHOCYTES NFR BLD AUTO: 15.7 % (ref 19.6–45.3)
M PNEUMO IGG SER IA-ACNC: NOT DETECTED
MCH RBC QN AUTO: 29.8 PG (ref 26.6–33)
MCHC RBC AUTO-ENTMCNC: 31.6 G/DL (ref 31.5–35.7)
MCV RBC AUTO: 94.2 FL (ref 79–97)
MONOCYTES # BLD AUTO: 1.04 10*3/MM3 (ref 0.1–0.9)
MONOCYTES NFR BLD AUTO: 13.6 % (ref 5–12)
NEUTROPHILS NFR BLD AUTO: 5.05 10*3/MM3 (ref 1.7–7)
NEUTROPHILS NFR BLD AUTO: 66.1 % (ref 42.7–76)
NITRITE UR QL STRIP: NEGATIVE
NRBC BLD AUTO-RTO: 0 /100 WBC (ref 0–0.2)
NT-PROBNP SERPL-MCNC: 152 PG/ML (ref 0–1800)
PH UR STRIP.AUTO: 6.5 [PH] (ref 5–8)
PLATELET # BLD AUTO: 275 10*3/MM3 (ref 140–450)
PMV BLD AUTO: 9.4 FL (ref 6–12)
POTASSIUM SERPL-SCNC: 3.3 MMOL/L (ref 3.5–5.2)
PROT SERPL-MCNC: 6.9 G/DL (ref 6–8.5)
PROT UR QL STRIP: NEGATIVE
RBC # BLD AUTO: 4.8 10*6/MM3 (ref 3.77–5.28)
RHINOVIRUS RNA SPEC NAA+PROBE: NOT DETECTED
RSV RNA NPH QL NAA+NON-PROBE: NOT DETECTED
SARS-COV-2 RNA RESP QL NAA+PROBE: NOT DETECTED
SODIUM SERPL-SCNC: 138 MMOL/L (ref 136–145)
SP GR UR STRIP: <=1.005 (ref 1–1.03)
TROPONIN T % DELTA: -19 %
TROPONIN T NUMERIC DELTA: -4 NG/L
TROPONIN T SERPL HS-MCNC: 21 NG/L
UROBILINOGEN UR QL STRIP: NORMAL
WBC NRBC COR # BLD AUTO: 7.64 10*3/MM3 (ref 3.4–10.8)
WHOLE BLOOD HOLD COAG: NORMAL
WHOLE BLOOD HOLD SPECIMEN: NORMAL

## 2025-01-02 PROCEDURE — G0378 HOSPITAL OBSERVATION PER HR: HCPCS

## 2025-01-02 PROCEDURE — 83880 ASSAY OF NATRIURETIC PEPTIDE: CPT | Performed by: EMERGENCY MEDICINE

## 2025-01-02 PROCEDURE — 0202U NFCT DS 22 TRGT SARS-COV-2: CPT | Performed by: EMERGENCY MEDICINE

## 2025-01-02 PROCEDURE — 36415 COLL VENOUS BLD VENIPUNCTURE: CPT

## 2025-01-02 PROCEDURE — 94761 N-INVAS EAR/PLS OXIMETRY MLT: CPT

## 2025-01-02 PROCEDURE — 94640 AIRWAY INHALATION TREATMENT: CPT

## 2025-01-02 PROCEDURE — 80053 COMPREHEN METABOLIC PANEL: CPT | Performed by: EMERGENCY MEDICINE

## 2025-01-02 PROCEDURE — 71045 X-RAY EXAM CHEST 1 VIEW: CPT

## 2025-01-02 PROCEDURE — 93005 ELECTROCARDIOGRAM TRACING: CPT

## 2025-01-02 PROCEDURE — 85025 COMPLETE CBC W/AUTO DIFF WBC: CPT | Performed by: EMERGENCY MEDICINE

## 2025-01-02 PROCEDURE — 93005 ELECTROCARDIOGRAM TRACING: CPT | Performed by: EMERGENCY MEDICINE

## 2025-01-02 PROCEDURE — 94799 UNLISTED PULMONARY SVC/PX: CPT

## 2025-01-02 PROCEDURE — 81003 URINALYSIS AUTO W/O SCOPE: CPT | Performed by: PHYSICIAN ASSISTANT

## 2025-01-02 PROCEDURE — 84484 ASSAY OF TROPONIN QUANT: CPT | Performed by: EMERGENCY MEDICINE

## 2025-01-02 PROCEDURE — 25010000002 METHYLPREDNISOLONE PER 125 MG: Performed by: EMERGENCY MEDICINE

## 2025-01-02 PROCEDURE — 99285 EMERGENCY DEPT VISIT HI MDM: CPT

## 2025-01-02 PROCEDURE — 83605 ASSAY OF LACTIC ACID: CPT

## 2025-01-02 PROCEDURE — 87040 BLOOD CULTURE FOR BACTERIA: CPT | Performed by: EMERGENCY MEDICINE

## 2025-01-02 RX ORDER — BISACODYL 5 MG/1
5 TABLET, DELAYED RELEASE ORAL DAILY PRN
Status: DISCONTINUED | OUTPATIENT
Start: 2025-01-02 | End: 2025-01-04 | Stop reason: HOSPADM

## 2025-01-02 RX ORDER — POTASSIUM CHLORIDE 1500 MG/1
40 TABLET, EXTENDED RELEASE ORAL EVERY 4 HOURS
Status: COMPLETED | OUTPATIENT
Start: 2025-01-02 | End: 2025-01-03

## 2025-01-02 RX ORDER — SODIUM CHLORIDE 9 MG/ML
40 INJECTION, SOLUTION INTRAVENOUS AS NEEDED
Status: DISCONTINUED | OUTPATIENT
Start: 2025-01-02 | End: 2025-01-04 | Stop reason: HOSPADM

## 2025-01-02 RX ORDER — IPRATROPIUM BROMIDE AND ALBUTEROL SULFATE 2.5; .5 MG/3ML; MG/3ML
3 SOLUTION RESPIRATORY (INHALATION) ONCE
Status: COMPLETED | OUTPATIENT
Start: 2025-01-02 | End: 2025-01-02

## 2025-01-02 RX ORDER — ACETAMINOPHEN 500 MG
1000 TABLET ORAL ONCE
Status: COMPLETED | OUTPATIENT
Start: 2025-01-02 | End: 2025-01-02

## 2025-01-02 RX ORDER — AMOXICILLIN 250 MG
2 CAPSULE ORAL 2 TIMES DAILY PRN
Status: DISCONTINUED | OUTPATIENT
Start: 2025-01-02 | End: 2025-01-04 | Stop reason: HOSPADM

## 2025-01-02 RX ORDER — IPRATROPIUM BROMIDE AND ALBUTEROL SULFATE 2.5; .5 MG/3ML; MG/3ML
3 SOLUTION RESPIRATORY (INHALATION) EVERY 4 HOURS PRN
Status: DISCONTINUED | OUTPATIENT
Start: 2025-01-02 | End: 2025-01-03

## 2025-01-02 RX ORDER — POLYETHYLENE GLYCOL 3350 17 G/17G
17 POWDER, FOR SOLUTION ORAL DAILY PRN
Status: DISCONTINUED | OUTPATIENT
Start: 2025-01-02 | End: 2025-01-04 | Stop reason: HOSPADM

## 2025-01-02 RX ORDER — SODIUM CHLORIDE 0.9 % (FLUSH) 0.9 %
10 SYRINGE (ML) INJECTION AS NEEDED
Status: DISCONTINUED | OUTPATIENT
Start: 2025-01-02 | End: 2025-01-04 | Stop reason: HOSPADM

## 2025-01-02 RX ORDER — BISACODYL 10 MG
10 SUPPOSITORY, RECTAL RECTAL DAILY PRN
Status: DISCONTINUED | OUTPATIENT
Start: 2025-01-02 | End: 2025-01-04 | Stop reason: HOSPADM

## 2025-01-02 RX ORDER — METHYLPREDNISOLONE SODIUM SUCCINATE 125 MG/2ML
62.5 INJECTION, POWDER, LYOPHILIZED, FOR SOLUTION INTRAMUSCULAR; INTRAVENOUS DAILY
Status: DISCONTINUED | OUTPATIENT
Start: 2025-01-03 | End: 2025-01-04 | Stop reason: HOSPADM

## 2025-01-02 RX ORDER — ONDANSETRON 2 MG/ML
4 INJECTION INTRAMUSCULAR; INTRAVENOUS EVERY 6 HOURS PRN
Status: DISCONTINUED | OUTPATIENT
Start: 2025-01-02 | End: 2025-01-04 | Stop reason: HOSPADM

## 2025-01-02 RX ORDER — ONDANSETRON 4 MG/1
4 TABLET, ORALLY DISINTEGRATING ORAL EVERY 6 HOURS PRN
Status: DISCONTINUED | OUTPATIENT
Start: 2025-01-02 | End: 2025-01-04 | Stop reason: HOSPADM

## 2025-01-02 RX ORDER — BENZONATATE 100 MG/1
200 CAPSULE ORAL 3 TIMES DAILY PRN
Status: DISCONTINUED | OUTPATIENT
Start: 2025-01-02 | End: 2025-01-04 | Stop reason: HOSPADM

## 2025-01-02 RX ORDER — ALUMINA, MAGNESIA, AND SIMETHICONE 2400; 2400; 240 MG/30ML; MG/30ML; MG/30ML
15 SUSPENSION ORAL EVERY 6 HOURS PRN
Status: DISCONTINUED | OUTPATIENT
Start: 2025-01-02 | End: 2025-01-04 | Stop reason: HOSPADM

## 2025-01-02 RX ORDER — HYDROXYCHLOROQUINE SULFATE 200 MG/1
200 TABLET, FILM COATED ORAL ONCE
Status: COMPLETED | OUTPATIENT
Start: 2025-01-03 | End: 2025-01-03

## 2025-01-02 RX ORDER — NITROGLYCERIN 0.4 MG/1
0.4 TABLET SUBLINGUAL
Status: DISCONTINUED | OUTPATIENT
Start: 2025-01-02 | End: 2025-01-04 | Stop reason: HOSPADM

## 2025-01-02 RX ORDER — SODIUM CHLORIDE 0.9 % (FLUSH) 0.9 %
10 SYRINGE (ML) INJECTION EVERY 12 HOURS SCHEDULED
Status: DISCONTINUED | OUTPATIENT
Start: 2025-01-02 | End: 2025-01-04 | Stop reason: HOSPADM

## 2025-01-02 RX ORDER — METHYLPREDNISOLONE SODIUM SUCCINATE 125 MG/2ML
125 INJECTION, POWDER, LYOPHILIZED, FOR SOLUTION INTRAMUSCULAR; INTRAVENOUS ONCE
Status: COMPLETED | OUTPATIENT
Start: 2025-01-02 | End: 2025-01-02

## 2025-01-02 RX ADMIN — IPRATROPIUM BROMIDE AND ALBUTEROL SULFATE 3 ML: .5; 3 SOLUTION RESPIRATORY (INHALATION) at 15:25

## 2025-01-02 RX ADMIN — METHYLPREDNISOLONE SODIUM SUCCINATE 125 MG: 125 INJECTION, POWDER, FOR SOLUTION INTRAMUSCULAR; INTRAVENOUS at 13:22

## 2025-01-02 RX ADMIN — Medication 10 ML: at 20:59

## 2025-01-02 RX ADMIN — ACETAMINOPHEN 1000 MG: 500 TABLET, FILM COATED ORAL at 20:57

## 2025-01-02 RX ADMIN — RIMEGEPANT SULFATE 75 MG: 75 TABLET, ORALLY DISINTEGRATING ORAL at 21:36

## 2025-01-02 RX ADMIN — POTASSIUM CHLORIDE 40 MEQ: 1500 TABLET, EXTENDED RELEASE ORAL at 20:30

## 2025-01-02 NOTE — ED PROVIDER NOTES
Subjective   History of Present Illness  Patient is a 74-year-old female with no significant past medical history who presents with shortness of breath since Saturday. She reports a cough productive of unspecified sputum color, rib pain due to coughing, and fever of unknown temperature. She denies vomiting and diarrhea but mentions experiencing constipation. She has been taking guaifenesin, phenylephrine (Sudafed PE), and tramadol for symptom relief. 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 She suspects she may have contracted an illness from her son, who was sick during [place] dinner. She denies any history of COPD or asthma.    Medications: Guaifenesin, phenylephrine (Sudafed PE), tramadol.  Review of Systems  See HPI.  Past Medical History:   Diagnosis Date    Fibromyalgia     GERD (gastroesophageal reflux disease)     Hx of compression fracture of spine     T12    Hyperlipidemia     Kidney stone     Loosening of hardware in spine     Lupus     Migraines     Narcolepsy 12/6/2023    Numbness and tingling     Osteoporosis 02/23/2018    [place] x 2yrs(2/2018-12/2019,3/20-4/2020), on prolia(2/28/2020)    Recurrent UTI     Rheumatoid arthritis     Scoliosis     Seasonal allergies     Shoulder pain, left     Sleep apnea     Spinal stenosis     Stress incontinence     Stroke        Allergies   Allergen Reactions    Shingrix [Zoster Vac Recomb Adjuvanted] Hallucinations     Fever, chills, redness swelling at injection site    Zolmitriptan Hemorrhagic stroke     Hx of stroke     Adhesive Tape Itching     Paper tape - redness and itching.     Hydromorphone Itching and Rash    Amoxicillin-Pot Clavulanate Itching and Rash       Past Surgical History:   Procedure Laterality Date    BACK SURGERY      CARDIAC CATHETERIZATION      CARPAL TUNNEL RELEASE      CATARACT EXTRACTION, BILATERAL      CYSTOSCOPY W/ URETEROSCOPY W/ LITHOTRIPSY      HAND SURGERY      HYSTERECTOMY      RECTOVAGINAL FISTULA CLOSURE    "   SACRAL NERVE STIMULATOR PLACEMENT      TOTAL SHOULDER ARTHROPLASTY W/ DISTAL CLAVICLE EXCISION Left 6/13/2019    Procedure: TOTAL SHOULDER REVERSE ARTHROPLASTY;  Surgeon: Brice Mayes MD;  Location: [facility];  Service: Orthopedics       Family History   Problem Relation Age of Onset    Hypotension Mother     Hypotension Father     Colon cancer Father     Stroke Father     Colon cancer Maternal Grandmother     Heart disease Maternal Uncle     Hypertension Paternal Aunt     Diabetes Paternal Aunt     Heart failure Paternal Aunt     Malig Hyperthermia Neg Hx        Social History     Socioeconomic History    Marital status:    Tobacco Use    Smoking status: Never    Smokeless tobacco: Never   Vaping Use    Vaping status: Never Used   Substance and Sexual Activity    Alcohol use: Not Currently     Comment: OCC    Drug use: No    Sexual activity: Defer           Objective   Physical Exam  No acute distress. Normocephalic. No scleral icterus. Moist oral mucosa. No observable neck masses on external visualization.  Normal heart rate. Intact distal pulses. Abdomen soft and nontender without peritoneal signs. Alert. Normal Speech. Speaking in short sentences. Bilateral expiratory wheezes with dimished breath sounds.  Trace bilateral lower extremity edema that is symmetrical.  Procedures           ED Course      /78 (BP Location: Right arm, Patient Position: Lying)   Pulse 105   Temp 100.1 °F (37.8 °C) (Oral)   Resp 27   Ht 139.7 cm (55\")   Wt 90.7 kg (200 lb)   SpO2 93%   BMI 46.48 kg/m²   Labs Reviewed   RESPIRATORY PANEL PCR W/ COVID-19 (SARS-COV-2), NP SWAB IN UTM/VTP, 2 HR TAT - Abnormal; Notable for the following components:       Result Value    Coronavirus NL63 Detected (*)     Influenza A H3 Detected (*)     All other components within normal limits    Narrative:     In the setting of a positive respiratory panel with a viral infection PLUS a negative procalcitonin without other " underlying concern for bacterial infection, consider observing off antibiotics or discontinuation of antibiotics and continue supportive care. If the respiratory panel is positive for atypical bacterial infection (Bordetella pertussis, Chlamydophila pneumoniae, or Mycoplasma pneumoniae), consider antibiotic de-escalation to target atypical bacterial infection.   COMPREHENSIVE METABOLIC PANEL - Abnormal; Notable for the following components:    Glucose 133 (*)     Potassium 3.3 (*)     Chloride 97 (*)     CO2 29.8 (*)     AST (SGOT) 35 (*)     Alkaline Phosphatase 119 (*)     All other components within normal limits    Narrative:     GFR Categories in Chronic Kidney Disease (CKD)      GFR Category          GFR (mL/min/1.73)    Interpretation  G1                     90 or greater         Normal or high (1)  G2                      60-89                Mild decrease (1)  G3a                   45-59                Mild to moderate decrease  G3b                   30-44                Moderate to severe decrease  G4                    15-29                Severe decrease  G5                    14 or less           Kidney failure          (1)In the absence of evidence of kidney disease, neither GFR category G1 or G2 fulfill the criteria for CKD.    eGFR calculation 2021 CKD-EPI creatinine equation, which does not include race as a factor   TROPONIN - Abnormal; Notable for the following components:    HS Troponin T 21 (*)     All other components within normal limits    Narrative:     High Sensitive Troponin T Reference Range:  <14.0 ng/L- Negative Female for AMI  <22.0 ng/L- Negative Male for AMI  >=14 - Abnormal Female indicating possible myocardial injury.  >=22 - Abnormal Male indicating possible myocardial injury.   Clinicians would have to utilize clinical acumen, EKG, Troponin, and serial changes to determine if it is an Acute Myocardial Infarction or myocardial injury due to an underlying chronic condition.         CBC WITH AUTO DIFFERENTIAL - Abnormal; Notable for the following components:    Lymphocyte % 15.7 (*)     Monocyte % 13.6 (*)     Monocytes, Absolute 1.04 (*)     All other components within normal limits   HIGH SENSITIVITIY TROPONIN T 1HR - Abnormal; Notable for the following components:    HS Troponin T 17 (*)     All other components within normal limits    Narrative:     High Sensitive Troponin T Reference Range:  <14.0 ng/L- Negative Female for AMI  <22.0 ng/L- Negative Male for AMI  >=14 - Abnormal Female indicating possible myocardial injury.  >=22 - Abnormal Male indicating possible myocardial injury.   Clinicians would have to utilize clinical acumen, EKG, Troponin, and serial changes to determine if it is an Acute Myocardial Infarction or myocardial injury due to an underlying chronic condition.        BNP (IN-HOUSE) - Normal    Narrative:     This assay is used as an aid in the diagnosis of individuals suspected of having heart failure. It can be used as an aid in the diagnosis of acute decompensated heart failure (ADHF) in patients presenting with signs and symptoms of ADHF to the emergency department (ED). In addition, NT-proBNP of <300 pg/mL indicates ADHF is not likely.    Age Range Result Interpretation  NT-proBNP Concentration (pg/mL:      <50             Positive            >450                   Gray                 300-450                    Negative             <300    50-75           Positive            >900                  Gray                300-900                  Negative            <300      >75             Positive            >1800                  Gray                300-1800                  Negative            <300   URINALYSIS W/ CULTURE IF INDICATED - Normal    Narrative:     In absence of clinical symptoms, the presence of pyuria, bacteria, and/or nitrites on the urinalysis result does not correlate with infection.  Urine microscopic not indicated.   POC LACTATE - Normal   BLOOD  CULTURE   BLOOD CULTURE   RAINBOW DRAW    Narrative:     The following orders were created for panel order [place] Draw.  Procedure                               Abnormality         Status                     ---------                               -----------         ------                     Green Top (Gel)[[id number]]                                  Final result               Lavender Top[[id number]]                                     Final result               Gold Top - SST[[id number]]                                   Final result               Light Blue Top[[id number]]                                   Final result                 Please view results for these tests on the individual orders.   POTASSIUM   BASIC METABOLIC PANEL   MAGNESIUM   CBC WITH AUTO DIFFERENTIAL   POC LACTATE   GREEN TOP   LAVENDER TOP   GOLD TOP - SST   LIGHT BLUE TOP   CBC AND DIFFERENTIAL    Narrative:     The following orders were created for panel order CBC & Differential.  Procedure                               Abnormality         Status                     ---------                               -----------         ------                     CBC Auto Differential[[id number]]        Abnormal            Final result                 Please view results for these tests on the individual orders.   EXTRA TUBES    Narrative:     The following orders were created for panel order Extra Tubes.  Procedure                               Abnormality         Status                     ---------                               -----------         ------                     Gold Top - SST[[id number]]                                   Final result                 Please view results for these tests on the individual orders.   GOLD TOP - SST   CBC AND DIFFERENTIAL    Narrative:     The following orders were created for panel order CBC & Differential.  Procedure                               Abnormality         Status                     ---------                                -----------         ------                     CBC Auto Differential[[id number]]                                                         Please view results for these tests on the individual orders.     XR Chest 1 View   Final Result   Impression:   No acute cardiopulmonary findings.         Electronically Signed: Alisha Dennison MD     1/2/2025 1:53 PM EST     Workstation ID: [id number]                                                         Medical Decision Making    EKG interpretation: 12:53 PM, rate 102, sinus tachycardia, right bundle branch block, nonspecific ST changes.  Right bundle branch block also present on June 7, 2019 EKG.    With interpretation of chest x-ray is no focal infiltrate or pneumothorax.  See system for radiology interpretation    Patient with borderline oxygen saturations.  Occasionally desatting upper 80s but then coming back up reasonably quickly.  Quite diminished breath sounds.  Patient with significant wheezing from her flu a and COVID.  Will place in observation for further treatment.  Final diagnoses:   Acute bronchitis, unspecified organism   Influenza   COVID       ED Disposition  ED Disposition       ED Disposition   Decision to Admit    Condition   --    Comment   --               No follow-up provider specified.       Medication List      No changes were made to your prescriptions during this visit.            Tyrone Jennings MD  01/02/25 [id number]

## 2025-01-02 NOTE — PLAN OF CARE
Problem: Adult Inpatient Plan of Care  Goal: Plan of Care Review  Outcome: Progressing  Goal: Patient-Specific Goal (Individualized)  Outcome: Progressing  Goal: Absence of Hospital-Acquired Illness or Injury  Outcome: Progressing  Intervention: Identify and Manage Fall Risk  Recent Flowsheet Documentation  Taken 1/2/2025 1800 by Terese Foreman RN  Safety Promotion/Fall Prevention:   safety round/check completed   assistive device/personal items within reach   clutter free environment maintained   nonskid shoes/slippers when out of bed   room organization consistent  Taken 1/2/2025 1720 by Terese Foreman RN  Safety Promotion/Fall Prevention:   safety round/check completed   assistive device/personal items within reach   clutter free environment maintained   nonskid shoes/slippers when out of bed   room organization consistent  Intervention: Prevent Skin Injury  Recent Flowsheet Documentation  Taken 1/2/2025 1720 by Terese Foreman RN  Body Position:   position changed independently   sitting up in bed   legs elevated  Skin Protection: transparent dressing maintained  Intervention: Prevent and Manage VTE (Venous Thromboembolism) Risk  Recent Flowsheet Documentation  Taken 1/2/2025 1720 by Terese Foreman RN  VTE Prevention/Management: SCDs (sequential compression devices) off  Intervention: Prevent Infection  Recent Flowsheet Documentation  Taken 1/2/2025 1720 by Terese Foreman RN  Infection Prevention:   hand hygiene promoted   personal protective equipment utilized   rest/sleep promoted   single patient room provided  Goal: Optimal Comfort and Wellbeing  Outcome: Progressing  Intervention: Monitor Pain and Promote Comfort  Recent Flowsheet Documentation  Taken 1/2/2025 1720 by Terese Foreman RN  Pain Management Interventions:   quiet environment facilitated   pillow support provided  Intervention: Provide Person-Centered Care  Recent Flowsheet Documentation  Taken 1/2/2025 1720 by Terese Foreman RN  Trust  Relationship/Rapport:   care explained   questions answered  Goal: Readiness for Transition of Care  Outcome: Progressing  Intervention: Mutually Develop Transition Plan  Recent Flowsheet Documentation  Taken 1/2/2025 1659 by Terese Foreman, RN  Equipment Currently Used at Home:   cane, straight   shower chair   grab bar  Transportation Anticipated: family or friend will provide  Patient/Family Anticipated Services at Transition: none  Patient/Family Anticipates Transition to: home with family  Taken 1/2/2025 1657 by Terese Foreman, RN  Equipment Currently Used at Home:   cane, straight   shower chair   grab bar   Goal Outcome Evaluation:   Aox4, RA, Sinus tach on tele. Droplet precautions for coronavirus and Flu A. UA collected d/t urgency. Simple sepsis- BC pending

## 2025-01-03 PROBLEM — J10.1 INFLUENZA A: Status: ACTIVE | Noted: 2025-01-03

## 2025-01-03 LAB
ANION GAP SERPL CALCULATED.3IONS-SCNC: 8.6 MMOL/L (ref 5–15)
BASOPHILS # BLD AUTO: 0.03 10*3/MM3 (ref 0–0.2)
BASOPHILS NFR BLD AUTO: 0.3 % (ref 0–1.5)
BUN SERPL-MCNC: 14 MG/DL (ref 8–23)
BUN/CREAT SERPL: 24.6 (ref 7–25)
CALCIUM SPEC-SCNC: 9 MG/DL (ref 8.6–10.5)
CHLORIDE SERPL-SCNC: 102 MMOL/L (ref 98–107)
CO2 SERPL-SCNC: 27.4 MMOL/L (ref 22–29)
CREAT SERPL-MCNC: 0.57 MG/DL (ref 0.57–1)
DEPRECATED RDW RBC AUTO: 45.4 FL (ref 37–54)
EGFRCR SERPLBLD CKD-EPI 2021: 94.3 ML/MIN/1.73
EOSINOPHIL # BLD AUTO: 0 10*3/MM3 (ref 0–0.4)
EOSINOPHIL NFR BLD AUTO: 0 % (ref 0.3–6.2)
ERYTHROCYTE [DISTWIDTH] IN BLOOD BY AUTOMATED COUNT: 13.2 % (ref 12.3–15.4)
GLUCOSE SERPL-MCNC: 135 MG/DL (ref 65–99)
HCT VFR BLD AUTO: 41.7 % (ref 34–46.6)
HGB BLD-MCNC: 13.9 G/DL (ref 12–15.9)
IMM GRANULOCYTES # BLD AUTO: 0.07 10*3/MM3 (ref 0–0.05)
IMM GRANULOCYTES NFR BLD AUTO: 0.6 % (ref 0–0.5)
LYMPHOCYTES # BLD AUTO: 0.89 10*3/MM3 (ref 0.7–3.1)
LYMPHOCYTES NFR BLD AUTO: 7.8 % (ref 19.6–45.3)
MAGNESIUM SERPL-MCNC: 2.4 MG/DL (ref 1.6–2.4)
MCH RBC QN AUTO: 31.1 PG (ref 26.6–33)
MCHC RBC AUTO-ENTMCNC: 33.3 G/DL (ref 31.5–35.7)
MCV RBC AUTO: 93.3 FL (ref 79–97)
MONOCYTES # BLD AUTO: 0.65 10*3/MM3 (ref 0.1–0.9)
MONOCYTES NFR BLD AUTO: 5.7 % (ref 5–12)
NEUTROPHILS NFR BLD AUTO: 85.6 % (ref 42.7–76)
NEUTROPHILS NFR BLD AUTO: 9.82 10*3/MM3 (ref 1.7–7)
NRBC BLD AUTO-RTO: 0 /100 WBC (ref 0–0.2)
PLATELET # BLD AUTO: 297 10*3/MM3 (ref 140–450)
PMV BLD AUTO: 10.2 FL (ref 6–12)
POTASSIUM SERPL-SCNC: 4.8 MMOL/L (ref 3.5–5.2)
QT INTERVAL: 380 MS
QTC INTERVAL: 494 MS
RBC # BLD AUTO: 4.47 10*6/MM3 (ref 3.77–5.28)
SODIUM SERPL-SCNC: 138 MMOL/L (ref 136–145)
WBC NRBC COR # BLD AUTO: 11.46 10*3/MM3 (ref 3.4–10.8)

## 2025-01-03 PROCEDURE — 85025 COMPLETE CBC W/AUTO DIFF WBC: CPT | Performed by: PHYSICIAN ASSISTANT

## 2025-01-03 PROCEDURE — 94761 N-INVAS EAR/PLS OXIMETRY MLT: CPT

## 2025-01-03 PROCEDURE — 97162 PT EVAL MOD COMPLEX 30 MIN: CPT

## 2025-01-03 PROCEDURE — 83735 ASSAY OF MAGNESIUM: CPT | Performed by: PHYSICIAN ASSISTANT

## 2025-01-03 PROCEDURE — 94799 UNLISTED PULMONARY SVC/PX: CPT

## 2025-01-03 PROCEDURE — 94664 DEMO&/EVAL PT USE INHALER: CPT

## 2025-01-03 PROCEDURE — 80048 BASIC METABOLIC PNL TOTAL CA: CPT | Performed by: PHYSICIAN ASSISTANT

## 2025-01-03 PROCEDURE — 25010000002 METHYLPREDNISOLONE PER 125 MG: Performed by: PHYSICIAN ASSISTANT

## 2025-01-03 RX ORDER — PHENAZOPYRIDINE HYDROCHLORIDE 100 MG/1
100 TABLET, FILM COATED ORAL
Status: DISCONTINUED | OUTPATIENT
Start: 2025-01-03 | End: 2025-01-03

## 2025-01-03 RX ORDER — IPRATROPIUM BROMIDE AND ALBUTEROL SULFATE 2.5; .5 MG/3ML; MG/3ML
3 SOLUTION RESPIRATORY (INHALATION)
Status: DISCONTINUED | OUTPATIENT
Start: 2025-01-03 | End: 2025-01-04 | Stop reason: HOSPADM

## 2025-01-03 RX ORDER — PANTOPRAZOLE SODIUM 40 MG/1
40 TABLET, DELAYED RELEASE ORAL
Status: DISCONTINUED | OUTPATIENT
Start: 2025-01-03 | End: 2025-01-04 | Stop reason: HOSPADM

## 2025-01-03 RX ORDER — METHOCARBAMOL 500 MG/1
1500 TABLET, FILM COATED ORAL NIGHTLY PRN
Status: DISCONTINUED | OUTPATIENT
Start: 2025-01-03 | End: 2025-01-04 | Stop reason: HOSPADM

## 2025-01-03 RX ORDER — ACETAMINOPHEN 325 MG/1
650 TABLET ORAL EVERY 6 HOURS PRN
Status: DISCONTINUED | OUTPATIENT
Start: 2025-01-03 | End: 2025-01-04 | Stop reason: HOSPADM

## 2025-01-03 RX ORDER — FLUCONAZOLE 150 MG/1
150 TABLET ORAL ONCE
Status: COMPLETED | OUTPATIENT
Start: 2025-01-03 | End: 2025-01-03

## 2025-01-03 RX ORDER — SODIUM CHLORIDE 9 MG/ML
40 INJECTION, SOLUTION INTRAVENOUS AS NEEDED
Status: DISCONTINUED | OUTPATIENT
Start: 2025-01-03 | End: 2025-01-04 | Stop reason: HOSPADM

## 2025-01-03 RX ORDER — GUAIFENESIN 600 MG/1
600 TABLET, EXTENDED RELEASE ORAL EVERY 12 HOURS SCHEDULED
Status: DISCONTINUED | OUTPATIENT
Start: 2025-01-03 | End: 2025-01-04

## 2025-01-03 RX ORDER — ONDANSETRON 4 MG/1
4 TABLET, ORALLY DISINTEGRATING ORAL EVERY 6 HOURS PRN
Status: DISCONTINUED | OUTPATIENT
Start: 2025-01-03 | End: 2025-01-04 | Stop reason: HOSPADM

## 2025-01-03 RX ORDER — TRAMADOL HYDROCHLORIDE 50 MG/1
100 TABLET ORAL
Status: DISCONTINUED | OUTPATIENT
Start: 2025-01-03 | End: 2025-01-03

## 2025-01-03 RX ORDER — ATORVASTATIN CALCIUM 40 MG/1
40 TABLET, FILM COATED ORAL DAILY
Status: DISCONTINUED | OUTPATIENT
Start: 2025-01-03 | End: 2025-01-04 | Stop reason: HOSPADM

## 2025-01-03 RX ORDER — SODIUM CHLORIDE 0.9 % (FLUSH) 0.9 %
10 SYRINGE (ML) INJECTION EVERY 12 HOURS SCHEDULED
Status: DISCONTINUED | OUTPATIENT
Start: 2025-01-03 | End: 2025-01-04 | Stop reason: HOSPADM

## 2025-01-03 RX ORDER — FUROSEMIDE 40 MG/1
40 TABLET ORAL
Status: DISCONTINUED | OUTPATIENT
Start: 2025-01-03 | End: 2025-01-04 | Stop reason: HOSPADM

## 2025-01-03 RX ORDER — HYDROXYCHLOROQUINE SULFATE 200 MG/1
200 TABLET, FILM COATED ORAL ONCE
Status: COMPLETED | OUTPATIENT
Start: 2025-01-03 | End: 2025-01-03

## 2025-01-03 RX ORDER — BUPROPION HYDROCHLORIDE 150 MG/1
300 TABLET ORAL DAILY
Status: DISCONTINUED | OUTPATIENT
Start: 2025-01-03 | End: 2025-01-03

## 2025-01-03 RX ORDER — HYDROXYCHLOROQUINE SULFATE 200 MG/1
200 TABLET, FILM COATED ORAL 2 TIMES DAILY
Status: DISCONTINUED | OUTPATIENT
Start: 2025-01-03 | End: 2025-01-04 | Stop reason: HOSPADM

## 2025-01-03 RX ORDER — TRAMADOL HYDROCHLORIDE 50 MG/1
50 TABLET ORAL EVERY 6 HOURS PRN
Status: DISCONTINUED | OUTPATIENT
Start: 2025-01-03 | End: 2025-01-04 | Stop reason: HOSPADM

## 2025-01-03 RX ORDER — SODIUM CHLORIDE 0.9 % (FLUSH) 0.9 %
10 SYRINGE (ML) INJECTION AS NEEDED
Status: DISCONTINUED | OUTPATIENT
Start: 2025-01-03 | End: 2025-01-04 | Stop reason: HOSPADM

## 2025-01-03 RX ORDER — PHENAZOPYRIDINE HYDROCHLORIDE 100 MG/1
100 TABLET, FILM COATED ORAL 3 TIMES DAILY PRN
Status: DISCONTINUED | OUTPATIENT
Start: 2025-01-03 | End: 2025-01-04 | Stop reason: HOSPADM

## 2025-01-03 RX ORDER — ONDANSETRON 2 MG/ML
4 INJECTION INTRAMUSCULAR; INTRAVENOUS EVERY 6 HOURS PRN
Status: DISCONTINUED | OUTPATIENT
Start: 2025-01-03 | End: 2025-01-04 | Stop reason: HOSPADM

## 2025-01-03 RX ORDER — PROMETHAZINE HYDROCHLORIDE 25 MG/1
25 TABLET ORAL AS NEEDED
Status: DISCONTINUED | OUTPATIENT
Start: 2025-01-03 | End: 2025-01-04 | Stop reason: HOSPADM

## 2025-01-03 RX ADMIN — GUAIFENESIN 600 MG: 600 TABLET, EXTENDED RELEASE ORAL at 21:09

## 2025-01-03 RX ADMIN — ATORVASTATIN CALCIUM 40 MG: 40 TABLET, FILM COATED ORAL at 08:35

## 2025-01-03 RX ADMIN — Medication 10 ML: at 08:35

## 2025-01-03 RX ADMIN — PANTOPRAZOLE SODIUM 40 MG: 40 TABLET, DELAYED RELEASE ORAL at 08:35

## 2025-01-03 RX ADMIN — RIMEGEPANT SULFATE 75 MG: 75 TABLET, ORALLY DISINTEGRATING ORAL at 21:09

## 2025-01-03 RX ADMIN — GUAIFENESIN 600 MG: 600 TABLET, EXTENDED RELEASE ORAL at 08:35

## 2025-01-03 RX ADMIN — APIXABAN 5 MG: 5 TABLET, FILM COATED ORAL at 21:08

## 2025-01-03 RX ADMIN — HYDROXYCHLOROQUINE SULFATE 200 MG: 200 TABLET, FILM COATED ORAL at 21:08

## 2025-01-03 RX ADMIN — TRAMADOL HYDROCHLORIDE 50 MG: 50 TABLET, COATED ORAL at 09:26

## 2025-01-03 RX ADMIN — Medication 5 MG: at 21:09

## 2025-01-03 RX ADMIN — POTASSIUM CHLORIDE 40 MEQ: 1500 TABLET, EXTENDED RELEASE ORAL at 00:06

## 2025-01-03 RX ADMIN — METHYLPREDNISOLONE SODIUM SUCCINATE 62.5 MG: 125 INJECTION, POWDER, FOR SOLUTION INTRAMUSCULAR; INTRAVENOUS at 08:34

## 2025-01-03 RX ADMIN — IPRATROPIUM BROMIDE AND ALBUTEROL SULFATE 3 ML: .5; 3 SOLUTION RESPIRATORY (INHALATION) at 19:05

## 2025-01-03 RX ADMIN — IPRATROPIUM BROMIDE AND ALBUTEROL SULFATE 3 ML: .5; 3 SOLUTION RESPIRATORY (INHALATION) at 08:20

## 2025-01-03 RX ADMIN — IPRATROPIUM BROMIDE AND ALBUTEROL SULFATE 3 ML: .5; 3 SOLUTION RESPIRATORY (INHALATION) at 15:13

## 2025-01-03 RX ADMIN — Medication 10 ML: at 21:12

## 2025-01-03 RX ADMIN — HYDROXYCHLOROQUINE SULFATE 200 MG: 200 TABLET, FILM COATED ORAL at 00:06

## 2025-01-03 RX ADMIN — HYDROXYCHLOROQUINE SULFATE 200 MG: 200 TABLET, FILM COATED ORAL at 09:25

## 2025-01-03 RX ADMIN — APIXABAN 5 MG: 5 TABLET, FILM COATED ORAL at 00:06

## 2025-01-03 RX ADMIN — FLUCONAZOLE 150 MG: 150 TABLET ORAL at 14:36

## 2025-01-03 RX ADMIN — APIXABAN 5 MG: 5 TABLET, FILM COATED ORAL at 09:26

## 2025-01-03 NOTE — THERAPY EVALUATION
Patient Name: Annalisa Bhagat  : 1950    MRN: [id number]                              Today's Date: 1/3/2025       Admit Date: 2025    Visit Dx:     ICD-10-CM ICD-9-CM   1. Acute bronchitis, unspecified organism  J20.9 466.0   2. Influenza  J11.1 487.1   3. COVID  U07.1 079.89     Patient Active Problem List   Diagnosis    Tear of left rotator cuff    Abnormal laboratory test    Back pain    Fibromyalgia    Neck pain    Compression fracture of vertebra    Constipation    CPAP (continuous positive airway pressure) dependence    Degeneration of intervertebral disc of lumbar region    Depression    Dermatitis    Psoriasis    Encounter for therapeutic drug monitoring    ESBL (extended spectrum beta-lactamase) producing bacteria infection    Family history of cerebrovascular accident (CVA)    Family history of diabetes mellitus    Fatigue    History of stroke    Hx of pathological fracture    Hypersomnolence    Hypoxemia    Incontinence    Inflammatory arthritis    Lupus    Rheumatoid arthritis    Kyphosis of thoracic region    Lumbar radiculopathy    Lumbar spondylosis with myelopathy    Lumbar stenosis    Spinal stenosis, lumbar    Migraine headache    Mitral prolapse    Nephrolithiasis    Neuropathy    Obesity    Other mechanical complication of internal fixation device of other bones, subsequent encounter    Osteoporosis    Postmenopausal    Pseudoarthrosis    RBBB    Recurrent UTI    Rotator cuff syndrome, left    Scoliosis    Seasonal allergies    Shoulder pain, right    Sleep apnea    Snoring    Stress incontinence    Vitamin D deficiency    High risk medication use    Hx of compression fracture of spine    Narcolepsy    Acute bronchitis     Past Medical History:   Diagnosis Date    Fibromyalgia     GERD (gastroesophageal reflux disease)     Hx of compression fracture of spine     T12    Hyperlipidemia     Kidney stone     Loosening of hardware in spine     Lupus     Migraines     Narcolepsy 2023     Numbness and tingling     Osteoporosis 02/23/2018    [place]eo x 2yrs(2/2018-12/2019,3/20-4/2020), on prolia(2/28/2020)    Recurrent UTI     Rheumatoid arthritis     Scoliosis     Seasonal allergies     Shoulder pain, left     Sleep apnea     Spinal stenosis     Stress incontinence     Stroke      Past Surgical History:   Procedure Laterality Date    BACK SURGERY      CARDIAC CATHETERIZATION      CARPAL TUNNEL RELEASE      CATARACT EXTRACTION, BILATERAL      CYSTOSCOPY W/ URETEROSCOPY W/ LITHOTRIPSY      HAND SURGERY      HYSTERECTOMY      RECTOVAGINAL FISTULA CLOSURE      SACRAL NERVE STIMULATOR PLACEMENT      TOTAL SHOULDER ARTHROPLASTY W/ DISTAL CLAVICLE EXCISION Left 6/13/2019    Procedure: TOTAL SHOULDER REVERSE ARTHROPLASTY;  Surgeon: Brice Mayes MD;  Location: [facility] OR;  Service: Orthopedics      General Information       Row Name 01/03/25 1354          Physical Therapy Time and Intention    Document Type evaluation  -AM     Mode of Treatment physical therapy  -AM       Row Name 01/03/25 1352          General Information    Patient Profile Reviewed yes  -AM     Prior Level of Function independent:;all household mobility;community mobility;gait;transfer;bed mobility;using stairs  ambulates with rollator  -AM     Existing Precautions/Restrictions other (see comments);oxygen therapy device and L/min  2L O2, Droplet Precuation  -AM     Barriers to Rehab none identified  -AM       Row Name 01/03/25 1354          Living Environment    People in Home parent(s)  -AM       Row Name 01/03/25 1354          Home Main Entrance    Number of Stairs, Main Entrance three  -AM     Stair Railings, Main Entrance railings safe and in good condition  -AM       Row Name 01/03/25 1354          Stairs Within Home, Primary    Number of Stairs, Within Home, Primary none  -AM       Row Name 01/03/25 1354          Cognition    Orientation Status (Cognition) oriented x 3  -AM       Row Name 01/03/25 1354          Safety  Issues/Impairments Affecting Functional Mobility    Impairments Affecting Function (Mobility) balance;endurance/activity tolerance;pain;strength  -AM     Comment, Safety Issues/Impairments (Mobility) gait belt utilized  -AM               User Key  (r) = Recorded By, (t) = Taken By, (c) = Cosigned By      Initials Name Provider Type    AM Luis Eagle, PT Physical Therapist                   Mobility       Row Name 01/03/25 1355          Bed Mobility    Bed Mobility bed mobility (all) activities  -AM     All Activities, [place] (Bed Mobility) modified independence  -AM     Assistive Device (Bed Mobility) bed rails  -AM       Row Name 01/03/25 1355          Sit-Stand Transfer    Sit-Stand [place] (Transfers) contact guard;1 person assist  -AM       Row Name 01/03/25 1355          Gait/Stairs (Locomotion)    [place] Level (Gait) contact guard;1 person assist  -AM     Distance in Feet (Gait) 15  -AM     Deviations/Abnormal Patterns (Gait) gait speed decreased  -AM     Bilateral Gait Deviations forward flexed posture  -AM     Comment, (Gait/Stairs) decreased endurance  -AM               User Key  (r) = Recorded By, (t) = Taken By, (c) = Cosigned By      Initials Name Provider Type    AM Luis Eagle, PT Physical Therapist                   Obj/Interventions       Row Name 01/03/25 1356          Range of Motion Comprehensive    General Range of Motion no range of motion deficits identified  -AM       Row Name 01/03/25 1356          Strength Comprehensive (MMT)    Comment, General Manual Muscle Testing (MMT) Assessment 4+/5 throughout  -AM       Row Name 01/03/25 1356          Motor Skills    Motor Skills functional endurance  -AM     Functional Endurance fair  -AM       Row Name 01/03/25 1356          Balance    Balance Assessment sitting static balance;sitting dynamic balance;standing static balance;standing dynamic balance  -AM     Static Sitting Balance independent  -AM     Dynamic Sitting Balance  independent  -AM     Position, Sitting Balance unsupported  -AM     Static Standing Balance contact guard  -AM     Dynamic Standing Balance contact guard  -AM       Row Name 01/03/25 1356          Sensory Assessment (Somatosensory)    Sensory Assessment (Somatosensory) sensation intact  -AM               User Key  (r) = Recorded By, (t) = Taken By, (c) = Cosigned By      Initials Name Provider Type    AM Luis Eagle, PT Physical Therapist                   Goals/Plan       Row Name 01/03/25 1409          Bed Mobility Goal 1 (PT)    Activity/Assistive Device (Bed Mobility Goal 1, PT) bed mobility activities, all  -AM     [place] Level/Cues Needed (Bed Mobility Goal 1, PT) modified independence  -AM     Time Frame (Bed Mobility Goal 1, PT) long term goal (LTG)  -AM       Row Name 01/03/25 1409          Transfer Goal 1 (PT)    Activity/Assistive Device (Transfer Goal 1, PT) transfers, all;walker, rolling  -AM     [place] Level/Cues Needed (Transfer Goal 1, PT) modified independence  -AM     Time Frame (Transfer Goal 1, PT) long term goal (LTG)  -AM       Row Name 01/03/25 1409          Gait Training Goal 1 (PT)    Activity/Assistive Device (Gait Training Goal 1, PT) gait (walking locomotion);walker, rolling  -AM     [place] Level (Gait Training Goal 1, PT) modified independence  -AM     Distance (Gait Training Goal 1, PT) 100'  -AM       Row Name 01/03/25 1409          Stairs Goal 1 (PT)    Activity/Assistive Device (Stairs Goal 1, PT) stairs, all skills  -AM     [place] Level/Cues Needed (Stairs Goal 1, PT) modified independence  -AM     Number of Stairs (Stairs Goal 1, PT) 3  -AM     Time Frame (Stairs Goal 1, PT) long term goal (LTG)  -AM       Row Name 01/03/25 1400          Therapy Assessment/Plan (PT)    Planned Therapy Interventions (PT) balance training;bed mobility training;gait training;strengthening;stair training;patient/family education;transfer training  -AM               User Key   (r) = Recorded By, (t) = Taken By, (c) = Cosigned By      Initials Name Provider Type    AM Luis Eagle, PT Physical Therapist                   Clinical Impression       Row Name 01/03/25 1401          Pain    Pretreatment Pain Rating 1/10  -AM     Posttreatment Pain Rating 1/10  -AM     Pain Location other (see comments)  ribs from coughing  -AM     Pain Management Interventions exercise or physical activity utilized  -AM     Response to Pain Interventions no change per patient report  -AM       Row Name 01/03/25 1401          Plan of Care Review    Plan of Care Reviewed With patient  -AM     Outcome Evaluation Pt is a 73 y/o female with c/o SOB x 5 days with productive cough, rib pain secondary to coughing, fever, body aches, fatigue and dysuria.  Pt with [facility] of fibromyalgia, lupus, RA.  (+) Coronavirus, (+) influenza A.  Chest X-ray: (-) acute.  Pt lives with her mother (mother is currently staying at brother's home) in a 1 story home with 3 steps to enter with railings.  Pt was independent with all functional mobility tasks and ambulated with a rollator prior to hospitalization and did not utilize home O2.  Pt on 2L O2 and telemetry this date.  Pt was mod I for bed mobilty, CGA for transfers and ambulated 15' without an assitive device with CGA.  Recommend HHPT.  -AM       Row Name 01/03/25 1401          Therapy Assessment/Plan (PT)    Patient/Family Therapy Goals Statement (PT) To feel better  -AM     Rehab Potential (PT) good  -AM     Criteria for Skilled Interventions Met (PT) yes  -AM     Therapy Frequency (PT) 3 times/wk  -AM     Predicted Duration of Therapy Intervention (PT) until d/c  -AM       Row Name 01/03/25 1401          Vital Signs    Pre SpO2 (%) 95  -AM     O2 Delivery Pre Treatment nasal cannula  2L O2  -AM     Intra SpO2 (%) 94  -AM     O2 Delivery Intra Treatment nasal cannula  -AM     O2 Delivery Post Treatment nasal cannula  -AM     Pre Patient Position Supine  -AM     Intra Patient  Position Standing  -AM     Post Patient Position Supine  -AM       Row Name 01/03/25 1401          Positioning and Restraints    Pre-Treatment Position in bed  -AM     Post Treatment Position bed  -AM     In Bed notified nsg;supine;call light within reach;encouraged to call for assist  -AM               User Key  (r) = Recorded By, (t) = Taken By, (c) = Cosigned By      Initials Name Provider Type    AM Luis Eagle, PT Physical Therapist                   Outcome Measures       Row Name 01/03/25 1411 01/03/25 0800       How much help from another person do you currently need...    Turning from your back to your side while in flat bed without using bedrails? 4  -AM 4  -BB    Moving from lying on back to sitting on the side of a flat bed without bedrails? 4  -AM 4  -BB    Moving to and from a bed to a chair (including a wheelchair)? 3  -AM 3  -BB    Standing up from a chair using your arms (e.g., wheelchair, bedside chair)? 3  -AM 3  -BB    Climbing 3-5 steps with a railing? 2  -AM 3  -BB    To walk in hospital room? 3  -AM 3  -BB    AM-PAC 6 Clicks Score (PT) 19  -AM 20  -BB    Highest Level of Mobility Goal 6 --> Walk 10 steps or more  -AM 6 --> Walk 10 steps or more  -BB      Row Name 01/03/25 1411          Functional Assessment    Outcome Measure Options AM-PAC 6 Clicks Basic Mobility (PT)  -AM               User Key  (r) = Recorded By, (t) = Taken By, (c) = Cosigned By      Initials Name Provider Type    AM Luis Eagle, PT Physical Therapist    BB Yue Chavez, RN Registered Nurse                                 Physical Therapy Education       Title: PT OT SLP Therapies (Done)       Topic: Physical Therapy (Done)       Point: Mobility training (Done)       Learning Progress Summary            Patient Acceptance, E,TB, VU by AM at 1/3/2025 1411                      Point: Body mechanics (Done)       Learning Progress Summary            Patient Acceptance, E,TB, VU by AM at 1/3/2025 1411                       Point: Precautions (Done)       Learning Progress Summary            Patient Acceptance, E,TB, VU by AM at 1/3/2025 1411                                      User Key       Initials Effective Dates Name Provider Type Discipline    AM 05/10/21 -  Luis Eagle PT Physical Therapist PT                  PT Recommendation and Plan  Planned Therapy Interventions (PT): balance training, bed mobility training, gait training, strengthening, stair training, patient/family education, transfer training  Outcome Evaluation: Pt is a 75 y/o female with c/o SOB x 5 days with productive cough, rib pain secondary to coughing, fever, body aches, fatigue and dysuria.  Pt with [facility] of fibromyalgia, lupus, RA.  (+) Coronavirus, (+) influenza A.  Chest X-ray: (-) acute.  Pt lives with her mother (mother is currently staying at brother's home) in a 1 story home with 3 steps to enter with railings.  Pt was independent with all functional mobility tasks and ambulated with a rollator prior to hospitalization and did not utilize home O2.  Pt on 2L O2 and telemetry this date.  Pt was mod I for bed mobilty, CGA for transfers and ambulated 15' without an assitive device with CGA.  Recommend HHPT.     Time Calculation:         PT Charges       Row Name 01/03/25 1414             Time Calculation    Start Time 1045  -AM      Stop Time 1102  -AM      Time Calculation (min) 17 min  -AM      PT Received On 01/03/25  -AM      PT - Next Appointment 01/05/25  -AM      PT Goal Re-Cert Due Date 01/17/25  -AM                User Key  (r) = Recorded By, (t) = Taken By, (c) = Cosigned By      Initials Name Provider Type    AM Luis Eagle PT Physical Therapist                  Therapy Charges for Today       Code Description Service Date Service Provider Modifiers Qty    [id number] HC PT EVAL MOD COMPLEXITY 3 1/3/2025 Luis Eagle, PT GP 1            PT G-Codes  Outcome Measure Options: AM-PAC 6 Clicks Basic Mobility (PT)  AM-PAC 6 Clicks Score (PT):  19  PT Discharge Summary  Anticipated Discharge Disposition (PT): home with home health    Luis Eagle, PT  1/3/2025

## 2025-01-03 NOTE — DISCHARGE INSTR - APPOINTMENTS
Medication Assistance:  Www.needymeds.org  GoodRx    Also, check mothers insurance benefits to see if you are eligible to be a paid caregiver for her.

## 2025-01-03 NOTE — CASE MANAGEMENT/SOCIAL WORK
Discharge Planning Assessment   Shimon     Patient Name: Annalisa Bhagat  MRN: [id number]  Today's Date: 1/3/2025    Admit Date: 1/2/2025    Plan: From home with mother   Discharge Needs Assessment       Row Name 01/03/25 1345       Living Environment    People in Home parent(s)    Name(s) of People in Home Mother Pau    Current Living Arrangements home    Potentially Unsafe Housing Conditions none    In the past 12 months has the electric, gas, oil, or water company threatened to shut off services in your home? No    Primary Care Provided by self    Provides Primary Care For parent(s)  Mother Nadmarla    Family Caregiver if Needed child(michael), adult    Family Caregiver Names Jose Rodriguez    Quality of Family Relationships helpful;involved;supportive    Able to Return to Prior Arrangements yes       Resource/Environmental Concerns    Resource/Environmental Concerns none    Transportation Concerns none       Transportation Needs    In the past 12 months, has lack of transportation kept you from medical appointments or from getting medications? no    In the past 12 months, has lack of transportation kept you from meetings, work, or from getting things needed for daily living? No       Food Insecurity    Within the past 12 months, you worried that your food would run out before you got the money to buy more. Never true    Within the past 12 months, the food you bought just didn't last and you didn't have money to get more. Never true       Transition Planning    Patient/Family Anticipates Transition to home with family    Patient/Family Anticipated Services at Transition none    Transportation Anticipated car, drives self;family or friend will provide       Discharge Needs Assessment    Readmission Within the Last 30 Days no previous admission in last 30 days    Equipment Currently Used at Home cane, straight;shower chair;grab bar;rollator    Concerns to be Addressed denies needs/concerns at this time;no discharge needs  identified    Do you want help finding or keeping work or a job? I do not need or want help    Do you want help with school or training? For example, starting or completing job training or getting a high school diploma, GED or equivalent No    Anticipated Changes Related to Illness none    Equipment Needed After Discharge none    Provided Post Acute Provider List? N/A    Provided Post Acute Provider Quality & Resource List? N/A    Offered/Gave Vendor List no                   Discharge Plan       Row Name 01/03/25 6386       Plan    Plan From home with mother    Patient/Family in Agreement with Plan yes    Provided Post Acute Provider List? N/A    Provided Post Acute Provider Quality & Resource List? N/A    Plan Comments CM met with patient at the bedside to review discharge planning. Patient lives at home with her mother Pau whom she provides care for, is IADLs and drives. Son, Michael, to transport patient at d/c. Confirmed PCP, insurance, and pharmacy. Patient declines M2B. Patient denies any difficulty affording food or utilities. Patient indicated there was issues affording medications - CM added resources to AVS. Pt also asked CM about being a paid caregiver for her mother - CM indicated that could be a possibility and to check with insurance. Patient is not current with any HHC/OPPT/OT services. DC Barriers: IV steroid, increased O2 demands @ 3L NC, elevated WBC, blood cultures pending                  Continued Care and Services - Admitted Since 1/2/2025    No active coordination exists for this encounter.       Expected Discharge Date and Time       Expected Discharge Date Expected Discharge Time    Jan 4, 2025            Demographic Summary       Row Name 01/03/25 1343       General Information    Admission Type observation    Arrived From emergency department    Required Notices Provided Observation Status Notice    Referral Source admission list    Reason for Consult discharge planning    Preferred  Language English       Contact Information    Permission Granted to Share Info With     Contact Information Obtained for                    Functional Status       Row Name 01/03/25 1344       Functional Status    Usual Activity Tolerance good    Current Activity Tolerance moderate       Functional Status, IADL    Medications independent    Meal Preparation independent    Housekeeping independent    Laundry independent    Shopping independent    If for any reason you need help with day-to-day activities such as bathing, preparing meals, shopping, managing finances, etc., do you get the help you need? I don't need any help       Mental Status    General Appearance WDL WDL       Mental Status Summary    Recent Changes in Mental Status/Cognitive Functioning no changes                Stefany Winter RN     [phone]  [email or web address]

## 2025-01-03 NOTE — H&P
[facility] Medicine Services  History & Physical    Patient Name: Annalisa Bhagat  : 1950  MRN: [id number]  Primary Care Physician:  Caleb Whitney MD  Date of admission: 2025  Date and Time of Service: 2025 at 1110    Subjective      Chief Complaint: shortness of breath    History of Present Illness: Annalisa Bhagat is a 74 y.o. female with a CMH of fibromyalgia, GERD, HLD, Lupus, RA who presented to [facility] on 2025 with shortness of breath, body aches, fatigue, dysuria. She was around her son recently who was also sick. She was admitted under observation status. Her respiratory pathogen panel was positive for Coronavirus NL63 and Flu A. WBC 11.46, UA negative. CXR showed no acute process. Potassium was 3.3 on admit but was replaced and now at 4.8. Patient meeting inpatient criteria. Hospitalist team to admit for further medical management.       Review of Systems   Constitutional:  Positive for fatigue and fever.   Respiratory:  Positive for cough, chest tightness and shortness of breath.    Cardiovascular:  Positive for chest pain.   Genitourinary:  Positive for dysuria.   Musculoskeletal:  Positive for back pain and myalgias.   Neurological:  Positive for weakness.       Personal History     Past Medical History:   Diagnosis Date    Fibromyalgia     GERD (gastroesophageal reflux disease)     Hx of compression fracture of spine     T12    Hyperlipidemia     Kidney stone     Loosening of hardware in spine     Lupus     Migraines     Narcolepsy 2023    Numbness and tingling     Osteoporosis 2018    Forteo x 2yrs(2018-2019,3/), on prolia(2020)    Recurrent UTI     Rheumatoid arthritis     Scoliosis     Seasonal allergies     Shoulder pain, left     Sleep apnea     Spinal stenosis     Stress incontinence     Stroke        Past Surgical History:   Procedure Laterality Date    BACK SURGERY      CARDIAC CATHETERIZATION      CARPAL TUNNEL  RELEASE      CATARACT EXTRACTION, BILATERAL      CYSTOSCOPY W/ URETEROSCOPY W/ LITHOTRIPSY      HAND SURGERY      HYSTERECTOMY      RECTOVAGINAL FISTULA CLOSURE      SACRAL NERVE STIMULATOR PLACEMENT      TOTAL SHOULDER ARTHROPLASTY W/ DISTAL CLAVICLE EXCISION Left 6/13/2019    Procedure: TOTAL SHOULDER REVERSE ARTHROPLASTY;  Surgeon: Brice Mayes MD;  Location: [facility];  Service: Orthopedics       Family History: family history includes Colon cancer in her father and maternal grandmother; Diabetes in her paternal aunt; Heart disease in her maternal uncle; Heart failure in her paternal aunt; Hypertension in her paternal aunt; Hypotension in her father and mother; Stroke in her father. Otherwise pertinent FHx was reviewed and not pertinent to current issue.    Social History:  reports that she has never smoked. She has never used smokeless tobacco. She reports that she does not currently use alcohol. She reports that she does not use drugs.    Home Medications:  Prior to Admission Medications       Prescriptions Last Dose Informant Patient Reported? Taking?    apixaban (ELIQUIS) 5 MG tablet tablet   No Yes    Take 1 tablet by mouth Every 12 (Twelve) Hours. Resume 6/15/29    atorvastatin (LIPITOR) 40 MG tablet  Self Yes Yes    Take 1 tablet by mouth Daily.    Azelastine-Fluticasone 137-50 MCG/ACT suspension   Yes Yes    1-2 sprays into the nostril(s) as directed by provider.    B Complex Vitamins (VITAMIN B COMPLEX PO)  Self Yes Yes    Take 1 each by mouth Daily.    calcium citrate (CALCITRATE) 950 MG tablet  Self Yes Yes    Take 1 tablet by mouth.    Cholecalciferol (VITAMIN D3) 125 MCG (5000 UT) capsule capsule Patient Taking Differently Self Yes Yes    Take 1 capsule by mouth Daily.    denosumab (PROLIA) 60 MG/ML solution prefilled syringe syringe   No Yes    Inject 1 mL under the skin into the appropriate area as directed Every 6 (Six) Months.    furosemide (LASIX) 40 MG tablet  Self Yes Yes    Take 1  tablet by mouth 2 (Two) Times a Day As Needed.    hydrocortisone 2.5 % cream  Self Yes Yes    Apply 1 Application topically to the appropriate area as directed As Needed.    hydroxychloroquine (PLAQUENIL) 200 MG tablet   Yes Yes    Take 1 tablet by mouth 2 (Two) Times a Day.    Melatonin 10 MG tablet  Self Yes Yes    Take 1 tablet by mouth Every Night.    methocarbamol (ROBAXIN) 750 MG tablet   Yes Yes    Take 2 tablets by mouth Every Night.    Multiple Vitamins-Minerals (PRESERVISION AREDS 2 PO)  Self Yes Yes    Take  by mouth.    omeprazole (priLOSEC) 20 MG capsule  Self Yes Yes    Take 1 capsule by mouth Every Night.    phenazopyridine (PYRIDIUM) 100 MG tablet  Self Yes Yes    Take 1 tablet by mouth 3 (Three) Times a Day As Needed for Bladder Spasms.    promethazine (PHENERGAN) 25 MG tablet  Self Yes Yes    Take 1 tablet by mouth As Needed (MIGRAINES).    pyridoxine (VITAMIN B-6) 200 MG tablet  Self Yes Yes    Take 1 tablet by mouth Daily.    Rimegepant Sulfate (NURTEC) 75 MG tablet dispersible tablet   Yes Yes    Take 1 tablet by mouth Daily As Needed.    traMADol (ULTRAM) 50 MG tablet  Self Yes Yes    Take 2 tablets by mouth Every Morning. AND PRN BACK PAIN    triamcinolone (KENALOG) 0.025 % cream  Self Yes Yes    Apply 1 Application topically to the appropriate area as directed As Needed.    vitamin B-12 (CYANOCOBALAMIN) 1000 MCG tablet  Self Yes Yes    Take 1 tablet by mouth. 2 TABS DAILY    vitamin C (ASCORBIC ACID) 250 MG tablet  Self Yes Yes    Take 4 tablets by mouth Daily.    Armodafinil 250 MG tablet   No No    Take 1 tablet by mouth Daily.    buPROPion XL (WELLBUTRIN XL) 150 MG 24 hr tablet   Yes No    Take 1 tablet by mouth Daily.    buPROPion XL (WELLBUTRIN XL) 300 MG 24 hr tablet   Yes No    Take 1 tablet by mouth Daily.    denosumab (PROLIA) syringe 60 mg   No No    DULoxetine (CYMBALTA) 60 MG capsule  Self Yes No    Take 1 capsule by mouth Every Night.    fluocinonide (LIDEX) 0.05 % gel   Yes No     PRN    gabapentin (NEURONTIN) 600 MG tablet  Self Yes No    Take 1 tablet by mouth Take As Directed. 1 PO Q AM, 2 PO Q PM    hydrOXYzine (ATARAX) 25 MG tablet   Yes No    Take 1 tablet by mouth.    loratadine (CLARITIN) 10 MG tablet  Self Yes No    Take 1 tablet by mouth As Needed.              Allergies:  Allergies   Allergen Reactions    Shingrix [Zoster Vac Recomb Adjuvanted] Hallucinations     Fever, chills, redness swelling at injection site    Zolmitriptan Hemorrhagic stroke     Hx of stroke     Adhesive Tape Itching     Paper tape - redness and itching.     Hydromorphone Itching and Rash    Amoxicillin-Pot Clavulanate Itching and Rash       Objective      Vitals:   Temp:  [97.4 °F (36.3 °C)-100.1 °F (37.8 °C)] 98.6 °F (37 °C)  Heart Rate:  [] 93  Resp:  [15-27] 18  BP: (116-169)/(56-86) 156/69  Body mass index is 46.48 kg/m².  Physical Exam  Vitals reviewed.   Constitutional:       Appearance: She is obese. She is ill-appearing.   HENT:      Head: Normocephalic and atraumatic.      Nose: Congestion present.      Mouth/Throat:      Mouth: Mucous membranes are moist.   Eyes:      Extraocular Movements: Extraocular movements intact.      Pupils: Pupils are equal, round, and reactive to light.   Cardiovascular:      Rate and Rhythm: Normal rate and regular rhythm.      Pulses: Normal pulses.   Pulmonary:      Effort: Pulmonary effort is normal.      Breath sounds: Wheezing present.   Abdominal:      General: Bowel sounds are normal.      Palpations: Abdomen is soft.   Musculoskeletal:         General: Tenderness present.      Cervical back: Normal range of motion and neck supple.   Skin:     General: Skin is warm.      Capillary Refill: Capillary refill takes less than 2 seconds.   Neurological:      Mental Status: She is alert and oriented to person, place, and time.      Motor: Weakness present.         Diagnostic Data:  Lab Results (last 24 hours)       Procedure Component Value Units Date/Time     Basic Metabolic Panel [[id number]]  (Abnormal) Collected: 01/03/25 0348    Specimen: Blood from Hand, Right Updated: 01/03/25 0507     Glucose 135 mg/dL      BUN 14 mg/dL      Creatinine 0.57 mg/dL      Sodium 138 mmol/L      Potassium 4.8 mmol/L      Chloride 102 mmol/L      CO2 27.4 mmol/L      Calcium 9.0 mg/dL      BUN/Creatinine Ratio 24.6     Anion Gap 8.6 mmol/L      eGFR 94.3 mL/min/1.73     Narrative:      GFR Categories in Chronic Kidney Disease (CKD)      GFR Category          GFR (mL/min/1.73)    Interpretation  G1                     90 or greater         Normal or high (1)  G2                      60-89                Mild decrease (1)  G3a                   45-59                Mild to moderate decrease  G3b                   30-44                Moderate to severe decrease  G4                    15-29                Severe decrease  G5                    14 or less           Kidney failure          (1)In the absence of evidence of kidney disease, neither GFR category G1 or G2 fulfill the criteria for CKD.    eGFR calculation 2021 CKD-EPI creatinine equation, which does not include race as a factor    Magnesium [[id number]]  (Normal) Collected: 01/03/25 0348    Specimen: Blood from Hand, Right Updated: 01/03/25 0507     Magnesium 2.4 mg/dL     CBC & Differential [[id number]]  (Abnormal) Collected: 01/03/25 0348    Specimen: Blood from Hand, Right Updated: 01/03/25 0439    Narrative:      The following orders were created for panel order CBC & Differential.  Procedure                               Abnormality         Status                     ---------                               -----------         ------                     CBC Auto Differential[[id number]]        Abnormal            Final result                 Please view results for these tests on the individual orders.    CBC Auto Differential [[id number]]  (Abnormal) Collected: 01/03/25 0348    Specimen: Blood from Hand, Right Updated: 01/03/25  0439     WBC 11.46 10*3/mm3      RBC 4.47 10*6/mm3      Hemoglobin 13.9 g/dL      Hematocrit 41.7 %      MCV 93.3 fL      MCH 31.1 pg      MCHC 33.3 g/dL      RDW 13.2 %      RDW-SD 45.4 fl      MPV 10.2 fL      Platelets 297 10*3/mm3      Neutrophil % 85.6 %      Lymphocyte % 7.8 %      Monocyte % 5.7 %      Eosinophil % 0.0 %      Basophil % 0.3 %      Immature Grans % 0.6 %      Neutrophils, Absolute 9.82 10*3/mm3      Lymphocytes, Absolute 0.89 10*3/mm3      Monocytes, Absolute 0.65 10*3/mm3      Eosinophils, Absolute 0.00 10*3/mm3      Basophils, Absolute 0.03 10*3/mm3      Immature Grans, Absolute 0.07 10*3/mm3      nRBC 0.0 /100 WBC     Urinalysis With Culture If Indicated - Urine, Clean Catch [[id number]]  (Normal) Collected: 01/02/25 1743    Specimen: Urine, Clean Catch Updated: 01/02/25 1753     Color, UA Yellow     Appearance, UA Clear     pH, UA 6.5     Specific Gravity, UA <=1.005     Glucose, UA Negative     Ketones, UA Negative     Bilirubin, UA Negative     Blood, UA Negative     Protein, UA Negative     Leuk Esterase, UA Negative     Nitrite, UA Negative     Urobilinogen, UA 0.2 E.U./dL    Narrative:      In absence of clinical symptoms, the presence of pyuria, bacteria, and/or nitrites on the urinalysis result does not correlate with infection.  Urine microscopic not indicated.    High Sensitivity Troponin T 1Hr [[id number]]  (Abnormal) Collected: 01/02/25 1436    Specimen: Blood Updated: 01/02/25 1503     HS Troponin T 17 ng/L      Troponin T Numeric Delta -4 ng/L      Troponin T % Delta -19 %     Narrative:      High Sensitive Troponin T Reference Range:  <14.0 ng/L- Negative Female for AMI  <22.0 ng/L- Negative Male for AMI  >=14 - Abnormal Female indicating possible myocardial injury.  >=22 - Abnormal Male indicating possible myocardial injury.   Clinicians would have to utilize clinical acumen, EKG, Troponin, and serial changes to determine if it is an Acute Myocardial Infarction or myocardial  injury due to an underlying chronic condition.         Extra Tubes [[id number]] Collected: 01/02/25 1436    Specimen: Blood, Venous Line Updated: 01/02/25 1445    Narrative:      The following orders were created for panel order Extra Tubes.  Procedure                               Abnormality         Status                     ---------                               -----------         ------                     Gold Top - SST[[id number]]                                   Final result                 Please view results for these tests on the individual orders.    Gold Top - SST [[id number]] Collected: 01/02/25 1436    Specimen: Blood Updated: 01/02/25 1445     Extra Tube Hold for add-ons.     Comment: Auto resulted.       Respiratory Panel PCR w/COVID-19(SARS-CoV-2) FRANCESCO/SALLY/LUCILLE/PAD/COR/EMIL In-House, NP Swab in UTM/VTM, 2 HR TAT - Swab, Nasopharynx [[id number]]  (Abnormal) Collected: 01/02/25 1325    Specimen: Swab from Nasopharynx Updated: 01/02/25 1415     ADENOVIRUS, PCR Not Detected     Coronavirus 229E Not Detected     Coronavirus HKU1 Not Detected     Coronavirus NL63 Detected     Coronavirus OC43 Not Detected     COVID19 Not Detected     Human Metapneumovirus Not Detected     Human Rhinovirus/Enterovirus Not Detected     Influenza A H3 Detected     Influenza B PCR Not Detected     Parainfluenza Virus 1 Not Detected     Parainfluenza Virus 2 Not Detected     Parainfluenza Virus 3 Not Detected     Parainfluenza Virus 4 Not Detected     RSV, PCR Not Detected     Bordetella pertussis pcr Not Detected     Bordetella parapertussis PCR Not Detected     Chlamydophila pneumoniae PCR Not Detected     Mycoplasma pneumo by PCR Not Detected    Narrative:      In the setting of a positive respiratory panel with a viral infection PLUS a negative procalcitonin without other underlying concern for bacterial infection, consider observing off antibiotics or discontinuation of antibiotics and continue supportive care. If the  respiratory panel is positive for atypical bacterial infection (Bordetella pertussis, Chlamydophila pneumoniae, or Mycoplasma pneumoniae), consider antibiotic de-escalation to target atypical bacterial infection.    Comprehensive Metabolic Panel [[id number]]  (Abnormal) Collected: 01/02/25 1311    Specimen: Blood Updated: 01/02/25 1344     Glucose 133 mg/dL      BUN 13 mg/dL      Creatinine 0.71 mg/dL      Sodium 138 mmol/L      Potassium 3.3 mmol/L      Chloride 97 mmol/L      CO2 29.8 mmol/L      Calcium 9.0 mg/dL      Total Protein 6.9 g/dL      Albumin 3.9 g/dL      ALT (SGPT) 23 U/L      AST (SGOT) 35 U/L      Alkaline Phosphatase 119 U/L      Total Bilirubin 0.3 mg/dL      Globulin 3.0 gm/dL      A/G Ratio 1.3 g/dL      BUN/Creatinine Ratio 18.3     Anion Gap 11.2 mmol/L      eGFR 88.2 mL/min/1.73     Narrative:      GFR Categories in Chronic Kidney Disease (CKD)      GFR Category          GFR (mL/min/1.73)    Interpretation  G1                     90 or greater         Normal or high (1)  G2                      60-89                Mild decrease (1)  G3a                   45-59                Mild to moderate decrease  G3b                   30-44                Moderate to severe decrease  G4                    15-29                Severe decrease  G5                    14 or less           Kidney failure          (1)In the absence of evidence of kidney disease, neither GFR category G1 or G2 fulfill the criteria for CKD.    eGFR calculation 2021 CKD-EPI creatinine equation, which does not include race as a factor    High Sensitivity Troponin T [[id number]]  (Abnormal) Collected: 01/02/25 1311    Specimen: Blood Updated: 01/02/25 1344     HS Troponin T 21 ng/L     Narrative:      High Sensitive Troponin T Reference Range:  <14.0 ng/L- Negative Female for AMI  <22.0 ng/L- Negative Male for AMI  >=14 - Abnormal Female indicating possible myocardial injury.  >=22 - Abnormal Male indicating possible myocardial  injury.   Clinicians would have to utilize clinical acumen, EKG, Troponin, and serial changes to determine if it is an Acute Myocardial Infarction or myocardial injury due to an underlying chronic condition.         BNP [[id number]]  (Normal) Collected: 01/02/25 1311    Specimen: Blood Updated: 01/02/25 1344     proBNP 152.0 pg/mL     Narrative:      This assay is used as an aid in the diagnosis of individuals suspected of having heart failure. It can be used as an aid in the diagnosis of acute decompensated heart failure (ADHF) in patients presenting with signs and symptoms of ADHF to the emergency department (ED). In addition, NT-proBNP of <300 pg/mL indicates ADHF is not likely.    Age Range Result Interpretation  NT-proBNP Concentration (pg/mL:      <50             Positive            >450                   Gray                 300-450                    Negative             <300    50-75           Positive            >900                  Gray                300-900                  Negative            <300      >75             Positive            >1800                  Gray                300-1800                  Negative            <300    Blood Culture - Blood, Arm, Right [[id number]] Collected: 01/02/25 1323    Specimen: Blood from Arm, Right Updated: 01/02/25 1329    Blood Culture - Blood, Arm, Left [[id number]] Collected: 01/02/25 1323    Specimen: Blood from Arm, Left Updated: 01/02/25 1329    POC Lactate [[id number]]  (Normal) Collected: 01/02/25 1326    Specimen: Blood Updated: 01/02/25 1328     Lactate 1.3 mmol/L      Comment: Serial Number: [id number]:  [id number]       CBC & Differential [[id number]]  (Abnormal) Collected: 01/02/25 1311    Specimen: Blood Updated: 01/02/25 1320    Narrative:      The following orders were created for panel order CBC & Differential.  Procedure                               Abnormality         Status                     ---------                                -----------         ------                     CBC Auto Differential[[id number]]        Abnormal            Final result                 Please view results for these tests on the individual orders.    CBC Auto Differential [[id number]]  (Abnormal) Collected: 01/02/25 1311    Specimen: Blood Updated: 01/02/25 1320     WBC 7.64 10*3/mm3      RBC 4.80 10*6/mm3      Hemoglobin 14.3 g/dL      Hematocrit 45.2 %      MCV 94.2 fL      MCH 29.8 pg      MCHC 31.6 g/dL      RDW 13.2 %      RDW-SD 45.8 fl      MPV 9.4 fL      Platelets 275 10*3/mm3      Neutrophil % 66.1 %      Lymphocyte % 15.7 %      Monocyte % 13.6 %      Eosinophil % 3.8 %      Basophil % 0.4 %      Immature Grans % 0.4 %      Neutrophils, Absolute 5.05 10*3/mm3      Lymphocytes, Absolute 1.20 10*3/mm3      Monocytes, Absolute 1.04 10*3/mm3      Eosinophils, Absolute 0.29 10*3/mm3      Basophils, Absolute 0.03 10*3/mm3      Immature Grans, Absolute 0.03 10*3/mm3      nRBC 0.0 /100 WBC     [place] Draw [[id number]] Collected: 01/02/25 1311    Specimen: Blood Updated: 01/02/25 1315    Narrative:      The following orders were created for panel order [place] Draw.  Procedure                               Abnormality         Status                     ---------                               -----------         ------                     Green Top (Gel)[[id number]]                                  Final result               Lavender Top[[id number]]                                     Final result               Gold Top - SST[[id number]]                                   Final result               Light Blue Top[[id number]]                                   Final result                 Please view results for these tests on the individual orders.    Green Top (Gel) [[id number]] Collected: 01/02/25 1311    Specimen: Blood Updated: 01/02/25 1315     Extra Tube Hold for add-ons.     Comment: Auto resulted.       Gold Top - SST [[id number]] Collected: 01/02/25 1311     Specimen: Blood Updated: 01/02/25 1315     Extra Tube Hold for add-ons.     Comment: Auto resulted.       Light Blue Top [[id number]] Collected: 01/02/25 1311    Specimen: Blood Updated: 01/02/25 1315     Extra Tube Hold for add-ons.     Comment: Auto resulted       Lavender Top [[id number]] Collected: 01/02/25 1311    Specimen: Blood Updated: 01/02/25 1315     Extra Tube hold for add-on     Comment: Auto resulted                Imaging Results (Last 24 Hours)       Procedure Component Value Units Date/Time    XR Chest 1 View [[id number]] Collected: 01/02/25 1353     Updated: 01/02/25 1356    Narrative:      XR CHEST 1 VW    Date of Exam: 1/2/2025 1:44 PM EST    Indication: cough, fever, sob    Comparison: PA and lateral chest 11/25/2015    Findings:  No acute airspace disease. Heart size is upper limits normal but stable. Pulmonary vascular distribution is normal. Multilevel thoracic vertebroplasty changes are present. Bilateral thoracic and lumbar vertical fusion cony hardware is noted. Left shoulder   replacement changes are present. No acute osseous abnormalities are identified.      Impression:      Impression:  No acute cardiopulmonary findings.      Electronically Signed: Alisha Dennison MD    1/2/2025 1:53 PM EST    Workstation ID: [id number]              Assessment & Plan        This is a 74 y.o. female with:    Active and Resolved Problems  Active Hospital Problems    Diagnosis  POA    **Acute bronchitis [J20.9]  Yes      Resolved Hospital Problems   No resolved problems to display.     Dyspnea   2/2 Influenza A and Coronavirus NL63  - Chest x-ray: no acute cardiopulmonary findings   - Respiratory panel positive for Coronavirus NL63 and Flu A  - Blood cultures pending   - Breathing treatments, Solu-Medrol, Mucinex, Tessalon and incentive spirometry ordered  - Currenlty on 3L of oxygen, wean as tolerated  - Consider pulm consult   - PT following      Dysuria  - History of incontinence   - Urinalysis  unremarkable  - Consider repeat UA if symptoms worsen, also consider fungal infection   - Pyridium PRN  - Encouraged patient to follow up with urology outpatient if symptoms persist    History of CVA  - Continue Eliquis      Hyperlipidemia  - Continue statin     RA  - Continue Plaquenil      GERD  - Continue PPI      Obesity  - BMI 46.48  - Lifestyle modifications      VTE Prophylaxis:  Pharmacologic & mechanical VTE prophylaxis orders are present.        The patient desires to be as follows:    CODE STATUS:    Code Status (Patient has no pulse and is not breathing): CPR (Attempt to Resuscitate)  Medical Interventions (Patient has pulse or is breathing): Full Support      Michael Bhagat, who can be contacted at [phone], is the designated person to make medical decisions on the patient's behalf if She is incapable of doing so. This was clarified with patient and/or next of kin on 1/2/2025 during the course of this H&P.    Admission Status:  I believe this patient meets inpatient status.    Expected Length of Stay: > 2 midnights    PDMP and Medication Dispenses via Sidebar reviewed and consistent with patient reported medications.    I discussed the patient's findings and my recommendations with patient.      Signature:     This document has been electronically signed by JENNY Vasquez on January 3, 2025 11:10 [facility] Hospitalist Team

## 2025-01-03 NOTE — H&P
[facility] Observation Unit H&P    Patient Name: Annalisa Bhagat  : 1950  MRN: [id number]  Primary Care Physician: Caleb Whitney MD  Date of admission: 2025     Patient Care Team:  Caleb Whitney MD as PCP - General (Internal Medicine)  Leanne Farah APRN as PCP - Family Medicine  Maia, CHEL Stone as Physician Assistant (Physician Assistant)          Subjective   History Present Illness     Chief Complaint:   Chief Complaint   Patient presents with    Shortness of Breath     Shortness of breath     Ms. Bhagat is a 74 y.o.  presents to [facility] complaining of shortness of breath       History of Present Illness    ED 25: Patient is a 74-year-old female with no significant past medical history who presents with shortness of breath since Saturday. She reports a cough productive of unspecified sputum color, rib pain due to coughing, and fever of unknown temperature. She denies vomiting and diarrhea but mentions experiencing constipation. She has been taking guaifenesin, phenylephrine (Sudafed PE), and tramadol for symptom relief. 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 She suspects she may have contracted an illness from her son, who was sick during [place] dinner. She denies any history of COPD or asthma.     Observation 1/3/25: Patient is a 74-year-old female presented to the hospital complaints of shortness of breath cough and congestion.  Patient states she has had productive cough with significant rib pain and low-grade fevers since Saturday.  Patient states she does wear CPAP at night but oxygen as needed.  Patient reports Tmax subjective fever of 100.1.  Patient reports has been in contact with ill known contact.  Denies use of nebulizer or inhalers at home.  Patient to be transferred to hospitalist services for further management of viral illnesses causing dyspnea and fever.       Review of Systems   Constitutional: Positive for fever and  malaise/fatigue.   HENT:  Positive for congestion.    Eyes: Negative.    Cardiovascular:  Positive for chest pain and dyspnea on exertion.   Respiratory:  Positive for cough, shortness of breath, sputum production and wheezing.    Endocrine: Negative.    Musculoskeletal:  Positive for back pain and joint pain.   Gastrointestinal: Negative.    Genitourinary:  Positive for dysuria. Negative for frequency and hematuria.   Neurological:  Positive for weakness.   Psychiatric/Behavioral: Negative.             Personal History     Past Medical History:   Past Medical History:   Diagnosis Date    Fibromyalgia     GERD (gastroesophageal reflux disease)     Hx of compression fracture of spine     T12    Hyperlipidemia     Kidney stone     Loosening of hardware in spine     Lupus     Migraines     Narcolepsy 12/6/2023    Numbness and tingling     Osteoporosis 02/23/2018    Forteo x 2yrs(2/2018-12/2019,3/20-4/2020), on prolia(2/28/2020)    Recurrent UTI     Rheumatoid arthritis     Scoliosis     Seasonal allergies     Shoulder pain, left     Sleep apnea     Spinal stenosis     Stress incontinence     Stroke        Surgical History:      Past Surgical History:   Procedure Laterality Date    BACK SURGERY      CARDIAC CATHETERIZATION      CARPAL TUNNEL RELEASE      CATARACT EXTRACTION, BILATERAL      CYSTOSCOPY W/ URETEROSCOPY W/ LITHOTRIPSY      HAND SURGERY      HYSTERECTOMY      RECTOVAGINAL FISTULA CLOSURE      SACRAL NERVE STIMULATOR PLACEMENT      TOTAL SHOULDER ARTHROPLASTY W/ DISTAL CLAVICLE EXCISION Left 6/13/2019    Procedure: TOTAL SHOULDER REVERSE ARTHROPLASTY;  Surgeon: Brice Mayes MD;  Location: [facility];  Service: Orthopedics           Family History: family history includes Colon cancer in her father and maternal grandmother; Diabetes in her paternal aunt; Heart disease in her maternal uncle; Heart failure in her paternal aunt; Hypertension in her paternal aunt; Hypotension in her father and mother;  Stroke in her father. Otherwise pertinent FHx was reviewed and unremarkable.     Social History:  reports that she has never smoked. She has never used smokeless tobacco. She reports that she does not currently use alcohol. She reports that she does not use drugs.      Medications:  Prior to Admission medications    Medication Sig Start Date End Date Taking? Authorizing Provider   apixaban (ELIQUIS) 5 MG tablet tablet Take 1 tablet by mouth Every 12 (Twelve) Hours. Resume 6/15/29 6/14/19  Yes Brice Mayes MD   atorvastatin (LIPITOR) 40 MG tablet Take 1 tablet by mouth Daily.   Yes Karlos Brownlee MD   Azelastine-Fluticasone 137-50 MCG/ACT suspension 1-2 sprays into the nostril(s) as directed by provider. 11/14/23  Yes Karlos Brownlee MD   B Complex Vitamins (VITAMIN B COMPLEX PO) Take 1 each by mouth Daily.   Yes Karols Brownlee MD   calcium citrate (CALCITRATE) 950 MG tablet Take 1 tablet by mouth.   Yes Karlos Brownlee MD   Cholecalciferol (VITAMIN D3) 125 MCG (5000 UT) capsule capsule Take 1 capsule by mouth Daily.   Yes Karlos Brownlee MD   denosumab (PROLIA) 60 MG/ML solution prefilled syringe syringe Inject 1 mL under the skin into the appropriate area as directed Every 6 (Six) Months. 5/4/23  Yes Jason Qiu MD   furosemide (LASIX) 40 MG tablet Take 1 tablet by mouth 2 (Two) Times a Day As Needed.   Yes Karlos Brownlee MD   hydrocortisone 2.5 % cream Apply 1 Application topically to the appropriate area as directed As Needed. 5/8/17  Yes Karlos Brownlee MD   hydroxychloroquine (PLAQUENIL) 200 MG tablet Take 1 tablet by mouth 2 (Two) Times a Day. 8/8/23  Yes Karlos Brownlee MD   Melatonin 10 MG tablet Take 1 tablet by mouth Every Night.   Yes Karlos Brownlee MD   methocarbamol (ROBAXIN) 750 MG tablet Take 2 tablets by mouth Every Night. 2/14/22  Yes Karlos Brownlee MD   Multiple Vitamins-Minerals (PRESERVISION AREDS 2 PO) Take  by mouth.    Yes Karlos Brownlee MD   omeprazole (priLOSEC) 20 MG capsule Take 1 capsule by mouth Every Night.   Yes Karlos Brownlee MD   phenazopyridine (PYRIDIUM) 100 MG tablet Take 1 tablet by mouth 3 (Three) Times a Day As Needed for Bladder Spasms.   Yes Karlos Brownlee MD   promethazine (PHENERGAN) 25 MG tablet Take 1 tablet by mouth As Needed (MIGRAINES). 1/18/19  Yes Karlos Brownlee MD   pyridoxine (VITAMIN B-6) 200 MG tablet Take 1 tablet by mouth Daily.   Yes Karlos Brownlee MD   Rimegepant Sulfate (NURTEC) 75 MG tablet dispersible tablet Take 1 tablet by mouth Daily As Needed. 8/11/23  Yes Karlos Brownlee MD   traMADol (ULTRAM) 50 MG tablet Take 2 tablets by mouth Every Morning. AND PRN BACK PAIN   Yes Karlos Brownlee MD   triamcinolone (KENALOG) 0.025 % cream Apply 1 Application topically to the appropriate area as directed As Needed.   Yes Karlos Brownlee MD   vitamin B-12 (CYANOCOBALAMIN) 1000 MCG tablet Take 1 tablet by mouth. 2 TABS DAILY   Yes Karlos Brownlee MD   vitamin C (ASCORBIC ACID) 250 MG tablet Take 4 tablets by mouth Daily.   Yes Karlos Brownlee MD   Armodafinil 250 MG tablet Take 1 tablet by mouth Daily. 12/6/23   Seipel, Joseph F, MD   buPROPion XL (WELLBUTRIN XL) 150 MG 24 hr tablet Take 1 tablet by mouth Daily. 11/17/23   Karlos Brownlee MD   buPROPion XL (WELLBUTRIN XL) 300 MG 24 hr tablet Take 1 tablet by mouth Daily. 11/20/23 11/19/24  Karlos Brownlee MD   DULoxetine (CYMBALTA) 60 MG capsule Take 1 capsule by mouth Every Night.    Karlos Brownlee MD   fluocinonide (LIDEX) 0.05 % gel PRN 2/17/21   Karlos Brownlee MD   gabapentin (NEURONTIN) 600 MG tablet Take 1 tablet by mouth Take As Directed. 1 PO Q AM, 2 PO Q PM 10/21/18   Karlos Brownlee MD   hydrOXYzine (ATARAX) 25 MG tablet Take 1 tablet by mouth. 6/27/23   Karlos Brownlee MD   loratadine (CLARITIN) 10 MG tablet Take 1 tablet by mouth As Needed.     Provider, Historical, MD       Allergies:    Allergies   Allergen Reactions    Shingrix [Zoster Vac Recomb Adjuvanted] Hallucinations     Fever, chills, redness swelling at injection site    Zolmitriptan Hemorrhagic stroke     Hx of stroke     Adhesive Tape Itching     Paper tape - redness and itching.     Hydromorphone Itching and Rash    Amoxicillin-Pot Clavulanate Itching and Rash       Objective   Objective     Vital Signs  Temp:  [97.4 °F (36.3 °C)-100.1 °F (37.8 °C)] 98.4 °F (36.9 °C)  Heart Rate:  [] 75  Resp:  [15-27] 18  BP: (116-169)/(56-86) 150/77  SpO2:  [89 %-100 %] 100 %  on  Flow (L/min) (Oxygen Therapy):  [3] 3;   Device (Oxygen Therapy): nasal cannula  Body mass index is 46.48 kg/m².    Physical Exam  Vitals and nursing note reviewed.   Constitutional:       Appearance: Normal appearance. She is obese.   HENT:      Head: Normocephalic.      Right Ear: External ear normal.      Left Ear: External ear normal.      Nose: Congestion present.      Mouth/Throat:      Pharynx: Oropharynx is clear.   Eyes:      Conjunctiva/sclera: Conjunctivae normal.      Pupils: Pupils are equal, round, and reactive to light.   Cardiovascular:      Rate and Rhythm: Normal rate and regular rhythm.      Pulses: Normal pulses.      Heart sounds: Normal heart sounds.   Pulmonary:      Effort: Pulmonary effort is normal.      Breath sounds: Wheezing present.   Musculoskeletal:         General: Tenderness present.   Skin:     General: Skin is warm.      Capillary Refill: Capillary refill takes less than 2 seconds.   Neurological:      Mental Status: She is alert and oriented to person, place, and time.      Motor: Weakness present.   Psychiatric:         Mood and Affect: Mood normal.         Behavior: Behavior normal.         Thought Content: Thought content normal.         Judgment: Judgment normal.           Results Review:  I have personally reviewed most recent lab results, microbiology results, and radiology images  and interpretations and agree with findings, most notably: Cbc, CMP, EKG, CXR.    Results from last 7 days   Lab Units 01/03/25  0348   WBC 10*3/mm3 11.46*   HEMOGLOBIN g/dL 13.9   HEMATOCRIT % 41.7   PLATELETS 10*3/mm3 297     Results from last 7 days   Lab Units 01/03/25  0348 01/02/25  1436 01/02/25  1326 01/02/25  1311   SODIUM mmol/L 138  --   --  138   POTASSIUM mmol/L 4.8  --   --  3.3*   CHLORIDE mmol/L 102  --   --  97*   CO2 mmol/L 27.4  --   --  29.8*   BUN mg/dL 14  --   --  13   CREATININE mg/dL 0.57  --   --  0.71   GLUCOSE mg/dL 135*  --   --  133*   CALCIUM mg/dL 9.0  --   --  9.0   ALK PHOS U/L  --   --   --  119*   ALT (SGPT) U/L  --   --   --  23   AST (SGOT) U/L  --   --   --  35*   HSTROP T ng/L  --  17*  --  21*   PROBNP pg/mL  --   --   --  152.0   LACTATE mmol/L  --   --  1.3  --      Estimated Creatinine Clearance: 81.8 mL/min (by C-G formula based on SCr of 0.57 mg/dL).  Brief Urine Lab Results  (Last result in the past 365 days)        Color   Clarity   Blood   Leuk Est   Nitrite   Protein   CREAT   Urine HCG        01/02/25 1743 Yellow   Clear   Negative   Negative   Negative   Negative                   Microbiology Results (last 10 days)       Procedure Component Value - Date/Time    Respiratory Panel PCR w/COVID-19(SARS-CoV-2) FRANCESCO/SALLY/LUCILLE/PAD/COR/EMIL In-House, NP Swab in UTM/VTM, 2 HR TAT - Swab, Nasopharynx [[id number]]  (Abnormal) Collected: 01/02/25 1325    Lab Status: Final result Specimen: Swab from Nasopharynx Updated: 01/02/25 1415     ADENOVIRUS, PCR Not Detected     Coronavirus 229E Not Detected     Coronavirus HKU1 Not Detected     Coronavirus NL63 Detected     Coronavirus OC43 Not Detected     COVID19 Not Detected     Human Metapneumovirus Not Detected     Human Rhinovirus/Enterovirus Not Detected     Influenza A H3 Detected     Influenza B PCR Not Detected     Parainfluenza Virus 1 Not Detected     Parainfluenza Virus 2 Not Detected     Parainfluenza Virus 3 Not Detected      Parainfluenza Virus 4 Not Detected     RSV, PCR Not Detected     Bordetella pertussis pcr Not Detected     Bordetella parapertussis PCR Not Detected     Chlamydophila pneumoniae PCR Not Detected     Mycoplasma pneumo by PCR Not Detected    Narrative:      In the setting of a positive respiratory panel with a viral infection PLUS a negative procalcitonin without other underlying concern for bacterial infection, consider observing off antibiotics or discontinuation of antibiotics and continue supportive care. If the respiratory panel is positive for atypical bacterial infection (Bordetella pertussis, Chlamydophila pneumoniae, or Mycoplasma pneumoniae), consider antibiotic de-escalation to target atypical bacterial infection.            ECG/EMG Results (most recent)       Procedure Component Value Units Date/Time    ECG 12 Lead Dyspnea [[id number]] Collected: 01/02/25 1253     Updated: 01/03/25 0523     QT Interval 380 ms      QTC Interval 494 ms     Narrative:      HEART PCVG=818  bpm  RR Whrjtics=218  ms  VA Ktavhice=435  ms  P Horizontal Axis=16  deg  P Front Axis=49  deg  QRSD Lzaquvgk=441  ms  QT Kgupxont=707  ms  FKvU=308  ms  QRS Axis=-59  deg  T Wave Axis=28  deg  - ABNORMAL ECG -  Sinus tachycardia  Right bundle branch block  Anterolateral  infarct, age indeterminate  When compared with ECG of 07-Jun-2019 16:20:18,  Significant rate increase  New or worsened ischemia or infarction  Electronically Signed By: Tyrone Jennings ([facility]) 2025-01-03 05:22:54  Date and Time of Study:2025-01-02 12:53:40                    XR Chest 1 View    Result Date: 1/2/2025  Impression: No acute cardiopulmonary findings. Electronically Signed: Alisha Dennison MD  1/2/2025 1:53 PM EST  Workstation ID: [id number]       Estimated Creatinine Clearance: 81.8 mL/min (by C-G formula based on SCr of 0.57 mg/dL).    Assessment & Plan   Assessment/Plan       Active Hospital Problems    Diagnosis  POA    **Acute bronchitis [J20.9]  Yes       Resolved Hospital Problems   No resolved problems to display.     Dyspnea   Lab Results   Component Value Date    WBC 11.46 (H) 01/03/2025    LACTATE 1.3 01/02/2025    PROCALCITO 0.14 07/03/2022   -Chest x-ray: no acute cardiopulmonary findings   -Respiratory panel positive for Coronavirus NL63 and Flu A  -Isolation precautions   -Blood cultures pending   -Breathing treatments, Solu-Medrol, Mucinex, Tessalon and incentive spirometry ordered  -Oxygen 2L for oxygen saturation below 90%  -Continue pulse oximetry and telemetry    RA  -Continue Plaquenil     Dysuria  -History of incontinence   -Urinalysis unremarkable  -Add Pyridium PRN    History of CVA  -Continue Eliquis     Hyperlipidemia  -Continue statin     GERD  -Continue PPI     Obesity  -BMI 46.48  -Lifestyle modifications    VTE Prophylaxis - Active VTE Prophylaxis  Pharmacologic:        Start     Dose Route Frequency Stop    01/03/25 2100  apixaban (ELIQUIS) tablet 5 mg         5 mg PO Every 12 Hours Scheduled --                  Mechanical:        Start        01/03/25 0726  Maintain Sequential Compression Device  Continuous                              CODE STATUS:    Code Status and Medical Interventions: CPR (Attempt to Resuscitate); Full Support   Ordered at: 01/03/25 0728     Code Status (Patient has no pulse and is not breathing):    CPR (Attempt to Resuscitate)     Medical Interventions (Patient has pulse or is breathing):    Full Support       This patient has been examined wearing personal protective equipment.     I discussed the patient's findings and my recommendations with patient, family, nursing staff, primary care team, and consulting provider.      Signature:Electronically signed by JENNY Contreras, 01/03/25, 10:40 AM EST.      I spent 35 minutes caring for Annalisa on this date of service. This time includes time spent by me in the following activities: reviewing tests, performing a medically appropriate examination and/or evaluation,  counseling and educating the patient/family/caregiver, referring and communicating with other health care professionals, documenting information in the medical record, independently interpreting results and communicating that information with the patient/family/caregiver, care coordination, ordering medications, ordering test(s), ordering procedure(s), obtaining a separately obtained history, and reviewing a separately obtained history.

## 2025-01-03 NOTE — CONSULTS
Initial spiritual care visit. Pt in room alone, with her tablet, sitting up in recliner. She was tearful and spoke of her 98 year old mother; pt is her caregiver and pt is sick and coughing. Family has stepped up to care for mother while pt in hospital. She of how helpful her family is and how loved her mother is. Pt's father  in , and pt became tearful imagining how she'll feel when her mother passes. She asked for healing prayer and that her mother doesn't get sick because of her. She is Orthodox and appreciated the prayer. Pt informed that spiritual care is always available If needed. There were no other needs after praying and  stepped away.

## 2025-01-03 NOTE — PLAN OF CARE
Goal Outcome Evaluation:  Plan of Care Reviewed With: patient        Progress: improving  Outcome Evaluation: Pt remains stable this shift. Continues to require O2 2L NC to maintain O2 sats above 90%. Pt switched to inpatient admission this shift. Continues to be standby assist with transfers. Will continue to monitor.

## 2025-01-03 NOTE — PLAN OF CARE
Problem: Adult Inpatient Plan of Care  Goal: Absence of Hospital-Acquired Illness or Injury  Intervention: Identify and Manage Fall Risk  Recent Flowsheet Documentation  Taken 1/3/2025 0200 by Lashay Mcclain RN  Safety Promotion/Fall Prevention: safety round/check completed  Taken 1/3/2025 0000 by Lashay Mcclain RN  Safety Promotion/Fall Prevention: safety round/check completed  Taken 1/2/2025 2200 by Lashay Mcclain RN  Safety Promotion/Fall Prevention: safety round/check completed  Taken 1/2/2025 1913 by Lashay Mcclain RN  Safety Promotion/Fall Prevention: safety round/check completed  Intervention: Prevent Skin Injury  Recent Flowsheet Documentation  Taken 1/2/2025 1913 by Lashay Mcclain RN  Body Position: position changed independently  Skin Protection: transparent dressing maintained  Intervention: Prevent and Manage VTE (Venous Thromboembolism) Risk  Recent Flowsheet Documentation  Taken 1/2/2025 1913 by Lashay Mcclain RN  VTE Prevention/Management: (ambulates) SCDs (sequential compression devices) off  Intervention: Prevent Infection  Recent Flowsheet Documentation  Taken 1/2/2025 1913 by Lashay Mcclain RN  Infection Prevention:   rest/sleep promoted   hand hygiene promoted  Goal: Optimal Comfort and Wellbeing  Intervention: Provide Person-Centered Care  Recent Flowsheet Documentation  Taken 1/2/2025 1913 by Lashay Mcclain RN  Trust Relationship/Rapport:   care explained   choices provided   empathic listening provided   emotional support provided   questions answered   questions encouraged   reassurance provided   thoughts/feelings acknowledged     Problem: Comorbidity Management  Goal: Blood Pressure in Desired Range  Intervention: Maintain Blood Pressure Management  Recent Flowsheet Documentation  Taken 1/2/2025 1913 by Lashay Mcclain RN  Medication Review/Management: medications reviewed  Goal: Maintenance of Osteoarthritis Symptom Control  Intervention: Maintain Osteoarthritis  Symptom Control  Recent Flowsheet Documentation  Taken 1/2/2025 1913 by Lashay Mcclain, RN  Activity Management: up ad duarte  Medication Review/Management: medications reviewed     Problem: Sepsis/Septic Shock  Goal: Optimal Coping  Intervention: Support Patient and Family Response  Recent Flowsheet Documentation  Taken 1/2/2025 1913 by Lashay Mcclain RN  Family/Support System Care: self-care encouraged  Goal: Absence of Bleeding  Intervention: Monitor and Manage Bleeding  Recent Flowsheet Documentation  Taken 1/2/2025 1913 by Lashay Mcclain RN  Bleeding Precautions: blood pressure closely monitored  Goal: Absence of Infection Signs and Symptoms  Intervention: Initiate Sepsis Management  Recent Flowsheet Documentation  Taken 1/2/2025 1913 by Lashay Mcclain RN  Infection Management: aseptic technique maintained  Infection Prevention:   rest/sleep promoted   hand hygiene promoted  Isolation Precautions: precautions maintained  Intervention: Promote Recovery  Recent Flowsheet Documentation  Taken 1/2/2025 1913 by Lashay Mcclain, RN  Activity Management: up ad duarte   Goal Outcome Evaluation:

## 2025-01-04 ENCOUNTER — READMISSION MANAGEMENT (OUTPATIENT)
Dept: CALL CENTER | Facility: HOSPITAL | Age: 75
End: 2025-01-04
Payer: MEDICARE

## 2025-01-04 VITALS
HEART RATE: 78 BPM | RESPIRATION RATE: 20 BRPM | TEMPERATURE: 97.8 F | BODY MASS INDEX: 46.28 KG/M2 | OXYGEN SATURATION: 90 % | SYSTOLIC BLOOD PRESSURE: 112 MMHG | WEIGHT: 199.96 LBS | HEIGHT: 55 IN | DIASTOLIC BLOOD PRESSURE: 63 MMHG

## 2025-01-04 LAB
ANION GAP SERPL CALCULATED.3IONS-SCNC: 11.9 MMOL/L (ref 5–15)
BASOPHILS # BLD AUTO: 0.02 10*3/MM3 (ref 0–0.2)
BASOPHILS NFR BLD AUTO: 0.1 % (ref 0–1.5)
BUN SERPL-MCNC: 18 MG/DL (ref 8–23)
BUN/CREAT SERPL: 24.3 (ref 7–25)
CALCIUM SPEC-SCNC: 9.4 MG/DL (ref 8.6–10.5)
CHLORIDE SERPL-SCNC: 103 MMOL/L (ref 98–107)
CO2 SERPL-SCNC: 27.1 MMOL/L (ref 22–29)
CREAT SERPL-MCNC: 0.74 MG/DL (ref 0.57–1)
DEPRECATED RDW RBC AUTO: 47.1 FL (ref 37–54)
EGFRCR SERPLBLD CKD-EPI 2021: 84 ML/MIN/1.73
EOSINOPHIL # BLD AUTO: 0 10*3/MM3 (ref 0–0.4)
EOSINOPHIL NFR BLD AUTO: 0 % (ref 0.3–6.2)
ERYTHROCYTE [DISTWIDTH] IN BLOOD BY AUTOMATED COUNT: 13.7 % (ref 12.3–15.4)
GLUCOSE SERPL-MCNC: 113 MG/DL (ref 65–99)
HCT VFR BLD AUTO: 45.1 % (ref 34–46.6)
HGB BLD-MCNC: 14.1 G/DL (ref 12–15.9)
IMM GRANULOCYTES # BLD AUTO: 0.07 10*3/MM3 (ref 0–0.05)
IMM GRANULOCYTES NFR BLD AUTO: 0.5 % (ref 0–0.5)
LYMPHOCYTES # BLD AUTO: 1.35 10*3/MM3 (ref 0.7–3.1)
LYMPHOCYTES NFR BLD AUTO: 9.2 % (ref 19.6–45.3)
MAGNESIUM SERPL-MCNC: 2.4 MG/DL (ref 1.6–2.4)
MCH RBC QN AUTO: 29.3 PG (ref 26.6–33)
MCHC RBC AUTO-ENTMCNC: 31.3 G/DL (ref 31.5–35.7)
MCV RBC AUTO: 93.8 FL (ref 79–97)
MONOCYTES # BLD AUTO: 1.34 10*3/MM3 (ref 0.1–0.9)
MONOCYTES NFR BLD AUTO: 9.2 % (ref 5–12)
NEUTROPHILS NFR BLD AUTO: 11.86 10*3/MM3 (ref 1.7–7)
NEUTROPHILS NFR BLD AUTO: 81 % (ref 42.7–76)
NRBC BLD AUTO-RTO: 0 /100 WBC (ref 0–0.2)
PLATELET # BLD AUTO: 399 10*3/MM3 (ref 140–450)
PMV BLD AUTO: 9.9 FL (ref 6–12)
POTASSIUM SERPL-SCNC: 4.2 MMOL/L (ref 3.5–5.2)
RBC # BLD AUTO: 4.81 10*6/MM3 (ref 3.77–5.28)
SODIUM SERPL-SCNC: 142 MMOL/L (ref 136–145)
WBC NRBC COR # BLD AUTO: 14.64 10*3/MM3 (ref 3.4–10.8)

## 2025-01-04 PROCEDURE — 94618 PULMONARY STRESS TESTING: CPT

## 2025-01-04 PROCEDURE — 94664 DEMO&/EVAL PT USE INHALER: CPT

## 2025-01-04 PROCEDURE — 83735 ASSAY OF MAGNESIUM: CPT | Performed by: PHYSICIAN ASSISTANT

## 2025-01-04 PROCEDURE — 94799 UNLISTED PULMONARY SVC/PX: CPT

## 2025-01-04 PROCEDURE — 80048 BASIC METABOLIC PNL TOTAL CA: CPT | Performed by: PHYSICIAN ASSISTANT

## 2025-01-04 PROCEDURE — 85025 COMPLETE CBC W/AUTO DIFF WBC: CPT | Performed by: PHYSICIAN ASSISTANT

## 2025-01-04 PROCEDURE — 94761 N-INVAS EAR/PLS OXIMETRY MLT: CPT

## 2025-01-04 PROCEDURE — 25010000002 METHYLPREDNISOLONE PER 125 MG: Performed by: PHYSICIAN ASSISTANT

## 2025-01-04 RX ORDER — GUAIFENESIN/DEXTROMETHORPHAN 100-10MG/5
5 SYRUP ORAL EVERY 4 HOURS PRN
Status: DISCONTINUED | OUTPATIENT
Start: 2025-01-04 | End: 2025-01-04 | Stop reason: HOSPADM

## 2025-01-04 RX ORDER — BENZONATATE 200 MG/1
200 CAPSULE ORAL 3 TIMES DAILY PRN
Qty: 21 CAPSULE | Refills: 0 | Status: SHIPPED | OUTPATIENT
Start: 2025-01-04 | End: 2025-01-16 | Stop reason: HOSPADM

## 2025-01-04 RX ORDER — GUAIFENESIN/DEXTROMETHORPHAN 100-10MG/5
5 SYRUP ORAL EVERY 4 HOURS PRN
Qty: 237 ML | Refills: 0 | Status: SHIPPED | OUTPATIENT
Start: 2025-01-04 | End: 2025-01-16 | Stop reason: HOSPADM

## 2025-01-04 RX ADMIN — METHYLPREDNISOLONE SODIUM SUCCINATE 62.5 MG: 125 INJECTION, POWDER, FOR SOLUTION INTRAMUSCULAR; INTRAVENOUS at 10:08

## 2025-01-04 RX ADMIN — ACETAMINOPHEN 650 MG: 325 TABLET, FILM COATED ORAL at 06:20

## 2025-01-04 RX ADMIN — Medication 10 ML: at 10:08

## 2025-01-04 RX ADMIN — BENZONATATE 200 MG: 100 CAPSULE ORAL at 10:08

## 2025-01-04 RX ADMIN — HYDROXYCHLOROQUINE SULFATE 200 MG: 200 TABLET, FILM COATED ORAL at 10:08

## 2025-01-04 RX ADMIN — TRAMADOL HYDROCHLORIDE 50 MG: 50 TABLET, COATED ORAL at 02:01

## 2025-01-04 RX ADMIN — IPRATROPIUM BROMIDE AND ALBUTEROL SULFATE 3 ML: .5; 3 SOLUTION RESPIRATORY (INHALATION) at 06:47

## 2025-01-04 RX ADMIN — PANTOPRAZOLE SODIUM 40 MG: 40 TABLET, DELAYED RELEASE ORAL at 05:10

## 2025-01-04 RX ADMIN — RIMEGEPANT SULFATE 75 MG: 75 TABLET, ORALLY DISINTEGRATING ORAL at 11:56

## 2025-01-04 RX ADMIN — IPRATROPIUM BROMIDE AND ALBUTEROL SULFATE 3 ML: .5; 3 SOLUTION RESPIRATORY (INHALATION) at 14:08

## 2025-01-04 RX ADMIN — GUAIFENESIN AND DEXTROMETHORPHAN 5 ML: 100; 10 SYRUP ORAL at 10:08

## 2025-01-04 RX ADMIN — ATORVASTATIN CALCIUM 40 MG: 40 TABLET, FILM COATED ORAL at 10:08

## 2025-01-04 RX ADMIN — APIXABAN 5 MG: 5 TABLET, FILM COATED ORAL at 10:08

## 2025-01-04 NOTE — PLAN OF CARE
Goal Outcome Evaluation:  Plan of Care Reviewed With: patient        Progress: improving  Outcome Evaluation: Pt d/c home with HH.

## 2025-01-04 NOTE — NURSING NOTE
Pt feels like she is having panic attacks.  Its resolved at this time however pt advised to talk to her DrGautam about these.

## 2025-01-04 NOTE — DISCHARGE SUMMARY
"             [facility] Medicine Services  Discharge Summary    Date of Service: 25  Patient Name: Annalisa Bhagat  : 1950  MRN: [id number]    Date of Admission: 2025  Discharge Diagnosis: Acute bronchitis secondary to influenza a infection  Date of Discharge:  25  Primary Care Physician: Caleb Whitney MD    Presenting Problem:   Acute bronchitis [J20.9]  Influenza [J11.1]  Acute bronchitis, unspecified organism [J20.9]  COVID [U07.1]  Influenza A [J10.1]    Active and Resolved Hospital Problems:  Active Hospital Problems    Diagnosis POA    **Acute bronchitis [J20.9] Yes    Influenza A [J10.1] Yes      Resolved Hospital Problems   No resolved problems to display.         Hospital Course     HPI:  Per the H&P written by Amanda Duval, dated 1/3/25:  \"Annalisa Bhagat is a 74 y.o. female with a CMH of fibromyalgia, GERD, HLD, Lupus, RA who presented to [facility] on 2025 with shortness of breath, body aches, fatigue, dysuria. She was around her son recently who was also sick. She was admitted under observation status. Her respiratory pathogen panel was positive for Coronavirus NL63 and Flu A. WBC 11.46, UA negative. CXR showed no acute process. Potassium was 3.3 on admit but was replaced and now at 4.8. Patient meeting inpatient criteria. Hospitalist team to admit for further medical management. \"    Hospital Course:  Patient admitted as above. Patient treated with supportive care for acute bronchitis with overall improvement in respiratory status. Chest xray clear and procal negative in setting of positive viral panel; thus, antibiotics deferred. Patient underwent walking oximetry which did not demonstrate need for home supplemental oxygen. Appropriate for discharge with outpatient follow up.     DISCHARGE Follow Up Recommendations for labs and diagnostics:   - Follow up with PCP within 2 weeks  - Home health referral placed    Day of Discharge     Vital Signs:  Heart Rate:  " [78-80] 78  Resp:  [20] 20    Physical Exam:   General: No acute distress  Neuro: AAOx3, no FND  CV: RRR, no peripheral edema  Pulm: Minimal expiratory wheezing, no increased work of breathing  Abd: Soft, nontender, nondistended  Skin: No rashes or lesions on exposed skin  Psych: Anxious mood and affect    Pertinent  and/or Most Recent Results     LAB RESULTS:      Lab 01/04/25  0156 01/03/25  0348 01/02/25  1326 01/02/25  1311   WBC 14.64* 11.46*  --  7.64   HEMOGLOBIN 14.1 13.9  --  14.3   HEMATOCRIT 45.1 41.7  --  45.2   PLATELETS 399 297  --  275   NEUTROS ABS 11.86* 9.82*  --  5.05   IMMATURE GRANS (ABS) 0.07* 0.07*  --  0.03   LYMPHS ABS 1.35 0.89  --  1.20   MONOS ABS 1.34* 0.65  --  1.04*   EOS ABS 0.00 0.00  --  0.29   MCV 93.8 93.3  --  94.2   LACTATE  --   --  1.3  --          Lab 01/04/25  0156 01/03/25  0348 01/02/25  1311   SODIUM 142 138 138   POTASSIUM 4.2 4.8 3.3*   CHLORIDE 103 102 97*   CO2 27.1 27.4 29.8*   ANION GAP 11.9 8.6 11.2   BUN 18 14 13   CREATININE 0.74 0.57 0.71   EGFR 84.0 94.3 88.2   GLUCOSE 113* 135* 133*   CALCIUM 9.4 9.0 9.0   MAGNESIUM 2.4 2.4  --          Lab 01/02/25  1311   TOTAL PROTEIN 6.9   ALBUMIN 3.9   GLOBULIN 3.0   ALT (SGPT) 23   AST (SGOT) 35*   BILIRUBIN 0.3   ALK PHOS 119*         Lab 01/02/25  1436 01/02/25  1311   PROBNP  --  152.0   HSTROP T 17* 21*                 Brief Urine Lab Results  (Last result in the past 365 days)        Color   Clarity   Blood   Leuk Est   Nitrite   Protein   CREAT   Urine HCG        01/02/25 1743 Yellow   Clear   Negative   Negative   Negative   Negative                 Microbiology Results (last 10 days)       Procedure Component Value - Date/Time    Respiratory Panel PCR w/COVID-19(SARS-CoV-2) FRANCESCO/SALLY/LUCILLE/PAD/COR/EMIL In-House, NP Swab in UTM/VTM, 2 HR TAT - Swab, Nasopharynx [[id number]]  (Abnormal) Collected: 01/02/25 1325    Lab Status: Final result Specimen: Swab from Nasopharynx Updated: 01/02/25 1415     ADENOVIRUS, PCR Not  Detected     Coronavirus 229E Not Detected     Coronavirus HKU1 Not Detected     Coronavirus NL63 Detected     Coronavirus OC43 Not Detected     COVID19 Not Detected     Human Metapneumovirus Not Detected     Human Rhinovirus/Enterovirus Not Detected     Influenza A H3 Detected     Influenza B PCR Not Detected     Parainfluenza Virus 1 Not Detected     Parainfluenza Virus 2 Not Detected     Parainfluenza Virus 3 Not Detected     Parainfluenza Virus 4 Not Detected     RSV, PCR Not Detected     Bordetella pertussis pcr Not Detected     Bordetella parapertussis PCR Not Detected     Chlamydophila pneumoniae PCR Not Detected     Mycoplasma pneumo by PCR Not Detected    Narrative:      In the setting of a positive respiratory panel with a viral infection PLUS a negative procalcitonin without other underlying concern for bacterial infection, consider observing off antibiotics or discontinuation of antibiotics and continue supportive care. If the respiratory panel is positive for atypical bacterial infection (Bordetella pertussis, Chlamydophila pneumoniae, or Mycoplasma pneumoniae), consider antibiotic de-escalation to target atypical bacterial infection.    Blood Culture - Blood, Arm, Left [[id number]]  (Normal) Collected: 01/02/25 1323    Lab Status: Preliminary result Specimen: Blood from Arm, Left Updated: 01/04/25 1330     Blood Culture No growth at 2 days    Blood Culture - Blood, Arm, Right [[id number]]  (Normal) Collected: 01/02/25 1323    Lab Status: Preliminary result Specimen: Blood from Arm, Right Updated: 01/04/25 1330     Blood Culture No growth at 2 days    Narrative:      Less than seven (7) mL's of blood was collected.  Insufficient quantity may yield false negative results.          XR Chest 1 View    Result Date: 1/2/2025  Impression: Impression: No acute cardiopulmonary findings. Electronically Signed: Alisha Dennison MD  1/2/2025 1:53 PM EST  Workstation ID: [id number]           Labs Pending at Discharge:    Pending Results       None            Procedures Performed     01/04 1124 Walking Oximetry    Consults:   Consults       No orders found from 12/4/2024 to 1/3/2025.            Discharge Details        Discharge Medications        New Medications        Instructions Start Date   benzonatate 200 MG capsule  Commonly known as: TESSALON   200 mg, Oral, 3 Times Daily PRN      guaiFENesin-dextromethorphan 100-10 MG/5ML syrup  Commonly known as: ROBITUSSIN DM   5 mL, Oral, Every 4 Hours PRN             Continue These Medications        Instructions Start Date   apixaban 5 MG tablet tablet  Commonly known as: ELIQUIS   5 mg, Oral, Every 12 Hours Scheduled, Resume 6/15/29      Armodafinil 250 MG tablet   250 mg, Oral, Daily      atorvastatin 40 MG tablet  Commonly known as: LIPITOR   40 mg, Daily      Azelastine-Fluticasone 137-50 MCG/ACT suspension   1-2 sprays      buPROPion  MG 24 hr tablet  Commonly known as: WELLBUTRIN XL   150 mg, Oral, Daily      buPROPion  MG 24 hr tablet  Commonly known as: WELLBUTRIN XL   300 mg, Oral, Daily      calcium citrate 950 (200 Ca) MG tablet  Commonly known as: CALCITRATE   1 tablet      denosumab 60 MG/ML solution prefilled syringe syringe  Commonly known as: PROLIA   60 mg, Subcutaneous, Every 6 Months      DULoxetine 60 MG capsule  Commonly known as: CYMBALTA   60 mg, Oral, Nightly      fluocinonide 0.05 % gel  Commonly known as: LIDEX   PRN      furosemide 40 MG tablet  Commonly known as: LASIX   40 mg, 2 Times Daily PRN      gabapentin 600 MG tablet  Commonly known as: NEURONTIN   600 mg, Oral, Take As Directed, 1 PO Q AM, 2 PO Q PM      hydrocortisone 2.5 % cream   As Needed      hydroxychloroquine 200 MG tablet  Commonly known as: PLAQUENIL   1 tablet, 2 Times Daily      hydrOXYzine 25 MG tablet  Commonly known as: ATARAX   25 mg, Oral      loratadine 10 MG tablet  Commonly known as: CLARITIN   10 mg, Oral, As Needed      Melatonin 10 MG tablet   1 tablet, Nightly       methocarbamol 750 MG tablet  Commonly known as: ROBAXIN   2 tablets, Nightly      omeprazole 20 MG capsule  Commonly known as: priLOSEC   20 mg, Nightly      phenazopyridine 100 MG tablet  Commonly known as: PYRIDIUM   100 mg, 3 Times Daily PRN      PRESERVISION AREDS 2 PO   Take  by mouth.      promethazine 25 MG tablet  Commonly known as: PHENERGAN   25 mg, As Needed      pyridoxine 200 MG tablet  Commonly known as: VITAMIN B-6   200 mg, Daily      rimegepant sulfate 75 MG tablet  Commonly known as: NURTEC   1 tablet, Daily PRN      traMADol 50 MG tablet  Commonly known as: ULTRAM   100 mg, Every Early Morning      triamcinolone 0.025 % cream  Commonly known as: KENALOG   As Needed      VITAMIN B COMPLEX PO   1 each, Daily      vitamin B-12 1000 MCG tablet  Commonly known as: CYANOCOBALAMIN   1,000 mcg      vitamin C 250 MG tablet  Commonly known as: ASCORBIC ACID   1,000 mg, Daily      vitamin D3 125 MCG (5000 UT) capsule capsule   5,000 Units, Daily               Allergies   Allergen Reactions    Shingrix [Zoster Vac Recomb Adjuvanted] Hallucinations     Fever, chills, redness swelling at injection site    Zolmitriptan Hemorrhagic stroke     Hx of stroke     Adhesive Tape Itching     Paper tape - redness and itching.     Hydromorphone Itching and Rash    Amoxicillin-Pot Clavulanate Itching and Rash       Discharge Disposition:   Home    Diet:  Heart healthy    Discharge Activity:   As tolerated    CODE STATUS:  Code Status and Medical Interventions: CPR (Attempt to Resuscitate); Full Support   Ordered at: 01/03/25 0728     Code Status (Patient has no pulse and is not breathing):    CPR (Attempt to Resuscitate)     Medical Interventions (Patient has pulse or is breathing):    Full Support       No future appointments.    Additional Instructions for the Follow-ups that You Need to Schedule       Ambulatory Referral to Home Health   As directed      Face to Face Visit Date: 1/4/2025   Follow-up provider for Plan  of Care?: I treated the patient in an acute care facility and will not continue treatment after discharge.   Follow-up provider: ANDIE VELASQUEZ [[id number]]   Reason/Clinical Findings: weakness   Describe mobility limitations that make leaving home difficult: weakness   Nursing/Therapeutic Services Requested: Physical Therapy   PT orders: Therapeutic exercise Strengthening   Frequency: 1 Week 1        Discharge Follow-up with PCP   As directed       Currently Documented PCP:    Andie Velasquez MD    PCP Phone Number:    [phone]     Follow Up Details: Follow up with PCP within 2 weeks of discharge                Time spent on Discharge including face to face service:  >30 minutes    Signature: Electronically signed by Nara Castillo MD, 01/05/25, 12:11 [facility].  [facility] Hospitalist Team

## 2025-01-04 NOTE — PLAN OF CARE
Problem: Adult Inpatient Plan of Care  Goal: Plan of Care Review  Outcome: Progressing  Flowsheets (Taken 1/4/2025 0359)  Outcome Evaluation: PT DOES NOT FEEL READY TO GO HOME YET  Goal: Patient-Specific Goal (Individualized)  Outcome: Progressing  Goal: Absence of Hospital-Acquired Illness or Injury  Outcome: Progressing  Intervention: Identify and Manage Fall Risk  Recent Flowsheet Documentation  Taken 1/4/2025 0200 by Neha Greenfield RN  Safety Promotion/Fall Prevention:   safety round/check completed   nonskid shoes/slippers when out of bed   lighting adjusted  Taken 1/4/2025 0000 by Neha Greenfield RN  Safety Promotion/Fall Prevention:   safety round/check completed   nonskid shoes/slippers when out of bed   lighting adjusted  Taken 1/3/2025 2200 by Neha Greenfield RN  Safety Promotion/Fall Prevention:   safety round/check completed   nonskid shoes/slippers when out of bed   lighting adjusted  Taken 1/3/2025 2000 by Neha Greenfield RN  Safety Promotion/Fall Prevention:   safety round/check completed   lighting adjusted   nonskid shoes/slippers when out of bed  Goal: Optimal Comfort and Wellbeing  Outcome: Progressing  Intervention: Monitor Pain and Promote Comfort  Recent Flowsheet Documentation  Taken 1/4/2025 0201 by Neha Greenfield RN  Pain Management Interventions: pain medication given  Taken 1/3/2025 2109 by Neha Greenfield RN  Pain Management Interventions: (REC'D NURTEC FROM PHARMACY) pain medication given  Taken 1/3/2025 2000 by Neha Greenfield RN  Pain Management Interventions: (dayshift nurse requested nurtec from pharmacy; still awaiting this medication.) other (see comments)  Goal: Readiness for Transition of Care  Outcome: Progressing   Goal Outcome Evaluation:              Outcome Evaluation: PT DOES NOT FEEL READY TO GO HOME YET

## 2025-01-04 NOTE — NURSING NOTE
Pt selin irritated about getting her Nurtec  Dayshift nurse informed this nsg that she had requested the dose prior to shift change.  Requested the dose again at this time.

## 2025-01-04 NOTE — PROCEDURES
Exercise Oximetry    Patient Name:Annalisa Bhagat   MRN: [id number]   Date: 01/04/25             ROOM AIR BASELINE   SpO2%               95   Heart Rate    Blood Pressure      EXERCISE ON ROOM AIR SpO2% EXERCISE ON O2 @  LPM SpO2%   1 MINUTE 95 1 MINUTE    2 MINUTES 94 2 MINUTES    3 MINUTES 91 3 MINUTES    4 MINUTES 90 4 MINUTES    5 MINUTES 90 5 MINUTES    6 MINUTES 90 6 MINUTES               Distance Walked   Distance Walked   Dyspnea (Burton Scale)   Dyspnea (Burton Scale)   Fatigue (Burton Scale)   Fatigue (Burton Scale)   SpO2% Post Exercise   SpO2% Post Exercise   HR Post Exercise   HR Post Exercise   Time to Recovery   Time to Recovery     Comments:

## 2025-01-05 NOTE — OUTREACH NOTE
Prep Survey      Flowsheet Row Responses   [facility] patient discharged from? [facility]   Is LACE score < 7 ? No   Eligibility Readm Mgmt   Discharge diagnosis Acute bronchitis,  Influenza A   Does the patient have one of the following disease processes/diagnoses(primary or secondary)? Other   Does the patient have Home health ordered? No   Is there a DME ordered? No   Prep survey completed? Yes            Ivana RAO - Registered Nurse

## 2025-01-07 LAB
BACTERIA SPEC AEROBE CULT: NORMAL
BACTERIA SPEC AEROBE CULT: NORMAL

## 2025-01-08 ENCOUNTER — READMISSION MANAGEMENT (OUTPATIENT)
Dept: CALL CENTER | Facility: HOSPITAL | Age: 75
End: 2025-01-08
Payer: MEDICARE

## 2025-01-08 NOTE — OUTREACH NOTE
Medical Week 1 Survey      Flowsheet Row Responses   [facility] patient discharged from? [facility]   Does the patient have one of the following disease processes/diagnoses(primary or secondary)? Other   Week 1 attempt successful? Yes   Call start time 1754   Call end time 1801   Discharge diagnosis Acute bronchitis,  Influenza A   Meds reviewed with patient/caregiver? Yes   Is the patient having any side effects they believe may be caused by any medication additions or changes? No   Does the patient have all medications ordered at discharge? Yes   Is the patient taking all medications as directed (includes completed medication regime)? Yes   Does the patient have a primary care provider?  Yes   Does the patient have an appointment with their PCP within 7 days of discharge? No   What is preventing the patient from scheduling follow up appointments within 7 days of discharge? Waiting on return call   Has the patient kept scheduled appointments due by today? N/A   Home health comments daughter called PCP regarding getting her home health   Psychosocial issues? No   Did the patient receive a copy of their discharge instructions? Yes   Nursing interventions Reviewed instructions with patient   What is the patient's perception of their health status since discharge? Same   Is the patient/caregiver able to teach back signs and symptoms related to disease process for when to call PCP? Yes   Is the patient/caregiver able to teach back signs and symptoms related to disease process for when to call 911? Yes   Is the patient/caregiver able to teach back the hierarchy of who to call/visit for symptoms/problems? PCP, Specialist, Home health nurse, Urgent Care, ED, 911 Yes   If the patient is a current smoker, are they able to teach back resources for cessation? Not a smoker   Additional teach back comments Pt states her daughter has contacted PCP.  She states she was coughing and couldn't get mucous up and called an ambulance.   They gave her an alburterol treatment and it helped and they did not transport her to hospital because they would not go to [facility].  Pt was advised if she is feeling worse and oxygen levels are dropping below 90% she need to go to ED even if it is not [facility].  States she has had a fever.  Does not have a themometer but states she know when she has one.  Advised to contact PCP.  She has been taking tylenol for it.   Week 1 call completed? Yes   Wrap up additional comments If symptoms worsen, she is to go to ED.  Pt to contact PCP regarding fever and still having issues with her breathing and coughing.   Call end time 1801            Olga VEGA - Licensed Nurse

## 2025-01-09 ENCOUNTER — APPOINTMENT (OUTPATIENT)
Dept: GENERAL RADIOLOGY | Facility: HOSPITAL | Age: 75
DRG: 193 | End: 2025-01-09
Payer: MEDICARE

## 2025-01-09 ENCOUNTER — HOSPITAL ENCOUNTER (INPATIENT)
Facility: HOSPITAL | Age: 75
LOS: 7 days | Discharge: NURSING, REHAB OR LONG-TERM CARE | DRG: 193 | End: 2025-01-16
Attending: INTERNAL MEDICINE | Admitting: INTERNAL MEDICINE
Payer: MEDICARE

## 2025-01-09 ENCOUNTER — READMISSION MANAGEMENT (OUTPATIENT)
Dept: CALL CENTER | Facility: HOSPITAL | Age: 75
End: 2025-01-09
Payer: MEDICARE

## 2025-01-09 ENCOUNTER — APPOINTMENT (OUTPATIENT)
Dept: CT IMAGING | Facility: HOSPITAL | Age: 75
DRG: 193 | End: 2025-01-09
Payer: MEDICARE

## 2025-01-09 DIAGNOSIS — J18.9 PNEUMONIA OF BOTH LOWER LOBES DUE TO INFECTIOUS ORGANISM: ICD-10-CM

## 2025-01-09 DIAGNOSIS — J18.9 PNEUMONIA DUE TO INFECTIOUS ORGANISM, UNSPECIFIED LATERALITY, UNSPECIFIED PART OF LUNG: ICD-10-CM

## 2025-01-09 DIAGNOSIS — R06.00 DYSPNEA, UNSPECIFIED TYPE: Primary | ICD-10-CM

## 2025-01-09 DIAGNOSIS — T17.800A MULTIPLE TRACHEOBRONCHIAL MUCUS PLUGS: ICD-10-CM

## 2025-01-09 PROBLEM — E66.813 CLASS 3 OBESITY: Status: ACTIVE | Noted: 2025-01-09

## 2025-01-09 PROBLEM — E87.6 HYPOKALEMIA: Status: ACTIVE | Noted: 2025-01-09

## 2025-01-09 PROBLEM — Z79.01 CHRONIC ANTICOAGULATION: Status: ACTIVE | Noted: 2025-01-09

## 2025-01-09 PROBLEM — E78.2 MIXED HYPERLIPIDEMIA: Status: ACTIVE | Noted: 2025-01-09

## 2025-01-09 LAB
ALBUMIN SERPL-MCNC: 3.1 G/DL (ref 3.5–5.2)
ALBUMIN/GLOB SERPL: 0.9 G/DL
ALP SERPL-CCNC: 144 U/L (ref 39–117)
ALT SERPL W P-5'-P-CCNC: 39 U/L (ref 1–33)
ANION GAP SERPL CALCULATED.3IONS-SCNC: 9.6 MMOL/L (ref 5–15)
AST SERPL-CCNC: 50 U/L (ref 1–32)
BACTERIA UR QL AUTO: ABNORMAL /HPF
BASOPHILS # BLD AUTO: 0.07 10*3/MM3 (ref 0–0.2)
BASOPHILS NFR BLD AUTO: 0.4 % (ref 0–1.5)
BILIRUB SERPL-MCNC: 0.4 MG/DL (ref 0–1.2)
BILIRUB UR QL STRIP: NEGATIVE
BUN SERPL-MCNC: 11 MG/DL (ref 8–23)
BUN/CREAT SERPL: 16.2 (ref 7–25)
CALCIUM SPEC-SCNC: 8.7 MG/DL (ref 8.6–10.5)
CHLORIDE SERPL-SCNC: 100 MMOL/L (ref 98–107)
CLARITY UR: CLEAR
CO2 SERPL-SCNC: 27.4 MMOL/L (ref 22–29)
COLOR UR: YELLOW
CREAT SERPL-MCNC: 0.68 MG/DL (ref 0.57–1)
D-LACTATE SERPL-SCNC: 1.2 MMOL/L (ref 0.3–2)
DEPRECATED RDW RBC AUTO: 46 FL (ref 37–54)
EGFRCR SERPLBLD CKD-EPI 2021: 90.4 ML/MIN/1.73
EOSINOPHIL # BLD AUTO: 0.23 10*3/MM3 (ref 0–0.4)
EOSINOPHIL NFR BLD AUTO: 1.4 % (ref 0.3–6.2)
ERYTHROCYTE [DISTWIDTH] IN BLOOD BY AUTOMATED COUNT: 13.8 % (ref 12.3–15.4)
GEN 5 1HR TROPONIN T REFLEX: 19 NG/L
GLOBULIN UR ELPH-MCNC: 3.5 GM/DL
GLUCOSE SERPL-MCNC: 103 MG/DL (ref 65–99)
GLUCOSE UR STRIP-MCNC: NEGATIVE MG/DL
HCT VFR BLD AUTO: 43.3 % (ref 34–46.6)
HGB BLD-MCNC: 14.3 G/DL (ref 12–15.9)
HGB UR QL STRIP.AUTO: NEGATIVE
HOLD SPECIMEN: NORMAL
HYALINE CASTS UR QL AUTO: ABNORMAL /LPF
IMM GRANULOCYTES # BLD AUTO: 0.18 10*3/MM3 (ref 0–0.05)
IMM GRANULOCYTES NFR BLD AUTO: 1.1 % (ref 0–0.5)
KETONES UR QL STRIP: NEGATIVE
LEUKOCYTE ESTERASE UR QL STRIP.AUTO: ABNORMAL
LYMPHOCYTES # BLD AUTO: 1.63 10*3/MM3 (ref 0.7–3.1)
LYMPHOCYTES NFR BLD AUTO: 10.1 % (ref 19.6–45.3)
MAGNESIUM SERPL-MCNC: 2.1 MG/DL (ref 1.6–2.4)
MCH RBC QN AUTO: 30 PG (ref 26.6–33)
MCHC RBC AUTO-ENTMCNC: 33 G/DL (ref 31.5–35.7)
MCV RBC AUTO: 90.8 FL (ref 79–97)
MONOCYTES # BLD AUTO: 1.35 10*3/MM3 (ref 0.1–0.9)
MONOCYTES NFR BLD AUTO: 8.3 % (ref 5–12)
MRSA DNA SPEC QL NAA+PROBE: NORMAL
NEUTROPHILS NFR BLD AUTO: 12.74 10*3/MM3 (ref 1.7–7)
NEUTROPHILS NFR BLD AUTO: 78.7 % (ref 42.7–76)
NITRITE UR QL STRIP: NEGATIVE
NRBC BLD AUTO-RTO: 0 /100 WBC (ref 0–0.2)
NT-PROBNP SERPL-MCNC: 118 PG/ML (ref 0–1800)
PH UR STRIP.AUTO: 6.5 [PH] (ref 5–8)
PLATELET # BLD AUTO: 421 10*3/MM3 (ref 140–450)
PMV BLD AUTO: 10.4 FL (ref 6–12)
POTASSIUM SERPL-SCNC: 3.4 MMOL/L (ref 3.5–5.2)
PROCALCITONIN SERPL-MCNC: 0.1 NG/ML (ref 0–0.25)
PROT SERPL-MCNC: 6.6 G/DL (ref 6–8.5)
PROT UR QL STRIP: NEGATIVE
RBC # BLD AUTO: 4.77 10*6/MM3 (ref 3.77–5.28)
RBC # UR STRIP: ABNORMAL /HPF
REF LAB TEST METHOD: ABNORMAL
SODIUM SERPL-SCNC: 137 MMOL/L (ref 136–145)
SP GR UR STRIP: 1.01 (ref 1–1.03)
SQUAMOUS #/AREA URNS HPF: ABNORMAL /HPF
TROPONIN T % DELTA: -5 %
TROPONIN T NUMERIC DELTA: -1 NG/L
TROPONIN T SERPL HS-MCNC: 20 NG/L
UROBILINOGEN UR QL STRIP: ABNORMAL
WBC # UR STRIP: ABNORMAL /HPF
WBC NRBC COR # BLD AUTO: 16.2 10*3/MM3 (ref 3.4–10.8)
WHOLE BLOOD HOLD COAG: NORMAL

## 2025-01-09 PROCEDURE — 70450 CT HEAD/BRAIN W/O DYE: CPT

## 2025-01-09 PROCEDURE — 94761 N-INVAS EAR/PLS OXIMETRY MLT: CPT

## 2025-01-09 PROCEDURE — 87040 BLOOD CULTURE FOR BACTERIA: CPT

## 2025-01-09 PROCEDURE — 85025 COMPLETE CBC W/AUTO DIFF WBC: CPT

## 2025-01-09 PROCEDURE — 83735 ASSAY OF MAGNESIUM: CPT

## 2025-01-09 PROCEDURE — 71046 X-RAY EXAM CHEST 2 VIEWS: CPT

## 2025-01-09 PROCEDURE — 81001 URINALYSIS AUTO W/SCOPE: CPT

## 2025-01-09 PROCEDURE — 94799 UNLISTED PULMONARY SVC/PX: CPT

## 2025-01-09 PROCEDURE — 99285 EMERGENCY DEPT VISIT HI MDM: CPT

## 2025-01-09 PROCEDURE — 80053 COMPREHEN METABOLIC PANEL: CPT

## 2025-01-09 PROCEDURE — 83605 ASSAY OF LACTIC ACID: CPT

## 2025-01-09 PROCEDURE — 93005 ELECTROCARDIOGRAM TRACING: CPT

## 2025-01-09 PROCEDURE — 84145 PROCALCITONIN (PCT): CPT

## 2025-01-09 PROCEDURE — 83880 ASSAY OF NATRIURETIC PEPTIDE: CPT

## 2025-01-09 PROCEDURE — 84484 ASSAY OF TROPONIN QUANT: CPT

## 2025-01-09 PROCEDURE — 94640 AIRWAY INHALATION TREATMENT: CPT

## 2025-01-09 PROCEDURE — 93005 ELECTROCARDIOGRAM TRACING: CPT | Performed by: INTERNAL MEDICINE

## 2025-01-09 PROCEDURE — 87641 MR-STAPH DNA AMP PROBE: CPT

## 2025-01-09 PROCEDURE — 25010000002 CEFEPIME PER 500 MG

## 2025-01-09 PROCEDURE — 36415 COLL VENOUS BLD VENIPUNCTURE: CPT

## 2025-01-09 RX ORDER — VENLAFAXINE HYDROCHLORIDE 150 MG/1
150 CAPSULE, EXTENDED RELEASE ORAL DAILY
COMMUNITY

## 2025-01-09 RX ORDER — IPRATROPIUM BROMIDE AND ALBUTEROL SULFATE 2.5; .5 MG/3ML; MG/3ML
3 SOLUTION RESPIRATORY (INHALATION)
Status: DISCONTINUED | OUTPATIENT
Start: 2025-01-09 | End: 2025-01-16 | Stop reason: HOSPADM

## 2025-01-09 RX ORDER — DIPHENOXYLATE HYDROCHLORIDE AND ATROPINE SULFATE 2.5; .025 MG/1; MG/1
1 TABLET ORAL DAILY
Status: DISCONTINUED | OUTPATIENT
Start: 2025-01-10 | End: 2025-01-16 | Stop reason: HOSPADM

## 2025-01-09 RX ORDER — SODIUM CHLORIDE 0.9 % (FLUSH) 0.9 %
10 SYRINGE (ML) INJECTION AS NEEDED
Status: DISCONTINUED | OUTPATIENT
Start: 2025-01-09 | End: 2025-01-16 | Stop reason: HOSPADM

## 2025-01-09 RX ORDER — ASPIRIN 81 MG/1
81 TABLET ORAL DAILY
Status: DISCONTINUED | OUTPATIENT
Start: 2025-01-10 | End: 2025-01-16 | Stop reason: HOSPADM

## 2025-01-09 RX ORDER — ACETAMINOPHEN 650 MG/1
650 SUPPOSITORY RECTAL EVERY 4 HOURS PRN
Status: DISCONTINUED | OUTPATIENT
Start: 2025-01-09 | End: 2025-01-16 | Stop reason: HOSPADM

## 2025-01-09 RX ORDER — ACETAMINOPHEN 325 MG/1
650 TABLET ORAL EVERY 4 HOURS PRN
Status: DISCONTINUED | OUTPATIENT
Start: 2025-01-09 | End: 2025-01-16 | Stop reason: HOSPADM

## 2025-01-09 RX ORDER — VENLAFAXINE HYDROCHLORIDE 75 MG/1
150 CAPSULE, EXTENDED RELEASE ORAL DAILY
Status: DISCONTINUED | OUTPATIENT
Start: 2025-01-10 | End: 2025-01-16 | Stop reason: HOSPADM

## 2025-01-09 RX ORDER — GUAIFENESIN 600 MG/1
600 TABLET, EXTENDED RELEASE ORAL EVERY 12 HOURS SCHEDULED
Status: DISCONTINUED | OUTPATIENT
Start: 2025-01-10 | End: 2025-01-16 | Stop reason: HOSPADM

## 2025-01-09 RX ORDER — AZITHROMYCIN 250 MG/1
500 TABLET, FILM COATED ORAL
Status: DISCONTINUED | OUTPATIENT
Start: 2025-01-10 | End: 2025-01-14

## 2025-01-09 RX ORDER — METHYLPHENIDATE HYDROCHLORIDE 20 MG/1
20 TABLET ORAL DAILY
COMMUNITY
Start: 2024-10-29 | End: 2025-01-16 | Stop reason: HOSPADM

## 2025-01-09 RX ORDER — ACETAMINOPHEN 160 MG/5ML
650 SOLUTION ORAL EVERY 4 HOURS PRN
Status: DISCONTINUED | OUTPATIENT
Start: 2025-01-09 | End: 2025-01-16 | Stop reason: HOSPADM

## 2025-01-09 RX ORDER — PANTOPRAZOLE SODIUM 40 MG/1
40 TABLET, DELAYED RELEASE ORAL
Status: DISCONTINUED | OUTPATIENT
Start: 2025-01-10 | End: 2025-01-16 | Stop reason: HOSPADM

## 2025-01-09 RX ORDER — BUPROPION HYDROCHLORIDE 150 MG/1
300 TABLET ORAL DAILY
Status: DISCONTINUED | OUTPATIENT
Start: 2025-01-10 | End: 2025-01-10

## 2025-01-09 RX ORDER — ONDANSETRON 4 MG/1
4 TABLET, ORALLY DISINTEGRATING ORAL EVERY 6 HOURS PRN
Status: DISCONTINUED | OUTPATIENT
Start: 2025-01-09 | End: 2025-01-16 | Stop reason: HOSPADM

## 2025-01-09 RX ORDER — SODIUM CHLORIDE 0.9 % (FLUSH) 0.9 %
10 SYRINGE (ML) INJECTION EVERY 12 HOURS SCHEDULED
Status: DISCONTINUED | OUTPATIENT
Start: 2025-01-09 | End: 2025-01-16 | Stop reason: HOSPADM

## 2025-01-09 RX ORDER — ATORVASTATIN CALCIUM 40 MG/1
40 TABLET, FILM COATED ORAL NIGHTLY
Status: DISCONTINUED | OUTPATIENT
Start: 2025-01-10 | End: 2025-01-16 | Stop reason: HOSPADM

## 2025-01-09 RX ORDER — PREDNISONE 20 MG/1
40 TABLET ORAL
Status: DISCONTINUED | OUTPATIENT
Start: 2025-01-10 | End: 2025-01-16 | Stop reason: HOSPADM

## 2025-01-09 RX ORDER — BISACODYL 5 MG/1
5 TABLET, DELAYED RELEASE ORAL DAILY PRN
Status: DISCONTINUED | OUTPATIENT
Start: 2025-01-09 | End: 2025-01-16 | Stop reason: HOSPADM

## 2025-01-09 RX ORDER — IPRATROPIUM BROMIDE AND ALBUTEROL SULFATE 2.5; .5 MG/3ML; MG/3ML
3 SOLUTION RESPIRATORY (INHALATION) ONCE
Status: COMPLETED | OUTPATIENT
Start: 2025-01-09 | End: 2025-01-09

## 2025-01-09 RX ORDER — GUAIFENESIN 200 MG/10ML
200 LIQUID ORAL EVERY 4 HOURS PRN
Status: DISCONTINUED | OUTPATIENT
Start: 2025-01-09 | End: 2025-01-16 | Stop reason: HOSPADM

## 2025-01-09 RX ORDER — BISACODYL 10 MG
10 SUPPOSITORY, RECTAL RECTAL DAILY PRN
Status: DISCONTINUED | OUTPATIENT
Start: 2025-01-09 | End: 2025-01-16 | Stop reason: HOSPADM

## 2025-01-09 RX ORDER — ALUMINA, MAGNESIA, AND SIMETHICONE 2400; 2400; 240 MG/30ML; MG/30ML; MG/30ML
15 SUSPENSION ORAL EVERY 6 HOURS PRN
Status: DISCONTINUED | OUTPATIENT
Start: 2025-01-09 | End: 2025-01-16 | Stop reason: HOSPADM

## 2025-01-09 RX ORDER — ONDANSETRON 2 MG/ML
4 INJECTION INTRAMUSCULAR; INTRAVENOUS EVERY 6 HOURS PRN
Status: DISCONTINUED | OUTPATIENT
Start: 2025-01-09 | End: 2025-01-16 | Stop reason: HOSPADM

## 2025-01-09 RX ORDER — AMOXICILLIN 250 MG
2 CAPSULE ORAL 2 TIMES DAILY
Status: DISCONTINUED | OUTPATIENT
Start: 2025-01-09 | End: 2025-01-16 | Stop reason: HOSPADM

## 2025-01-09 RX ORDER — POLYETHYLENE GLYCOL 3350 17 G/17G
17 POWDER, FOR SOLUTION ORAL DAILY PRN
Status: DISCONTINUED | OUTPATIENT
Start: 2025-01-09 | End: 2025-01-16 | Stop reason: HOSPADM

## 2025-01-09 RX ORDER — POTASSIUM CHLORIDE 1500 MG/1
40 TABLET, EXTENDED RELEASE ORAL ONCE
Status: COMPLETED | OUTPATIENT
Start: 2025-01-09 | End: 2025-01-09

## 2025-01-09 RX ORDER — SODIUM CHLORIDE 9 MG/ML
40 INJECTION, SOLUTION INTRAVENOUS AS NEEDED
Status: DISCONTINUED | OUTPATIENT
Start: 2025-01-09 | End: 2025-01-16 | Stop reason: HOSPADM

## 2025-01-09 RX ORDER — POTASSIUM CHLORIDE 1.5 G/1.58G
40 POWDER, FOR SOLUTION ORAL ONCE
Status: DISCONTINUED | OUTPATIENT
Start: 2025-01-09 | End: 2025-01-16 | Stop reason: HOSPADM

## 2025-01-09 RX ADMIN — IPRATROPIUM BROMIDE AND ALBUTEROL SULFATE 3 ML: .5; 3 SOLUTION RESPIRATORY (INHALATION) at 23:43

## 2025-01-09 RX ADMIN — Medication 10 ML: at 21:58

## 2025-01-09 RX ADMIN — CEFEPIME 2000 MG: 2 INJECTION, POWDER, FOR SOLUTION INTRAVENOUS at 16:37

## 2025-01-09 RX ADMIN — IPRATROPIUM BROMIDE AND ALBUTEROL SULFATE 3 ML: .5; 3 SOLUTION RESPIRATORY (INHALATION) at 14:42

## 2025-01-09 RX ADMIN — POTASSIUM CHLORIDE 40 MEQ: 1500 TABLET, EXTENDED RELEASE ORAL at 18:19

## 2025-01-09 RX ADMIN — SENNOSIDES AND DOCUSATE SODIUM 2 TABLET: 50; 8.6 TABLET ORAL at 21:44

## 2025-01-09 NOTE — Clinical Note
Level of Care: Telemetry [5]   Admitting Physician: ANGELO COX [[id number]]   Attending Physician: ANGELO COX [[id number]]

## 2025-01-09 NOTE — ED PROVIDER NOTES
Subjective   History of Present Illness  74-year-old female with history of CVA on Eliquis, migraines, GERD presents the ED with complaints of worsening shortness of breath, productive cough with yellow sputum, nasal congestion, feeling that she has a fever but does not have a thermometer, lightheadedness feeling like she would pass out with exertion, fatigue, intermittent migraines that she takes Nurtec for.  Patient denies chest pain, abdominal pain, nausea vomiting diarrhea, syncope, thunderclap headache.  Reports she has been on Tessalon Perles and Mucinex DM since discharge on 1/4/2025 but has not been feeling any better.  She was admitted at that time for flu A and coronavirus NL63.  She reports she was told by her primary care provider to come to the ER as she might be developing pneumonia.    PCP: Devonte        Review of Systems   Constitutional:  Positive for chills, fatigue and fever.   Respiratory:  Positive for cough and shortness of breath.    Cardiovascular:  Negative for chest pain.   Gastrointestinal:  Negative for abdominal pain, nausea and vomiting.   Neurological:  Positive for dizziness, light-headedness and headaches.       Past Medical History:   Diagnosis Date    Fibromyalgia     GERD (gastroesophageal reflux disease)     Hx of compression fracture of spine     T12    Hyperlipidemia     Kidney stone     Loosening of hardware in spine     Lupus     Migraines     Narcolepsy 12/6/2023    Numbness and tingling     Osteoporosis 02/23/2018    Forteo x 2yrs(2/2018-12/2019,3/20-4/2020), on prolia(2/28/2020)    Recurrent UTI     Rheumatoid arthritis     Scoliosis     Seasonal allergies     Shoulder pain, left     Sleep apnea     Spinal stenosis     Stress incontinence     Stroke        Allergies   Allergen Reactions    Shingrix [Zoster Vac Recomb Adjuvanted] Hallucinations     Fever, chills, redness swelling at injection site    Zolmitriptan Hemorrhagic stroke     Hx of stroke     Adhesive Tape  Itching     Paper tape - redness and itching.     Hydromorphone Itching and Rash    Amoxicillin-Pot Clavulanate Itching and Rash       Past Surgical History:   Procedure Laterality Date    BACK SURGERY      CARDIAC CATHETERIZATION      CARPAL TUNNEL RELEASE      CATARACT EXTRACTION, BILATERAL      CYSTOSCOPY W/ URETEROSCOPY W/ LITHOTRIPSY      HAND SURGERY      HYSTERECTOMY      RECTOVAGINAL FISTULA CLOSURE      SACRAL NERVE STIMULATOR PLACEMENT      TOTAL SHOULDER ARTHROPLASTY W/ DISTAL CLAVICLE EXCISION Left 6/13/2019    Procedure: TOTAL SHOULDER REVERSE ARTHROPLASTY;  Surgeon: Brice Mayes MD;  Location: [facility] OR;  Service: Orthopedics       Family History   Problem Relation Age of Onset    Hypotension Mother     Hypotension Father     Colon cancer Father     Stroke Father     Colon cancer Maternal Grandmother     Heart disease Maternal Uncle     Hypertension Paternal Aunt     Diabetes Paternal Aunt     Heart failure Paternal Aunt     Malig Hyperthermia Neg Hx        Social History     Socioeconomic History    Marital status:    Tobacco Use    Smoking status: Never    Smokeless tobacco: Never   Vaping Use    Vaping status: Never Used   Substance and Sexual Activity    Alcohol use: Not Currently     Comment: OCC    Drug use: No    Sexual activity: Defer           Objective   Physical Exam  Vitals reviewed.   HENT:      Head: Normocephalic.   Eyes:      Extraocular Movements: Extraocular movements intact.      Conjunctiva/sclera: Conjunctivae normal.      Pupils: Pupils are equal, round, and reactive to light.   Cardiovascular:      Rate and Rhythm: Normal rate and regular rhythm.      Pulses: Normal pulses.      Heart sounds: Normal heart sounds.   Pulmonary:      Effort: Pulmonary effort is normal.      Breath sounds: Wheezing present.      Comments: Expiratory wheezes to the bilateral lower lobes  Abdominal:      General: Bowel sounds are normal.      Palpations: Abdomen is soft.       "Tenderness: There is no abdominal tenderness.   Musculoskeletal:         General: Normal range of motion.      Right lower leg: No edema.      Left lower leg: No edema.   Skin:     General: Skin is warm and dry.   Neurological:      General: No focal deficit present.      Mental Status: She is alert and oriented to person, place, and time.   Psychiatric:         Mood and Affect: Mood normal.         Behavior: Behavior normal.         Procedures    EKG independently interpreted by Dr. Pineda as sinus rhythm with a right bundle branch block and left anterior fascicular block at a rate of 77.  Compared to previous from 1/2/2020 from the sinus tachycardia with a right bundle branch block at a rate of 102           ED Course  ED Course as of 01/09/25 2016   Thu Jan 09, 2025   1540 Marked ready for CT [KB]   1836 Spoke to Natasha ALVARADO with hospitalist group who agrees to admit patient [KB]      ED Course User Index  [KB] Christine Valladares APRN      /78   Pulse 81   Temp 98.8 °F (37.1 °C) (Oral)   Resp 22   Ht 139.7 cm (55\")   Wt 89.6 kg (197 lb 8.5 oz)   SpO2 96%   BMI 45.91 kg/m²   Labs Reviewed   COMPREHENSIVE METABOLIC PANEL - Abnormal; Notable for the following components:       Result Value    Glucose 103 (*)     Potassium 3.4 (*)     Albumin 3.1 (*)     ALT (SGPT) 39 (*)     AST (SGOT) 50 (*)     Alkaline Phosphatase 144 (*)     All other components within normal limits    Narrative:     GFR Categories in Chronic Kidney Disease (CKD)      GFR Category          GFR (mL/min/1.73)    Interpretation  G1                     90 or greater         Normal or high (1)  G2                      60-89                Mild decrease (1)  G3a                   45-59                Mild to moderate decrease  G3b                   30-44                Moderate to severe decrease  G4                    15-29                Severe decrease  G5                    14 or less           Kidney failure          (1)In the absence of " evidence of kidney disease, neither GFR category G1 or G2 fulfill the criteria for CKD.    eGFR calculation 2021 CKD-EPI creatinine equation, which does not include race as a factor   URINALYSIS W/ MICROSCOPIC IF INDICATED (NO CULTURE) - Abnormal; Notable for the following components:    Leuk Esterase, UA Trace (*)     All other components within normal limits   CBC WITH AUTO DIFFERENTIAL - Abnormal; Notable for the following components:    WBC 16.20 (*)     Neutrophil % 78.7 (*)     Lymphocyte % 10.1 (*)     Immature Grans % 1.1 (*)     Neutrophils, Absolute 12.74 (*)     Monocytes, Absolute 1.35 (*)     Immature Grans, Absolute 0.18 (*)     All other components within normal limits   TROPONIN - Abnormal; Notable for the following components:    HS Troponin T 20 (*)     All other components within normal limits    Narrative:     High Sensitive Troponin T Reference Range:  <14.0 ng/L- Negative Female for AMI  <22.0 ng/L- Negative Male for AMI  >=14 - Abnormal Female indicating possible myocardial injury.  >=22 - Abnormal Male indicating possible myocardial injury.   Clinicians would have to utilize clinical acumen, EKG, Troponin, and serial changes to determine if it is an Acute Myocardial Infarction or myocardial injury due to an underlying chronic condition.        URINALYSIS, MICROSCOPIC ONLY - Abnormal; Notable for the following components:    Bacteria, UA Trace (*)     All other components within normal limits   HIGH SENSITIVITIY TROPONIN T 1HR - Abnormal; Notable for the following components:    HS Troponin T 19 (*)     All other components within normal limits    Narrative:     High Sensitive Troponin T Reference Range:  <14.0 ng/L- Negative Female for AMI  <22.0 ng/L- Negative Male for AMI  >=14 - Abnormal Female indicating possible myocardial injury.  >=22 - Abnormal Male indicating possible myocardial injury.   Clinicians would have to utilize clinical acumen, EKG, Troponin, and serial changes to determine  "if it is an Acute Myocardial Infarction or myocardial injury due to an underlying chronic condition.        MRSA SCREEN, PCR - Normal    Narrative:     The negative predictive value of this diagnostic test is high and should only be used to consider de-escalating anti-MRSA therapy. A positive result may indicate colonization with MRSA and must be correlated clinically.   MAGNESIUM - Normal   BNP (IN-HOUSE) - Normal    Narrative:     This assay is used as an aid in the diagnosis of individuals suspected of having heart failure. It can be used as an aid in the diagnosis of acute decompensated heart failure (ADHF) in patients presenting with signs and symptoms of ADHF to the emergency department (ED). In addition, NT-proBNP of <300 pg/mL indicates ADHF is not likely.    Age Range Result Interpretation  NT-proBNP Concentration (pg/mL:      <50             Positive            >450                   Gray                 300-450                    Negative             <300    50-75           Positive            >900                  Gray                300-900                  Negative            <300      >75             Positive            >1800                  Gray                300-1800                  Negative            <300   PROCALCITONIN - Normal    Narrative:     As a Marker for Sepsis (Non-Neonates):    1. <0.5 ng/mL represents a low risk of severe sepsis and/or septic shock.  2. >2 ng/mL represents a high risk of severe sepsis and/or septic shock.    As a Marker for Lower Respiratory Tract Infections that require antibiotic therapy:    PCT on Admission    Antibiotic Therapy       6-12 Hrs later    >0.5                Strongly Recommended  >0.25 - <0.5        Recommended   0.1 - 0.25          Discouraged              Remeasure/reassess PCT  <0.1                Strongly Discouraged     Remeasure/reassess PCT    As 28 day mortality risk marker: \"Change in Procalcitonin Result\" (>80% or <=80%) if Day 0 (or Day " 1) and Day 4 values are available. Refer to [email or web address]://[email or web address]-[email or web address]-[email or web address]    Change in PCT <=80%  A decrease of PCT levels below or equal to 80% defines a positive change in PCT test result representing a higher risk for 28-day all-cause mortality of patients diagnosed with severe sepsis for septic shock.    Change in PCT >80%  A decrease of PCT levels of more than 80% defines a negative change in PCT result representing a lower risk for 28-day all-cause mortality of patients diagnosed with severe sepsis or septic shock.      POC LACTATE - Normal   BLOOD CULTURE   BLOOD CULTURE   CBC AND DIFFERENTIAL    Narrative:     The following orders were created for panel order CBC & Differential.  Procedure                               Abnormality         Status                     ---------                               -----------         ------                     CBC Auto Differential[[id number]]        Abnormal            Final result                 Please view results for these tests on the individual orders.   EXTRA TUBES    Narrative:     The following orders were created for panel order Extra Tubes.  Procedure                               Abnormality         Status                     ---------                               -----------         ------                     Gold Top - SST[[id number]]                                   Final result               Light Blue Top[[id number]]                                   Final result                 Please view results for these tests on the individual orders.   GOLD TOP - SST   LIGHT BLUE TOP     Medications   sodium chloride 0.9 % flush 10 mL (has no administration in time range)   sodium chloride 0.9 % flush 10 mL (has no administration in time range)   sodium chloride 0.9 % flush 10 mL (has no administration in time range)   sodium chloride 0.9 % infusion 40 mL (has no administration in time range)   sennosides-docusate (PERICOLACE) 8.6-50 MG per tablet 2  tablet (has no administration in time range)     And   polyethylene glycol (MIRALAX) packet 17 g (has no administration in time range)     And   bisacodyl (DULCOLAX) EC tablet 5 mg (has no administration in time range)     And   bisacodyl (DULCOLAX) suppository 10 mg (has no administration in time range)   ondansetron ODT (ZOFRAN-ODT) disintegrating tablet 4 mg (has no administration in time range)     Or   ondansetron (ZOFRAN) injection 4 mg (has no administration in time range)   aluminum-magnesium hydroxide-simethicone (MAALOX MAX) 400-400-40 MG/5ML suspension 15 mL (has no administration in time range)   multivitamin (THERAGRAN) tablet 1 tablet (has no administration in time range)   acetaminophen (TYLENOL) tablet 650 mg (has no administration in time range)     Or   acetaminophen (TYLENOL) 160 MG/5ML oral solution 650 mg (has no administration in time range)     Or   acetaminophen (TYLENOL) suppository 650 mg (has no administration in time range)   ipratropium-albuterol (DUO-NEB) nebulizer solution 3 mL (3 mL Nebulization Given 1/9/25 1442)   cefepime 2000 mg IVPB in 100 mL NS (MBP) (0 mg Intravenous Stopped 1/9/25 1730)   potassium chloride (KLOR-CON M20) CR tablet 40 mEq (40 mEq Oral Given 1/9/25 1819)     CT Head Without Contrast    Result Date: 1/9/2025  Impression: 1.Encephalomalacia in the right frontal lobe which was not apparent on the remote study likely due to an interval infarct. 2.Chronic ischemic changes. 3.Volume loss secondary to cerebral atrophy. 4.Chronic paranasal sinus disease. Electronically Signed: Panchito Funes MD  1/9/2025 4:17 PM EST  Workstation ID: [id number]    XR Chest 2 View    Result Date: 1/9/2025  Impression: 1. Bibasilar airspace opacities, left greater than right suggesting pneumonia. Follow-up evaluation suggested to document resolution. 2. Low lung volumes. Electronically Signed: Marc Peters MD  1/9/2025 3:13 PM EST  Workstation ID: [id number]                                                     Medical Decision Making  Patient was seen for the above complaints.  IV was established and blood work was obtained to assess for electrolyte abnormality or infection.  Of note, patient was positive for influenza A along with coronavirus NL63 on 1/2/25 when she was admitted.  Initial troponin 20 with a repeat of 19 however appears to be along baseline, glucose 103, liver enzymes mildly elevated however these have been elevated in the past, white blood cell count 16.2 and of note she did not have any leukocytosis on 1/2/2025, mag 2.1, , Pro-Kashif 0.1, blood cultures currently pending, lactic 1.2.  MRSA swab negative.  Considered respiratory panel however patient already had positive respiratory panel last week. Chest x-ray independently interpreted by radiologist as:  1. Bibasilar airspace opacities, left greater than right suggesting pneumonia. Follow-up evaluation suggested to document resolution.   2. Low lung volumes.     Considered PE however patient is anticoagulated on Eliquis and did not have any significant hypoxia or tachycardia during ER course.  Head CT independently interpreted by the radiologist as:  1.Encephalomalacia in the right frontal lobe which was not apparent on the remote study likely due to an interval infarct.   2.Chronic ischemic changes.   3.Volume loss secondary to cerebral atrophy.   4.Chronic paranasal sinus disease     Head CT was discussed with Dr. Yao who agrees with inpatient management as patient had no focal deficits on assessment and has a CVA in the past.  Patient admitted to the hospitalist service for further evaluation and management.  Discussed with Natasha ALVARADO with the hospitalist group agrees to admit patient.  Discussed plan of care with patient and family bedside who verbalized understanding of agreeable plan of care at this time.    Based on the clinical findings at this time I anticipate the patient will require a 2 midnight stay      Problems  Addressed:  Dyspnea, unspecified type: acute illness or injury  Pneumonia due to infectious organism, unspecified laterality, unspecified part of lung: acute illness or injury    Amount and/or Complexity of Data Reviewed  Labs: ordered. Decision-making details documented in ED Course.  Radiology: ordered and independent interpretation performed. Decision-making details documented in ED Course.  ECG/medicine tests: ordered and independent interpretation performed. Decision-making details documented in ED Course.    Risk  Prescription drug management.  Decision regarding hospitalization.        Final diagnoses:   Dyspnea, unspecified type   Pneumonia due to infectious organism, unspecified laterality, unspecified part of lung       ED Disposition  ED Disposition       ED Disposition   Decision to Admit    Condition   --    Comment   Level of Care: Telemetry [5]   Diagnosis: Pneumonia [427203]   Admitting Physician: MISAEL MARINELLI [[id number]]   Attending Physician: MISAEL MARINELLI [[id number]]   Certification: I Certify That Inpatient Hospital Services Are Medically Necessary For Greater Than 2 Midnights                 No follow-up provider specified.       Medication List      No changes were made to your prescriptions during this visit.            Christine Valladares, APRBALWINDER  01/09/25 2021

## 2025-01-10 ENCOUNTER — TRANSCRIBE ORDERS (OUTPATIENT)
Dept: HOME HEALTH SERVICES | Facility: HOME HEALTHCARE | Age: 75
End: 2025-01-10
Payer: MEDICARE

## 2025-01-10 ENCOUNTER — APPOINTMENT (OUTPATIENT)
Dept: CARDIOLOGY | Facility: HOSPITAL | Age: 75
DRG: 193 | End: 2025-01-10
Payer: MEDICARE

## 2025-01-10 ENCOUNTER — APPOINTMENT (OUTPATIENT)
Dept: MRI IMAGING | Facility: HOSPITAL | Age: 75
DRG: 193 | End: 2025-01-10
Payer: MEDICARE

## 2025-01-10 DIAGNOSIS — I50.33 ACUTE ON CHRONIC DIASTOLIC HEART FAILURE: Primary | ICD-10-CM

## 2025-01-10 LAB
ANION GAP SERPL CALCULATED.3IONS-SCNC: 9.9 MMOL/L (ref 5–15)
AV MEAN PRESS GRAD SYS DOP V1V2: 5 MMHG
AV VMAX SYS DOP: 149 CM/SEC
BH CV ECHO LEFT VENTRICLE GLOBAL LONGITUDINAL STRAIN: -13.4 %
BH CV ECHO MEAS - ACS: 1.9 CM
BH CV ECHO MEAS - AO MAX PG: 8.9 MMHG
BH CV ECHO MEAS - AO V2 VTI: 28.5 CM
BH CV ECHO MEAS - AVA(I,D): 2.8 CM2
BH CV ECHO MEAS - EDV(CUBED): 68.9 ML
BH CV ECHO MEAS - EDV(MOD-SP4): 69.8 ML
BH CV ECHO MEAS - EF(MOD-SP4): 68.2 %
BH CV ECHO MEAS - ESV(CUBED): 22 ML
BH CV ECHO MEAS - ESV(MOD-SP4): 22.2 ML
BH CV ECHO MEAS - FS: 31.7 %
BH CV ECHO MEAS - IVS/LVPW: 1 CM
BH CV ECHO MEAS - IVSD: 1 CM
BH CV ECHO MEAS - LA DIMENSION: 3.9 CM
BH CV ECHO MEAS - LAT PEAK E' VEL: 11.6 CM/SEC
BH CV ECHO MEAS - LV DIASTOLIC VOL/BSA (35-75): 40.1 CM2
BH CV ECHO MEAS - LV MASS(C)D: 132.1 GRAMS
BH CV ECHO MEAS - LV MAX PG: 9.4 MMHG
BH CV ECHO MEAS - LV MEAN PG: 5 MMHG
BH CV ECHO MEAS - LV SYSTOLIC VOL/BSA (12-30): 12.7 CM2
BH CV ECHO MEAS - LV V1 MAX: 153 CM/SEC
BH CV ECHO MEAS - LV V1 VTI: 28.3 CM
BH CV ECHO MEAS - LVIDD: 4.1 CM
BH CV ECHO MEAS - LVIDS: 2.8 CM
BH CV ECHO MEAS - LVOT AREA: 2.8 CM2
BH CV ECHO MEAS - LVOT DIAM: 1.9 CM
BH CV ECHO MEAS - LVPWD: 1 CM
BH CV ECHO MEAS - MED PEAK E' VEL: 7.3 CM/SEC
BH CV ECHO MEAS - MV A DUR: 0.11 SEC
BH CV ECHO MEAS - MV A MAX VEL: 142 CM/SEC
BH CV ECHO MEAS - MV DEC SLOPE: 551 CM/SEC2
BH CV ECHO MEAS - MV DEC TIME: 0.22 SEC
BH CV ECHO MEAS - MV E MAX VEL: 91.4 CM/SEC
BH CV ECHO MEAS - MV E/A: 0.64
BH CV ECHO MEAS - MV MAX PG: 8.8 MMHG
BH CV ECHO MEAS - MV MEAN PG: 4 MMHG
BH CV ECHO MEAS - MV P1/2T: 57.9 MSEC
BH CV ECHO MEAS - MV V2 VTI: 36.2 CM
BH CV ECHO MEAS - MVA(P1/2T): 3.8 CM2
BH CV ECHO MEAS - MVA(VTI): 2.22 CM2
BH CV ECHO MEAS - PA ACC TIME: 0.08 SEC
BH CV ECHO MEAS - PA V2 MAX: 133 CM/SEC
BH CV ECHO MEAS - RV MAX PG: 5.2 MMHG
BH CV ECHO MEAS - RV V1 MAX: 114 CM/SEC
BH CV ECHO MEAS - RV V1 VTI: 20.8 CM
BH CV ECHO MEAS - SV(LVOT): 80.2 ML
BH CV ECHO MEAS - SV(MOD-SP4): 47.6 ML
BH CV ECHO MEAS - SVI(LVOT): 46.1 ML/M2
BH CV ECHO MEAS - SVI(MOD-SP4): 27.3 ML/M2
BH CV ECHO MEAS - TAPSE (>1.6): 3.4 CM
BH CV ECHO MEAS - TR MAX PG: 31.4 MMHG
BH CV ECHO MEAS - TR MAX VEL: 280 CM/SEC
BH CV ECHO MEASUREMENTS AVERAGE E/E' RATIO: 9.67
BH CV ECHO SHUNT ASSESSMENT PERFORMED (HIDDEN SCRIPTING): 1
BH CV XLRA - TDI S': 12.2 CM/SEC
BUN SERPL-MCNC: 9 MG/DL (ref 8–23)
BUN/CREAT SERPL: 15.3 (ref 7–25)
CALCIUM SPEC-SCNC: 8.7 MG/DL (ref 8.6–10.5)
CHLORIDE SERPL-SCNC: 102 MMOL/L (ref 98–107)
CO2 SERPL-SCNC: 27.1 MMOL/L (ref 22–29)
CREAT SERPL-MCNC: 0.59 MG/DL (ref 0.57–1)
EGFRCR SERPLBLD CKD-EPI 2021: 93.5 ML/MIN/1.73
GLUCOSE SERPL-MCNC: 111 MG/DL (ref 65–99)
MAGNESIUM SERPL-MCNC: 2.4 MG/DL (ref 1.6–2.4)
POTASSIUM SERPL-SCNC: 3.8 MMOL/L (ref 3.5–5.2)
QT INTERVAL: 426 MS
QTC INTERVAL: 482 MS
SINUS: 3 CM
SODIUM SERPL-SCNC: 139 MMOL/L (ref 136–145)
STJ: 3 CM

## 2025-01-10 PROCEDURE — 80048 BASIC METABOLIC PNL TOTAL CA: CPT | Performed by: INTERNAL MEDICINE

## 2025-01-10 PROCEDURE — 93356 MYOCRD STRAIN IMG SPCKL TRCK: CPT | Performed by: INTERNAL MEDICINE

## 2025-01-10 PROCEDURE — 97162 PT EVAL MOD COMPLEX 30 MIN: CPT

## 2025-01-10 PROCEDURE — 93306 TTE W/DOPPLER COMPLETE: CPT | Performed by: INTERNAL MEDICINE

## 2025-01-10 PROCEDURE — 63710000001 PREDNISONE PER 1 MG: Performed by: INTERNAL MEDICINE

## 2025-01-10 PROCEDURE — 25010000002 LORAZEPAM PER 2 MG: Performed by: NURSE PRACTITIONER

## 2025-01-10 PROCEDURE — 83735 ASSAY OF MAGNESIUM: CPT | Performed by: INTERNAL MEDICINE

## 2025-01-10 PROCEDURE — 25010000002 ONDANSETRON PER 1 MG: Performed by: INTERNAL MEDICINE

## 2025-01-10 PROCEDURE — 94664 DEMO&/EVAL PT USE INHALER: CPT

## 2025-01-10 PROCEDURE — 94799 UNLISTED PULMONARY SVC/PX: CPT

## 2025-01-10 PROCEDURE — 25010000002 CEFTRIAXONE PER 250 MG: Performed by: INTERNAL MEDICINE

## 2025-01-10 PROCEDURE — 93356 MYOCRD STRAIN IMG SPCKL TRCK: CPT

## 2025-01-10 PROCEDURE — 97166 OT EVAL MOD COMPLEX 45 MIN: CPT

## 2025-01-10 PROCEDURE — 70551 MRI BRAIN STEM W/O DYE: CPT

## 2025-01-10 PROCEDURE — 93306 TTE W/DOPPLER COMPLETE: CPT

## 2025-01-10 PROCEDURE — 94761 N-INVAS EAR/PLS OXIMETRY MLT: CPT

## 2025-01-10 RX ORDER — HYDROXYCHLOROQUINE SULFATE 200 MG/1
200 TABLET, FILM COATED ORAL EVERY 12 HOURS SCHEDULED
Status: DISCONTINUED | OUTPATIENT
Start: 2025-01-10 | End: 2025-01-16 | Stop reason: HOSPADM

## 2025-01-10 RX ORDER — LORAZEPAM 2 MG/ML
1 INJECTION INTRAMUSCULAR ONCE
Status: COMPLETED | OUTPATIENT
Start: 2025-01-10 | End: 2025-01-10

## 2025-01-10 RX ADMIN — HYDROXYCHLOROQUINE SULFATE 200 MG: 200 TABLET ORAL at 09:05

## 2025-01-10 RX ADMIN — AZITHROMYCIN 500 MG: 250 TABLET, FILM COATED ORAL at 09:05

## 2025-01-10 RX ADMIN — Medication 10 MG: at 02:08

## 2025-01-10 RX ADMIN — ONDANSETRON 4 MG: 2 INJECTION INTRAMUSCULAR; INTRAVENOUS at 18:00

## 2025-01-10 RX ADMIN — VENLAFAXINE HYDROCHLORIDE 150 MG: 75 CAPSULE, EXTENDED RELEASE ORAL at 09:05

## 2025-01-10 RX ADMIN — GUAIFENESIN 600 MG: 600 TABLET ORAL at 22:15

## 2025-01-10 RX ADMIN — ACETAMINOPHEN 650 MG: 325 TABLET, FILM COATED ORAL at 09:04

## 2025-01-10 RX ADMIN — Medication 10 MG: at 22:19

## 2025-01-10 RX ADMIN — IPRATROPIUM BROMIDE AND ALBUTEROL SULFATE 3 ML: .5; 3 SOLUTION RESPIRATORY (INHALATION) at 06:39

## 2025-01-10 RX ADMIN — ONDANSETRON 4 MG: 2 INJECTION INTRAMUSCULAR; INTRAVENOUS at 02:08

## 2025-01-10 RX ADMIN — THERA TABS 1 TABLET: TAB at 09:05

## 2025-01-10 RX ADMIN — CEFTRIAXONE 2000 MG: 2 INJECTION, POWDER, FOR SOLUTION INTRAMUSCULAR; INTRAVENOUS at 00:11

## 2025-01-10 RX ADMIN — HYDROXYCHLOROQUINE SULFATE 200 MG: 200 TABLET ORAL at 22:15

## 2025-01-10 RX ADMIN — Medication 10 ML: at 02:08

## 2025-01-10 RX ADMIN — ATORVASTATIN CALCIUM 40 MG: 40 TABLET, FILM COATED ORAL at 22:17

## 2025-01-10 RX ADMIN — APIXABAN 5 MG: 5 TABLET, FILM COATED ORAL at 22:15

## 2025-01-10 RX ADMIN — IPRATROPIUM BROMIDE AND ALBUTEROL SULFATE 3 ML: .5; 3 SOLUTION RESPIRATORY (INHALATION) at 15:12

## 2025-01-10 RX ADMIN — ATORVASTATIN CALCIUM 40 MG: 40 TABLET, FILM COATED ORAL at 02:08

## 2025-01-10 RX ADMIN — LORAZEPAM 1 MG: 2 INJECTION INTRAMUSCULAR; INTRAVENOUS at 09:42

## 2025-01-10 RX ADMIN — Medication 10 ML: at 09:16

## 2025-01-10 RX ADMIN — GUAIFENESIN 200 MG: 200 SOLUTION ORAL at 09:05

## 2025-01-10 RX ADMIN — GUAIFENESIN 600 MG: 600 TABLET ORAL at 09:16

## 2025-01-10 RX ADMIN — APIXABAN 5 MG: 5 TABLET, FILM COATED ORAL at 09:05

## 2025-01-10 RX ADMIN — Medication 10 ML: at 22:15

## 2025-01-10 RX ADMIN — GUAIFENESIN 600 MG: 600 TABLET ORAL at 02:08

## 2025-01-10 RX ADMIN — PANTOPRAZOLE SODIUM 40 MG: 40 TABLET, DELAYED RELEASE ORAL at 02:08

## 2025-01-10 RX ADMIN — PREDNISONE 40 MG: 20 TABLET ORAL at 02:08

## 2025-01-10 RX ADMIN — GUAIFENESIN 200 MG: 200 SOLUTION ORAL at 14:32

## 2025-01-10 RX ADMIN — APIXABAN 5 MG: 5 TABLET, FILM COATED ORAL at 00:25

## 2025-01-10 RX ADMIN — IPRATROPIUM BROMIDE AND ALBUTEROL SULFATE 3 ML: .5; 3 SOLUTION RESPIRATORY (INHALATION) at 21:43

## 2025-01-10 RX ADMIN — ACETAMINOPHEN 650 MG: 325 TABLET, FILM COATED ORAL at 00:25

## 2025-01-10 RX ADMIN — SENNOSIDES AND DOCUSATE SODIUM 1 TABLET: 50; 8.6 TABLET ORAL at 09:06

## 2025-01-10 NOTE — THERAPY EVALUATION
Patient Name: Annalisa Bhagat  : 1950    MRN: [id number]                              Today's Date: 1/10/2025       Admit Date: 2025    Visit Dx:     ICD-10-CM ICD-9-CM   1. Dyspnea, unspecified type  R06.00 786.09   2. Pneumonia due to infectious organism, unspecified laterality, unspecified part of lung  J18.9 486     Patient Active Problem List   Diagnosis    Tear of left rotator cuff    Abnormal laboratory test    Back pain    Fibromyalgia    Neck pain    Compression fracture of vertebra    Constipation    CPAP (continuous positive airway pressure) dependence    Degeneration of intervertebral disc of lumbar region    Depression    Dermatitis    Psoriasis    Encounter for therapeutic drug monitoring    ESBL (extended spectrum beta-lactamase) producing bacteria infection    Family history of cerebrovascular accident (CVA)    Family history of diabetes mellitus    Fatigue    History of ischemic stroke    Hx of pathological fracture    Hypersomnolence    Hypoxemia    Incontinence    Inflammatory arthritis    Lupus    Rheumatoid arthritis    Kyphosis of thoracic region    Lumbar radiculopathy    Lumbar spondylosis with myelopathy    Lumbar stenosis    Spinal stenosis, lumbar    Migraine headache    Mitral prolapse    Nephrolithiasis    Neuropathy    Obesity    Other mechanical complication of internal fixation device of other bones, subsequent encounter    Osteoporosis    Postmenopausal    Pseudoarthrosis    RBBB    Recurrent UTI    Rotator cuff syndrome, left    Scoliosis    Seasonal allergies    Shoulder pain, right    Sleep apnea    Snoring    Stress incontinence    Vitamin D deficiency    High risk medication use    Hx of compression fracture of spine    Narcolepsy    Acute bronchitis    Influenza A    Pneumonia    Chronic anticoagulation    Class 3 obesity    Mixed hyperlipidemia    Hypokalemia     Past Medical History:   Diagnosis Date    Fibromyalgia     GERD (gastroesophageal reflux disease)     Hx  of compression fracture of spine     T12    Hyperlipidemia     Kidney stone     Loosening of hardware in spine     Lupus     Migraines     Narcolepsy 12/6/2023    Numbness and tingling     Osteoporosis 02/23/2018    Forteo x 2yrs(2/2018-12/2019,3/20-4/2020), on prolia(2/28/2020)    Recurrent UTI     Rheumatoid arthritis     Scoliosis     Seasonal allergies     Shoulder pain, left     Sleep apnea     Spinal stenosis     Stress incontinence     Stroke      Past Surgical History:   Procedure Laterality Date    BACK SURGERY      CARDIAC CATHETERIZATION      CARPAL TUNNEL RELEASE      CATARACT EXTRACTION, BILATERAL      CYSTOSCOPY W/ URETEROSCOPY W/ LITHOTRIPSY      HAND SURGERY      HYSTERECTOMY      RECTOVAGINAL FISTULA CLOSURE      SACRAL NERVE STIMULATOR PLACEMENT      TOTAL SHOULDER ARTHROPLASTY W/ DISTAL CLAVICLE EXCISION Left 6/13/2019    Procedure: TOTAL SHOULDER REVERSE ARTHROPLASTY;  Surgeon: Brice Mayes MD;  Location: [facility] MAIN OR;  Service: Orthopedics      General Information       Row Name 01/10/25 1442          OT Time and Intention    Document Type evaluation  -MS     Mode of Treatment occupational therapy  -MS     Patient Effort good  -MS       Row Name 01/10/25 1442          General Information    Patient Profile Reviewed yes  -MS     Prior Level of Function independent:;ADL's;all household mobility  -MS     Existing Precautions/Restrictions fall  -MS     Barriers to Rehab medically complex  -MS       Row Name 01/10/25 1442          Occupational Profile    Reason for Services/Referral (Occupational Profile) Pt is a 73 y/o F admitted to [facility] 1/9/25 with dyspnea, hospital dx PNA. CXR (+) Bibasilar airspace opacities, left greater than right suggesting pneumonia. CT head indicates Encephalomalacia in the right frontal lobe which was not apparent on the remote study likely due to an interval infarct. MRI brain (-) acute, (+) remote R MCA infarct. PMHx significant for hx CVA, fibromyalgia, hx  narcolepsy, lupus, TA, depression, and hx stress incontinence. At baseline pt resides with mother in 90's (whom is staying with brother currently). Pt resides in [facility] with 3 MAYA. Pt typically (I) with ADLs, (I) with mobility with rollator and typically cares for mother. DME includes straight cane, RW and shower chair.  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 to assist mother at this time  -MS       Row Name 01/10/25 1442          Living Environment    People in Home parent(s)  -MS       Row Name 01/10/25 1442          Home Main Entrance    Number of Stairs, Main Entrance three  -MS     Stair Railings, Main Entrance railings safe and in good condition  -MS       Row Name 01/10/25 1442          Stairs Within Home, Primary    Number of Stairs, Within Home, Primary none  -MS       Row Name 01/10/25 1442          Cognition    Orientation Status (Cognition) oriented x 4  -MS       Row Name 01/10/25 1442          Safety Issues/Impairments Affecting Functional Mobility    Impairments Affecting Function (Mobility) balance;endurance/activity tolerance;pain;range of motion (ROM);strength;shortness of breath  -MS               User Key  (r) = Recorded By, (t) = Taken By, (c) = Cosigned By      Initials Name Provider Type    MS Lu Almonte OT Occupational Therapist                     Mobility/ADL's       Row Name 01/10/25 1443          Bed Mobility    Bed Mobility bed mobility (all) activities  -MS     All Activities, [place] (Bed Mobility) not tested  -MS     Comment, (Bed Mobility) transferring to [facility] upon arrival  -MS       Row Name 01/10/25 1443          Transfers    Transfers sit-stand transfer  -MS       Row Name 01/10/25 1443          Sit-Stand Transfer    Sit-Stand [place] (Transfers) minimum assist (75% patient effort);contact guard  -MS     Assistive Device (Sit-Stand Transfers) walker, front-wheeled  -MS     Comment, (Sit-Stand  Transfer) one LOB with functional transfser  -MS       Row Name 01/10/25 1443          Functional Mobility    Patient was able to Ambulate yes  -MS       Row Name 01/10/25 1443          Activities of Daily Living    BADL Assessment/Intervention lower body dressing;toileting  -MS       Row Name 01/10/25 1443          Lower Body Dressing Assessment/Training    [place] Level (Lower Body Dressing) minimum assist (75% patient effort)  -MS     Position (Lower Body Dressing) supported sitting  -MS       Row Name 01/10/25 1443          Toileting Assessment/Training    [place] Level (Toileting) adjust/manage clothing;perform perineal hygiene;minimum assist (75% patient effort)  -MS     Position (Toileting) supported sitting  -MS               User Key  (r) = Recorded By, (t) = Taken By, (c) = Cosigned By      Initials Name Provider Type    Lu Gibson OT Occupational Therapist                   Obj/Interventions       Row Name 01/10/25 1443          Sensory Assessment (Somatosensory)    Sensory Assessment (Somatosensory) UE sensation intact  -MS       Row Name 01/10/25 1443          Vision Assessment/Intervention    Visual Impairment/Limitations WFL  -MS       Row Name 01/10/25 1443          Range of Motion Comprehensive    General Range of Motion bilateral upper extremity ROM WFL  -MS       Row Name 01/10/25 1443          Strength Comprehensive (MMT)    Comment, General Manual Muscle Testing (MMT) Assessment BUE grossly 3+/5  -MS       Row Name 01/10/25 1443          Balance    Balance Assessment sitting static balance;sitting dynamic balance;standing static balance;standing dynamic balance  -MS     Static Sitting Balance modified independence  -MS     Dynamic Sitting Balance modified independence  -MS     Position, Sitting Balance supported;sitting in chair  -MS     Static Standing Balance contact guard  -MS     Dynamic Standing Balance minimal assist  -MS     Position/Device Used, Standing Balance  supported;walker, front-wheeled  -MS               User Key  (r) = Recorded By, (t) = Taken By, (c) = Cosigned By      Initials Name Provider Type    Lu Gibson, OT Occupational Therapist                   Goals/Plan       Row Name 01/10/25 1447          Bed Mobility Goal 1 (OT)    Activity/Assistive Device (Bed Mobility Goal 1, OT) bed mobility activities, all  -MS     [place] Level/Cues Needed (Bed Mobility Goal 1, OT) modified independence  -MS     Time Frame (Bed Mobility Goal 1, OT) long term goal (LTG);2 weeks  -MS     Progress/Outcomes (Bed Mobility Goal 1, OT) new goal  -MS       Row Name 01/10/25 1447          Transfer Goal 1 (OT)    Activity/Assistive Device (Transfer Goal 1, OT) transfers, all  -MS     [place] Level/Cues Needed (Transfer Goal 1, OT) modified independence  -MS     Time Frame (Transfer Goal 1, OT) long term goal (LTG);2 weeks  -MS     Progress/Outcome (Transfer Goal 1, OT) new goal  -MS       Row Name 01/10/25 1447          Dressing Goal 1 (OT)    Activity/Device (Dressing Goal 1, OT) dressing skills, all  -MS     [place]/Cues Needed (Dressing Goal 1, OT) modified independence  -MS     Time Frame (Dressing Goal 1, OT) long term goal (LTG);2 weeks  -MS     Progress/Outcome (Dressing Goal 1, OT) new goal  -MS       Row Name 01/10/25 1447          Toileting Goal 1 (OT)    Activity/Device (Toileting Goal 1, OT) toileting skills, all  -MS     [place] Level/Cues Needed (Toileting Goal 1, OT) modified independence  -MS     Time Frame (Toileting Goal 1, OT) long term goal (LTG);2 weeks  -MS     Progress/Outcome (Toileting Goal 1, OT) new goal  -MS       Row Name 01/10/25 1444          Problem Specific Goal 1 (OT)    Problem Specific Goal 1 (OT) increase activity tolerance needed for ADL routine >5 minutes without rest break  -MS     Time Frame (Problem Specific Goal 1, OT) long term goal (LTG);2 weeks  -MS     Progress/Outcome (Problem Specific Goal 1, OT) new goal  -MS        Row Name 01/10/25 1447          Therapy Assessment/Plan (OT)    Planned Therapy Interventions (OT) activity tolerance training;adaptive equipment training;BADL retraining;functional balance retraining;IADL retraining;neuromuscular control/coordination retraining;occupation/activity based interventions;ROM/therapeutic exercise;patient/caregiver education/training;passive ROM/stretching;strengthening exercise;transfer/mobility retraining  -MS               User Key  (r) = Recorded By, (t) = Taken By, (c) = Cosigned By      Initials Name Provider Type    MS Lu Almonte, OT Occupational Therapist                   Clinical Impression       Row Name 01/10/25 1444          Pain Assessment    Pretreatment Pain Rating 2/10  -MS     Posttreatment Pain Rating 2/10  -MS     Pain Location elbow  -MS     Pain Side/Orientation left  -MS     Pain Management Interventions positioning techniques utilized  -MS       Row Name 01/10/25 1442          Plan of Care Review    Plan of Care Reviewed With patient  -MS     Progress no change  -MS     Outcome Evaluation Pt is a 75 y/o F admitted to [facility] 1/9/25 with dyspnea, hospital dx PNA. At baseline pt resides with mother in 90's (whom is staying with brother currently). Pt resides in [facility] with 3 MAYA. Pt typically (I) with ADLs, (I) with mobility with rollator and typically cares for mother. DME includes straight cane, RW and shower chair. Pt cleared for OT by nursing, oriented x4, with [facility] staff transferring to [facility] upon arrival. Pt c/o 2/10 L elbow pain. Pt requires CGA t o come to standing, x1 LOB noted, requiring min A to recover. Pt requiring min A for LB ADLs d/t decreased activity tolerance and limited trunk flexion. Pt ambulates with RW with CGA, however demo fair activity tolerance with mobility. Pt functioning below baseline, OT recommending home with assist and HHOT will follow within acute setting.  -MS       Row Name 01/10/25 1444          Therapy Assessment/Plan (OT)     Rehab Potential (OT) good  -MS     Criteria for Skilled Therapeutic Interventions Met (OT) yes;meets criteria;skilled treatment is necessary  -MS     Therapy Frequency (OT) 3 times/wk  -MS     Predicted Duration of Therapy Intervention (OT) until d/c  -MS       Row Name 01/10/25 1444          Therapy Plan Review/Discharge Plan (OT)    Anticipated Discharge Disposition (OT) home with 24/7 care;home with home health  -MS       Row Name 01/10/25 1444          Vital Signs    Pretreatment Heart Rate (beats/min) 98  -MS     Pretreatment Resp Rate (breaths/min) 23  -MS     Pre SpO2 (%) 92  -MS     O2 Delivery Pre Treatment room air  -MS     O2 Delivery Intra Treatment room air  -MS     O2 Delivery Post Treatment room air  -MS     Pre Patient Position Sitting  -MS     Intra Patient Position Standing  -MS     Post Patient Position Sitting  -MS       Row Name 01/10/25 1444          Positioning and Restraints    Pre-Treatment Position bedside commode  -MS     Post Treatment Position chair  -MS     In Chair notified nsg;sitting;call light within reach;encouraged to call for assist;exit alarm on  -MS               User Key  (r) = Recorded By, (t) = Taken By, (c) = Cosigned By      Initials Name Provider Type    MS Lu Almonte, OT Occupational Therapist                   Outcome Measures       Row Name 01/10/25 1448          How much help from another is currently needed...    Putting on and taking off regular lower body clothing? 3  -MS     Bathing (including washing, rinsing, and drying) 3  -MS     Toileting (which includes using toilet bed pan or urinal) 3  -MS     Putting on and taking off regular upper body clothing 4  -MS     Taking care of personal grooming (such as brushing teeth) 4  -MS     Eating meals 4  -MS     AM-PAC 6 Clicks Score (OT) 21  -MS       Row Name 01/10/25 1305 01/10/25 0800       How much help from another person do you currently need...    Turning from your back to your side while in flat bed without  using bedrails? 4  -AM 4  -MB    Moving from lying on back to sitting on the side of a flat bed without bedrails? 3  -AM 4  -MB    Moving to and from a bed to a chair (including a wheelchair)? 3  -AM 3  -MB    Standing up from a chair using your arms (e.g., wheelchair, bedside chair)? 3  -AM 3  -MB    Climbing 3-5 steps with a railing? 2  -AM 2  -MB    To walk in hospital room? 3  -AM 2  -MB    AM-PAC 6 Clicks Score (PT) 18  -AM 18  -MB    Highest Level of Mobility Goal 6 --> Walk 10 steps or more  -AM 6 --> Walk 10 steps or more  -MB      Row Name 01/10/25 1448 01/10/25 1305       Functional Assessment    Outcome Measure Options AM-PAC 6 Clicks Daily Activity (OT)  -MS AM-PAC 6 Clicks Basic Mobility (PT)  -AM              User Key  (r) = Recorded By, (t) = Taken By, (c) = Cosigned By      Initials Name Provider Type    Lu Gibson, OT Occupational Therapist    Luis Craig, PT Physical Therapist    Ashli Huddleston, RN Registered Nurse                    Occupational Therapy Education       Title: PT OT SLP Therapies (Done)       Topic: Occupational Therapy (Done)       Point: ADL training (Done)       Description:   Instruct learner(s) on proper safety adaptation and remediation techniques during self care or transfers.   Instruct in proper use of assistive devices.                  Learning Progress Summary            Patient Acceptance, E,TB, VU by MS at 1/10/2025 1448                      Point: Precautions (Done)       Description:   Instruct learner(s) on prescribed precautions during self-care and functional transfers.                  Learning Progress Summary            Patient Acceptance, E,TB, VU by MS at 1/10/2025 1448                      Point: Body mechanics (Done)       Description:   Instruct learner(s) on proper positioning and spine alignment during self-care, functional mobility activities and/or exercises.                  Learning Progress Summary            Patient Acceptance,  E,TB, VU by MS at 1/10/2025 1448                                      User Key       Initials Effective Dates Name Provider Type Discipline    MS 07/13/22 -  Lu Almonte OT Occupational Therapist OT                  OT Recommendation and Plan  Planned Therapy Interventions (OT): activity tolerance training, adaptive equipment training, BADL retraining, functional balance retraining, IADL retraining, neuromuscular control/coordination retraining, occupation/activity based interventions, ROM/therapeutic exercise, patient/caregiver education/training, passive ROM/stretching, strengthening exercise, transfer/mobility retraining  Therapy Frequency (OT): 3 times/wk  Plan of Care Review  Plan of Care Reviewed With: patient  Progress: no change  Outcome Evaluation: Pt is a 73 y/o F admitted to [facility] 1/9/25 with dyspnea, hospital dx PNA. At baseline pt resides with mother in 90's (whom is staying with brother currently). Pt resides in [facility] with 3 MAYA. Pt typically (I) with ADLs, (I) with mobility with rollator and typically cares for mother. DME includes straight cane, RW and shower chair. Pt cleared for OT by nursing, oriented x4, with [facility] staff [facility] [place] [facility] [place] arrival. Pt c/o 2/10 L elbow pain. Pt requires CGA t o come to standing, x1 LOB noted, requiring min A to recover. Pt requiring min A for LB ADLs d/t decreased activity tolerance and limited trunk flexion. Pt ambulates with RW with CGA, however demo fair activity tolerance with mobility. Pt functioning below baseline, OT recommending home with assist and HHOT will follow within acute setting.     Time Calculation:                   Lu Almonte OT  1/10/2025

## 2025-01-10 NOTE — OUTREACH NOTE
Medical Week 2 Survey      Flowsheet Row Responses   [facility] facility patient discharged from? [facility]   Does the patient have one of the following disease processes/diagnoses(primary or secondary)? Other   Week 2 attempt successful? No   Unsuccessful attempts Attempt 1   Revoke Readmitted            Nelida CAMPBELL - Registered Nurse

## 2025-01-10 NOTE — CASE MANAGEMENT/SOCIAL WORK
Discharge Planning Assessment   [facility]     Patient Name: Annalisa Bhagat  MRN: [id number]  Today's Date: 1/10/2025    Admit Date: 1/9/2025    Plan: Pending PT/OT [place]. From home with mother.   Discharge Needs Assessment       Row Name 01/10/25 1235       Living Environment    People in Home parent(s)    Name(s) of People in Home Lives with mother    Current Living Arrangements home    Potentially Unsafe Housing Conditions none    In the past 12 months has the electric, gas, oil, or water company threatened to shut off services in your home? No    Primary Care Provided by self    Provides Primary Care For no one    Family Caregiver if Needed child(michael), adult    Family Caregiver Names Jose jones    Quality of Family Relationships helpful;involved;supportive    Able to Return to Prior Arrangements yes       Resource/Environmental Concerns    Resource/Environmental Concerns none    Transportation Concerns none       Transportation Needs    In the past 12 months, has lack of transportation kept you from medical appointments or from getting medications? no    In the past 12 months, has lack of transportation kept you from meetings, work, or from getting things needed for daily living? No       Food Insecurity    Within the past 12 months, you worried that your food would run out before you got the money to buy more. Never true    Within the past 12 months, the food you bought just didn't last and you didn't have money to get more. Never true       Transition Planning    Patient/Family Anticipates Transition to home with help/services    Patient/Family Anticipated Services at Transition none    Transportation Anticipated family or friend will provide       Discharge Needs Assessment    Readmission Within the Last 30 Days previous discharge plan unsuccessful    Equipment Currently Used at Home cane, straight;walker, standard;shower chair;cpap    Concerns to be Addressed denies needs/concerns at this time    Do you want  help finding or keeping work or a job? I do not need or want help    Anticipated Changes Related to Illness none    Equipment Needed After Discharge none                   Discharge Plan       Row Name 01/10/25 1236       Plan    Plan Pending PT/OT Evals. From home with mother.    Patient/Family in Agreement with Plan yes    Plan Comments CM met with patient at bedside who could not answer questions due to receiving anxiety medications for MRI. Called sonMichael to answer questions. Confirmed PCP, insurance, and pharmacy.  Meds to beds enrolled. Patient denies any difficulty affording medications. Patient as last discharge had an ambulatory referral to home health, but this never was arranged. Son would like this to be set up before discharge. CM notified MD to place PT/OT eval. And [facility] is first choice if therapy recommends. Confirmed transportation at discharge will be sonMichael . DC Barriers: neurology consult, MRI brain, IV abx/steroids, requiring 3L O2, duo-nebs, echo.                  Continued Care and Services - Admitted Since 1/9/2025    No active coordination exists for this encounter.       Expected Discharge Date and Time       Expected Discharge Date Expected Discharge Time    Jan 13, 2025            Demographic Summary       Row Name 01/10/25 1234       General Information    Admission Type inpatient    Arrived From emergency department    Referral Source admission list    Reason for Consult discharge planning    Preferred Language English       Contact Information    Permission Granted to Share Info With                    Functional Status       Row Name 01/10/25 1234       Functional Status    Usual Activity Tolerance good    Current Activity Tolerance moderate       Functional Status, IADL    Medications independent    Meal Preparation independent    Housekeeping independent    Laundry independent    Shopping independent    If for any reason you need help with day-to-day  activities such as bathing, preparing meals, shopping, managing finances, etc., do you get the help you need? I get all the help I need    IADL Comments Lives with her mother             Kathy Hammonds RN     Office: [phone]

## 2025-01-10 NOTE — PLAN OF CARE
Goal Outcome Evaluation:  Plan of Care Reviewed With: patient        Progress: no change  Outcome Evaluation: Pt is a 73 y/o F admitted to [facility] 1/9/25 with dyspnea, hospital dx PNA. At baseline pt resides with mother in 90's (whom is staying with brother currently). Pt resides in [facility] with 3 MAYA. Pt typically (I) with ADLs, (I) with mobility with rollator and typically cares for mother. DME includes straight cane, RW and shower chair. Pt cleared for OT by nursing, oriented x4, with [facility] staff transferring to [facility] upon arrival. Pt c/o 2/10 L elbow pain. Pt requires CGA t o come to standing, x1 LOB noted, requiring min A to recover. Pt requiring min A for LB ADLs d/t decreased activity tolerance and limited trunk flexion. Pt ambulates with RW with CGA, however demo fair activity tolerance with mobility. Pt functioning below baseline, OT recommending home with assist and HHOT will follow within acute setting.    Anticipated Discharge Disposition (OT): home with 24/7 care, home with home health

## 2025-01-10 NOTE — SIGNIFICANT NOTE
01/10/25 1241   Readmission Indications   Is the patient and/or family able to complete the readmission assessment questions? Yes  (Called son Michael)   Is this hospitalization related to the prior hospital diagnosis? Yes   Recommendation for rehospitalization   Did you speak with your physician prior to coming to the hospital Yes  (Patient called office and they told her to come to ER)   If yes, what physician did you speak with? Dr Whitney   Follow-up Appointments   Do you have a PCP? Yes   Did you have an appointment with PCP after your hospitalization? No   Did you have an appointment with a Specialist? No   Are you current with the Pulmonary Clinic? No   Are you current with the CHF Clinic? No   Medications   Did you have newly prescribed medications at discharge? Yes  (benzonatate and guafinesin-dextromethorphan)   Did you understand the reasons for your medications at discharge and how to take them? Yes   Did you understand the side effects of your medications? Yes   Are you taking all of you prescribed medications? Yes   What pharmacy was used to fill prescription(s)? [facility]   Were medications picked up? Yes  (Son picked them up)   Discharge Instructions   Did you understand your discharge instructions? Yes   Did your family/caregiver hear your instructions? Yes   Were you told to eat a special diet? Yes   Did you adhere to the diet? Yes   Were you given a number of someone to call if you had questions or concerns? Yes   Index discharge location/services   Where did you go upon discharge? Home  (Ambulatory referral to home health was made but this was never arranged)   Do you have supportive family or friends in the home? Yes  (Lives with mother)   What services were arranged at discharge? Home Health   Discharge Readiness   On a scale of 1-5 (5 being well prepared), how ready were you for discharge 2   Recommendation based on interview Additional caregiver support;Other (comment)  (Patient was  having difficutly breathing)   Palliative Care/Hospice   Are you current with Palliative Care? No   Are you current with Hospice Care? No   Advance Directives (For Healthcare)   Pre-existing AND/MOST/POLST Order Yes, notify physician for order   Advance Directive Status Patient has advance directive, copy in chart   Type of Advance Directive Health care directive/Living will   Have you reviewed your Advance Directive and is it valid for this stay? Yes   Literature Provided on Advance Directives No   Patient Requests Assistance on Advance Directives Patient Declined   Readmission Assessment Final Comments   Final Comments Previous: Admitted with acute bronchitis due to Influenza A and Covid. CXR negative. WBC 11.46. K+ 3.3 replaced to 4.8. Walking oximetry showed no need for home oxygen. Blood cultures are negative. IV Rocephin/Duo-Nebs given. Discharged home with Benzonatate and Guaifenesin-Dextromethorphan. Ambulatory referral to home health was ordered, but per family never arranged. Current: Admitted with pneumonia, positive flu a, sent in by PCP. Treating with IV steroids/duo-nebs, azithromycin and ceftriaxone. Blood cultures pending. CXR showed low lung volumes. WBC 16.20. New finding on CT head, MRI ordered, and neuro consult placed.  Requiring 3L O2. Echo. PT/OT to evaluate, would like home health at discharge. LACE 10.

## 2025-01-10 NOTE — ED NOTES
Patient requesting breathing treatment and tylenol.  Dosages requested from pharmacy.  Respiratory notified of breathing treatment.

## 2025-01-10 NOTE — PLAN OF CARE
Goal Outcome Evaluation:  Plan of Care Reviewed With: patient           Outcome Evaluation: Pt is a 75 y/o female with c/o worsenion SOB, productive cough with yellow sputum, nasal congestion, CHAPMAN, and intermittent migraines.  Recent hospitalization on 1/4 secondary to influenza A/ cornavirus.  CT head: encephalomalacia R frontal lobe.  Chest X-ray: bibasilar opacities L>R suggesting PNA.  PMH of CVA, migraines, GERD, possible lupus, RA and mixed HLD.  Pt reports her 97 y/o mother lives with her in  a 1 story home with 3 steps to enter into home.  Pt's mother is currently staying at brother's home.  Pt was independent with all functional mobility tasks and did not use an assistive device prior to hospitalization.  Pt on room air and telemetry this date.  Pt required CGA for sit to stand transfer from [facility] with standing, MIn A to recover balance.  Pt ambulated 25' with HHA with CGA.  Recommend HHPT.    Anticipated Discharge Disposition (PT): home with home health

## 2025-01-10 NOTE — CASE MANAGEMENT/SOCIAL WORK
Continued Stay Note  IDALIA Robins     Patient Name: Annalisa Bhagat  MRN: [id number]  Today's Date: 1/10/2025    Admit Date: 1/9/2025    Plan: Pending PT/OT Evals. From home with mother.   Discharge Plan       Row Name 01/10/25 1626       Plan    Plan Comments CM contacted by Rad[facility]IC[facility], to inform that they had rec'd a referral from pt PCP. They contacted patient and pt declined due to HH not being able to start services until 1/16. Will need additional HH choices. SC sent to Weekend CM for F/u.           Met with patient in room wearing PPE: mask    Maintained distance greater than six feet and spent less than 15 minutes in the room    May Soria RN    Phone [phone]  Fax [phone]

## 2025-01-10 NOTE — H&P
[facility] Medicine Services  History & Physical    Patient Name: Annalisa Bhagat  : 1950  MRN: [id number]  Primary Care Physician:  Caleb Whitney MD  Date of admission: 2025  Date and Time of Service: 2025 at 20:00    Subjective      Chief Complaint: Shortness of breath    History of Present Illness: Annalisa Bhagat is a 74 y.o. female with a past medical history of obesity, possibly lupus/rheumatoid arthritis, previous stroke, on chronic anticoagulation, mixed hyperlipidemia,.  The patient states that her problem started a few days ago when she was diagnosed with a coronavirus not COVID, and influenza A.  Since then she has had cough and congestion and has not been able to catch her breath.  She also states she thinks that she has been having fevers she is tired she is lightheaded and some headaches.  Because of this shortness of breath she came to the emergency room.  In the emergency room a workup ensued and it appeared that she had a bibasilar pneumonia.  She also was wheezing on exam.  She stated that her breathing treatment helped her quite a bit.  Will treat her for a pneumonia but also probably this is a sequela from the virus that she had and is a reactive inflammatory process.  So we will treat it with steroids and breathing treatments.    Review of Systems cough, congestion, fever, weakness, headaches, myalgias, lightheadedness, shortness of breath, chills but denies chest pain, nausea, vomiting, abdominal pain, dysuria, diarrhea, constipation, peripheral edema, altered mental status changes in vision the rest the 15 essential review of systems have been reviewed and are negative    Personal History     Past Medical History:   Diagnosis Date    Fibromyalgia     GERD (gastroesophageal reflux disease)     Hx of compression fracture of spine     T12    Hyperlipidemia     Kidney stone     Loosening of hardware in spine     Lupus     Migraines     Narcolepsy 2023     Numbness and tingling     Osteoporosis 02/23/2018    [place] x 2yrs(2/2018-12/2019,3/20-4/2020), on prolia(2/28/2020)    Recurrent UTI     Rheumatoid arthritis     Scoliosis     Seasonal allergies     Shoulder pain, left     Sleep apnea     Spinal stenosis     Stress incontinence     Stroke        Past Surgical History:   Procedure Laterality Date    BACK SURGERY      CARDIAC CATHETERIZATION      CARPAL TUNNEL RELEASE      CATARACT EXTRACTION, BILATERAL      CYSTOSCOPY W/ URETEROSCOPY W/ LITHOTRIPSY      HAND SURGERY      HYSTERECTOMY      RECTOVAGINAL FISTULA CLOSURE      SACRAL NERVE STIMULATOR PLACEMENT      TOTAL SHOULDER ARTHROPLASTY W/ DISTAL CLAVICLE EXCISION Left 6/13/2019    Procedure: TOTAL SHOULDER REVERSE ARTHROPLASTY;  Surgeon: Brice Mayes MD;  Location: [facility];  Service: Orthopedics       Family History: family history includes Colon cancer in her father and maternal grandmother; Diabetes in her paternal aunt; Heart disease in her maternal uncle; Heart failure in her paternal aunt; Hypertension in her paternal aunt; Hypotension in her father and mother; Stroke in her father. Otherwise pertinent FHx was reviewed and not pertinent to current issue.    Social History:  reports that she has never smoked. She has never used smokeless tobacco. She reports that she does not currently use alcohol. She reports that she does not use drugs.    Home Medications:  Prior to Admission Medications       Prescriptions Last Dose Informant Patient Reported? Taking?    apixaban (ELIQUIS) 5 MG tablet tablet 1/8/2025  No Yes    Take 1 tablet by mouth Every 12 (Twelve) Hours. Resume 6/15/29    ascorbic acid (VITAMIN C) 1000 MG tablet Past Week Self Yes Yes    Take 1 tablet by mouth Daily.    atorvastatin (LIPITOR) 40 MG tablet 1/8/2025 Self Yes Yes    Take 1 tablet by mouth Daily.    Azelastine-Fluticasone 137-50 MCG/ACT suspension   Yes Yes    Administer 1-2 sprays into the nostril(s) as directed by provider  Daily As Needed.    benzonatate (TESSALON) 200 MG capsule 1/9/2025  No Yes    Take 1 capsule by mouth 3 (Three) Times a Day As Needed for Cough for up to 7 days.    calcium citrate (CALCITRATE) 950 MG tablet Past Week Self Yes Yes    Take 1 tablet by mouth Daily.    furosemide (LASIX) 40 MG tablet  Self Yes Yes    Take 1 tablet by mouth 2 (Two) Times a Day As Needed.    guaiFENesin-dextromethorphan (ROBITUSSIN DM) 100-10 MG/5ML syrup 1/9/2025  No Yes    Take 5 mL by mouth Every 4 (Four) Hours As Needed for Cough for up to 7 days.    hydrocortisone 2.5 % cream  Self Yes Yes    Apply 1 Application topically to the appropriate area as directed As Needed.    hydroxychloroquine (PLAQUENIL) 200 MG tablet 1/8/2025  Yes Yes    Take 1 tablet by mouth 2 (Two) Times a Day.    hydrOXYzine (ATARAX) 25 MG tablet   Yes Yes    Take 1 tablet by mouth Daily As Needed for Itching.    loratadine (CLARITIN) 10 MG tablet  Self Yes Yes    Take 1 tablet by mouth As Needed.    Melatonin 10 MG tablet 1/8/2025 Self Yes Yes    Take 1 tablet by mouth Every Night.    methocarbamol (ROBAXIN) 750 MG tablet 1/8/2025  Yes Yes    Take 1 tablet by mouth Every Night.    methylphenidate (RITALIN) 20 MG tablet 1/8/2025  Yes Yes    Take 1 tablet by mouth Daily.    Multiple Vitamins-Minerals (PRESERVISION AREDS 2 PO) Past Week Self Yes Yes    Take 1 capsule by mouth 2 (Two) Times a Day.    omeprazole (priLOSEC) 20 MG capsule 1/8/2025 Self Yes Yes    Take 1 capsule by mouth Daily.    phenazopyridine (PYRIDIUM) 100 MG tablet  Self Yes Yes    Take 1 tablet by mouth 3 (Three) Times a Day As Needed for Bladder Spasms.    promethazine (PHENERGAN) 25 MG tablet  Self Yes Yes    Take 1 tablet by mouth As Needed (MIGRAINES).    pyridoxine (VITAMIN B-6) 200 MG tablet Past Week Self Yes Yes    Take 1 tablet by mouth Daily.    Rimegepant Sulfate (NURTEC) 75 MG tablet dispersible tablet 1/8/2025  Yes Yes    Take 1 tablet by mouth Daily As Needed.    traMADol (ULTRAM) 50  MG tablet 1/8/2025 Self Yes Yes    Take 1 tablet by mouth Every 6 (Six) Hours As Needed for Moderate Pain. Back pain.    triamcinolone (KENALOG) 0.025 % cream  Self Yes Yes    Apply 1 Application topically to the appropriate area as directed As Needed.    venlafaxine XR (EFFEXOR-XR) 150 MG 24 hr capsule 1/8/2025  Yes Yes    Take 1 capsule by mouth Daily.    B Complex Vitamins (VITAMIN B COMPLEX PO) Not Taking Self Yes No    Take 1 each by mouth Daily.    Patient not taking:  Reported on 1/9/2025    denosumab (PROLIA) 60 MG/ML solution prefilled syringe syringe   No No    Inject 1 mL under the skin into the appropriate area as directed Every 6 (Six) Months.    denosumab (PROLIA) syringe 60 mg   No No              Allergies:  Allergies   Allergen Reactions    Shingrix [Zoster Vac Recomb Adjuvanted] Hallucinations     Fever, chills, redness swelling at injection site    Zolmitriptan Hemorrhagic stroke     Hx of stroke     Adhesive Tape Itching     Paper tape - redness and itching.     Hydromorphone Itching and Rash    Amoxicillin-Pot Clavulanate Itching and Rash       Objective      Vitals:   Temp:  [98.1 °F (36.7 °C)-98.8 °F (37.1 °C)] 98.8 °F (37.1 °C)  Heart Rate:  [79-83] 81  Resp:  [22] 22  BP: (123-152)/(52-82) 152/78  Body mass index is 45.91 kg/m².  Physical Exam  General Obese female looks like she does not feel well  Head atraumatic normal cephalic  Eyes pupils equal round react light ocular motion intact  Oropharynx membranes moist no erythema  Nares there is some clear nasal discharge  Neck no thyromegaly lymphadenopathy  Pulmonary bilateral wheezes with crackles in the bases  Cardiac regular rate and rhythm 2/6 systolic ejection murmur  Abdomen positive bowel sounds no guarding no rebound no hepatosplenomegaly bowel   Extremities symmetric warm to the touch mild peripheral edema  Derm no rash  Psych appropriate mood and affect  Neuro cranial nerves II through XII intact no obvious focal neurologic  deficit  Diagnostic Data:  Lab Results (last 24 hours)       Procedure Component Value Units Date/Time    MRSA Screen, PCR (Inpatient) - Swab, Nares [[id number]]  (Normal) Collected: 01/09/25 1639    Specimen: Swab from Nares Updated: 01/09/25 1804     MRSA PCR No MRSA Detected    Narrative:      The negative predictive value of this diagnostic test is high and should only be used to consider de-escalating anti-MRSA therapy. A positive result may indicate colonization with MRSA and must be correlated clinically.    High Sensitivity Troponin T 1Hr [[id number]]  (Abnormal) Collected: 01/09/25 1627    Specimen: Blood from Arm, Left Updated: 01/09/25 1659     HS Troponin T 19 ng/L      Troponin T Numeric Delta -1 ng/L      Troponin T % Delta -5 %     Narrative:      High Sensitive Troponin T Reference Range:  <14.0 ng/L- Negative Female for AMI  <22.0 ng/L- Negative Male for AMI  >=14 - Abnormal Female indicating possible myocardial injury.  >=22 - Abnormal Male indicating possible myocardial injury.   Clinicians would have to utilize clinical acumen, EKG, Troponin, and serial changes to determine if it is an Acute Myocardial Infarction or myocardial injury due to an underlying chronic condition.         Blood Culture - Blood, Arm, Left [[id number]] Collected: 01/09/25 1627    Specimen: Blood from Arm, Left Updated: 01/09/25 1632    POC Lactate [[id number]]  (Normal) Collected: 01/09/25 1626    Specimen: Blood Updated: 01/09/25 1628     Lactate 1.2 mmol/L      Comment: Serial Number: [id number]:  [id number]       Blood Culture - Blood, Arm, Right [[id number]] Collected: 01/09/25 1615    Specimen: Blood from Arm, Right Updated: 01/09/25 1624    Procalcitonin [[id number]]  (Normal) Collected: 01/09/25 1352    Specimen: Blood from Arm, Right Updated: 01/09/25 1610     Procalcitonin 0.10 ng/mL     Narrative:      As a Marker for Sepsis (Non-Neonates):    1. <0.5 ng/mL represents a low risk of severe sepsis and/or septic  "shock.  2. >2 ng/mL represents a high risk of severe sepsis and/or septic shock.    As a Marker for Lower Respiratory Tract Infections that require antibiotic therapy:    PCT on Admission    Antibiotic Therapy       6-12 Hrs later    >0.5                Strongly Recommended  >0.25 - <0.5        Recommended   0.1 - 0.25          Discouraged              Remeasure/reassess PCT  <0.1                Strongly Discouraged     Remeasure/reassess PCT    As 28 day mortality risk marker: \"Change in Procalcitonin Result\" (>80% or <=80%) if Day 0 (or Day 1) and Day 4 values are available. Refer to http://[email or web address]pct-[email or web address]    Change in PCT <=80%  A decrease of PCT levels below or equal to 80% defines a positive change in PCT test result representing a higher risk for 28-day all-cause mortality of patients diagnosed with severe sepsis for septic shock.    Change in PCT >80%  A decrease of PCT levels of more than 80% defines a negative change in PCT result representing a lower risk for 28-day all-cause mortality of patients diagnosed with severe sepsis or septic shock.       Comprehensive Metabolic Panel [[id number]]  (Abnormal) Collected: 01/09/25 1352    Specimen: Blood from Arm, Right Updated: 01/09/25 1507     Glucose 103 mg/dL      BUN 11 mg/dL      Creatinine 0.68 mg/dL      Sodium 137 mmol/L      Potassium 3.4 mmol/L      Comment: Slight hemolysis detected by analyzer. Result may be falsely elevated.        Chloride 100 mmol/L      CO2 27.4 mmol/L      Calcium 8.7 mg/dL      Total Protein 6.6 g/dL      Albumin 3.1 g/dL      ALT (SGPT) 39 U/L      AST (SGOT) 50 U/L      Alkaline Phosphatase 144 U/L      Total Bilirubin 0.4 mg/dL      Globulin 3.5 gm/dL      A/G Ratio 0.9 g/dL      BUN/Creatinine Ratio 16.2     Anion Gap 9.6 mmol/L      eGFR 90.4 mL/min/1.73     Narrative:      GFR Categories in Chronic Kidney Disease (CKD)      GFR Category          GFR (mL/min/1.73)    Interpretation  G1                     90 or " greater         Normal or high (1)  G2                      60-89                Mild decrease (1)  G3a                   45-59                Mild to moderate decrease  G3b                   30-44                Moderate to severe decrease  G4                    15-29                Severe decrease  G5                    14 or less           Kidney failure          (1)In the absence of evidence of kidney disease, neither GFR category G1 or G2 fulfill the criteria for CKD.    eGFR calculation 2021 CKD-EPI creatinine equation, which does not include race as a factor    Magnesium [[id number]]  (Normal) Collected: 01/09/25 1352    Specimen: Blood from Arm, Right Updated: 01/09/25 1507     Magnesium 2.1 mg/dL     Urinalysis, Microscopic Only - Urine, Clean Catch [[id number]]  (Abnormal) Collected: 01/09/25 1440    Specimen: Urine, Clean Catch Updated: 01/09/25 1505     RBC, UA None Seen /HPF      WBC, UA 0-2 /HPF      Bacteria, UA Trace /HPF      Squamous Epithelial Cells, UA 0-2 /HPF      Hyaline Casts, UA None Seen /LPF      Methodology Manual Light Microscopy    High Sensitivity Troponin T [[id number]]  (Abnormal) Collected: 01/09/25 1352    Specimen: Blood from Arm, Right Updated: 01/09/25 1504     HS Troponin T 20 ng/L      Comment: Specimen hemolyzed.  Results may be falsely decreased.       Narrative:      High Sensitive Troponin T Reference Range:  <14.0 ng/L- Negative Female for AMI  <22.0 ng/L- Negative Male for AMI  >=14 - Abnormal Female indicating possible myocardial injury.  >=22 - Abnormal Male indicating possible myocardial injury.   Clinicians would have to utilize clinical acumen, EKG, Troponin, and serial changes to determine if it is an Acute Myocardial Infarction or myocardial injury due to an underlying chronic condition.         Urinalysis With Microscopic If Indicated (No Culture) - Urine, Clean Catch [[id number]]  (Abnormal) Collected: 01/09/25 1440    Specimen: Urine, Clean Catch Updated: 01/09/25  1451     Color, UA Yellow     Appearance, UA Clear     pH, UA 6.5     Specific Gravity, UA 1.008     Glucose, UA Negative     Ketones, UA Negative     Bilirubin, UA Negative     Blood, UA Negative     Protein, UA Negative     Leuk Esterase, UA Trace     Nitrite, UA Negative     Urobilinogen, UA 0.2 E.U./dL    BNP [[id number]]  (Normal) Collected: 01/09/25 1352    Specimen: Blood from Arm, Right Updated: 01/09/25 1432     proBNP 118.0 pg/mL     Narrative:      This assay is used as an aid in the diagnosis of individuals suspected of having heart failure. It can be used as an aid in the diagnosis of acute decompensated heart failure (ADHF) in patients presenting with signs and symptoms of ADHF to the emergency department (ED). In addition, NT-proBNP of <300 pg/mL indicates ADHF is not likely.    Age Range Result Interpretation  NT-proBNP Concentration (pg/mL:      <50             Positive            >450                   Gray                 300-450                    Negative             <300    50-75           Positive            >900                  Gray                300-900                  Negative            <300      >75             Positive            >1800                  Gray                300-1800                  Negative            <300    Extra Tubes [[id number]] Collected: 01/09/25 1352    Specimen: Blood from Arm, Right Updated: 01/09/25 1401    Narrative:      The following orders were created for panel order Extra Tubes.  Procedure                               Abnormality         Status                     ---------                               -----------         ------                     Gold Top - SST[[id number]]                                   Final result               Light Blue Top[[id number]]                                   Final result                 Please view results for these tests on the individual orders.    Gold Top - SST [[id number]] Collected: 01/09/25 1352    Specimen:  Blood from Arm, Right Updated: 01/09/25 1401     Extra Tube Hold for add-ons.     Comment: Auto resulted.       Light Blue Top [[id number]] Collected: 01/09/25 1352    Specimen: Blood from Arm, Right Updated: 01/09/25 1401     Extra Tube Hold for add-ons.     Comment: Auto resulted       CBC & Differential [[id number]]  (Abnormal) Collected: 01/09/25 1352    Specimen: Blood from Arm, Right Updated: 01/09/25 1358    Narrative:      The following orders were created for panel order CBC & Differential.  Procedure                               Abnormality         Status                     ---------                               -----------         ------                     CBC Auto Differential[[id number]]        Abnormal            Final result                 Please view results for these tests on the individual orders.    CBC Auto Differential [[id number]]  (Abnormal) Collected: 01/09/25 1352    Specimen: Blood from Arm, Right Updated: 01/09/25 1358     WBC 16.20 10*3/mm3      RBC 4.77 10*6/mm3      Hemoglobin 14.3 g/dL      Hematocrit 43.3 %      MCV 90.8 fL      MCH 30.0 pg      MCHC 33.0 g/dL      RDW 13.8 %      RDW-SD 46.0 fl      MPV 10.4 fL      Platelets 421 10*3/mm3      Neutrophil % 78.7 %      Lymphocyte % 10.1 %      Monocyte % 8.3 %      Eosinophil % 1.4 %      Basophil % 0.4 %      Immature Grans % 1.1 %      Neutrophils, Absolute 12.74 10*3/mm3      Lymphocytes, Absolute 1.63 10*3/mm3      Monocytes, Absolute 1.35 10*3/mm3      Eosinophils, Absolute 0.23 10*3/mm3      Basophils, Absolute 0.07 10*3/mm3      Immature Grans, Absolute 0.18 10*3/mm3      nRBC 0.0 /100 WBC              Imaging Results (Last 24 Hours)       Procedure Component Value Units Date/Time    CT Head Without Contrast [[id number]] Collected: 01/09/25 1610     Updated: 01/09/25 1619    Narrative:      CT HEAD WO CONTRAST    Date of Exam: 1/9/2025 4:09 PM EST    Indication: Lightheadedness.    Comparison: 10/24/2017.    Technique: Axial CT  images were obtained of the head without contrast administration.  Coronal reconstructions were performed.  Automated exposure control and iterative reconstruction methods were used.      Findings:  There is encephalomalacia in the right frontal lobe which was not apparent on the remote study likely due to an interval infarct. There is decreased density in the periventricular and subcortical white matter consistent with chronic microvascular   ischemia which has advanced from the previous exam. The sulci, fissures, and ventricles are prominent indicating volume loss secondary to cerebral atrophy. The basal cisterns are well-maintained. There are atherosclerotic vascular calcifications in the   cavernous and supraclinoid internal carotid arteries. There is thinning of the lenses from previous cataract surgery. There is mucosal thickening in the bilateral ethmoid and left sphenoid sinus indicating chronic sinus disease. The mastoid sinuses are   clear. The calvarium is unremarkable.      Impression:      Impression:  1.Encephalomalacia in the right frontal lobe which was not apparent on the remote study likely due to an interval infarct.  2.Chronic ischemic changes.  3.Volume loss secondary to cerebral atrophy.  4.Chronic paranasal sinus disease.        Electronically Signed: Panchito Funes MD    1/9/2025 4:17 PM EST    Workstation ID: [id number]    XR Chest 2 View [[id number]] Collected: 01/09/25 1508     Updated: 01/09/25 1515    Narrative:      XR CHEST 2 VW    Date of Exam: 1/9/2025 2:55 PM EST    Indication: productive cough, fever, leukocytosis    Comparison: 1/2/2025    Findings:  Left shoulder arthroplasty and extensive thoracolumbar spinal fixation hardware is again noted. There are low lung volumes. No pneumothorax identified. There is new patchy airspace consolidation in the left base and to a lesser extent in the medial right   base. Heart size is at the upper limits of normal. There is crowding of the hilar  structures due to low lung volumes, which limits evaluation pulmonary vascularity.      Impression:      Impression:    1. Bibasilar airspace opacities, left greater than right suggesting pneumonia. Follow-up evaluation suggested to document resolution.  2. Low lung volumes.        Electronically Signed: Marc Peters MD    1/9/2025 3:13 PM EST    Workstation ID: [id number]              Assessment & Plan        This is a 74 y.o. female with:    Active and Resolved Problems  Active Hospital Problems    Diagnosis  POA    **Pneumonia [J18.9]  Yes    Chronic anticoagulation [Z79.01]  Not Applicable    Class 3 obesity [E66.813]  Yes    Mixed hyperlipidemia [E78.2]  Yes    Hypokalemia [E87.6]  Yes    History of ischemic stroke [Z86.73]  Not Applicable      Resolved Hospital Problems   No resolved problems to display.       1.  Pneumonia placed the patient empirically on azithromycin and ceftriaxone cultures pending, I believe a lot of her symptoms are a postviral symptom the x-ray does not look to ominous.  Placed her on some steroids and breathing treatments she stated that the breathing treatment that she had previously helped her a lot.  She does not have a history of lung disease according to the patient of note the procalcitonin was in the normal range at is not a very strong marker for bacterial infection  2.  Stroke the patient has a new finding on CT scan and MRI has been ordered and a neuroconsult it does not appear to be an acute infarct she has had a previous stroke but further evaluation went ahead and added an aspirin and an echocardiogram as well  3.  Class III obesity probably exacerbates the breathing situation  4.  Chronic anticoagulation she states is from the stroke that she has but she is not aware of any atrial fibrillation she is on telemetry  5.  Hypokalemia replacing it orally  6.  Mixed hyperlipidemia continue to atorvastatin 40 mg    VTE Prophylaxis:  Pharmacologic & mechanical VTE prophylaxis  orders are present.        The patient desires to be as follows:    CODE STATUS:    Level Of Support Discussed With: Patient  Code Status (Patient has no pulse and is not breathing): CPR (Attempt to Resuscitate)  Medical Interventions (Patient has pulse or is breathing): Full Support      Admission Status:  I believe this patient meets inpatient status.    Expected Length of Stay: 2 to 3 days    PDMP and Medication Dispenses via Sidebar reviewed and consistent with patient reported medications.    I discussed the patient's findings and my recommendations with patient.      Signature:     This document has been electronically signed by Nomi Meza MD on January 9, 2025 23:47 [facility]

## 2025-01-10 NOTE — THERAPY EVALUATION
Patient Name: Annalisa Bhagat  : 1950    MRN: [id number]                              Today's Date: 1/10/2025       Admit Date: 2025    Visit Dx:     ICD-10-CM ICD-9-CM   1. Dyspnea, unspecified type  R06.00 786.09   2. Pneumonia due to infectious organism, unspecified laterality, unspecified part of lung  J18.9 486     Patient Active Problem List   Diagnosis    Tear of left rotator cuff    Abnormal laboratory test    Back pain    Fibromyalgia    Neck pain    Compression fracture of vertebra    Constipation    CPAP (continuous positive airway pressure) dependence    Degeneration of intervertebral disc of lumbar region    Depression    Dermatitis    Psoriasis    Encounter for therapeutic drug monitoring    ESBL (extended spectrum beta-lactamase) producing bacteria infection    Family history of cerebrovascular accident (CVA)    Family history of diabetes mellitus    Fatigue    History of ischemic stroke    Hx of pathological fracture    Hypersomnolence    Hypoxemia    Incontinence    Inflammatory arthritis    Lupus    Rheumatoid arthritis    Kyphosis of thoracic region    Lumbar radiculopathy    Lumbar spondylosis with myelopathy    Lumbar stenosis    Spinal stenosis, lumbar    Migraine headache    Mitral prolapse    Nephrolithiasis    Neuropathy    Obesity    Other mechanical complication of internal fixation device of other bones, subsequent encounter    Osteoporosis    Postmenopausal    Pseudoarthrosis    RBBB    Recurrent UTI    Rotator cuff syndrome, left    Scoliosis    Seasonal allergies    Shoulder pain, right    Sleep apnea    Snoring    Stress incontinence    Vitamin D deficiency    High risk medication use    Hx of compression fracture of spine    Narcolepsy    Acute bronchitis    Influenza A    Pneumonia    Chronic anticoagulation    Class 3 obesity    Mixed hyperlipidemia    Hypokalemia     Past Medical History:   Diagnosis Date    Fibromyalgia     GERD (gastroesophageal reflux disease)     Hx  of compression fracture of spine     T12    Hyperlipidemia     Kidney stone     Loosening of hardware in spine     Lupus     Migraines     Narcolepsy 12/6/2023    Numbness and tingling     Osteoporosis 02/23/2018    Forteo x 2yrs(2/2018-12/2019,3/20-4/2020), on prolia(2/28/2020)    Recurrent UTI     Rheumatoid arthritis     Scoliosis     Seasonal allergies     Shoulder pain, left     Sleep apnea     Spinal stenosis     Stress incontinence     Stroke      Past Surgical History:   Procedure Laterality Date    BACK SURGERY      CARDIAC CATHETERIZATION      CARPAL TUNNEL RELEASE      CATARACT EXTRACTION, BILATERAL      CYSTOSCOPY W/ URETEROSCOPY W/ LITHOTRIPSY      HAND SURGERY      HYSTERECTOMY      RECTOVAGINAL FISTULA CLOSURE      SACRAL NERVE STIMULATOR PLACEMENT      TOTAL SHOULDER ARTHROPLASTY W/ DISTAL CLAVICLE EXCISION Left 6/13/2019    Procedure: TOTAL SHOULDER REVERSE ARTHROPLASTY;  Surgeon: Brice Mayes MD;  Location: [facility];  Service: Orthopedics      General Information       Row Name 01/10/25 1255          Physical Therapy Time and Intention    Document Type evaluation  -AM     Mode of Treatment physical therapy  -AM       Row Name 01/10/25 1255          General Information    Patient Profile Reviewed yes  -AM     Prior Level of Function independent:;all household mobility;community mobility;gait;transfer;bed mobility  -AM     Existing Precautions/Restrictions fall;other (see comments)  Droplet Precuation  -AM     Barriers to Rehab medically complex  -AM       Row Name 01/10/25 1255          Living Environment    People in Home parent(s)  -AM       Row Name 01/10/25 1255          Home Main Entrance    Number of Stairs, Main Entrance three  -AM     Stair Railings, Main Entrance railings safe and in good condition  -AM       Row Name 01/10/25 1255          Stairs Within Home, Primary    Number of Stairs, Within Home, Primary none  -AM       Row Name 01/10/25 1255          Cognition     Orientation Status (Cognition) oriented x 4  -AM       Row Name 01/10/25 1255          Safety Issues/Impairments Affecting Functional Mobility    Impairments Affecting Function (Mobility) balance;endurance/activity tolerance;pain;strength;shortness of breath  -AM     Comment, Safety Issues/Impairments (Mobility) gait belt utilized  -AM               User Key  (r) = Recorded By, (t) = Taken By, (c) = Cosigned By      Initials Name Provider Type    AM Luis Eagle, PT Physical Therapist                   Mobility       Row Name 01/10/25 1256          Bed Mobility    Comment, (Bed Mobility) NT secondary to pt sitting on b/s commode upon entrance into room  -AM       Row Name 01/10/25 1256          Sit-Stand Transfer    Sit-Stand [place] (Transfers) contact guard;1 person assist;minimum assist (75% patient effort)  -AM     Comment, (Sit-Stand Transfer) posterior LOB upon inital stand, required Min A to recover  -AM       Row Name 01/10/25 1256          Gait/Stairs (Locomotion)    [place] Level (Gait) contact guard;1 person assist  -AM     Assistive Device (Gait) other (see comments)  HHA  -AM     Distance in Feet (Gait) 25  -AM     Deviations/Abnormal Patterns (Gait) gait speed decreased;base of support, narrow  -AM     Bilateral Gait Deviations forward flexed posture  -AM     Comment, (Gait/Stairs) decreased endurance.  O2 sats 90% after ambulation on room air  -AM               User Key  (r) = Recorded By, (t) = Taken By, (c) = Cosigned By      Initials Name Provider Type    AM Luis Eagle, PT Physical Therapist                   Obj/Interventions       Row Name 01/10/25 1258          Range of Motion Comprehensive    General Range of Motion no range of motion deficits identified  -AM       Row Name 01/10/25 1258          Strength Comprehensive (MMT)    Comment, General Manual Muscle Testing (MMT) Assessment Defer BUE MMT to OT.  BLEs: 4+/5 throughout  -AM       Row Name 01/10/25 1258          Motor  Skills    Motor Skills functional endurance  -AM     Functional Endurance fair  -AM       Row Name 01/10/25 1258          Balance    Balance Assessment sitting static balance;sitting dynamic balance;standing static balance;standing dynamic balance  -AM     Static Sitting Balance independent  -AM     Dynamic Sitting Balance independent  -AM     Position, Sitting Balance sitting in chair;unsupported  -AM     Static Standing Balance contact guard  -AM     Dynamic Standing Balance contact guard;minimal assist  -AM     Position/Device Used, Standing Balance supported  HHA  -AM       Row Name 01/10/25 1258          Sensory Assessment (Somatosensory)    Sensory Assessment (Somatosensory) sensation intact  -AM               User Key  (r) = Recorded By, (t) = Taken By, (c) = Cosigned By      Initials Name Provider Type    AM Luis Eagle, PT Physical Therapist                   Goals/Plan       Row Name 01/10/25 1304          Bed Mobility Goal 1 (PT)    Activity/Assistive Device (Bed Mobility Goal 1, PT) bed mobility activities, all  -AM     [place] Level/Cues Needed (Bed Mobility Goal 1, PT) modified independence  -AM     Time Frame (Bed Mobility Goal 1, PT) long term goal (LTG)  -AM       Row Name 01/10/25 1304          Transfer Goal 1 (PT)    Activity/Assistive Device (Transfer Goal 1, PT) transfers, all  -AM     [place] Level/Cues Needed (Transfer Goal 1, PT) modified independence  -AM     Time Frame (Transfer Goal 1, PT) long term goal (LTG)  -AM       Row Name 01/10/25 1304          Gait Training Goal 1 (PT)    Activity/Assistive Device (Gait Training Goal 1, PT) gait (walking locomotion)  with use of assistive device PRN  -AM     [place] Level (Gait Training Goal 1, PT) modified independence  -AM     Distance (Gait Training Goal 1, PT) 100'  -AM     Time Frame (Gait Training Goal 1, PT) long term goal (LTG)  -AM       Row Name 01/10/25 1304          Stairs Goal 1 (PT)    Activity/Assistive Device  "(Stairs Goal 1, PT) stairs, all skills  -AM     [place] Level/Cues Needed (Stairs Goal 1, PT) modified independence  -AM     Number of Stairs (Stairs Goal 1, PT) 3  -AM     Time Frame (Stairs Goal 1, PT) long term goal (LTG)  -AM       Row Name 01/10/25 8233          Therapy Assessment/Plan (PT)    Planned Therapy Interventions (PT) balance training;bed mobility training;gait training;strengthening;stair training;patient/family education;transfer training  -AM               User Key  (r) = Recorded By, (t) = Taken By, (c) = Cosigned By      Initials Name Provider Type    AM Luis Eagle, PT Physical Therapist                   Clinical Impression       Row Name 01/10/25 3283          Pain    Pretreatment Pain Rating 2/10  -AM     Posttreatment Pain Rating 2/10  -AM     Pain Location elbow  -AM     Pain Side/Orientation left  -AM     Pre/Posttreatment Pain Comment \"lupus pain\"  -AM       Row Name 01/10/25 0493          Plan of Care Review    Plan of Care Reviewed With patient  -AM     Outcome Evaluation Pt is a 73 y/o female with c/o worsenion SOB, productive cough with yellow sputum, nasal congestion, CHAPMAN, and intermittent migraines.  Recent hospitalization on 1/4 secondary to influenza A/ cornavirus.  CT head: encephalomalacia R frontal lobe.  Chest X-ray: bibasilar opacities L>R suggesting PNA.  PMH of CVA, migraines, GERD, possible lupus, RA and mixed HLD.  Pt reports her 97 y/o mother lives with her in  a 1 story home with 3 steps to enter into home.  Pt's mother is currently staying at brother's home.  Pt was independent with all functional mobility tasks and did not use an assistive device prior to hospitalization.  Pt on room air and telemetry this date.  Pt required CGA for sit to stand transfer from [facility], [facility] with standing, MIn A to recover balance.  Pt ambulated 25' with HHA with CGA.  Recommend HHPT.  -AM       Row Name 01/10/25 9451          Therapy Assessment/Plan (PT)    " Patient/Family Therapy Goals Statement (PT) To go home  -AM     Rehab Potential (PT) good  -AM     Criteria for Skilled Interventions Met (PT) yes  -AM     Therapy Frequency (PT) 3 times/wk  -AM     Predicted Duration of Therapy Intervention (PT) until d/c  -AM       Row Name 01/10/25 1259          Vital Signs    Pre SpO2 (%) 92  -AM     O2 Delivery Pre Treatment room air  -AM     Intra SpO2 (%) 90  -AM     O2 Delivery Intra Treatment room air  -AM     O2 Delivery Post Treatment room air  -AM     Pre Patient Position Sitting  -AM     Intra Patient Position Standing  -AM     Post Patient Position Sitting  -AM       Row Name 01/10/25 1259          Positioning and Restraints    Pre-Treatment Position bedside commode  -AM     Post Treatment Position chair  -AM     In Chair notified nsg;call light within reach;sitting;encouraged to call for assist;exit alarm on  -AM               User Key  (r) = Recorded By, (t) = Taken By, (c) = Cosigned By      Initials Name Provider Type    AM Luis Eagle, PT Physical Therapist                   Outcome Measures       Row Name 01/10/25 1305          How much help from another person do you currently need...    Turning from your back to your side while in flat bed without using bedrails? 4  -AM     Moving from lying on back to sitting on the side of a flat bed without bedrails? 3  -AM     Moving to and from a bed to a chair (including a wheelchair)? 3  -AM     Standing up from a chair using your arms (e.g., wheelchair, bedside chair)? 3  -AM     Climbing 3-5 steps with a railing? 2  -AM     To walk in hospital room? 3  -AM     AM-PAC 6 Clicks Score (PT) 18  -AM     Highest Level of Mobility Goal 6 --> Walk 10 steps or more  -AM       Row Name 01/10/25 1305          Functional Assessment    Outcome Measure Options AM-PAC 6 Clicks Basic Mobility (PT)  -AM               User Key  (r) = Recorded By, (t) = Taken By, (c) = Cosigned By      Initials Name Provider Type    Luis Craig,  PT Physical Therapist                                 Physical Therapy Education       Title: PT OT SLP Therapies (Done)       Topic: Physical Therapy (Done)       Point: Mobility training (Done)       Learning Progress Summary            Patient Acceptance, E,TB, VU by AM at 1/10/2025 1305                      Point: Body mechanics (Done)       Learning Progress Summary            Patient Acceptance, E,TB, VU by AM at 1/10/2025 1305                      Point: Precautions (Done)       Learning Progress Summary            Patient Acceptance, E,TB, VU by AM at 1/10/2025 1305                                      User Key       Initials Effective Dates Name Provider Type Discipline    AM 05/10/21 -  Luis Eagle, PT Physical Therapist PT                  PT Recommendation and Plan  Planned Therapy Interventions (PT): balance training, bed mobility training, gait training, strengthening, stair training, patient/family education, transfer training  Outcome Evaluation: Pt is a 75 y/o female with c/o worsenion SOB, productive cough with yellow sputum, nasal congestion, CHAPMAN, and intermittent migraines.  Recent hospitalization on 1/4 secondary to influenza A/ cornavirus.  CT head: encephalomalacia R frontal lobe.  Chest X-ray: bibasilar opacities L>R suggesting PNA.  PMH of CVA, migraines, GERD, possible lupus, RA and mixed HLD.  Pt reports her 97 y/o mother lives with her in  a 1 story home with 3 steps to enter into home.  Pt's mother is currently staying at brother's home.  Pt was independent with all functional mobility tasks and did not use an assistive device prior to hospitalization.  Pt on room air and telemetry this date.  Pt required CGA for sit to stand transfer from [facility] with standing, MIn A to recover balance.  Pt ambulated 25' with HHA with CGA.  Recommend HHPT.     Time Calculation:         PT Charges       Row Name 01/10/25 2126             Time Calculation    Start Time 0817  -AM       Stop Time 0834  -AM      Time Calculation (min) 17 min  -AM      PT Received On 01/10/25  -AM      PT - Next Appointment 01/12/25  -AM      PT Goal Re-Cert Due Date 01/24/25  -AM                User Key  (r) = Recorded By, (t) = Taken By, (c) = Cosigned By      Initials Name Provider Type    AM Luis Eagle, PT Physical Therapist                  Therapy Charges for Today       Code Description Service Date Service Provider Modifiers Qty    22208437292 HC PT EVAL MOD COMPLEXITY 3 1/10/2025 Luis Eagle, PT GP 1            PT G-Codes  Outcome Measure Options: AM-PAC 6 Clicks Basic Mobility (PT)  AM-PAC 6 Clicks Score (PT): 18  PT Discharge Summary  Anticipated Discharge Disposition (PT): home with home health    Luis Eagle, PT  1/10/2025

## 2025-01-10 NOTE — PROGRESS NOTES
[facility] MEDICINE SERVICE  DAILY PROGRESS NOTE    NAME: Annalisa Bhagat  : 1950  MRN: [id number]      LOS: 1 day     PROVIDER OF SERVICE: Sally Puckett MD    Chief Complaint: Pneumonia    Subjective:     Interval History:  History taken from: patient    Patient otherwise appears comfortable.  She is currently sitting up.  She continues to require O2 via nasal cannula at approximately 3 L.  History has been reviewed and discussed with patient as well as review of systems.  Patient has been very compliant with her routine oral medications including chronic anticoagulation as well as her atorvastatin.        Review of Systems:   Review of Systems   All other systems reviewed and are negative.      Objective:     Vital Signs  Temp:  [98.1 °F (36.7 °C)-99.7 °F (37.6 °C)] 98.3 °F (36.8 °C)  Heart Rate:  [] 81  Resp:  [12-24] 16  BP: (120-185)/(52-82) 120/71  Flow (L/min) (Oxygen Therapy):  [3] 3   Body mass index is 45.91 kg/m².    Physical Exam  Physical Exam  Constitutional:       General: She is awake.      Appearance: She is well-developed and well-groomed. She is obese. She is ill-appearing.   HENT:      Head: Normocephalic and atraumatic.      Nose: Nose normal.      Mouth/Throat:      Mouth: Mucous membranes are moist.      Pharynx: Oropharynx is clear.   Eyes:      Extraocular Movements: Extraocular movements intact.      Conjunctiva/sclera: Conjunctivae normal.      Pupils: Pupils are equal, round, and reactive to light.   Cardiovascular:      Rate and Rhythm: Normal rate and regular rhythm.      Pulses: Normal pulses.      Heart sounds: Normal heart sounds.   Pulmonary:      Effort: Pulmonary effort is normal.      Breath sounds: Wheezing and rhonchi present.   Abdominal:      General: Abdomen is flat. Bowel sounds are normal.      Palpations: Abdomen is soft.   Musculoskeletal:         General: Normal range of motion.      Cervical back: Normal range of motion and neck supple.       Right lower leg: No edema.      Left lower leg: No edema.   Skin:     General: Skin is warm and dry.   Neurological:      General: No focal deficit present.      Mental Status: She is alert and oriented to person, place, and time. Mental status is at baseline.      Cranial Nerves: Cranial nerves 2-12 are intact.      Sensory: Sensation is intact.      Motor: Motor function is intact.   Psychiatric:         Mood and Affect: Mood normal.         Behavior: Behavior normal. Behavior is cooperative.         Thought Content: Thought content normal.         Judgment: Judgment normal.            Diagnostic Data    Results from last 7 days   Lab Units 01/10/25  0520 01/09/25  1352   WBC 10*3/mm3  --  16.20*   HEMOGLOBIN g/dL  --  14.3   HEMATOCRIT %  --  43.3   PLATELETS 10*3/mm3  --  421   GLUCOSE mg/dL 111* 103*   CREATININE mg/dL 0.59 0.68   BUN mg/dL 9 11   SODIUM mmol/L 139 137   POTASSIUM mmol/L 3.8 3.4*   AST (SGOT) U/L  --  50*   ALT (SGPT) U/L  --  39*   ALK PHOS U/L  --  144*   BILIRUBIN mg/dL  --  0.4   ANION GAP mmol/L 9.9 9.6       CT Head Without Contrast    Result Date: 1/9/2025  Impression: 1.Encephalomalacia in the right frontal lobe which was not apparent on the remote study likely due to an interval infarct. 2.Chronic ischemic changes. 3.Volume loss secondary to cerebral atrophy. 4.Chronic paranasal sinus disease. Electronically Signed: Panchito Funes MD  1/9/2025 4:17 PM EST  Workstation ID: [id number]    XR Chest 2 View    Result Date: 1/9/2025  Impression: 1. Bibasilar airspace opacities, left greater than right suggesting pneumonia. Follow-up evaluation suggested to document resolution. 2. Low lung volumes. Electronically Signed: Marc Peters MD  1/9/2025 3:13 PM EST  Workstation ID: [id number]       I reviewed the patient's new clinical results.    Assessment/Plan:     Active and Resolved Problems  Active Hospital Problems    Diagnosis  POA    **Pneumonia [J18.9]  Yes    Chronic anticoagulation [Z79.01]  Not  Applicable    Class 3 obesity [E66.813]  Yes    Mixed hyperlipidemia [E78.2]  Yes    Hypokalemia [E87.6]  Yes    History of ischemic stroke [Z86.73]  Not Applicable      Resolved Hospital Problems   No resolved problems to display.       Acute hypoxemic respiratory failure  Coronavirus positive  Influenza positive  Abnormal CT imaging possible new CVA  Encephalomalacia  Remote history of CVA  Rheumatoid arthritis  Chronic anticoagulation with Eliquis      Patient has been placed on broad-spectrum IV antibiotics empirically for possible superimposed bacterial pneumonia.  Noted influenza as well as coronavirus positive.  Continue supportive care.  Continue IV steroids, aerosol treatments.    CT head imaging noted and reviewed.  MRI has been ordered.  Ativan will be prescribed secondary to increased level of anxiety.  Based on MRI imaging results place consultation to neurology for anticoagulation opinion.  Hold off on consultation until MRI imaging returns.    Will consult physical and Occupational Therapy after MRI imaging, patient likely will require rehab at time of discharge.    VTE Prophylaxis:  Pharmacologic & mechanical VTE prophylaxis orders are present.             Disposition Planning:     Barriers to Discharge: MRI imaging, improvement of O2 status, respiratory failure  Anticipated Date of Discharge: 1/12/2025  Place of Discharge: Home versus rehab pending progress      Time: 35 minutes     Code Status and Medical Interventions: CPR (Attempt to Resuscitate); Full Support   Ordered at: 01/09/25 1938     Level Of Support Discussed With:    Patient     Code Status (Patient has no pulse and is not breathing):    CPR (Attempt to Resuscitate)     Medical Interventions (Patient has pulse or is breathing):    Full Support       Signature: Electronically signed by Sally Puckett MD, 01/10/25, 09:43 EST.  [facility] Hospitalist Team

## 2025-01-10 NOTE — PLAN OF CARE
Pt had a MRI done today, no acute stroke per MD [facility] neuro consult. Administered meds as ordered, call light within the reach, no new concerns voiced at this time.     Problem: Adult Inpatient Plan of Care  Goal: Plan of Care Review  Outcome: Progressing  Goal: Patient-Specific Goal (Individualized)  Outcome: Progressing  Goal: Absence of Hospital-Acquired Illness or Injury  Outcome: Progressing  Intervention: Identify and Manage Fall Risk  Recent Flowsheet Documentation  Taken 1/10/2025 1200 by Ashli Lua RN  Safety Promotion/Fall Prevention: safety round/check completed  Taken 1/10/2025 1000 by Ashli Lau RN  Safety Promotion/Fall Prevention: safety round/check completed  Taken 1/10/2025 0800 by Ashli Lua RN  Safety Promotion/Fall Prevention: safety round/check completed  Intervention: Prevent Skin Injury  Recent Flowsheet Documentation  Taken 1/10/2025 0800 by Ashli Lua RN  Body Position: position changed independently  Skin Protection: incontinence pads utilized  Intervention: Prevent and Manage VTE (Venous Thromboembolism) Risk  Recent Flowsheet Documentation  Taken 1/10/2025 0800 by Ashli Lua RN  VTE Prevention/Management: SCDs (sequential compression devices) off  Intervention: Prevent Infection  Recent Flowsheet Documentation  Taken 1/10/2025 1200 by Ashli Lua RN  Infection Prevention:   single patient room provided   hand hygiene promoted  Taken 1/10/2025 1000 by Ashli Lua RN  Infection Prevention:   hand hygiene promoted   single patient room provided  Taken 1/10/2025 0800 by Ashli Lua RN  Infection Prevention: single patient room provided  Goal: Optimal Comfort and Wellbeing  Outcome: Progressing  Intervention: Provide Person-Centered Care  Recent Flowsheet Documentation  Taken 1/10/2025 0800 by Ashli Lua RN  Trust Relationship/Rapport:   care explained   choices provided   emotional support provided  Goal: Readiness for  Transition of Care  Outcome: Progressing     Problem: Pneumonia  Goal: Fluid Balance  Outcome: Progressing  Goal: Absence of Infection Signs and Symptoms  Outcome: Progressing  Intervention: Prevent Infection Progression  Recent Flowsheet Documentation  Taken 1/10/2025 1200 by Ashli Lua RN  Isolation Precautions:   precautions maintained   droplet  Taken 1/10/2025 1000 by Ashli Lua RN  Isolation Precautions:   precautions maintained   droplet  Taken 1/10/2025 0800 by Ashli Lua RN  Isolation Precautions:   precautions maintained   droplet  Goal: Effective Oxygenation and Ventilation  Outcome: Progressing  Intervention: Promote Airway Secretion Clearance  Recent Flowsheet Documentation  Taken 1/10/2025 0800 by Ashli Lua RN  Activity Management: activity encouraged  Cough And Deep Breathing: done independently per patient  Intervention: Optimize Oxygenation and Ventilation  Recent Flowsheet Documentation  Taken 1/10/2025 0800 by Ashli Lua RN  Head of Bed (HOB) Positioning: HOB elevated     Problem: Fatigue  Goal: Improved Activity Tolerance  Outcome: Progressing  Intervention: Promote Improved Energy  Recent Flowsheet Documentation  Taken 1/10/2025 0800 by Ashli Lua RN  Activity Management: activity encouraged     Problem: Fall Injury Risk  Goal: Absence of Fall and Fall-Related Injury  Outcome: Progressing  Intervention: Identify and Manage Contributors  Recent Flowsheet Documentation  Taken 1/10/2025 1200 by Ashli Lua RN  Medication Review/Management: medications reviewed  Taken 1/10/2025 1000 by Ashli Lua RN  Medication Review/Management: medications reviewed  Taken 1/10/2025 0800 by Ashli Lua RN  Medication Review/Management: medications reviewed  Intervention: Promote Injury-Free Environment  Recent Flowsheet Documentation  Taken 1/10/2025 1200 by Ashli Lua RN  Safety Promotion/Fall Prevention: safety round/check completed  Taken  1/10/2025 1000 by Ashli Lua RN  Safety Promotion/Fall Prevention: safety round/check completed  Taken 1/10/2025 0800 by Ashli Lua RN  Safety Promotion/Fall Prevention: safety round/check completed     Problem: Skin Injury Risk Increased  Goal: Skin Health and Integrity  Outcome: Progressing  Intervention: Optimize Skin Protection  Recent Flowsheet Documentation  Taken 1/10/2025 0800 by Ashli Lua RN  Activity Management: activity encouraged  Pressure Reduction Techniques: frequent weight shift encouraged  Head of Bed (HOB) Positioning: HOB elevated  Pressure Reduction Devices: pressure-redistributing mattress utilized  Skin Protection: incontinence pads utilized   Goal Outcome Evaluation:

## 2025-01-11 ENCOUNTER — APPOINTMENT (OUTPATIENT)
Dept: CT IMAGING | Facility: HOSPITAL | Age: 75
DRG: 193 | End: 2025-01-11
Payer: MEDICARE

## 2025-01-11 PROCEDURE — 94799 UNLISTED PULMONARY SVC/PX: CPT

## 2025-01-11 PROCEDURE — 94761 N-INVAS EAR/PLS OXIMETRY MLT: CPT

## 2025-01-11 PROCEDURE — 94664 DEMO&/EVAL PT USE INHALER: CPT

## 2025-01-11 PROCEDURE — 71250 CT THORAX DX C-: CPT

## 2025-01-11 PROCEDURE — 63710000001 PREDNISONE PER 1 MG: Performed by: INTERNAL MEDICINE

## 2025-01-11 PROCEDURE — 25010000002 CEFTRIAXONE PER 250 MG: Performed by: INTERNAL MEDICINE

## 2025-01-11 RX ORDER — BENZONATATE 100 MG/1
200 CAPSULE ORAL 3 TIMES DAILY PRN
Status: DISCONTINUED | OUTPATIENT
Start: 2025-01-11 | End: 2025-01-16 | Stop reason: HOSPADM

## 2025-01-11 RX ADMIN — AZITHROMYCIN 500 MG: 250 TABLET, FILM COATED ORAL at 08:06

## 2025-01-11 RX ADMIN — CEFTRIAXONE 2000 MG: 2 INJECTION, POWDER, FOR SOLUTION INTRAMUSCULAR; INTRAVENOUS at 00:08

## 2025-01-11 RX ADMIN — PANTOPRAZOLE SODIUM 40 MG: 40 TABLET, DELAYED RELEASE ORAL at 06:06

## 2025-01-11 RX ADMIN — Medication 10 ML: at 08:09

## 2025-01-11 RX ADMIN — Medication 10 MG: at 20:03

## 2025-01-11 RX ADMIN — Medication 10 ML: at 20:03

## 2025-01-11 RX ADMIN — HYDROXYCHLOROQUINE SULFATE 200 MG: 200 TABLET ORAL at 20:03

## 2025-01-11 RX ADMIN — APIXABAN 5 MG: 5 TABLET, FILM COATED ORAL at 08:07

## 2025-01-11 RX ADMIN — VENLAFAXINE HYDROCHLORIDE 150 MG: 75 CAPSULE, EXTENDED RELEASE ORAL at 08:06

## 2025-01-11 RX ADMIN — ACETAMINOPHEN 650 MG: 325 TABLET, FILM COATED ORAL at 08:07

## 2025-01-11 RX ADMIN — BENZONATATE 200 MG: 100 CAPSULE ORAL at 10:33

## 2025-01-11 RX ADMIN — ATORVASTATIN CALCIUM 40 MG: 40 TABLET, FILM COATED ORAL at 20:03

## 2025-01-11 RX ADMIN — IPRATROPIUM BROMIDE AND ALBUTEROL SULFATE 3 ML: .5; 3 SOLUTION RESPIRATORY (INHALATION) at 11:06

## 2025-01-11 RX ADMIN — GUAIFENESIN 200 MG: 200 SOLUTION ORAL at 00:16

## 2025-01-11 RX ADMIN — GUAIFENESIN 200 MG: 200 SOLUTION ORAL at 08:06

## 2025-01-11 RX ADMIN — GUAIFENESIN 600 MG: 600 TABLET ORAL at 20:03

## 2025-01-11 RX ADMIN — IPRATROPIUM BROMIDE AND ALBUTEROL SULFATE 3 ML: .5; 3 SOLUTION RESPIRATORY (INHALATION) at 19:25

## 2025-01-11 RX ADMIN — ACETAMINOPHEN 650 MG: 325 TABLET, FILM COATED ORAL at 20:03

## 2025-01-11 RX ADMIN — HYDROXYCHLOROQUINE SULFATE 200 MG: 200 TABLET ORAL at 08:06

## 2025-01-11 RX ADMIN — APIXABAN 5 MG: 5 TABLET, FILM COATED ORAL at 20:03

## 2025-01-11 RX ADMIN — PREDNISONE 40 MG: 20 TABLET ORAL at 08:06

## 2025-01-11 RX ADMIN — THERA TABS 1 TABLET: TAB at 08:06

## 2025-01-11 RX ADMIN — ACETAMINOPHEN 650 MG: 325 TABLET, FILM COATED ORAL at 01:02

## 2025-01-11 RX ADMIN — GUAIFENESIN 600 MG: 600 TABLET ORAL at 08:06

## 2025-01-11 NOTE — CASE MANAGEMENT/SOCIAL WORK
Continued Stay Note   Shimon     Patient Name: Annalisa Bhagat  MRN: [id number]  Today's Date: 1/11/2025    Admit Date: 1/9/2025    Plan: Return home with [facility] referral.   Discharge Plan       Row Name 01/11/25 0912       Plan    Plan Return home with [facility] referral.    Plan Comments CM received message from DANIELLE Olvera regarding pt's referral to [facility]. CM met with pt at bedside. Pt states she had not heard from [facility] about when they would be able to see her and would like to keep her referral placed with them. She has no further needs at this time. CM noted PT/OT recs for HH and ambulatory referral for home health order in place. CM noted no referral to home health agency had been placed in [facility]. CM placed referral to [facility] in [facility] basket and notified liaison Radha.             Expected Discharge Date and Time       Expected Discharge Date Expected Discharge Time    Jan 13, 2025           Apple Dee RN      Office Phone: [phone]  Office Cell: [phone]

## 2025-01-11 NOTE — DISCHARGE PLACEMENT REQUEST
"Annalisa Talbert (74 y.o. Female)       Date of Birth   1950    Social Security Number       Address   [address] [place] [address] [place] [facility]    Home Phone   [phone]    MRN   [id number]       L[employer]   W[employer]    Marital Status                               Admission Date   1/9/25    Admission Type   Emergency    Admitting Provider   Nomi Meza MD    Attending Provider   Sally Puckett MD    Department, Room/Bed   [facility] INPATIENT, 380/1       Discharge Date       Discharge Disposition       Discharge Destination                                 Attending Provider: Sally Puckett MD    Allergies: Shingrix [Zoster Vac Recomb Adjuvanted], Zolmitriptan, Adhesive Tape, Hydromorphone, Amoxicillin-pot Clavulanate    Isolation: Droplet   Infection: Influenza (01/02/25)   Code Status: CPR    Ht: 139.7 cm (55\")   Wt: 89.6 kg (197 lb 8.5 oz)    Admission Cmt: None   Principal Problem: Pneumonia [J18.9]                   Active Insurance as of 1/9/2025       Primary Coverage       Payor Plan Insurance Group Employer/Plan Group    MEDICARE MEDICARE A & B        Payor Plan Address Payor Plan Phone Number Payor Plan Fax Number Effective Dates    [address] [phone]  6/1/2013 - None Entered    [facility] [id number]         Subscriber Name Subscriber Birth Date Member ID       ANNALISA TALBERT 1950 [id number]               Secondary Coverage       Payor Plan Insurance Group Employer/Plan Group    [facility] [employer] [facility]        Payor Plan Address Payor Plan Phone Number Payor Plan Fax Number Effective Dates    [facility] [phone]  1/1/2019 - None Entered    [address]       [facility] [address]         Subscriber Name Subscriber Birth Date Member ID       ANNALISA TALBERT 1950 [id number]                     Emergency Contacts        (Rel.) Home Phone Work Phone Mobile Phone    Michael Talbert (Son) [phone] -- [phone]    " ELLA SERNA (Mother) [phone] -- --    ELOISA TALBERT (Daughter) [phone] -- [phone]

## 2025-01-11 NOTE — PROGRESS NOTES
[facility] MEDICINE SERVICE  DAILY PROGRESS NOTE    NAME: Annalisa Bhagat  : 1950  MRN: [id number]      LOS: 2 days     PROVIDER OF SERVICE: Sally Puckett MD    Chief Complaint: Pneumonia    Subjective:     Interval History:  History taken from: patient    Patient appears anxious about the idea of being discharged home.  Noted therapy evaluation and/or recommendations for home at time of discharge.  Patient feels as though she is unable to care for herself.  She resides at home alone.  She does care for her 98-year-old mother as well.  Noted MRI results.  Plan of care was reviewed and/or discussed with patient.        Review of Systems:   Review of Systems   All other systems reviewed and are negative.      Objective:     Vital Signs  Temp:  [97.8 °F (36.6 °C)-98.8 °F (37.1 °C)] 98.8 °F (37.1 °C)  Heart Rate:  [] 78  Resp:  [12-20] 12  BP: (119-152)/(50-77) 144/72  Flow (L/min) (Oxygen Therapy):  [2] 2   Body mass index is 45.91 kg/m².    Physical Exam  Physical Exam  Constitutional:       General: She is awake.      Appearance: She is well-developed and well-groomed. She is ill-appearing.   HENT:      Head: Normocephalic and atraumatic.      Nose: Nose normal.      Mouth/Throat:      Mouth: Mucous membranes are moist.      Pharynx: Oropharynx is clear.   Eyes:      Extraocular Movements: Extraocular movements intact.      Conjunctiva/sclera: Conjunctivae normal.      Pupils: Pupils are equal, round, and reactive to light.   Cardiovascular:      Rate and Rhythm: Normal rate and regular rhythm.      Pulses: Normal pulses.      Heart sounds: Normal heart sounds.   Pulmonary:      Effort: Pulmonary effort is normal.      Breath sounds: Wheezing, rhonchi and rales present.   Abdominal:      General: Abdomen is flat. Bowel sounds are normal.      Palpations: Abdomen is soft.   Musculoskeletal:         General: Normal range of motion.      Cervical back: Normal range of motion and neck supple.       Right lower leg: No edema.      Left lower leg: No edema.   Skin:     General: Skin is warm and dry.   Neurological:      General: No focal deficit present.      Mental Status: She is alert and oriented to person, place, and time. Mental status is at baseline.      Cranial Nerves: Cranial nerves 2-12 are intact.      Sensory: Sensation is intact.      Motor: Motor function is intact.   Psychiatric:         Mood and Affect: Mood normal.         Behavior: Behavior normal. Behavior is cooperative.         Thought Content: Thought content normal.         Judgment: Judgment normal.            Diagnostic Data    Results from last 7 days   Lab Units 01/10/25  0520 01/09/25  1352   WBC 10*3/mm3  --  16.20*   HEMOGLOBIN g/dL  --  14.3   HEMATOCRIT %  --  43.3   PLATELETS 10*3/mm3  --  421   GLUCOSE mg/dL 111* 103*   CREATININE mg/dL 0.59 0.68   BUN mg/dL 9 11   SODIUM mmol/L 139 137   POTASSIUM mmol/L 3.8 3.4*   AST (SGOT) U/L  --  50*   ALT (SGPT) U/L  --  39*   ALK PHOS U/L  --  144*   BILIRUBIN mg/dL  --  0.4   ANION GAP mmol/L 9.9 9.6       MRI Brain Without Contrast    Result Date: 1/10/2025  Impression: 1.No acute ischemic change. 2.Remote right MCA distribution infarct. 3.White matter changes compatible small vessel ischemic disease in this age group. Electronically Signed: Fede Sebastian MD  1/10/2025 11:34 AM EST  Workstation ID: [id number]    CT Head Without Contrast    Result Date: 1/9/2025  Impression: 1.Encephalomalacia in the right frontal lobe which was not apparent on the remote study likely due to an interval infarct. 2.Chronic ischemic changes. 3.Volume loss secondary to cerebral atrophy. 4.Chronic paranasal sinus disease. Electronically Signed: Panchito Funes MD  1/9/2025 4:17 PM EST  Workstation ID: [id number]    XR Chest 2 View    Result Date: 1/9/2025  Impression: 1. Bibasilar airspace opacities, left greater than right suggesting pneumonia. Follow-up evaluation suggested to document resolution. 2. Low lung  volumes. Electronically Signed: Marc Peters MD  1/9/2025 3:13 PM EST  Workstation ID: [id number]       I reviewed the patient's new clinical results.    Assessment/Plan:     Active and Resolved Problems  Active Hospital Problems    Diagnosis  POA    **Pneumonia [J18.9]  Yes    Chronic anticoagulation [Z79.01]  Not Applicable    Class 3 obesity [E66.813]  Yes    Mixed hyperlipidemia [E78.2]  Yes    Hypokalemia [E87.6]  Yes    History of ischemic stroke [Z86.73]  Not Applicable      Resolved Hospital Problems   No resolved problems to display.     Acute hypoxemic respiratory failure  Coronavirus positive  Influenza positive  Abnormal CT imaging possible new CVA  Encephalomalacia  Remote history of CVA  Rheumatoid arthritis  Chronic anticoagulation with Eliquis      Patient continues to endorse significant weakness.  Noted therapy evaluation from yesterday.  Patient resides at home alone.  She does care for her 98-year-old mother as well.  Would strongly advocate for patient to be transferred and/or discharge to her subacute rehab facility for further strengthening and/or reconditioning.  Plan of care was reviewed and discussed with patient.  Will review with therapy services.    Discussed MRI results with patient at bedside.  Noted no new acute CVA.  Continue current medications for prior CVA including chronic anticoagulation with Eliquis.    Given O2 requirements at approximate 2 L as well as baseline room air status will check CT noncontrast chest.  Continue IV antibiotics for possible superimposed bacterial pneumonia.          VTE Prophylaxis:  Pharmacologic & mechanical VTE prophylaxis orders are present.             Disposition Planning:     Barriers to Discharge: Therapy evaluation, insurance approval, stability of respiratory status  Anticipated Date of Discharge: 1/13/2025  Place of Discharge: Rehab      Time: 35 minutes     Code Status and Medical Interventions: CPR (Attempt to Resuscitate); Full Support    Ordered at: 01/09/25 1938     Level Of Support Discussed With:    Patient     Code Status (Patient has no pulse and is not breathing):    CPR (Attempt to Resuscitate)     Medical Interventions (Patient has pulse or is breathing):    Full Support       Signature: Electronically signed by Sally Puckett MD, 01/11/25, 09:38 EST.  [facility]

## 2025-01-11 NOTE — PLAN OF CARE
Goal Outcome Evaluation:         Pt rested well this shift. VSS at this time with no new complaints.

## 2025-01-11 NOTE — PLAN OF CARE
Pt had a ct of chest done today which showed pna, pt is on abx for tat. Pt complained of cough, ordered prn benzonatate and given robitussin for that. Pt is resting in bed stand by assist to commode, call light within the reach. VSS.    Problem: Adult Inpatient Plan of Care  Goal: Plan of Care Review  Outcome: Progressing  Goal: Patient-Specific Goal (Individualized)  Outcome: Progressing  Goal: Absence of Hospital-Acquired Illness or Injury  Outcome: Progressing  Intervention: Identify and Manage Fall Risk  Recent Flowsheet Documentation  Taken 1/11/2025 1400 by Ashli Lua RN  Safety Promotion/Fall Prevention: safety round/check completed  Taken 1/11/2025 1200 by Ashli Lua RN  Safety Promotion/Fall Prevention: safety round/check completed  Taken 1/11/2025 1000 by Ashli Lua RN  Safety Promotion/Fall Prevention: safety round/check completed  Taken 1/11/2025 0800 by Ashli Lua RN  Safety Promotion/Fall Prevention: safety round/check completed  Intervention: Prevent Skin Injury  Recent Flowsheet Documentation  Taken 1/11/2025 0800 by Ashli Lua RN  Body Position: position changed independently  Skin Protection: incontinence pads utilized  Intervention: Prevent and Manage VTE (Venous Thromboembolism) Risk  Recent Flowsheet Documentation  Taken 1/11/2025 0800 by Ashli Lua RN  VTE Prevention/Management: SCDs (sequential compression devices) off  Intervention: Prevent Infection  Recent Flowsheet Documentation  Taken 1/11/2025 1400 by Ashli Lua RN  Infection Prevention:   hand hygiene promoted   single patient room provided  Taken 1/11/2025 1200 by Ashli Lua RN  Infection Prevention:   single patient room provided   hand hygiene promoted  Taken 1/11/2025 1000 by Ashli Lua RN  Infection Prevention:   single patient room provided   hand hygiene promoted  Taken 1/11/2025 0800 by Ashli Lua RN  Infection Prevention: single patient room provided  Goal:  Optimal Comfort and Wellbeing  Outcome: Progressing  Intervention: Monitor Pain and Promote Comfort  Recent Flowsheet Documentation  Taken 1/11/2025 0800 by Ashli Lua RN  Pain Management Interventions: pain medication given  Intervention: Provide Person-Centered Care  Recent Flowsheet Documentation  Taken 1/11/2025 0800 by Ashli Lua RN  Trust Relationship/Rapport:   care explained   choices provided   emotional support provided  Goal: Readiness for Transition of Care  Outcome: Progressing     Problem: Pneumonia  Goal: Fluid Balance  Outcome: Progressing  Goal: Absence of Infection Signs and Symptoms  Outcome: Progressing  Intervention: Prevent Infection Progression  Recent Flowsheet Documentation  Taken 1/11/2025 1400 by Ashli Lua RN  Isolation Precautions:   precautions maintained   droplet  Taken 1/11/2025 1200 by Ashli Lua RN  Isolation Precautions:   precautions maintained   droplet  Taken 1/11/2025 1000 by Ashli Lua RN  Isolation Precautions:   precautions maintained   droplet  Taken 1/11/2025 0800 by Ashli Lua RN  Isolation Precautions:   precautions maintained   droplet  Goal: Effective Oxygenation and Ventilation  Outcome: Progressing  Intervention: Promote Airway Secretion Clearance  Recent Flowsheet Documentation  Taken 1/11/2025 0800 by Ashli Lua RN  Activity Management: activity encouraged  Cough And Deep Breathing: done independently per patient  Intervention: Optimize Oxygenation and Ventilation  Recent Flowsheet Documentation  Taken 1/11/2025 0800 by Ashli Lua RN  Head of Bed (HOB) Positioning: HOB elevated     Problem: Fatigue  Goal: Improved Activity Tolerance  Outcome: Progressing  Intervention: Promote Improved Energy  Recent Flowsheet Documentation  Taken 1/11/2025 0800 by Ashli Lua RN  Activity Management: activity encouraged     Problem: Fall Injury Risk  Goal: Absence of Fall and Fall-Related Injury  Outcome:  Progressing  Intervention: Identify and Manage Contributors  Recent Flowsheet Documentation  Taken 1/11/2025 1400 by Ashli Lua RN  Medication Review/Management: medications reviewed  Taken 1/11/2025 1200 by Ashli Lua RN  Medication Review/Management: medications reviewed  Taken 1/11/2025 1000 by Ashli Lua RN  Medication Review/Management: medications reviewed  Taken 1/11/2025 0800 by Ashli Lua RN  Medication Review/Management: medications reviewed  Intervention: Promote Injury-Free Environment  Recent Flowsheet Documentation  Taken 1/11/2025 1400 by Ashli Lua RN  Safety Promotion/Fall Prevention: safety round/check completed  Taken 1/11/2025 1200 by Ashli Lua RN  Safety Promotion/Fall Prevention: safety round/check completed  Taken 1/11/2025 1000 by Ashli Lua RN  Safety Promotion/Fall Prevention: safety round/check completed  Taken 1/11/2025 0800 by Ashli Lua RN  Safety Promotion/Fall Prevention: safety round/check completed     Problem: Skin Injury Risk Increased  Goal: Skin Health and Integrity  Outcome: Progressing  Intervention: Optimize Skin Protection  Recent Flowsheet Documentation  Taken 1/11/2025 0800 by Ashli Lua RN  Activity Management: activity encouraged  Pressure Reduction Techniques:   frequent weight shift encouraged   heels elevated off bed  Head of Bed (HOB) Positioning: HOB elevated  Pressure Reduction Devices: pressure-redistributing mattress utilized  Skin Protection: incontinence pads utilized   Goal Outcome Evaluation:

## 2025-01-12 LAB
ALBUMIN SERPL-MCNC: 3.3 G/DL (ref 3.5–5.2)
ALBUMIN/GLOB SERPL: 1 G/DL
ALP SERPL-CCNC: 163 U/L (ref 39–117)
ALT SERPL W P-5'-P-CCNC: 65 U/L (ref 1–33)
ANION GAP SERPL CALCULATED.3IONS-SCNC: 12.1 MMOL/L (ref 5–15)
AST SERPL-CCNC: 81 U/L (ref 1–32)
BASOPHILS # BLD AUTO: 0.07 10*3/MM3 (ref 0–0.2)
BASOPHILS NFR BLD AUTO: 0.5 % (ref 0–1.5)
BILIRUB SERPL-MCNC: 0.2 MG/DL (ref 0–1.2)
BUN SERPL-MCNC: 18 MG/DL (ref 8–23)
BUN/CREAT SERPL: 29.5 (ref 7–25)
CALCIUM SPEC-SCNC: 9 MG/DL (ref 8.6–10.5)
CHLORIDE SERPL-SCNC: 104 MMOL/L (ref 98–107)
CO2 SERPL-SCNC: 27.9 MMOL/L (ref 22–29)
CREAT SERPL-MCNC: 0.61 MG/DL (ref 0.57–1)
DEPRECATED RDW RBC AUTO: 49.1 FL (ref 37–54)
EGFRCR SERPLBLD CKD-EPI 2021: 92.8 ML/MIN/1.73
EOSINOPHIL # BLD AUTO: 0.26 10*3/MM3 (ref 0–0.4)
EOSINOPHIL NFR BLD AUTO: 1.7 % (ref 0.3–6.2)
ERYTHROCYTE [DISTWIDTH] IN BLOOD BY AUTOMATED COUNT: 14.1 % (ref 12.3–15.4)
GLOBULIN UR ELPH-MCNC: 3.4 GM/DL
GLUCOSE SERPL-MCNC: 100 MG/DL (ref 65–99)
HCT VFR BLD AUTO: 40.3 % (ref 34–46.6)
HGB BLD-MCNC: 12.7 G/DL (ref 12–15.9)
IMM GRANULOCYTES # BLD AUTO: 0.2 10*3/MM3 (ref 0–0.05)
IMM GRANULOCYTES NFR BLD AUTO: 1.3 % (ref 0–0.5)
LYMPHOCYTES # BLD AUTO: 3.07 10*3/MM3 (ref 0.7–3.1)
LYMPHOCYTES NFR BLD AUTO: 19.9 % (ref 19.6–45.3)
MCH RBC QN AUTO: 29.8 PG (ref 26.6–33)
MCHC RBC AUTO-ENTMCNC: 31.5 G/DL (ref 31.5–35.7)
MCV RBC AUTO: 94.6 FL (ref 79–97)
MONOCYTES # BLD AUTO: 1.26 10*3/MM3 (ref 0.1–0.9)
MONOCYTES NFR BLD AUTO: 8.2 % (ref 5–12)
NEUTROPHILS NFR BLD AUTO: 10.58 10*3/MM3 (ref 1.7–7)
NEUTROPHILS NFR BLD AUTO: 68.4 % (ref 42.7–76)
NRBC BLD AUTO-RTO: 0 /100 WBC (ref 0–0.2)
PLATELET # BLD AUTO: 503 10*3/MM3 (ref 140–450)
PMV BLD AUTO: 9.7 FL (ref 6–12)
POTASSIUM SERPL-SCNC: 3.6 MMOL/L (ref 3.5–5.2)
PROT SERPL-MCNC: 6.7 G/DL (ref 6–8.5)
RBC # BLD AUTO: 4.26 10*6/MM3 (ref 3.77–5.28)
SODIUM SERPL-SCNC: 144 MMOL/L (ref 136–145)
WBC NRBC COR # BLD AUTO: 15.44 10*3/MM3 (ref 3.4–10.8)

## 2025-01-12 PROCEDURE — 97116 GAIT TRAINING THERAPY: CPT

## 2025-01-12 PROCEDURE — 97535 SELF CARE MNGMENT TRAINING: CPT

## 2025-01-12 PROCEDURE — 25010000002 CEFTRIAXONE PER 250 MG: Performed by: INTERNAL MEDICINE

## 2025-01-12 PROCEDURE — 63710000001 PREDNISONE PER 1 MG: Performed by: INTERNAL MEDICINE

## 2025-01-12 PROCEDURE — 97112 NEUROMUSCULAR REEDUCATION: CPT

## 2025-01-12 PROCEDURE — 94799 UNLISTED PULMONARY SVC/PX: CPT

## 2025-01-12 PROCEDURE — 97530 THERAPEUTIC ACTIVITIES: CPT

## 2025-01-12 PROCEDURE — 25010000002 ONDANSETRON PER 1 MG: Performed by: INTERNAL MEDICINE

## 2025-01-12 PROCEDURE — 80053 COMPREHEN METABOLIC PANEL: CPT | Performed by: FAMILY MEDICINE

## 2025-01-12 PROCEDURE — 85025 COMPLETE CBC W/AUTO DIFF WBC: CPT | Performed by: FAMILY MEDICINE

## 2025-01-12 RX ADMIN — RIMEGEPANT SULFATE 75 MG: 75 TABLET, ORALLY DISINTEGRATING ORAL at 14:18

## 2025-01-12 RX ADMIN — THERA TABS 1 TABLET: TAB at 08:09

## 2025-01-12 RX ADMIN — BENZONATATE 200 MG: 100 CAPSULE ORAL at 05:10

## 2025-01-12 RX ADMIN — IPRATROPIUM BROMIDE AND ALBUTEROL SULFATE 3 ML: .5; 3 SOLUTION RESPIRATORY (INHALATION) at 18:35

## 2025-01-12 RX ADMIN — CEFTRIAXONE 2000 MG: 2 INJECTION, POWDER, FOR SOLUTION INTRAMUSCULAR; INTRAVENOUS at 00:01

## 2025-01-12 RX ADMIN — GUAIFENESIN 600 MG: 600 TABLET ORAL at 22:03

## 2025-01-12 RX ADMIN — Medication 10 ML: at 08:10

## 2025-01-12 RX ADMIN — IPRATROPIUM BROMIDE AND ALBUTEROL SULFATE 3 ML: .5; 3 SOLUTION RESPIRATORY (INHALATION) at 04:03

## 2025-01-12 RX ADMIN — ATORVASTATIN CALCIUM 40 MG: 40 TABLET, FILM COATED ORAL at 22:03

## 2025-01-12 RX ADMIN — PANTOPRAZOLE SODIUM 40 MG: 40 TABLET, DELAYED RELEASE ORAL at 05:10

## 2025-01-12 RX ADMIN — Medication 10 MG: at 22:03

## 2025-01-12 RX ADMIN — APIXABAN 5 MG: 5 TABLET, FILM COATED ORAL at 22:03

## 2025-01-12 RX ADMIN — GUAIFENESIN 200 MG: 200 SOLUTION ORAL at 14:18

## 2025-01-12 RX ADMIN — VENLAFAXINE HYDROCHLORIDE 150 MG: 75 CAPSULE, EXTENDED RELEASE ORAL at 08:09

## 2025-01-12 RX ADMIN — HYDROXYCHLOROQUINE SULFATE 200 MG: 200 TABLET ORAL at 22:02

## 2025-01-12 RX ADMIN — ACETAMINOPHEN 650 MG: 325 TABLET, FILM COATED ORAL at 00:01

## 2025-01-12 RX ADMIN — AZITHROMYCIN 500 MG: 250 TABLET, FILM COATED ORAL at 08:09

## 2025-01-12 RX ADMIN — APIXABAN 5 MG: 5 TABLET, FILM COATED ORAL at 08:09

## 2025-01-12 RX ADMIN — ONDANSETRON 4 MG: 2 INJECTION INTRAMUSCULAR; INTRAVENOUS at 00:01

## 2025-01-12 RX ADMIN — IPRATROPIUM BROMIDE AND ALBUTEROL SULFATE 3 ML: .5; 3 SOLUTION RESPIRATORY (INHALATION) at 14:45

## 2025-01-12 RX ADMIN — GUAIFENESIN 200 MG: 200 SOLUTION ORAL at 08:09

## 2025-01-12 RX ADMIN — ACETAMINOPHEN 650 MG: 325 TABLET, FILM COATED ORAL at 08:09

## 2025-01-12 RX ADMIN — Medication 10 ML: at 22:03

## 2025-01-12 RX ADMIN — HYDROXYCHLOROQUINE SULFATE 200 MG: 200 TABLET ORAL at 08:09

## 2025-01-12 RX ADMIN — GUAIFENESIN 600 MG: 600 TABLET ORAL at 08:09

## 2025-01-12 RX ADMIN — ONDANSETRON 4 MG: 2 INJECTION INTRAMUSCULAR; INTRAVENOUS at 10:48

## 2025-01-12 RX ADMIN — PREDNISONE 40 MG: 20 TABLET ORAL at 08:09

## 2025-01-12 NOTE — PLAN OF CARE
Problem: Adult Inpatient Plan of Care  Goal: Plan of Care Review  Outcome: Adequate for Care Transition  Goal: Patient-Specific Goal (Individualized)  Outcome: Adequate for Care Transition  Goal: Absence of Hospital-Acquired Illness or Injury  Outcome: Adequate for Care Transition  Intervention: Identify and Manage Fall Risk  Recent Flowsheet Documentation  Taken 1/12/2025 1000 by Ashli Lua RN  Safety Promotion/Fall Prevention: safety round/check completed  Taken 1/12/2025 0800 by Ashli Lua RN  Safety Promotion/Fall Prevention: safety round/check completed  Intervention: Prevent Skin Injury  Recent Flowsheet Documentation  Taken 1/12/2025 0800 by Ashli Lua RN  Body Position: position changed independently  Skin Protection: incontinence pads utilized  Intervention: Prevent and Manage VTE (Venous Thromboembolism) Risk  Recent Flowsheet Documentation  Taken 1/12/2025 0800 by Ashli Lua RN  VTE Prevention/Management: SCDs (sequential compression devices) off  Intervention: Prevent Infection  Recent Flowsheet Documentation  Taken 1/12/2025 1000 by Ashli Lua RN  Infection Prevention:   single patient room provided   hand hygiene promoted  Taken 1/12/2025 0800 by Ashli Lua RN  Infection Prevention: single patient room provided  Goal: Optimal Comfort and Wellbeing  Outcome: Adequate for Care Transition  Intervention: Monitor Pain and Promote Comfort  Recent Flowsheet Documentation  Taken 1/12/2025 0800 by Ashli Lua RN  Pain Management Interventions: pain medication given  Intervention: Provide Person-Centered Care  Recent Flowsheet Documentation  Taken 1/12/2025 0800 by Ashli Lua RN  Trust Relationship/Rapport:   care explained   choices provided   emotional support provided  Goal: Readiness for Transition of Care  Outcome: Adequate for Care Transition     Problem: Pneumonia  Goal: Fluid Balance  Outcome: Adequate for Care Transition  Goal: Absence of  Infection Signs and Symptoms  Outcome: Adequate for Care Transition  Intervention: Prevent Infection Progression  Recent Flowsheet Documentation  Taken 1/12/2025 1000 by Ashli Lua RN  Isolation Precautions:   precautions maintained   droplet  Taken 1/12/2025 0800 by Ashli Lua RN  Isolation Precautions:   precautions maintained   droplet  Goal: Effective Oxygenation and Ventilation  Outcome: Adequate for Care Transition  Intervention: Promote Airway Secretion Clearance  Recent Flowsheet Documentation  Taken 1/12/2025 0800 by Ashli Lua RN  Activity Management: activity encouraged  Cough And Deep Breathing: done independently per patient  Intervention: Optimize Oxygenation and Ventilation  Recent Flowsheet Documentation  Taken 1/12/2025 0800 by Ashli Lua RN  Head of Bed (HOB) Positioning: HOB elevated     Problem: Fatigue  Goal: Improved Activity Tolerance  Outcome: Adequate for Care Transition  Intervention: Promote Improved Energy  Recent Flowsheet Documentation  Taken 1/12/2025 0800 by Ashli Lua RN  Activity Management: activity encouraged     Problem: Fall Injury Risk  Goal: Absence of Fall and Fall-Related Injury  Outcome: Adequate for Care Transition  Intervention: Identify and Manage Contributors  Recent Flowsheet Documentation  Taken 1/12/2025 1000 by Ashli Lua RN  Medication Review/Management: medications reviewed  Taken 1/12/2025 0800 by Ashli Lua RN  Medication Review/Management: medications reviewed  Intervention: Promote Injury-Free Environment  Recent Flowsheet Documentation  Taken 1/12/2025 1000 by Ashli Lua RN  Safety Promotion/Fall Prevention: safety round/check completed  Taken 1/12/2025 0800 by Ashli Lua RN  Safety Promotion/Fall Prevention: safety round/check completed     Problem: Skin Injury Risk Increased  Goal: Skin Health and Integrity  Outcome: Adequate for Care Transition  Intervention: Optimize Skin Protection  Recent  Flowsheet Documentation  Taken 1/12/2025 0800 by Ashli Lua RN  Activity Management: activity encouraged  Pressure Reduction Techniques: frequent weight shift encouraged  Head of Bed (HOB) Positioning: HOB elevated  Pressure Reduction Devices: pressure-redistributing mattress utilized  Skin Protection: incontinence pads utilized     Problem: Adult Inpatient Plan of Care  Goal: Plan of Care Review  Outcome: Adequate for Care Transition  Goal: Patient-Specific Goal (Individualized)  Outcome: Adequate for Care Transition  Goal: Absence of Hospital-Acquired Illness or Injury  Outcome: Adequate for Care Transition  Intervention: Identify and Manage Fall Risk  Recent Flowsheet Documentation  Taken 1/12/2025 1000 by Ashli Lua RN  Safety Promotion/Fall Prevention: safety round/check completed  Taken 1/12/2025 0800 by Ashli Lua RN  Safety Promotion/Fall Prevention: safety round/check completed  Intervention: Prevent Skin Injury  Recent Flowsheet Documentation  Taken 1/12/2025 0800 by Ashli Lua RN  Body Position: position changed independently  Skin Protection: incontinence pads utilized  Intervention: Prevent and Manage VTE (Venous Thromboembolism) Risk  Recent Flowsheet Documentation  Taken 1/12/2025 0800 by Ashli Lua RN  VTE Prevention/Management: SCDs (sequential compression devices) off  Intervention: Prevent Infection  Recent Flowsheet Documentation  Taken 1/12/2025 1000 by Ashli Lua RN  Infection Prevention:   single patient room provided   hand hygiene promoted  Taken 1/12/2025 0800 by Ashli Lua RN  Infection Prevention: single patient room provided  Goal: Optimal Comfort and Wellbeing  Outcome: Adequate for Care Transition  Intervention: Monitor Pain and Promote Comfort  Recent Flowsheet Documentation  Taken 1/12/2025 0800 by Ashli Lua RN  Pain Management Interventions: pain medication given  Intervention: Provide Person-Centered Care  Recent Flowsheet  Documentation  Taken 1/12/2025 0800 by Ashli Lua RN  Trust Relationship/Rapport:   care explained   choices provided   emotional support provided  Goal: Readiness for Transition of Care  Outcome: Adequate for Care Transition     Problem: Pneumonia  Goal: Fluid Balance  Outcome: Adequate for Care Transition  Goal: Absence of Infection Signs and Symptoms  Outcome: Adequate for Care Transition  Intervention: Prevent Infection Progression  Recent Flowsheet Documentation  Taken 1/12/2025 1000 by Ashli Lua RN  Isolation Precautions:   precautions maintained   droplet  Taken 1/12/2025 0800 by Ashli Lua RN  Isolation Precautions:   precautions maintained   droplet  Goal: Effective Oxygenation and Ventilation  Outcome: Adequate for Care Transition  Intervention: Promote Airway Secretion Clearance  Recent Flowsheet Documentation  Taken 1/12/2025 0800 by Ashli Lua RN  Activity Management: activity encouraged  Cough And Deep Breathing: done independently per patient  Intervention: Optimize Oxygenation and Ventilation  Recent Flowsheet Documentation  Taken 1/12/2025 0800 by Ashli Lua RN  Head of Bed (HOB) Positioning: HOB elevated     Problem: Fatigue  Goal: Improved Activity Tolerance  Outcome: Adequate for Care Transition  Intervention: Promote Improved Energy  Recent Flowsheet Documentation  Taken 1/12/2025 0800 by Ashli Lua RN  Activity Management: activity encouraged     Problem: Fall Injury Risk  Goal: Absence of Fall and Fall-Related Injury  Outcome: Adequate for Care Transition  Intervention: Identify and Manage Contributors  Recent Flowsheet Documentation  Taken 1/12/2025 1000 by Ashli Lua RN  Medication Review/Management: medications reviewed  Taken 1/12/2025 0800 by Ashli Lua RN  Medication Review/Management: medications reviewed  Intervention: Promote Injury-Free Environment  Recent Flowsheet Documentation  Taken 1/12/2025 1000 by Ashli Lua  RN  Safety Promotion/Fall Prevention: safety round/check completed  Taken 1/12/2025 0800 by Ashli Lua RN  Safety Promotion/Fall Prevention: safety round/check completed     Problem: Skin Injury Risk Increased  Goal: Skin Health and Integrity  Outcome: Adequate for Care Transition  Intervention: Optimize Skin Protection  Recent Flowsheet Documentation  Taken 1/12/2025 0800 by Ashli Lua RN  Activity Management: activity encouraged  Pressure Reduction Techniques: frequent weight shift encouraged  Head of Bed (HOB) Positioning: HOB elevated  Pressure Reduction Devices: pressure-redistributing mattress utilized  Skin Protection: incontinence pads utilized   Goal Outcome Evaluation:

## 2025-01-12 NOTE — PLAN OF CARE
"  Problem: Adult Inpatient Plan of Care  Goal: Optimal Comfort and Wellbeing  Outcome: Progressing  Intervention: Provide Person-Centered Care  Recent Flowsheet Documentation  Taken 1/11/2025 1938 by Neena Isaacs LPN  Trust Relationship/Rapport:   care explained   questions encouraged   questions answered   Goal Outcome Evaluation:      Pt rested for a majority of the shift. Woke up around 0400 coughing. Refused tessalon pearls and stated \"they just dont work for me.\" RT contacted for neb. VSS at this time.                                      "

## 2025-01-12 NOTE — PROGRESS NOTES
[facility] MEDICINE SERVICE  DAILY PROGRESS NOTE    NAME: Annalisa Bhagat  : 1950  MRN: [id number]      LOS: 3 days     PROVIDER OF SERVICE: Sally Puckett MD    Chief Complaint: Pneumonia    Subjective:     Interval History:  History taken from: patient    Patient otherwise appears comfortable.  She is sitting up in her chair.  She does endorse persistent cough.  Continues to endorse weakness.        Review of Systems:   Review of Systems   Constitutional:  Positive for fatigue.   Respiratory:  Positive for cough and shortness of breath.    All other systems reviewed and are negative.      Objective:     Vital Signs  Temp:  [97.4 °F (36.3 °C)-99.1 °F (37.3 °C)] 97.4 °F (36.3 °C)  Heart Rate:  [] 84  Resp:  [15-20] 16  BP: (121-177)/(57-94) 133/94  Flow (L/min) (Oxygen Therapy):  [1-2] 2   Body mass index is 45.91 kg/m².    Physical Exam  Physical Exam  Constitutional:       General: She is awake.      Appearance: Normal appearance. She is well-developed and well-groomed.   HENT:      Head: Normocephalic and atraumatic.      Nose: Nose normal.      Mouth/Throat:      Mouth: Mucous membranes are moist.      Pharynx: Oropharynx is clear.   Eyes:      Extraocular Movements: Extraocular movements intact.      Conjunctiva/sclera: Conjunctivae normal.      Pupils: Pupils are equal, round, and reactive to light.   Cardiovascular:      Rate and Rhythm: Normal rate and regular rhythm.      Pulses: Normal pulses.      Heart sounds: Normal heart sounds.   Pulmonary:      Effort: Pulmonary effort is normal.      Breath sounds: Wheezing and rhonchi present.   Abdominal:      General: Abdomen is flat. Bowel sounds are normal.      Palpations: Abdomen is soft.      Tenderness: There is abdominal tenderness.   Musculoskeletal:         General: Normal range of motion.      Cervical back: Normal range of motion and neck supple.      Right lower leg: No edema.      Left lower leg: No edema.   Skin:     General:  Skin is warm and dry.   Neurological:      General: No focal deficit present.      Mental Status: She is alert and oriented to person, place, and time. Mental status is at baseline.      Cranial Nerves: Cranial nerves 2-12 are intact.      Sensory: Sensation is intact.      Motor: Motor function is intact.   Psychiatric:         Mood and Affect: Mood normal.         Behavior: Behavior normal. Behavior is cooperative.         Thought Content: Thought content normal.         Judgment: Judgment normal.            Diagnostic Data    Results from last 7 days   Lab Units 01/12/25  0355   WBC 10*3/mm3 15.44*   HEMOGLOBIN g/dL 12.7   HEMATOCRIT % 40.3   PLATELETS 10*3/mm3 503*   GLUCOSE mg/dL 100*   CREATININE mg/dL 0.61   BUN mg/dL 18   SODIUM mmol/L 144   POTASSIUM mmol/L 3.6   AST (SGOT) U/L 81*   ALT (SGPT) U/L 65*   ALK PHOS U/L 163*   BILIRUBIN mg/dL 0.2   ANION GAP mmol/L 12.1       CT Chest Without Contrast Diagnostic    Result Date: 1/11/2025  Impression: 1.Infiltrates are noted within the bilateral lower lobes, more pronounced on the left. Additional atelectasis versus less well-defined infiltrates are seen throughout the lungs bilaterally. The findings suggest multifocal pneumonia. 2.Coronary artery calcifications are observed. Electronically Signed: Bryan Parker MD  1/11/2025 11:18 AM EST  Workstation ID: [id number]    MRI Brain Without Contrast    Result Date: 1/10/2025  Impression: 1.No acute ischemic change. 2.Remote right MCA distribution infarct. 3.White matter changes compatible small vessel ischemic disease in this age group. Electronically Signed: Fede Sebastian MD  1/10/2025 11:34 AM EST  Workstation ID: [id number]       I reviewed the patient's new clinical results.    Assessment/Plan:     Active and Resolved Problems  Active Hospital Problems    Diagnosis  POA    **Pneumonia [J18.9]  Yes    Chronic anticoagulation [Z79.01]  Not Applicable    Class 3 obesity [E66.813]  Yes    Mixed hyperlipidemia  [E78.2]  Yes    Hypokalemia [E87.6]  Yes    History of ischemic stroke [Z86.73]  Not Applicable      Resolved Hospital Problems   No resolved problems to display.         Acute hypoxemic respiratory failure  Coronavirus positive  Influenza positive  Abnormal CT imaging possible new CVA, MRI negative  Encephalomalacia  Remote history of CVA  Rheumatoid arthritis  Chronic anticoagulation with Eliquis    Patient continues to endorse significant weakness.  Noted therapy evaluation.  Therapy to reevaluate later this morning.  Patient does reside at home alone.  She does express reservations in regards to discharge planning.  CT chest results noted.  Continue broad-spectrum IV antibiotics for now.  Plan of care discussed and reviewed with patient at bedside.  Plan for disposition to rehab facility in morning, will transition to oral antibiotics tomorrow.  Elevated white count noted likely secondary to oral steroids.    MRI brain imaging noted.  No new CVA.  Continue routine home medications.    VTE Prophylaxis:  Pharmacologic & mechanical VTE prophylaxis orders are present.             Disposition Planning:     Barriers to Discharge: Insurance approval for rehab placement  Anticipated Date of Discharge: 1/13/2025  Place of Discharge: Rehab      Time: 35 minutes     Code Status and Medical Interventions: CPR (Attempt to Resuscitate); Full Support   Ordered at: 01/09/25 1938     Level Of Support Discussed With:    Patient     Code Status (Patient has no pulse and is not breathing):    CPR (Attempt to Resuscitate)     Medical Interventions (Patient has pulse or is breathing):    Full Support       Signature: Electronically signed by Sally Puckett MD, 01/12/25, 10:22 EST.  [facility] Hospitalist Team

## 2025-01-12 NOTE — THERAPY TREATMENT NOTE
"Subjective: Pt agreeable to therapeutic plan of care. Pt concerned about going home at this level. Feels drained and fatigues very easily.    Objective:     Precautions - falls, droplet isol    Bed mobility - N/A or Not attempted.  Transfers - CGA and with rolling walker  Ambulation - 25 and 20 feet CGA and with rolling walker    Vitals: pt on 2L O2 upon arrival and sats were 98%, amb without O2 and sats dropped to 90% and HR incr to 117.     Pain:  pt hurts from coughing  Intervention for pain: Repositioned, RN notified, and Therapeutic Presence    Education: Provided education on the importance of mobility in the acute care setting, Verbal/Tactile Cues, ADL training, Transfer Training, and Gait Training    Assessment: Annalisa Bhagat presents with functional mobility impairments which indicate the need for skilled intervention. Tolerating session today without incident. Pt is not feeling well and is req freq rest breaks and needed assist with wiping following BM. Could benefit from short rehab stay to incr overall endurance and mobility and give her some time to get over flu/congestion/coughing. Will continue to follow and progress as tolerated.     Plan/Recommendations:   If medically appropriate, Moderate Intensity Therapy recommended post-acute care. This is recommended as therapy feels the patient would require 3-4 days per week and wouldn't tolerate \"3 hour daily\" rehab intensity. SNF would be the preferred choice. If the patient does not agree to SNF, arrange HH or OP depending on home bound status. If patient is medically complex, consider LTACH. Pt requires no DME at discharge.     Pt desires Skilled Rehab placement at discharge. Pt cooperative; agreeable to therapeutic recommendations and plan of care.         Basic Mobility 6-click:  Rollin = Total, A lot = 2, A little = 3; 4 = None  Supine>Sit:   1 = Total, A lot = 2, A little = 3; 4 = None   Sit>Stand with arms:  1 = Total, A lot = 2, A " little = 3; 4 = None  Bed>Chair:   1 = Total, A lot = 2, A little = 3; 4 = None  Ambulate in room:  1 = Total, A lot = 2, A little = 3; 4 = None  3-5 Steps with railin = Total, A lot = 2, A little = 3; 4 = None  Score: 16    Post-Tx Position: Up in Chair, Alarms activated, and Call light and personal items within reach  PPE: gloves and surgical mask    Therapy Charges for Today       Code Description Service Date Service Provider Modifiers Qty    [id number]  PT THERAPEUTIC ACT EA 15 MIN 2025 Katelyn Tomlin, PTA GP 1    30296036202 HC PT NEUROMUSC RE EDUCATION EA 15 MIN 2025 Katelyn Tomlin, PTA GP 1    [id number]  PT SELF CARE/MGMT/TRAIN EA 15 MIN 2025 Katelyn Tomlin, PTA GP 1    38371742413 HC GAIT TRAINING EA 15 MIN 2025 Katelyn Tomlin, PTA GP 1           PT Charges       Row Name 25 1544             Time Calculation    Start Time 1455  -      Stop Time 1540  -      Time Calculation (min) 45 min  -      PT Received On 25  -      PT - Next Appointment 25  -         Time Calculation- PT    Total Timed Code Minutes- PT 45 minute(s)  -                User Key  (r) = Recorded By, (t) = Taken By, (c) = Cosigned By      Initials Name Provider Type     Katelyn Tomlin PTA Physical Therapist Assistant

## 2025-01-12 NOTE — PLAN OF CARE
Assessment: Annalisa Bhagat presents with functional mobility impairments which indicate the need for skilled intervention. Tolerating session today without incident. Pt is not feeling well and is req freq rest breaks and needed assist with wiping following BM. Could benefit from short rehab stay to incr overall endurance and mobility and give her some time to get over flu/congestion/coughing. Will continue to follow and progress as tolerated.

## 2025-01-13 LAB
ALBUMIN SERPL-MCNC: 3.2 G/DL (ref 3.5–5.2)
ALBUMIN/GLOB SERPL: 0.9 G/DL
ALP SERPL-CCNC: 123 U/L (ref 39–117)
ALT SERPL W P-5'-P-CCNC: 93 U/L (ref 1–33)
ANION GAP SERPL CALCULATED.3IONS-SCNC: 10.6 MMOL/L (ref 5–15)
AST SERPL-CCNC: 87 U/L (ref 1–32)
BASOPHILS # BLD AUTO: 0.06 10*3/MM3 (ref 0–0.2)
BASOPHILS NFR BLD AUTO: 0.4 % (ref 0–1.5)
BILIRUB SERPL-MCNC: 0.2 MG/DL (ref 0–1.2)
BUN SERPL-MCNC: 12 MG/DL (ref 8–23)
BUN/CREAT SERPL: 15.8 (ref 7–25)
CALCIUM SPEC-SCNC: 8.9 MG/DL (ref 8.6–10.5)
CHLORIDE SERPL-SCNC: 103 MMOL/L (ref 98–107)
CO2 SERPL-SCNC: 28.4 MMOL/L (ref 22–29)
CREAT SERPL-MCNC: 0.76 MG/DL (ref 0.57–1)
DEPRECATED RDW RBC AUTO: 48.6 FL (ref 37–54)
EGFRCR SERPLBLD CKD-EPI 2021: 81.3 ML/MIN/1.73
EOSINOPHIL # BLD AUTO: 0.14 10*3/MM3 (ref 0–0.4)
EOSINOPHIL NFR BLD AUTO: 1 % (ref 0.3–6.2)
ERYTHROCYTE [DISTWIDTH] IN BLOOD BY AUTOMATED COUNT: 14.2 % (ref 12.3–15.4)
GLOBULIN UR ELPH-MCNC: 3.5 GM/DL
GLUCOSE SERPL-MCNC: 110 MG/DL (ref 65–99)
HCT VFR BLD AUTO: 41.3 % (ref 34–46.6)
HGB BLD-MCNC: 12.8 G/DL (ref 12–15.9)
IMM GRANULOCYTES # BLD AUTO: 0.15 10*3/MM3 (ref 0–0.05)
IMM GRANULOCYTES NFR BLD AUTO: 1.1 % (ref 0–0.5)
L PNEUMO1 AG UR QL IA: NEGATIVE
LYMPHOCYTES # BLD AUTO: 1 10*3/MM3 (ref 0.7–3.1)
LYMPHOCYTES NFR BLD AUTO: 7.5 % (ref 19.6–45.3)
MAGNESIUM SERPL-MCNC: 2.3 MG/DL (ref 1.6–2.4)
MCH RBC QN AUTO: 29.3 PG (ref 26.6–33)
MCHC RBC AUTO-ENTMCNC: 31 G/DL (ref 31.5–35.7)
MCV RBC AUTO: 94.5 FL (ref 79–97)
MONOCYTES # BLD AUTO: 0.72 10*3/MM3 (ref 0.1–0.9)
MONOCYTES NFR BLD AUTO: 5.4 % (ref 5–12)
NEUTROPHILS NFR BLD AUTO: 11.3 10*3/MM3 (ref 1.7–7)
NEUTROPHILS NFR BLD AUTO: 84.6 % (ref 42.7–76)
NRBC BLD AUTO-RTO: 0 /100 WBC (ref 0–0.2)
PHOSPHATE SERPL-MCNC: 2.7 MG/DL (ref 2.5–4.5)
PLATELET # BLD AUTO: 447 10*3/MM3 (ref 140–450)
PMV BLD AUTO: 9.1 FL (ref 6–12)
POTASSIUM SERPL-SCNC: 3.3 MMOL/L (ref 3.5–5.2)
PROT SERPL-MCNC: 6.7 G/DL (ref 6–8.5)
RBC # BLD AUTO: 4.37 10*6/MM3 (ref 3.77–5.28)
S PNEUM AG SPEC QL LA: POSITIVE
SODIUM SERPL-SCNC: 142 MMOL/L (ref 136–145)
WBC NRBC COR # BLD AUTO: 13.37 10*3/MM3 (ref 3.4–10.8)

## 2025-01-13 PROCEDURE — 94799 UNLISTED PULMONARY SVC/PX: CPT

## 2025-01-13 PROCEDURE — 25010000002 CEFTRIAXONE PER 250 MG: Performed by: INTERNAL MEDICINE

## 2025-01-13 PROCEDURE — 63710000001 PREDNISONE PER 1 MG: Performed by: INTERNAL MEDICINE

## 2025-01-13 PROCEDURE — 25010000002 ONDANSETRON PER 1 MG: Performed by: INTERNAL MEDICINE

## 2025-01-13 PROCEDURE — 85025 COMPLETE CBC W/AUTO DIFF WBC: CPT | Performed by: INTERNAL MEDICINE

## 2025-01-13 PROCEDURE — 80053 COMPREHEN METABOLIC PANEL: CPT | Performed by: INTERNAL MEDICINE

## 2025-01-13 PROCEDURE — 87449 NOS EACH ORGANISM AG IA: CPT | Performed by: INTERNAL MEDICINE

## 2025-01-13 PROCEDURE — 84100 ASSAY OF PHOSPHORUS: CPT | Performed by: INTERNAL MEDICINE

## 2025-01-13 PROCEDURE — 83735 ASSAY OF MAGNESIUM: CPT | Performed by: INTERNAL MEDICINE

## 2025-01-13 PROCEDURE — 94761 N-INVAS EAR/PLS OXIMETRY MLT: CPT

## 2025-01-13 PROCEDURE — 94664 DEMO&/EVAL PT USE INHALER: CPT

## 2025-01-13 PROCEDURE — 87899 AGENT NOS ASSAY W/OPTIC: CPT | Performed by: INTERNAL MEDICINE

## 2025-01-13 RX ORDER — OXYMETAZOLINE HYDROCHLORIDE 0.05 G/100ML
2 SPRAY NASAL 2 TIMES DAILY
Status: COMPLETED | OUTPATIENT
Start: 2025-01-13 | End: 2025-01-15

## 2025-01-13 RX ORDER — CETIRIZINE HYDROCHLORIDE 10 MG/1
10 TABLET ORAL DAILY
Status: DISCONTINUED | OUTPATIENT
Start: 2025-01-13 | End: 2025-01-16 | Stop reason: HOSPADM

## 2025-01-13 RX ORDER — IPRATROPIUM BROMIDE 21 UG/1
2 SPRAY, METERED NASAL EVERY 12 HOURS
Status: DISCONTINUED | OUTPATIENT
Start: 2025-01-13 | End: 2025-01-16 | Stop reason: HOSPADM

## 2025-01-13 RX ADMIN — GUAIFENESIN 200 MG: 200 SOLUTION ORAL at 19:44

## 2025-01-13 RX ADMIN — ONDANSETRON 4 MG: 2 INJECTION INTRAMUSCULAR; INTRAVENOUS at 10:24

## 2025-01-13 RX ADMIN — IPRATROPIUM BROMIDE 2 SPRAY: 21 SPRAY, METERED NASAL at 23:24

## 2025-01-13 RX ADMIN — GUAIFENESIN 200 MG: 200 SOLUTION ORAL at 00:50

## 2025-01-13 RX ADMIN — HYDROXYCHLOROQUINE SULFATE 200 MG: 200 TABLET ORAL at 20:31

## 2025-01-13 RX ADMIN — BENZONATATE 200 MG: 100 CAPSULE ORAL at 15:35

## 2025-01-13 RX ADMIN — GUAIFENESIN 600 MG: 600 TABLET ORAL at 08:37

## 2025-01-13 RX ADMIN — Medication 10 MG: at 20:33

## 2025-01-13 RX ADMIN — ONDANSETRON 4 MG: 2 INJECTION INTRAMUSCULAR; INTRAVENOUS at 22:30

## 2025-01-13 RX ADMIN — HYDROXYCHLOROQUINE SULFATE 200 MG: 200 TABLET ORAL at 08:38

## 2025-01-13 RX ADMIN — BENZONATATE 200 MG: 100 CAPSULE ORAL at 01:04

## 2025-01-13 RX ADMIN — ATORVASTATIN CALCIUM 40 MG: 40 TABLET, FILM COATED ORAL at 20:31

## 2025-01-13 RX ADMIN — PANTOPRAZOLE SODIUM 40 MG: 40 TABLET, DELAYED RELEASE ORAL at 06:11

## 2025-01-13 RX ADMIN — GUAIFENESIN 600 MG: 600 TABLET ORAL at 20:30

## 2025-01-13 RX ADMIN — IPRATROPIUM BROMIDE 2 SPRAY: 21 SPRAY, METERED NASAL at 12:02

## 2025-01-13 RX ADMIN — IPRATROPIUM BROMIDE AND ALBUTEROL SULFATE 3 ML: .5; 3 SOLUTION RESPIRATORY (INHALATION) at 20:10

## 2025-01-13 RX ADMIN — OXYMETAZOLINE HYDROCHLORIDE 2 SPRAY: 0.5 SPRAY NASAL at 20:34

## 2025-01-13 RX ADMIN — IPRATROPIUM BROMIDE AND ALBUTEROL SULFATE 3 ML: .5; 3 SOLUTION RESPIRATORY (INHALATION) at 12:41

## 2025-01-13 RX ADMIN — VENLAFAXINE HYDROCHLORIDE 150 MG: 75 CAPSULE, EXTENDED RELEASE ORAL at 08:38

## 2025-01-13 RX ADMIN — IPRATROPIUM BROMIDE AND ALBUTEROL SULFATE 3 ML: .5; 3 SOLUTION RESPIRATORY (INHALATION) at 07:43

## 2025-01-13 RX ADMIN — AZITHROMYCIN 500 MG: 250 TABLET, FILM COATED ORAL at 08:38

## 2025-01-13 RX ADMIN — Medication 10 ML: at 08:40

## 2025-01-13 RX ADMIN — Medication 10 ML: at 20:33

## 2025-01-13 RX ADMIN — THERA TABS 1 TABLET: TAB at 08:37

## 2025-01-13 RX ADMIN — OXYMETAZOLINE HYDROCHLORIDE 2 SPRAY: 0.5 SPRAY NASAL at 12:03

## 2025-01-13 RX ADMIN — APIXABAN 5 MG: 5 TABLET, FILM COATED ORAL at 08:38

## 2025-01-13 RX ADMIN — CETIRIZINE HYDROCHLORIDE 10 MG: 10 TABLET, FILM COATED ORAL at 10:24

## 2025-01-13 RX ADMIN — CEFTRIAXONE 2000 MG: 2 INJECTION, POWDER, FOR SOLUTION INTRAMUSCULAR; INTRAVENOUS at 00:50

## 2025-01-13 RX ADMIN — CEFTRIAXONE 2000 MG: 2 INJECTION, POWDER, FOR SOLUTION INTRAMUSCULAR; INTRAVENOUS at 23:24

## 2025-01-13 RX ADMIN — PREDNISONE 40 MG: 20 TABLET ORAL at 08:38

## 2025-01-13 RX ADMIN — APIXABAN 5 MG: 5 TABLET, FILM COATED ORAL at 20:30

## 2025-01-13 NOTE — PLAN OF CARE
Goal Outcome Evaluation:                      Patient is A&Ox4 on 2L NC. No complaints of pain. Had episode of nausea, see MAR. Remains in isolation. Potassium protocol for replacement ordered. Need urine sample. Plan is pending SNF placement.

## 2025-01-13 NOTE — CASE MANAGEMENT/SOCIAL WORK
Continued Stay Note   Shimon     Patient Name: Annalisa Bhagat  MRN: [id number]  Today's Date: 1/12/2025    Admit Date: 1/9/2025    Plan: D/C to SNF, pending acceptance of facility. No precert required. Will need PASRR.   Discharge Plan       Row Name 01/12/25 2008       Plan    Plan D/C to SNF, pending acceptance of facility. No precert required. Will need PASRR.    Plan Comments CM received message from Katelyn AMIN stating pt reported to her that she was feeling drained and having difficulty getting around and that she was afraid she would not be able to manage her care at home. Pt wanted to speak with case management. CM met with pt at bedside. 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 CM reviewed pt's insurance. CM provided list of SNF agencies to patient to review and advised pt to use medicare.gov for better facility information. Pt placed phone call to her son, Michael, while CM still in room. CM provided same information to Michael. They both wished for more time to review facilities and website. CM provided Michael with name and phone number to return call. Michael returned call to CM a short time later and provided three SNF choices: 1. [facility] 2. [facility] 3. [facility]. Michael also stated there is no preference on SNF agency, whoever has a bed available first will be the one they take. CM placed referrals in epic basket and notified facility liaisons Chan and Mony.             Expected Discharge Date and Time       Expected Discharge Date Expected Discharge Time    Jan 13, 2025           Apple Dee RN     Office Phone: [phone]  Office Cell: [phone]

## 2025-01-13 NOTE — PROGRESS NOTES
The patient states that she is still not feeling well. She states that she feels her improvement has been minimal since coming into the hospital. She still has chills along with cough/sputum production. The patient has no appetite. Diarrhea has improved today. She is able to follow commands without issue. No pain. She does have nasal congestion as well.    BP is 160/72. Pulse rate is 72-87. Temperature is 97.8. Temperature is 97.6. R - 19-23. Oxygen saturations are 98 percent on 2 liters    Physical exam   Alert female able to follow commands but looks very tired  Neck - supple with no JVD or carotid bruits.   Heart - regular rate and rhythm  Lungs - Decreased breath sounds bilaterally. Poor air exchange. No wheezing  Abdomen - soft, nontender, NABS, No G/R/R/  Extremities - no edema. Pulses are intact      Acute hypoxemic respiratory failure  Coronavirus positive  Influenza positive  Abnormal CT imaging possible new CVA, MRI negative  Encephalomalacia  Remote history of CVA  Rheumatoid arthritis  Chronic anticoagulation with Eliquis     Patient continues to endorse significant weakness.  Noted therapy evaluation.  Therapy to reevaluate later this morning.  Patient does reside at home alone.  She does express reservations in regards to discharge planning.  CT chest results noted.  Continue broad-spectrum IV antibiotics for now.  Plan of care discussed and reviewed with patient at bedside.  Plan for disposition to rehab facility in morning, will transition to oral antibiotics tomorrow.  Elevated white count noted likely secondary to oral steroids.     MRI brain imaging noted.  No new CVA.  Continue routine home medications.     VTE Prophylaxis:  Pharmacologic & mechanical VTE prophylaxis orders are present.    MPRESSION:  Impression:  1.No acute ischemic change.  2.Remote right MCA distribution infarct.  3.White matter changes compatible small vessel ischemic disease in this age group.        Impression:  1.Infiltrates are noted within the bilateral lower lobes, more pronounced on the left. Additional atelectasis versus less well-defined infiltrates are seen throughout the lungs bilaterally. The findings suggest multifocal pneumonia.  2.Coronary artery calcifications are observed.    1/13/2025 -  The patient still does not feel well. I will add afrin and Atrovent nasal spray to her regiment in addition to claritin.ID consult as the patient states she is not improving. We are waiting on discharge plans as well for her. She will need rehab placement.

## 2025-01-13 NOTE — CASE MANAGEMENT/SOCIAL WORK
Social Work Assessment  [facility]     Patient Name: Annalisa Bhagat  MRN: [id number]  Today's Date: 1/13/2025    Admit Date: 1/9/2025       Discharge Plan       Row Name 01/13/25 1349       Plan    Plan D/C to SNF, pending acceptance of facility. No precert required. PASRR approved.    Plan Comments CM updated on status of PASRR.             DOMINICK Polo, LSW    Phone: [phone]  Fax: [phone]  [email or web address]

## 2025-01-13 NOTE — DISCHARGE PLACEMENT REQUEST
"Annalisa Talbert (74 y.o. Female)       Date of Birth   1950    Social Security Number       Address   [address] [place] [address] [place] [facility]    Home Phone   [phone]    MRN   [id number]       Catholic   P[employer]alism    Marital Status                               Admission Date   1/9/25    Admission Type   Emergency    Admitting Provider   Nomi Meza MD    Attending Provider   Sally Puckett MD    Department, Room/Bed   [facility] INPATIENT, 380/1       Discharge Date       Discharge Disposition       Discharge Destination                                 Attending Provider: Sally Puckett MD    Allergies: Shingrix [Zoster Vac Recomb Adjuvanted], Zolmitriptan, Adhesive Tape, Hydromorphone, Amoxicillin-pot Clavulanate    Isolation: Droplet   Infection: Influenza (01/02/25)   Code Status: CPR    Ht: 139.7 cm (55\")   Wt: 89.6 kg (197 lb 8.5 oz)    Admission Cmt: None   Principal Problem: Pneumonia [J18.9]                   Active Insurance as of 1/9/2025       Primary Coverage       Payor Plan Insurance Group Employer/Plan Group    MEDICARE MEDICARE A & B        Payor Plan Address Payor Plan Phone Number Payor Plan Fax Number Effective Dates    [address] [phone]  6/1/2013 - None Entered    [facility] [id number]         Subscriber Name Subscriber Birth Date Member ID       ANNALISA TALBERT 1950 [id number]               Secondary Coverage       Payor Plan Insurance Group Employer/Plan Group    [facility] [employer] [facility]        Payor Plan Address Payor Plan Phone Number Payor Plan Fax Number Effective Dates    [facility] [phone]  1/1/2019 - None Entered    [address]       [facility] [id number]         Subscriber Name Subscriber Birth Date Member ID       ANNALISA TALBERT 1950 [id number]                     Emergency Contacts        (Rel.) Home Phone Work Phone Mobile Phone    Michael Talbert (Son) [phone] -- [phone]    " ELLA SERNA (Mother) [phone] -- --    ELOISA TALBERT (Daughter) [phone] -- [phone]

## 2025-01-13 NOTE — CONSULTS
Infectious Diseases Consult Note    Referring Provider: Lauro Ortiz DO    Reason for Consultation: Pneumonia    Patient Care Team:  Caleb Whitney MD as PCP - General (Internal Medicine)  Leanne Farah APRN as PCP - Family Medicine  Cynthia Carvalho PA as Physician Assistant (Physician Assistant)    Chief complaint shortness of breath, cough, fatigue, weakness    Subjective     History of present illness:      This is a 74-year-old female presents to the hospital 1/9/2024 with worsening shortness of breath and cough.  Patient was recently in the hospital after being diagnosed with one of the coronaviruses and flu A.  Chills, weakness, shortness of breath, cough and fatigue.    Review of Systems   Review of Systems   Constitutional: Negative.  Positive for fatigue.   HENT: Negative.     Eyes: Negative.    Respiratory: Negative.  Positive for cough and shortness of breath.    Cardiovascular: Negative.    Gastrointestinal: Negative.    Endocrine: Negative.    Genitourinary: Negative.    Musculoskeletal: Negative.    Skin: Negative.    Neurological: Negative.  Positive for weakness.   Psychiatric/Behavioral: Negative.     All other systems reviewed and are negative.      Medications  Facility-Administered Medications Prior to Admission   Medication Dose Route Frequency Provider Last Rate Last Admin    denosumab (PROLIA) syringe 60 mg  60 mg Subcutaneous Q6 Months Jason Qiu MD   60 mg at 05/31/23 1502     Medications Prior to Admission   Medication Sig Dispense Refill Last Dose/Taking    apixaban (ELIQUIS) 5 MG tablet tablet Take 1 tablet by mouth Every 12 (Twelve) Hours. Resume 6/15/29 60 tablet  1/8/2025 Evening    ascorbic acid (VITAMIN C) 1000 MG tablet Take 1 tablet by mouth Daily.   Past Week    atorvastatin (LIPITOR) 40 MG tablet Take 1 tablet by mouth Daily.   1/8/2025 Morning    Azelastine-Fluticasone 137-50 MCG/ACT suspension Administer 1-2 sprays into the nostril(s) as directed by provider  Daily As Needed.   Taking As Needed    [] benzonatate (TESSALON) 200 MG capsule Take 1 capsule by mouth 3 (Three) Times a Day As Needed for Cough for up to 7 days. 21 capsule 0 2025 at  9:00 AM    calcium citrate (CALCITRATE) 950 MG tablet Take 1 tablet by mouth Daily.   Past Week    furosemide (LASIX) 40 MG tablet Take 1 tablet by mouth 2 (Two) Times a Day As Needed.   Taking As Needed    [] guaiFENesin-dextromethorphan (ROBITUSSIN DM) 100-10 MG/5ML syrup Take 5 mL by mouth Every 4 (Four) Hours As Needed for Cough for up to 7 days. 237 mL 0 2025    hydrocortisone 2.5 % cream Apply 1 Application topically to the appropriate area as directed As Needed.   Taking As Needed    hydroxychloroquine (PLAQUENIL) 200 MG tablet Take 1 tablet by mouth 2 (Two) Times a Day.   2025 Evening    hydrOXYzine (ATARAX) 25 MG tablet Take 1 tablet by mouth Daily As Needed for Itching.   Taking As Needed    loratadine (CLARITIN) 10 MG tablet Take 1 tablet by mouth As Needed.   Taking As Needed    Melatonin 10 MG tablet Take 1 tablet by mouth Every Night.   2025 Bedtime    methocarbamol (ROBAXIN) 750 MG tablet Take 1 tablet by mouth Every Night.   2025 Bedtime    methylphenidate (RITALIN) 20 MG tablet Take 1 tablet by mouth Daily.   2025 Morning    Multiple Vitamins-Minerals (PRESERVISION AREDS 2 PO) Take 1 capsule by mouth 2 (Two) Times a Day.   Past Week    omeprazole (priLOSEC) 20 MG capsule Take 1 capsule by mouth Daily.   2025 Noon    phenazopyridine (PYRIDIUM) 100 MG tablet Take 1 tablet by mouth 3 (Three) Times a Day As Needed for Bladder Spasms.   Taking As Needed    promethazine (PHENERGAN) 25 MG tablet Take 1 tablet by mouth As Needed (MIGRAINES).   Taking As Needed    pyridoxine (VITAMIN B-6) 200 MG tablet Take 1 tablet by mouth Daily.   Past Week    Rimegepant Sulfate (NURTEC) 75 MG tablet dispersible tablet Take 1 tablet by mouth Daily As Needed.   2025 Evening    traMADol (ULTRAM)  50 MG tablet Take 1 tablet by mouth Every 6 (Six) Hours As Needed for Moderate Pain. Back pain.   1/8/2025 Evening    triamcinolone (KENALOG) 0.025 % cream Apply 1 Application topically to the appropriate area as directed As Needed.   Taking As Needed    venlafaxine XR (EFFEXOR-XR) 150 MG 24 hr capsule Take 1 capsule by mouth Daily.   1/8/2025 Morning    B Complex Vitamins (VITAMIN B COMPLEX PO) Take 1 each by mouth Daily. (Patient not taking: Reported on 1/9/2025)   Not Taking    denosumab (PROLIA) 60 MG/ML solution prefilled syringe syringe Inject 1 mL under the skin into the appropriate area as directed Every 6 (Six) Months. 180 mL 0        History  Past Medical History:   Diagnosis Date    Fibromyalgia     GERD (gastroesophageal reflux disease)     Hx of compression fracture of spine     T12    Hyperlipidemia     Kidney stone     Loosening of hardware in spine     Lupus     Migraines     Narcolepsy 12/6/2023    Numbness and tingling     Osteoporosis 02/23/2018    Forteo x 2yrs(2/2018-12/2019,3/20-4/2020), on prolia(2/28/2020)    Recurrent UTI     Rheumatoid arthritis     Scoliosis     Seasonal allergies     Shoulder pain, left     Sleep apnea     Spinal stenosis     Stress incontinence     Stroke      Past Surgical History:   Procedure Laterality Date    BACK SURGERY      CARDIAC CATHETERIZATION      CARPAL TUNNEL RELEASE      CATARACT EXTRACTION, BILATERAL      CYSTOSCOPY W/ URETEROSCOPY W/ LITHOTRIPSY      HAND SURGERY      HYSTERECTOMY      RECTOVAGINAL FISTULA CLOSURE      SACRAL NERVE STIMULATOR PLACEMENT      TOTAL SHOULDER ARTHROPLASTY W/ DISTAL CLAVICLE EXCISION Left 6/13/2019    Procedure: TOTAL SHOULDER REVERSE ARTHROPLASTY;  Surgeon: Brice Mayes MD;  Location: [facility];  Service: Orthopedics       Family History  Family History   Problem Relation Age of Onset    Hypotension Mother     Hypotension Father     Colon cancer Father     Stroke Father     Colon cancer Maternal Grandmother      Heart disease Maternal Uncle     Hypertension Paternal Aunt     Diabetes Paternal Aunt     Heart failure Paternal Aunt     Malig Hyperthermia Neg Hx        Social History   reports that she has never smoked. She has never used smokeless tobacco. She reports that she does not currently use alcohol. She reports that she does not use drugs.    Allergies  Shingrix [zoster vac recomb adjuvanted], Zolmitriptan, Adhesive tape, Hydromorphone, and Amoxicillin-pot clavulanate    Objective     Vital Signs   Vital Signs (last 24 hours)         01/12 0700  01/13 0659 01/13 0700  01/13 1712   Most Recent      Temp (°F) 97.4 -  98.9    97.8 -  98.4     98.4 (36.9) 01/13 1654    Heart Rate 72 -  104    72 -  93     93 01/13 1654    Resp 10 -  23    13 -  18     16 01/13 1654    /94 -  156/76    150/77 -  165/86     165/86 01/13 1654    SpO2 (%) 95 -  99    95 -  100     98 01/13 1654    Flow (L/min) (Oxygen Therapy)   2      2     2 01/13 1654            Physical Exam:  Physical Exam  Vitals and nursing note reviewed.   Constitutional:       General: She is not in acute distress.     Appearance: She is well-developed and normal weight. She is ill-appearing. She is not diaphoretic.   HENT:      Head: Normocephalic and atraumatic.   Eyes:      General: No scleral icterus.     Extraocular Movements: Extraocular movements intact.      Conjunctiva/sclera: Conjunctivae normal.      Pupils: Pupils are equal, round, and reactive to light.   Cardiovascular:      Rate and Rhythm: Normal rate and regular rhythm.      Heart sounds: Normal heart sounds, S1 normal and S2 normal. No murmur heard.  Pulmonary:      Effort: Pulmonary effort is normal. No respiratory distress.      Breath sounds: No stridor. No wheezing or rales.      Comments: Diminished throughout  Chest:      Chest wall: No tenderness.   Abdominal:      General: Bowel sounds are normal. There is no distension.      Palpations: Abdomen is soft. There is no mass.       Tenderness: There is no abdominal tenderness. There is no guarding.   Musculoskeletal:         General: No swelling, tenderness or deformity. Normal range of motion.      Cervical back: Neck supple.   Skin:     General: Skin is warm and dry.      Coloration: Skin is not pale.      Findings: No bruising, erythema or rash.   Neurological:      General: No focal deficit present.      Mental Status: She is alert and oriented to person, place, and time. Mental status is at baseline.      Cranial Nerves: No cranial nerve deficit.   Psychiatric:         Mood and Affect: Mood normal.         Microbiology  Microbiology Results (last 10 days)       Procedure Component Value - Date/Time    MRSA Screen, PCR (Inpatient) - Swab, Nares [[id number]]  (Normal) Collected: 01/09/25 1639    Lab Status: Final result Specimen: Swab from Nares Updated: 01/09/25 1804     MRSA PCR No MRSA Detected    Narrative:      The negative predictive value of this diagnostic test is high and should only be used to consider de-escalating anti-MRSA therapy. A positive result may indicate colonization with MRSA and must be correlated clinically.    Blood Culture - Blood, Arm, Left [[id number]]  (Normal) Collected: 01/09/25 1627    Lab Status: Preliminary result Specimen: Blood from Arm, Left Updated: 01/13/25 1645     Blood Culture No growth at 4 days    Blood Culture - Blood, Arm, Right [[id number]]  (Normal) Collected: 01/09/25 1615    Lab Status: Preliminary result Specimen: Blood from Arm, Right Updated: 01/13/25 1631     Blood Culture No growth at 4 days    Narrative:      Less than seven (7) mL's of blood was collected.  Insufficient quantity may yield false negative results.            Laboratory  Results from last 7 days   Lab Units 01/13/25  1220   WBC 10*3/mm3 13.37*   HEMOGLOBIN g/dL 12.8   HEMATOCRIT % 41.3   PLATELETS 10*3/mm3 447     Results from last 7 days   Lab Units 01/13/25  1220   SODIUM mmol/L 142   POTASSIUM mmol/L 3.3*   CHLORIDE  mmol/L 103   CO2 mmol/L 28.4   BUN mg/dL 12   CREATININE mg/dL 0.76   GLUCOSE mg/dL 110*   CALCIUM mg/dL 8.9     Results from last 7 days   Lab Units 01/13/25  1220   SODIUM mmol/L 142   POTASSIUM mmol/L 3.3*   CHLORIDE mmol/L 103   CO2 mmol/L 28.4   BUN mg/dL 12   CREATININE mg/dL 0.76   GLUCOSE mg/dL 110*   CALCIUM mg/dL 8.9                   Radiology  Imaging Results (Last 72 Hours)       Procedure Component Value Units Date/Time    CT Chest Without Contrast Diagnostic [[id number]] Collected: 01/11/25 1115     Updated: 01/11/25 1120    Narrative:      CT CHEST WO CONTRAST DIAGNOSTIC    Date of Exam: 1/11/2025 11:05 AM EST    Indication: hypoxia, abnl cxr.    Comparison: None available.    Technique: Axial CT images were obtained of the chest without contrast administration.  Sagittal and coronal reconstructions were performed.  Automated exposure control and iterative reconstruction methods were used.      Findings:  Infiltrates are noted within the bilateral lower lobes, more pronounced on the left. Additional atelectasis versus less well-defined infiltrates are seen throughout the lungs bilaterally. No pleural effusions are observed.    No significant lymphadenopathy is seen. Mild atherosclerosis is noted. Coronary artery calcifications are observed. The thyroid gland is unremarkable. The esophagus is unremarkable.    The limited evaluation of the upper abdomen demonstrates no acute abnormality.    Extensive postoperative changes are seen throughout the thoracic spine. No definitive acute osseous abnormalities are identified.      Impression:      Impression:  1.Infiltrates are noted within the bilateral lower lobes, more pronounced on the left. Additional atelectasis versus less well-defined infiltrates are seen throughout the lungs bilaterally. The findings suggest multifocal pneumonia.  2.Coronary artery calcifications are observed.        Electronically Signed: Bryan Parker MD    1/11/2025 11:18 AM EST     Workstation ID: [id number]            Cardiology      Results Review:  I have reviewed all clinical data, test, lab, and imaging results.       Schedule Meds  apixaban, 5 mg, Oral, Q12H  aspirin, 81 mg, Oral, Daily  atorvastatin, 40 mg, Oral, Nightly  azithromycin, 500 mg, Oral, Q24H  cefTRIAXone, 2,000 mg, Intravenous, Q24H  cetirizine, 10 mg, Oral, Daily  guaiFENesin, 600 mg, Oral, Q12H  hydroxychloroquine, 200 mg, Oral, Q12H  ipratropium, 2 spray, Each Nare, Q12H  ipratropium-albuterol, 3 mL, Nebulization, Q6H While Awake - RT  melatonin, 10 mg, Oral, Nightly  multivitamin, 1 tablet, Oral, Daily  oxymetazoline, 2 spray, Each Nare, BID  pantoprazole, 40 mg, Oral, Q AM  potassium chloride, 40 mEq, Oral, Once  predniSONE, 40 mg, Oral, Daily With Breakfast  senna-docusate sodium, 2 tablet, Oral, BID  sodium chloride, 10 mL, Intravenous, Q12H  venlafaxine XR, 150 mg, Oral, Daily        Infusion Meds       PRN Meds    acetaminophen **OR** acetaminophen **OR** acetaminophen    aluminum-magnesium hydroxide-simethicone    benzonatate    senna-docusate sodium **AND** polyethylene glycol **AND** bisacodyl **AND** bisacodyl    Calcium Replacement - Follow Nurse / BPA Driven Protocol    guaifenesin    Magnesium Standard Dose Replacement - Follow Nurse / BPA Driven Protocol    ondansetron ODT **OR** ondansetron    Phosphorus Replacement - Follow Nurse / BPA Driven Protocol    Potassium Replacement - Follow Nurse / BPA Driven Protocol    [COMPLETED] Insert Peripheral IV **AND** sodium chloride    sodium chloride    sodium chloride      Assessment & Plan       Assessment    Possible postviral bacterial pneumonia with bilateral lower lobe consolidation.  Patient is currently on 2 L of oxygen by nasal cannula is normally on room air.  MRSA of the nares screen is negative    Mild leukocytosis-likely from the above and steroid use    Mildly elevated liver transaminase-likely from infection    Recent hospitalization for both the  coronavirus NL63 and influenza A.    Lupus/rheumatoid arthritis-patient is currently on Plaquenil but denies being on any other immunosuppressive therapy    History of CVA    Plan    Continue p.o. azithromycin for now  Continue IV Rocephin 2 g every 24 hours for now  Pneumococcal  and Legionella urinary antigen  Sputum culture  Continue supportive care  A.m. labs with  CMP    No need for droplet precautions at this point    Anna Long, APRN  01/13/25  17:12 EST    Note is dictated utilizing voice recognition software/Dragon   The patient is a 44y Male complaining of medication refill.

## 2025-01-14 PROBLEM — T17.800A MULTIPLE TRACHEOBRONCHIAL MUCUS PLUGS: Status: ACTIVE | Noted: 2025-01-09

## 2025-01-14 LAB
ALBUMIN SERPL-MCNC: 3.1 G/DL (ref 3.5–5.2)
ALBUMIN/GLOB SERPL: 1.1 G/DL
ALP SERPL-CCNC: 110 U/L (ref 39–117)
ALT SERPL W P-5'-P-CCNC: 79 U/L (ref 1–33)
ANION GAP SERPL CALCULATED.3IONS-SCNC: 8.9 MMOL/L (ref 5–15)
AST SERPL-CCNC: 62 U/L (ref 1–32)
BACTERIA SPEC AEROBE CULT: NORMAL
BACTERIA SPEC AEROBE CULT: NORMAL
BASOPHILS # BLD AUTO: 0.03 10*3/MM3 (ref 0–0.2)
BASOPHILS NFR BLD AUTO: 0.3 % (ref 0–1.5)
BILIRUB SERPL-MCNC: <0.2 MG/DL (ref 0–1.2)
BUN SERPL-MCNC: 12 MG/DL (ref 8–23)
BUN/CREAT SERPL: 21.4 (ref 7–25)
CALCIUM SPEC-SCNC: 8.5 MG/DL (ref 8.6–10.5)
CHLORIDE SERPL-SCNC: 101 MMOL/L (ref 98–107)
CO2 SERPL-SCNC: 31.1 MMOL/L (ref 22–29)
CREAT SERPL-MCNC: 0.56 MG/DL (ref 0.57–1)
DEPRECATED RDW RBC AUTO: 47.9 FL (ref 37–54)
EGFRCR SERPLBLD CKD-EPI 2021: 94.7 ML/MIN/1.73
EOSINOPHIL # BLD AUTO: 0.04 10*3/MM3 (ref 0–0.4)
EOSINOPHIL NFR BLD AUTO: 0.4 % (ref 0.3–6.2)
ERYTHROCYTE [DISTWIDTH] IN BLOOD BY AUTOMATED COUNT: 14 % (ref 12.3–15.4)
GLOBULIN UR ELPH-MCNC: 2.8 GM/DL
GLUCOSE SERPL-MCNC: 128 MG/DL (ref 65–99)
HCT VFR BLD AUTO: 38.2 % (ref 34–46.6)
HGB BLD-MCNC: 12.1 G/DL (ref 12–15.9)
IMM GRANULOCYTES # BLD AUTO: 0.13 10*3/MM3 (ref 0–0.05)
IMM GRANULOCYTES NFR BLD AUTO: 1.2 % (ref 0–0.5)
LYMPHOCYTES # BLD AUTO: 1.67 10*3/MM3 (ref 0.7–3.1)
LYMPHOCYTES NFR BLD AUTO: 15.7 % (ref 19.6–45.3)
MCH RBC QN AUTO: 29.8 PG (ref 26.6–33)
MCHC RBC AUTO-ENTMCNC: 31.7 G/DL (ref 31.5–35.7)
MCV RBC AUTO: 94.1 FL (ref 79–97)
MONOCYTES # BLD AUTO: 1.11 10*3/MM3 (ref 0.1–0.9)
MONOCYTES NFR BLD AUTO: 10.4 % (ref 5–12)
NEUTROPHILS NFR BLD AUTO: 7.69 10*3/MM3 (ref 1.7–7)
NEUTROPHILS NFR BLD AUTO: 72 % (ref 42.7–76)
NRBC BLD AUTO-RTO: 0 /100 WBC (ref 0–0.2)
PLATELET # BLD AUTO: 415 10*3/MM3 (ref 140–450)
PMV BLD AUTO: 9.4 FL (ref 6–12)
POTASSIUM SERPL-SCNC: 3.2 MMOL/L (ref 3.5–5.2)
PROT SERPL-MCNC: 5.9 G/DL (ref 6–8.5)
RBC # BLD AUTO: 4.06 10*6/MM3 (ref 3.77–5.28)
SODIUM SERPL-SCNC: 141 MMOL/L (ref 136–145)
WBC NRBC COR # BLD AUTO: 10.67 10*3/MM3 (ref 3.4–10.8)

## 2025-01-14 PROCEDURE — 80053 COMPREHEN METABOLIC PANEL: CPT | Performed by: NURSE PRACTITIONER

## 2025-01-14 PROCEDURE — 94761 N-INVAS EAR/PLS OXIMETRY MLT: CPT

## 2025-01-14 PROCEDURE — 94799 UNLISTED PULMONARY SVC/PX: CPT

## 2025-01-14 PROCEDURE — 94664 DEMO&/EVAL PT USE INHALER: CPT

## 2025-01-14 PROCEDURE — 94618 PULMONARY STRESS TESTING: CPT

## 2025-01-14 PROCEDURE — 63710000001 PREDNISONE PER 1 MG: Performed by: INTERNAL MEDICINE

## 2025-01-14 PROCEDURE — 85025 COMPLETE CBC W/AUTO DIFF WBC: CPT | Performed by: NURSE PRACTITIONER

## 2025-01-14 RX ORDER — POTASSIUM CHLORIDE 1500 MG/1
20 TABLET, EXTENDED RELEASE ORAL ONCE
Status: COMPLETED | OUTPATIENT
Start: 2025-01-14 | End: 2025-01-14

## 2025-01-14 RX ADMIN — HYDROXYCHLOROQUINE SULFATE 200 MG: 200 TABLET ORAL at 09:38

## 2025-01-14 RX ADMIN — GUAIFENESIN 600 MG: 600 TABLET ORAL at 09:38

## 2025-01-14 RX ADMIN — OXYMETAZOLINE HYDROCHLORIDE 2 SPRAY: 0.5 SPRAY NASAL at 09:39

## 2025-01-14 RX ADMIN — IPRATROPIUM BROMIDE AND ALBUTEROL SULFATE 3 ML: .5; 3 SOLUTION RESPIRATORY (INHALATION) at 19:22

## 2025-01-14 RX ADMIN — PREDNISONE 40 MG: 20 TABLET ORAL at 09:38

## 2025-01-14 RX ADMIN — OXYMETAZOLINE HYDROCHLORIDE 2 SPRAY: 0.5 SPRAY NASAL at 21:56

## 2025-01-14 RX ADMIN — APIXABAN 5 MG: 5 TABLET, FILM COATED ORAL at 09:38

## 2025-01-14 RX ADMIN — VENLAFAXINE HYDROCHLORIDE 150 MG: 75 CAPSULE, EXTENDED RELEASE ORAL at 09:38

## 2025-01-14 RX ADMIN — RIMEGEPANT SULFATE 75 MG: 75 TABLET, ORALLY DISINTEGRATING ORAL at 05:55

## 2025-01-14 RX ADMIN — IPRATROPIUM BROMIDE 2 SPRAY: 21 SPRAY, METERED NASAL at 21:56

## 2025-01-14 RX ADMIN — POTASSIUM CHLORIDE 20 MEQ: 1500 TABLET, EXTENDED RELEASE ORAL at 05:55

## 2025-01-14 RX ADMIN — PANTOPRAZOLE SODIUM 40 MG: 40 TABLET, DELAYED RELEASE ORAL at 05:54

## 2025-01-14 RX ADMIN — Medication 10 ML: at 09:39

## 2025-01-14 RX ADMIN — Medication 10 MG: at 21:56

## 2025-01-14 RX ADMIN — IPRATROPIUM BROMIDE AND ALBUTEROL SULFATE 3 ML: .5; 3 SOLUTION RESPIRATORY (INHALATION) at 07:32

## 2025-01-14 RX ADMIN — HYDROXYCHLOROQUINE SULFATE 200 MG: 200 TABLET ORAL at 21:56

## 2025-01-14 RX ADMIN — THERA TABS 1 TABLET: TAB at 09:38

## 2025-01-14 RX ADMIN — CETIRIZINE HYDROCHLORIDE 10 MG: 10 TABLET, FILM COATED ORAL at 09:38

## 2025-01-14 RX ADMIN — IPRATROPIUM BROMIDE AND ALBUTEROL SULFATE 3 ML: .5; 3 SOLUTION RESPIRATORY (INHALATION) at 14:55

## 2025-01-14 RX ADMIN — Medication 10 ML: at 21:58

## 2025-01-14 RX ADMIN — ATORVASTATIN CALCIUM 40 MG: 40 TABLET, FILM COATED ORAL at 21:56

## 2025-01-14 RX ADMIN — GUAIFENESIN 600 MG: 600 TABLET ORAL at 21:56

## 2025-01-14 NOTE — PROGRESS NOTES
Infectious Diseases Progress Note      LOS: 5 days   Patient Care Team:  Caleb Whitney MD as PCP - General (Internal Medicine)  Leanne Farah APRN as PCP - Family Medicine  Maia, CHEL Stone as Physician Assistant (Physician Assistant)    Chief Complaint: Shortness of breath, cough, fatigue and weakness    Subjective       The patient has been afebrile for the last 24 hours.  The patient is on 2 L of oxygen by nasal cannula hemodynamically stable, and is tolerating antimicrobial therapy.  Currently attempting a 6-minute walk with respiratory therapy patient continues to be very weak and has had some nausea and anorexia      Review of Systems:   Review of Systems   Constitutional:  Positive for fatigue.   HENT: Negative.     Eyes: Negative.    Respiratory:  Positive for cough and shortness of breath.    Cardiovascular: Negative.    Gastrointestinal:  Positive for nausea.   Endocrine: Negative.    Genitourinary: Negative.    Musculoskeletal: Negative.    Skin: Negative.    Neurological:  Positive for weakness.   Psychiatric/Behavioral: Negative.     All other systems reviewed and are negative.       Objective     Vital Signs  Temp:  [97.9 °F (36.6 °C)-98.6 °F (37 °C)] 98 °F (36.7 °C)  Heart Rate:  [69-98] 86  Resp:  [15-20] 15  BP: (125-165)/(57-86) 143/64    Physical Exam:  Physical Exam  Vitals reviewed.   Constitutional:       Appearance: She is well-developed. She is obese. She is ill-appearing.   HENT:      Head: Normocephalic.   Eyes:      Pupils: Pupils are equal, round, and reactive to light.   Cardiovascular:      Rate and Rhythm: Normal rate and regular rhythm.      Heart sounds: Normal heart sounds.   Pulmonary:      Effort: Pulmonary effort is normal.      Breath sounds: Normal breath sounds.      Comments: Diminished throughout  Abdominal:      General: Bowel sounds are normal.      Palpations: Abdomen is soft.   Musculoskeletal:         General: Normal range of motion.      Cervical back:  Neck supple.   Skin:     General: Skin is warm and dry.   Neurological:      Mental Status: She is alert and oriented to person, place, and time.          Results Review:    I have reviewed all clinical data, test, lab, and imaging results.     Radiology  Walking Oximetry    Result Date: 1/14/2025  Cristy Jarrett, RRT     1/14/2025 12:27 PM Exercise Oximetry Patient Name:Annalisa Bhagat MRN: [id number] Date: 01/14/25         ROOM AIR BASELINE SpO2% 92 Heart Rate 76 Blood Pressure  EXERCISE ON ROOM AIR SpO2% EXERCISE ON O2 @2 LPM SpO2% 1 MINUTE 90 1 MINUTE  2 MINUTES 87 2 MINUTES  3 MINUTES  3 MINUTES 90 4 MINUTES  4 MINUTES 93 5 MINUTES  5 MINUTES  6 MINUTES  6 MINUTES           Distance Walked   Distance Walked 4 mins Dyspnea (Burton Scale)   Dyspnea (Burton Scale) Fatigue (Burton Scale)   Fatigue (Burton Scale) SpO2% Post Exercise   SpO2% Post Exercise 96 HR Post Exercise   HR Post Exercise 76 Time to Recovery   Time to Recovery 1 min Comments: On room air patient initially 92%, desat to 87%. Patient maintained on 2L for @ 4 mins before fatiguing and having to stop the walk, patient was only assessed for 4 mins total due to being weak and shaky.       Cardiology    Laboratory    Results from last 7 days   Lab Units 01/14/25  0015 01/13/25  1220 01/12/25  0355 01/09/25  1352   WBC 10*3/mm3 10.67 13.37* 15.44* 16.20*   HEMOGLOBIN g/dL 12.1 12.8 12.7 14.3   HEMATOCRIT % 38.2 41.3 40.3 43.3   PLATELETS 10*3/mm3 415 447 503* 421     Results from last 7 days   Lab Units 01/14/25  0015 01/13/25  1220 01/12/25  0355 01/10/25  0520 01/09/25  1352   SODIUM mmol/L 141 142 144 139 137   POTASSIUM mmol/L 3.2* 3.3* 3.6 3.8 3.4*   CHLORIDE mmol/L 101 103 104 102 100   CO2 mmol/L 31.1* 28.4 27.9 27.1 27.4   BUN mg/dL 12 12 18 9 11   CREATININE mg/dL 0.56* 0.76 0.61 0.59 0.68   GLUCOSE mg/dL 128* 110* 100* 111* 103*   ALBUMIN g/dL 3.1* 3.2* 3.3*  --  3.1*   BILIRUBIN mg/dL <0.2 0.2 0.2  --  0.4   ALK PHOS U/L 110 123* 163*  --   144*   AST (SGOT) U/L 62* 87* 81*  --  50*   ALT (SGPT) U/L 79* 93* 65*  --  39*   CALCIUM mg/dL 8.5* 8.9 9.0 8.7 8.7                 Microbiology   Microbiology Results (last 10 days)       Procedure Component Value - Date/Time    Legionella Antigen, Urine - Urine, Urine, Clean Catch [[id number]]  (Normal) Collected: 01/13/25 1759    Lab Status: Final result Specimen: Urine, Clean Catch Updated: 01/13/25 1821     LEGIONELLA ANTIGEN, URINE Negative    S. Pneumo Ag Urine or CSF - Urine, Urine, Clean Catch [[id number]]  (Abnormal) Collected: 01/13/25 1759    Lab Status: Final result Specimen: Urine, Clean Catch Updated: 01/13/25 1821     Strep Pneumo Ag Positive    MRSA Screen, PCR (Inpatient) - Swab, Nares [[id number]]  (Normal) Collected: 01/09/25 1639    Lab Status: Final result Specimen: Swab from Nares Updated: 01/09/25 1804     MRSA PCR No MRSA Detected    Narrative:      The negative predictive value of this diagnostic test is high and should only be used to consider de-escalating anti-MRSA therapy. A positive result may indicate colonization with MRSA and must be correlated clinically.    Blood Culture - Blood, Arm, Left [[id number]]  (Normal) Collected: 01/09/25 1627    Lab Status: Preliminary result Specimen: Blood from Arm, Left Updated: 01/13/25 1645     Blood Culture No growth at 4 days    Blood Culture - Blood, Arm, Right [[id number]]  (Normal) Collected: 01/09/25 1615    Lab Status: Preliminary result Specimen: Blood from Arm, Right Updated: 01/13/25 1631     Blood Culture No growth at 4 days    Narrative:      Less than seven (7) mL's of blood was collected.  Insufficient quantity may yield false negative results.            Medication Review:       Schedule Meds  apixaban, 5 mg, Oral, Q12H  aspirin, 81 mg, Oral, Daily  atorvastatin, 40 mg, Oral, Nightly  cefTRIAXone, 2,000 mg, Intravenous, Q24H  cetirizine, 10 mg, Oral, Daily  guaiFENesin, 600 mg, Oral, Q12H  hydroxychloroquine, 200 mg, Oral,  Q12H  ipratropium, 2 spray, Each Nare, Q12H  ipratropium-albuterol, 3 mL, Nebulization, Q6H While Awake - RT  melatonin, 10 mg, Oral, Nightly  multivitamin, 1 tablet, Oral, Daily  oxymetazoline, 2 spray, Each Nare, BID  pantoprazole, 40 mg, Oral, Q AM  potassium chloride, 40 mEq, Oral, Once  predniSONE, 40 mg, Oral, Daily With Breakfast  senna-docusate sodium, 2 tablet, Oral, BID  sodium chloride, 10 mL, Intravenous, Q12H  venlafaxine XR, 150 mg, Oral, Daily        Infusion Meds       PRN Meds    acetaminophen **OR** acetaminophen **OR** acetaminophen    aluminum-magnesium hydroxide-simethicone    benzonatate    senna-docusate sodium **AND** polyethylene glycol **AND** bisacodyl **AND** bisacodyl    Calcium Replacement - Follow Nurse / BPA Driven Protocol    guaifenesin    Magnesium Standard Dose Replacement - Follow Nurse / BPA Driven Protocol    ondansetron ODT **OR** ondansetron    Phosphorus Replacement - Follow Nurse / BPA Driven Protocol    Potassium Replacement - Follow Nurse / BPA Driven Protocol    rimegepant sulfate    [COMPLETED] Insert Peripheral IV **AND** sodium chloride    sodium chloride    sodium chloride        Assessment & Plan       Antimicrobial Therapy   1.  IV ceftriaxone        2.        3.        4.        5.            Assessment     Community-acquired pneumonia with Streptococcus pneumoniae.  Urine was positive for pneumococcal antigen.  The patient is currently on 2 L of oxygen.  This is a probably post viral bacterial pneumonia     Mild leukocytosis-likely from the above and steroid use.  Trending down     Recent hospitalization for both the coronavirus NL63 and influenza A.     Lupus/rheumatoid arthritis-patient is currently on Plaquenil but denies being on any other immunosuppressive therapy     History of CVA     Plan     Discontinue p.o. azithromycin   Continue IV Rocephin 2 g every 24 hours for now.  Can switch to p.o. Omnicef at discharge for 10 days of treatment  Continue  supportive care  A.m. labs with  CMP     No need for droplet precautions at this point      Anna Long, JENNY  01/14/25  14:29 EST    Note is dictated utilizing voice recognition software/Dragon

## 2025-01-14 NOTE — PLAN OF CARE
Goal Outcome Evaluation:            Patient is A&ox4 on 2L NC. No complaints of pain. Pulm consulted and plan to have bronch tomorrow. NPO at midnight. Consent in the chart. Remains in isolation. Walking test done by respiratory. Plan to go to [facility] when d/c

## 2025-01-14 NOTE — CASE MANAGEMENT/SOCIAL WORK
Continued Stay Note  IDALIA Robins     Patient Name: Annalisa Bhagat  MRN: [id number]  Today's Date: 1/14/2025    Admit Date: 1/9/2025    Plan: [facility] (accepted). PASRR approved, no precert required.   Discharge Plan       Row Name 01/14/25 1024       Plan    Plan [facility] (accepted). PASRR approved, no precert required.    Plan Comments CM met with patient at the bedside at the end of shift on 1/13 to obtain patient choices for SNF. [facility] final choice [facility] and CM confirmed with liaison Mony that a bed is ready and able to admit today. CM updated careteam via secure chat.           Ye Muñoz RN     Cell number [phone]  Office number [phone]

## 2025-01-14 NOTE — CONSULTS
Group: [facility]         CONSULT NOTE    Patient Identification:  Annalisa Bhagat  74 y.o.  female  1950  [id number]            Requesting physician: Attending physician    Reason for Consultation: non-resolving pneumonia        History of Present Illness:  73 y/o female with h/o lupus/RA on plaquenil and Prolia ( Denosumab) , CVA who developed influenza A and subsequent bacterial pneumonia, admitted 1/9/2025 with cough and SOA.     Assessment:  Multifocal pneumonia  Recent influenza A infection  Hypoxia: 6 minute walk 1/14/25 showing desat 87% RA, improved with 2L  Chronic anticoagulation on Eliquis  Old CVA      Recommendations:  D/w pt bronchoscopy and she consented to it, schedule it 1/15/25 to remove mucus plugs and obtain cultures    Abx as per ID: on Rocephin     Prednisone   Mucinex    Oxygen supplement and titration to maintain saturation 90 to 95%: 2 L  Bronchodilators: duoneb     Eliquis     I personally reviewed the radiological studies      Review of Sytems:  Constitutional: Negative for chills, and fever and positive for malaise/fatigue.   HENT: Negative.    Eyes: Negative.    Cardiovascular: Negative.    Respiratory: Positive for cough and shortness of breath.    Skin: Negative.    Musculoskeletal: Negative.    Gastrointestinal: Negative.    Genitourinary: Negative.    Neurological: Negative.    Psychiatric/Behavioral: Negative.    Past Medical History:  Past Medical History:   Diagnosis Date    Fibromyalgia     GERD (gastroesophageal reflux disease)     Hx of compression fracture of spine     T12    Hyperlipidemia     Kidney stone     Loosening of hardware in spine     Lupus     Migraines     Narcolepsy 12/6/2023    Numbness and tingling     Osteoporosis 02/23/2018    Forteo x 2yrs(2/2018-12/2019,3/20-4/2020), on prolia(2/28/2020)    Recurrent UTI     Rheumatoid arthritis     Scoliosis     Seasonal allergies     Shoulder pain, left     Sleep apnea     Spinal stenosis     Stress  incontinence     Stroke        Past Surgical History:  Past Surgical History:   Procedure Laterality Date    BACK SURGERY      CARDIAC CATHETERIZATION      CARPAL TUNNEL RELEASE      CATARACT EXTRACTION, BILATERAL      CYSTOSCOPY W/ URETEROSCOPY W/ LITHOTRIPSY      HAND SURGERY      HYSTERECTOMY      RECTOVAGINAL FISTULA CLOSURE      SACRAL NERVE STIMULATOR PLACEMENT      TOTAL SHOULDER ARTHROPLASTY W/ DISTAL CLAVICLE EXCISION Left 2019    Procedure: TOTAL SHOULDER REVERSE ARTHROPLASTY;  Surgeon: Brice Mayes MD;  Location: [facility] OR;  Service: Orthopedics        Home Meds:  Facility-Administered Medications Prior to Admission   Medication Dose Route Frequency Provider Last Rate Last Admin    denosumab (PROLIA) syringe 60 mg  60 mg Subcutaneous Q6 Months Jason Qiu MD   60 mg at 23 1502     Medications Prior to Admission   Medication Sig Dispense Refill Last Dose/Taking    apixaban (ELIQUIS) 5 MG tablet tablet Take 1 tablet by mouth Every 12 (Twelve) Hours. Resume 6/15/29 60 tablet  2025 Evening    ascorbic acid (VITAMIN C) 1000 MG tablet Take 1 tablet by mouth Daily.   Past Week    atorvastatin (LIPITOR) 40 MG tablet Take 1 tablet by mouth Daily.   2025 Morning    Azelastine-Fluticasone 137-50 MCG/ACT suspension Administer 1-2 sprays into the nostril(s) as directed by provider Daily As Needed.   Taking As Needed    [] benzonatate (TESSALON) 200 MG capsule Take 1 capsule by mouth 3 (Three) Times a Day As Needed for Cough for up to 7 days. 21 capsule 0 2025 at  9:00 AM    calcium citrate (CALCITRATE) 950 MG tablet Take 1 tablet by mouth Daily.   Past Week    furosemide (LASIX) 40 MG tablet Take 1 tablet by mouth 2 (Two) Times a Day As Needed.   Taking As Needed    [] guaiFENesin-dextromethorphan (ROBITUSSIN DM) 100-10 MG/5ML syrup Take 5 mL by mouth Every 4 (Four) Hours As Needed for Cough for up to 7 days. 237 mL 0 2025    hydrocortisone 2.5 % cream Apply 1  Application topically to the appropriate area as directed As Needed.   Taking As Needed    hydroxychloroquine (PLAQUENIL) 200 MG tablet Take 1 tablet by mouth 2 (Two) Times a Day.   1/8/2025 Evening    hydrOXYzine (ATARAX) 25 MG tablet Take 1 tablet by mouth Daily As Needed for Itching.   Taking As Needed    loratadine (CLARITIN) 10 MG tablet Take 1 tablet by mouth As Needed.   Taking As Needed    Melatonin 10 MG tablet Take 1 tablet by mouth Every Night.   1/8/2025 Bedtime    methocarbamol (ROBAXIN) 750 MG tablet Take 1 tablet by mouth Every Night.   1/8/2025 Bedtime    methylphenidate (RITALIN) 20 MG tablet Take 1 tablet by mouth Daily.   1/8/2025 Morning    Multiple Vitamins-Minerals (PRESERVISION AREDS 2 PO) Take 1 capsule by mouth 2 (Two) Times a Day.   Past Week    omeprazole (priLOSEC) 20 MG capsule Take 1 capsule by mouth Daily.   1/8/2025 Noon    phenazopyridine (PYRIDIUM) 100 MG tablet Take 1 tablet by mouth 3 (Three) Times a Day As Needed for Bladder Spasms.   Taking As Needed    promethazine (PHENERGAN) 25 MG tablet Take 1 tablet by mouth As Needed (MIGRAINES).   Taking As Needed    pyridoxine (VITAMIN B-6) 200 MG tablet Take 1 tablet by mouth Daily.   Past Week    Rimegepant Sulfate (NURTEC) 75 MG tablet dispersible tablet Take 1 tablet by mouth Daily As Needed.   1/8/2025 Evening    traMADol (ULTRAM) 50 MG tablet Take 1 tablet by mouth Every 6 (Six) Hours As Needed for Moderate Pain. Back pain.   1/8/2025 Evening    triamcinolone (KENALOG) 0.025 % cream Apply 1 Application topically to the appropriate area as directed As Needed.   Taking As Needed    venlafaxine XR (EFFEXOR-XR) 150 MG 24 hr capsule Take 1 capsule by mouth Daily.   1/8/2025 Morning    B Complex Vitamins (VITAMIN B COMPLEX PO) Take 1 each by mouth Daily. (Patient not taking: Reported on 1/9/2025)   Not Taking    denosumab (PROLIA) 60 MG/ML solution prefilled syringe syringe Inject 1 mL under the skin into the appropriate area as directed  "Every 6 (Six) Months. 180 mL 0        Allergies:  Allergies   Allergen Reactions    Shingrix [Zoster Vac Recomb Adjuvanted] Hallucinations     Fever, chills, redness swelling at injection site    Zolmitriptan Hemorrhagic stroke     Hx of stroke     Adhesive Tape Itching     Paper tape - redness and itching.     Hydromorphone Itching and Rash    Amoxicillin-Pot Clavulanate Itching and Rash       Social History:   Social History     Socioeconomic History    Marital status:    Tobacco Use    Smoking status: Never    Smokeless tobacco: Never   Vaping Use    Vaping status: Never Used   Substance and Sexual Activity    Alcohol use: Not Currently     Comment: OCC    Drug use: No    Sexual activity: Defer       Family History:  Family History   Problem Relation Age of Onset    Hypotension Mother     Hypotension Father     Colon cancer Father     Stroke Father     Colon cancer Maternal Grandmother     Heart disease Maternal Uncle     Hypertension Paternal Aunt     Diabetes Paternal Aunt     Heart failure Paternal Aunt     Malig Hyperthermia Neg Hx        Physical Exam:  /64 (BP Location: Right arm, Patient Position: Lying)   Pulse 86   Temp 98 °F (36.7 °C) (Oral)   Resp 15   Ht 139.7 cm (55\")   Wt 89.6 kg (197 lb 8.5 oz)   SpO2 97%   BMI 45.91 kg/m²  Body mass index is 45.91 kg/m². 97% 89.6 kg (197 lb 8.5 oz)  General Appearance:  Alert   HEENT:  Normocephalic, without obvious abnormality, Conjunctiva/corneas clear,.   Nares normal, no drainage     Neck:  Supple, symmetrical, trachea midline. No JVD.  Lungs /Chest wall:   Bilateral basal rhonchi, respirations unlabored, symmetrical wall movement.     Heart:  Regular rate and rhythm, S1 S2 normal  Abdomen: Soft, non-tender, no masses, no organomegaly.    Extremities: No edema, no clubbing or cyanosis    LABS:  Lab Results   Component Value Date    CALCIUM 8.5 (L) 01/14/2025    PHOS 2.7 01/13/2025     Results from last 7 days   Lab Units 01/14/25  0015 " 01/13/25  1220 01/12/25  0355 01/10/25  0520 01/09/25  1352   MAGNESIUM mg/dL  --  2.3  --  2.4 2.1   SODIUM mmol/L 141 142 144 139 137   POTASSIUM mmol/L 3.2* 3.3* 3.6 3.8 3.4*   CHLORIDE mmol/L 101 103 104 102 100   CO2 mmol/L 31.1* 28.4 27.9 27.1 27.4   BUN mg/dL 12 12 18 9 11   CREATININE mg/dL 0.56* 0.76 0.61 0.59 0.68   GLUCOSE mg/dL 128* 110* 100* 111* 103*   CALCIUM mg/dL 8.5* 8.9 9.0 8.7 8.7   WBC 10*3/mm3 10.67 13.37* 15.44*  --  16.20*   HEMOGLOBIN g/dL 12.1 12.8 12.7  --  14.3   PLATELETS 10*3/mm3 415 447 503*  --  421   ALT (SGPT) U/L 79* 93* 65*  --  39*   AST (SGOT) U/L 62* 87* 81*  --  50*   PROBNP pg/mL  --   --   --   --  118.0   PROCALCITONIN ng/mL  --   --   --   --  0.10     Lab Results   Component Value Date    TROPONINT 19 (H) 01/09/2025     Results from last 7 days   Lab Units 01/09/25  1627 01/09/25  1352   HSTROP T ng/L 19* 20*     Results from last 7 days   Lab Units 01/09/25  1627 01/09/25  1615   BLOODCX  No growth at 4 days No growth at 4 days     Results from last 7 days   Lab Units 01/09/25  1626 01/09/25  1352   PROCALCITONIN ng/mL  --  0.10   LACTATE mmol/L 1.2  --                  Results from last 7 days   Lab Units 01/09/25  1627 01/09/25  1615   BLOODCX  No growth at 4 days No growth at 4 days     Lab Results   Component Value Date    TSH 2.010 11/13/2019     Estimated Creatinine Clearance: 82.7 mL/min (A) (by C-G formula based on SCr of 0.56 mg/dL (L)).  Results from last 7 days   Lab Units 01/09/25  1440   NITRITE UA  Negative   WBC UA /HPF 0-2   BACTERIA UA /HPF Trace*   SQUAM EPITHEL UA /HPF 0-2        Imaging:  Imaging Results (Last 24 Hours)       ** No results found for the last 24 hours. **              Current Meds:   SCHEDULE  apixaban, 5 mg, Oral, Q12H  aspirin, 81 mg, Oral, Daily  atorvastatin, 40 mg, Oral, Nightly  cefTRIAXone, 2,000 mg, Intravenous, Q24H  cetirizine, 10 mg, Oral, Daily  guaiFENesin, 600 mg, Oral, Q12H  hydroxychloroquine, 200 mg, Oral,  Q12H  ipratropium, 2 spray, Each Nare, Q12H  ipratropium-albuterol, 3 mL, Nebulization, Q6H While Awake - RT  melatonin, 10 mg, Oral, Nightly  multivitamin, 1 tablet, Oral, Daily  oxymetazoline, 2 spray, Each Nare, BID  pantoprazole, 40 mg, Oral, Q AM  potassium chloride, 40 mEq, Oral, Once  predniSONE, 40 mg, Oral, Daily With Breakfast  senna-docusate sodium, 2 tablet, Oral, BID  sodium chloride, 10 mL, Intravenous, Q12H  venlafaxine XR, 150 mg, Oral, Daily      Infusions     PRNs    acetaminophen **OR** acetaminophen **OR** acetaminophen    aluminum-magnesium hydroxide-simethicone    benzonatate    senna-docusate sodium **AND** polyethylene glycol **AND** bisacodyl **AND** bisacodyl    Calcium Replacement - Follow Nurse / BPA Driven Protocol    guaifenesin    Magnesium Standard Dose Replacement - Follow Nurse / BPA Driven Protocol    ondansetron ODT **OR** ondansetron    Phosphorus Replacement - Follow Nurse / BPA Driven Protocol    Potassium Replacement - Follow Nurse / BPA Driven Protocol    rimegepant sulfate    [COMPLETED] Insert Peripheral IV **AND** sodium chloride    sodium chloride    sodium chloride        Néstor Mccarty MD  1/14/2025  14:50 EST      Much of this encounter note is an electronic transcription/translation of spoken language to printed text using Dragon Software.

## 2025-01-14 NOTE — PROGRESS NOTES
The patient feels about the same currently. She states that she is not improving. Still extremely weak. The patient has no nausea or vomiting. The patients appetite is still extremely poor. No light headedness or dizziness. Patient states that her breathing is labored. Not eating very much. Patient was seen by ID yesterday. No new recommendations at this point.    Vitals - BP is 143/64. Pulse is 86. Temperature is 98. R - 15. Oxygen saturations are 97 percent on 2 liters    Physical exam   Alert female able to follow commands but looks very tired  Neck - supple with no JVD or carotid bruits.   Heart - regular rate and rhythm  Lungs - Decreased breath sounds bilaterally. Poor air exchange. No wheezing  Abdomen - soft, nontender, NABS, No G/R/R/  Extremities - no edema. Pulses are intact        Acute hypoxemic respiratory failure  Coronavirus positive  Influenza positive  Abnormal CT imaging possible new CVA, MRI negative  Encephalomalacia  Remote history of CVA  Rheumatoid arthritis  Chronic anticoagulation with Eliquis     Patient continues to endorse significant weakness.  Noted therapy evaluation.  Therapy to reevaluate later this morning.  Patient does reside at home alone.  She does express reservations in regards to discharge planning.  CT chest results noted.  Continue broad-spectrum IV antibiotics for now.  Plan of care discussed and reviewed with patient at bedside.  Plan for disposition to rehab facility in morning, will transition to oral antibiotics tomorrow.  Elevated white count noted likely secondary to oral steroids.     MRI brain imaging noted.  No new CVA.  Continue routine home medications.     VTE Prophylaxis:  Pharmacologic & mechanical VTE prophylaxis orders are present.     MPRESSION:  Impression:  1.No acute ischemic change.  2.Remote right MCA distribution infarct.  3.White matter changes compatible small vessel ischemic disease in this age group.         Impression:  1.Infiltrates are noted  within the bilateral lower lobes, more pronounced on the left. Additional atelectasis versus less well-defined infiltrates are seen throughout the lungs bilaterally. The findings suggest multifocal pneumonia.  2.Coronary artery calcifications are observed.     1/13/2025 -  The patient still does not feel well. I will add afrin and Atrovent nasal spray to her regiment in addition to claritin.ID consult as the patient states she is not improving. We are waiting on discharge plans as well for her. She will need rehab placement.                01/14/2025 - patient still does not feel well. I will consult pulmonary to see if there anything else we can do to help her. Placement has arranged but patient is just not feeling well enough to discharge.

## 2025-01-14 NOTE — PLAN OF CARE
Goal Outcome Evaluation:                      Patient rested well on and off through the night. Woke up this morning with a migraine. Home med reordered per Dr. Graham. Potassium low at 3.2 will replace before shift change.

## 2025-01-15 ENCOUNTER — APPOINTMENT (OUTPATIENT)
Dept: CT IMAGING | Facility: HOSPITAL | Age: 75
DRG: 193 | End: 2025-01-15
Payer: MEDICARE

## 2025-01-15 ENCOUNTER — ANESTHESIA (OUTPATIENT)
Dept: GASTROENTEROLOGY | Facility: HOSPITAL | Age: 75
End: 2025-01-15
Payer: MEDICARE

## 2025-01-15 ENCOUNTER — ANESTHESIA EVENT (OUTPATIENT)
Dept: GASTROENTEROLOGY | Facility: HOSPITAL | Age: 75
End: 2025-01-15
Payer: MEDICARE

## 2025-01-15 LAB
ALBUMIN SERPL-MCNC: 3 G/DL (ref 3.5–5.2)
ALBUMIN/GLOB SERPL: 1.1 G/DL
ALP SERPL-CCNC: 109 U/L (ref 39–117)
ALT SERPL W P-5'-P-CCNC: 64 U/L (ref 1–33)
ANION GAP SERPL CALCULATED.3IONS-SCNC: 7.1 MMOL/L (ref 5–15)
AST SERPL-CCNC: 44 U/L (ref 1–32)
B PARAPERT DNA SPEC QL NAA+PROBE: NOT DETECTED
B PERT DNA SPEC QL NAA+PROBE: NOT DETECTED
BASOPHILS # BLD AUTO: 0.03 10*3/MM3 (ref 0–0.2)
BASOPHILS NFR BLD AUTO: 0.3 % (ref 0–1.5)
BILIRUB SERPL-MCNC: 0.2 MG/DL (ref 0–1.2)
BUN SERPL-MCNC: 15 MG/DL (ref 8–23)
BUN/CREAT SERPL: 26.3 (ref 7–25)
C PNEUM DNA NPH QL NAA+NON-PROBE: NOT DETECTED
CALCIUM SPEC-SCNC: 9 MG/DL (ref 8.6–10.5)
CHLORIDE SERPL-SCNC: 102 MMOL/L (ref 98–107)
CO2 SERPL-SCNC: 31.9 MMOL/L (ref 22–29)
CREAT SERPL-MCNC: 0.57 MG/DL (ref 0.57–1)
DEPRECATED RDW RBC AUTO: 49.1 FL (ref 37–54)
EGFRCR SERPLBLD CKD-EPI 2021: 94.3 ML/MIN/1.73
EOSINOPHIL # BLD AUTO: 0.03 10*3/MM3 (ref 0–0.4)
EOSINOPHIL NFR BLD AUTO: 0.3 % (ref 0.3–6.2)
ERYTHROCYTE [DISTWIDTH] IN BLOOD BY AUTOMATED COUNT: 14.3 % (ref 12.3–15.4)
FLUAV H3 RNA NPH QL NAA+PROBE: DETECTED
FLUBV RNA ISLT QL NAA+PROBE: NOT DETECTED
GLOBULIN UR ELPH-MCNC: 2.8 GM/DL
GLUCOSE SERPL-MCNC: 88 MG/DL (ref 65–99)
HADV DNA SPEC NAA+PROBE: NOT DETECTED
HCOV 229E RNA SPEC QL NAA+PROBE: NOT DETECTED
HCOV HKU1 RNA SPEC QL NAA+PROBE: NOT DETECTED
HCOV NL63 RNA SPEC QL NAA+PROBE: DETECTED
HCOV OC43 RNA SPEC QL NAA+PROBE: NOT DETECTED
HCT VFR BLD AUTO: 39.3 % (ref 34–46.6)
HGB BLD-MCNC: 12.3 G/DL (ref 12–15.9)
HMPV RNA NPH QL NAA+NON-PROBE: NOT DETECTED
HPIV1 RNA ISLT QL NAA+PROBE: NOT DETECTED
HPIV2 RNA SPEC QL NAA+PROBE: NOT DETECTED
HPIV3 RNA NPH QL NAA+PROBE: NOT DETECTED
HPIV4 P GENE NPH QL NAA+PROBE: NOT DETECTED
IMM GRANULOCYTES # BLD AUTO: 0.11 10*3/MM3 (ref 0–0.05)
IMM GRANULOCYTES NFR BLD AUTO: 0.9 % (ref 0–0.5)
LYMPHOCYTES # BLD AUTO: 1.57 10*3/MM3 (ref 0.7–3.1)
LYMPHOCYTES NFR BLD AUTO: 13.1 % (ref 19.6–45.3)
M PNEUMO IGG SER IA-ACNC: NOT DETECTED
MCH RBC QN AUTO: 29.5 PG (ref 26.6–33)
MCHC RBC AUTO-ENTMCNC: 31.3 G/DL (ref 31.5–35.7)
MCV RBC AUTO: 94.2 FL (ref 79–97)
MONOCYTES # BLD AUTO: 1.06 10*3/MM3 (ref 0.1–0.9)
MONOCYTES NFR BLD AUTO: 8.8 % (ref 5–12)
NEUTROPHILS NFR BLD AUTO: 76.6 % (ref 42.7–76)
NEUTROPHILS NFR BLD AUTO: 9.2 10*3/MM3 (ref 1.7–7)
NRBC BLD AUTO-RTO: 0 /100 WBC (ref 0–0.2)
PLATELET # BLD AUTO: 465 10*3/MM3 (ref 140–450)
PMV BLD AUTO: 9.4 FL (ref 6–12)
POTASSIUM SERPL-SCNC: 3.5 MMOL/L (ref 3.5–5.2)
PROT SERPL-MCNC: 5.8 G/DL (ref 6–8.5)
RBC # BLD AUTO: 4.17 10*6/MM3 (ref 3.77–5.28)
RHINOVIRUS RNA SPEC NAA+PROBE: NOT DETECTED
RSV RNA NPH QL NAA+NON-PROBE: NOT DETECTED
SARS-COV-2 RNA RESP QL NAA+PROBE: NOT DETECTED
SODIUM SERPL-SCNC: 141 MMOL/L (ref 136–145)
WBC NRBC COR # BLD AUTO: 12 10*3/MM3 (ref 3.4–10.8)

## 2025-01-15 PROCEDURE — 85025 COMPLETE CBC W/AUTO DIFF WBC: CPT | Performed by: NURSE PRACTITIONER

## 2025-01-15 PROCEDURE — 25810000003 SODIUM CHLORIDE 0.9 % SOLUTION: Performed by: NURSE ANESTHETIST, CERTIFIED REGISTERED

## 2025-01-15 PROCEDURE — 94761 N-INVAS EAR/PLS OXIMETRY MLT: CPT

## 2025-01-15 PROCEDURE — 70486 CT MAXILLOFACIAL W/O DYE: CPT

## 2025-01-15 PROCEDURE — 88108 CYTOPATH CONCENTRATE TECH: CPT | Performed by: INTERNAL MEDICINE

## 2025-01-15 PROCEDURE — 87070 CULTURE OTHR SPECIMN AEROBIC: CPT | Performed by: INTERNAL MEDICINE

## 2025-01-15 PROCEDURE — 87798 DETECT AGENT NOS DNA AMP: CPT | Performed by: INTERNAL MEDICINE

## 2025-01-15 PROCEDURE — 97116 GAIT TRAINING THERAPY: CPT

## 2025-01-15 PROCEDURE — 83735 ASSAY OF MAGNESIUM: CPT | Performed by: INTERNAL MEDICINE

## 2025-01-15 PROCEDURE — 25010000002 CEFTRIAXONE PER 250 MG: Performed by: NURSE PRACTITIONER

## 2025-01-15 PROCEDURE — 87206 SMEAR FLUORESCENT/ACID STAI: CPT | Performed by: INTERNAL MEDICINE

## 2025-01-15 PROCEDURE — 87205 SMEAR GRAM STAIN: CPT | Performed by: INTERNAL MEDICINE

## 2025-01-15 PROCEDURE — 3E1F88Z IRRIGATION OF RESPIRATORY TRACT USING IRRIGATING SUBSTANCE, VIA NATURAL OR ARTIFICIAL OPENING ENDOSCOPIC: ICD-10-PCS | Performed by: INTERNAL MEDICINE

## 2025-01-15 PROCEDURE — 97110 THERAPEUTIC EXERCISES: CPT

## 2025-01-15 PROCEDURE — 25010000002 LIDOCAINE PF 2% 2 % SOLUTION: Performed by: NURSE ANESTHETIST, CERTIFIED REGISTERED

## 2025-01-15 PROCEDURE — 0202U NFCT DS 22 TRGT SARS-COV-2: CPT | Performed by: INTERNAL MEDICINE

## 2025-01-15 PROCEDURE — 25010000002 PROPOFOL 1000 MG/100ML EMULSION: Performed by: NURSE ANESTHETIST, CERTIFIED REGISTERED

## 2025-01-15 PROCEDURE — 94664 DEMO&/EVAL PT USE INHALER: CPT

## 2025-01-15 PROCEDURE — 0B968ZX DRAINAGE OF RIGHT LOWER LOBE BRONCHUS, VIA NATURAL OR ARTIFICIAL OPENING ENDOSCOPIC, DIAGNOSTIC: ICD-10-PCS | Performed by: INTERNAL MEDICINE

## 2025-01-15 PROCEDURE — 87102 FUNGUS ISOLATION CULTURE: CPT | Performed by: INTERNAL MEDICINE

## 2025-01-15 PROCEDURE — 87116 MYCOBACTERIA CULTURE: CPT | Performed by: INTERNAL MEDICINE

## 2025-01-15 PROCEDURE — 80053 COMPREHEN METABOLIC PANEL: CPT | Performed by: NURSE PRACTITIONER

## 2025-01-15 PROCEDURE — 25010000002 LIDOCAINE 2% SOLUTION: Performed by: INTERNAL MEDICINE

## 2025-01-15 PROCEDURE — 25810000003 SODIUM CHLORIDE 0.9 % SOLUTION: Performed by: INTERNAL MEDICINE

## 2025-01-15 PROCEDURE — 97535 SELF CARE MNGMENT TRAINING: CPT

## 2025-01-15 PROCEDURE — 94799 UNLISTED PULMONARY SVC/PX: CPT

## 2025-01-15 PROCEDURE — 63710000001 PREDNISONE PER 1 MG: Performed by: INTERNAL MEDICINE

## 2025-01-15 PROCEDURE — 97530 THERAPEUTIC ACTIVITIES: CPT

## 2025-01-15 RX ORDER — SODIUM CHLORIDE 9 MG/ML
50 INJECTION, SOLUTION INTRAVENOUS ONCE
Status: COMPLETED | OUTPATIENT
Start: 2025-01-15 | End: 2025-01-15

## 2025-01-15 RX ORDER — PSEUDOEPHEDRINE HCL 30 MG/1
60 TABLET, FILM COATED ORAL EVERY 6 HOURS
Status: DISCONTINUED | OUTPATIENT
Start: 2025-01-15 | End: 2025-01-15 | Stop reason: ALTCHOICE

## 2025-01-15 RX ORDER — SODIUM CHLORIDE 9 MG/ML
INJECTION, SOLUTION INTRAVENOUS CONTINUOUS PRN
Status: DISCONTINUED | OUTPATIENT
Start: 2025-01-15 | End: 2025-01-15 | Stop reason: SURG

## 2025-01-15 RX ORDER — LIDOCAINE HYDROCHLORIDE 20 MG/ML
INJECTION, SOLUTION INFILTRATION; PERINEURAL AS NEEDED
Status: DISCONTINUED | OUTPATIENT
Start: 2025-01-15 | End: 2025-01-15 | Stop reason: HOSPADM

## 2025-01-15 RX ORDER — PROPOFOL 10 MG/ML
INJECTION, EMULSION INTRAVENOUS AS NEEDED
Status: DISCONTINUED | OUTPATIENT
Start: 2025-01-15 | End: 2025-01-15 | Stop reason: SURG

## 2025-01-15 RX ORDER — LIDOCAINE HYDROCHLORIDE 20 MG/ML
INJECTION, SOLUTION EPIDURAL; INFILTRATION; INTRACAUDAL; PERINEURAL AS NEEDED
Status: DISCONTINUED | OUTPATIENT
Start: 2025-01-15 | End: 2025-01-15 | Stop reason: SURG

## 2025-01-15 RX ORDER — LIDOCAINE HYDROCHLORIDE 20 MG/ML
JELLY TOPICAL AS NEEDED
Status: DISCONTINUED | OUTPATIENT
Start: 2025-01-15 | End: 2025-01-15 | Stop reason: HOSPADM

## 2025-01-15 RX ORDER — SODIUM CHLORIDE 9 MG/ML
50 INJECTION, SOLUTION INTRAVENOUS CONTINUOUS
Status: DISCONTINUED | OUTPATIENT
Start: 2025-01-15 | End: 2025-01-15

## 2025-01-15 RX ADMIN — GUAIFENESIN 600 MG: 600 TABLET ORAL at 20:45

## 2025-01-15 RX ADMIN — IPRATROPIUM BROMIDE 2 SPRAY: 21 SPRAY, METERED NASAL at 23:24

## 2025-01-15 RX ADMIN — SODIUM CHLORIDE: 9 INJECTION, SOLUTION INTRAVENOUS at 08:12

## 2025-01-15 RX ADMIN — LIDOCAINE HYDROCHLORIDE 60 MG: 20 INJECTION, SOLUTION EPIDURAL; INFILTRATION; INTRACAUDAL; PERINEURAL at 08:17

## 2025-01-15 RX ADMIN — CEFTRIAXONE 2000 MG: 2 INJECTION, POWDER, FOR SOLUTION INTRAMUSCULAR; INTRAVENOUS at 23:24

## 2025-01-15 RX ADMIN — PREDNISONE 40 MG: 20 TABLET ORAL at 11:39

## 2025-01-15 RX ADMIN — GUAIFENESIN 600 MG: 600 TABLET ORAL at 11:39

## 2025-01-15 RX ADMIN — Medication 10 MG: at 20:45

## 2025-01-15 RX ADMIN — PROPOFOL INJECTABLE EMULSION 50 MG: 10 INJECTION, EMULSION INTRAVENOUS at 08:17

## 2025-01-15 RX ADMIN — IPRATROPIUM BROMIDE AND ALBUTEROL SULFATE 3 ML: .5; 3 SOLUTION RESPIRATORY (INHALATION) at 14:45

## 2025-01-15 RX ADMIN — SENNOSIDES AND DOCUSATE SODIUM 2 TABLET: 50; 8.6 TABLET ORAL at 20:45

## 2025-01-15 RX ADMIN — ACETAMINOPHEN 650 MG: 325 TABLET, FILM COATED ORAL at 17:10

## 2025-01-15 RX ADMIN — APIXABAN 5 MG: 5 TABLET, FILM COATED ORAL at 20:45

## 2025-01-15 RX ADMIN — HYDROXYCHLOROQUINE SULFATE 200 MG: 200 TABLET ORAL at 11:38

## 2025-01-15 RX ADMIN — IPRATROPIUM BROMIDE 2 SPRAY: 21 SPRAY, METERED NASAL at 12:02

## 2025-01-15 RX ADMIN — CETIRIZINE HYDROCHLORIDE 10 MG: 10 TABLET, FILM COATED ORAL at 11:41

## 2025-01-15 RX ADMIN — SODIUM CHLORIDE 50 ML/HR: 9 INJECTION, SOLUTION INTRAVENOUS at 07:39

## 2025-01-15 RX ADMIN — VENLAFAXINE HYDROCHLORIDE 150 MG: 75 CAPSULE, EXTENDED RELEASE ORAL at 11:40

## 2025-01-15 RX ADMIN — RIMEGEPANT SULFATE 75 MG: 75 TABLET, ORALLY DISINTEGRATING ORAL at 11:39

## 2025-01-15 RX ADMIN — HYDROXYCHLOROQUINE SULFATE 200 MG: 200 TABLET ORAL at 20:45

## 2025-01-15 RX ADMIN — Medication 10 ML: at 20:31

## 2025-01-15 RX ADMIN — Medication 10 ML: at 11:43

## 2025-01-15 RX ADMIN — THERA TABS 1 TABLET: TAB at 11:39

## 2025-01-15 RX ADMIN — OXYMETAZOLINE HYDROCHLORIDE 2 SPRAY: 0.5 SPRAY NASAL at 12:03

## 2025-01-15 RX ADMIN — OXYMETAZOLINE HYDROCHLORIDE 2 SPRAY: 0.5 SPRAY NASAL at 20:47

## 2025-01-15 RX ADMIN — ATORVASTATIN CALCIUM 40 MG: 40 TABLET, FILM COATED ORAL at 20:45

## 2025-01-15 RX ADMIN — CEFTRIAXONE 2000 MG: 2 INJECTION, POWDER, FOR SOLUTION INTRAMUSCULAR; INTRAVENOUS at 01:18

## 2025-01-15 RX ADMIN — IPRATROPIUM BROMIDE AND ALBUTEROL SULFATE 3 ML: .5; 3 SOLUTION RESPIRATORY (INHALATION) at 19:15

## 2025-01-15 NOTE — SIGNIFICANT NOTE
01/15/25 0800   OTHER   Discipline occupational therapist   Rehab Time/Intention   Session Not Performed other (see comments)  ([facility] for bronch)   Recommendation   OT - Next Appointment 01/16/25

## 2025-01-15 NOTE — PLAN OF CARE
Goal Outcome Evaluation:         Patient rested well through the night without c/o pain. She has been NPO since MN in preparation for Bronchoscopy today.

## 2025-01-15 NOTE — ANESTHESIA PREPROCEDURE EVALUATION
Anesthesia Evaluation     Patient summary reviewed and Nursing notes reviewed   no history of anesthetic complications:   NPO Solid Status: > 8 hours  NPO Liquid Status: > 8 hours           Airway   Mallampati: III  Possible difficult intubation  Dental    (+) upper dentures and partials    Pulmonary    (+) pneumonia ,sleep apnea  Cardiovascular     ECG reviewed  Rhythm: regular  Rate: normal    (+) valvular problems/murmurs MVP, hyperlipidemia      Neuro/Psych  (+) CVA, headaches, numbness  GI/Hepatic/Renal/Endo    (+) morbid obesity, GERD, renal disease- stones    Musculoskeletal     (+) back pain, neck pain  Abdominal    Substance History - negative use     OB/GYN negative ob/gyn ROS         Other   arthritis,     ROS/Med Hx Other: History of Present Illness:  75 y/o female with h/o lupus/RA on plaquenil and Prolia ( Denosumab) , CVA who developed influenza A and subsequent bacterial pneumonia, admitted 1/9/2025 with cough and SOA.      Assessment:  Multifocal pneumonia  Recent influenza A infection  Hypoxia: 6 minute walk 1/14/25 showing desat 87% RA, improved with 2L  Chronic anticoagulation on Eliquis  Old CVA        Recommendations:  D/w pt bronchoscopy and she consented to it, schedule it 1/15/25 to remove mucus plugs and obtain cultures     Abx as per ID: on Rocephin      Prednisone   Mucinex     Oxygen supplement and titration to maintain saturation 90 to 95%: 2 L  Bronchodilators: duoneb      Eliquis                   Anesthesia Plan    ASA 3     general   total IV anesthesia  intravenous induction     Anesthetic plan, risks, benefits, and alternatives have been provided, discussed and informed consent has been obtained with: patient.    Plan discussed with CRNA.

## 2025-01-15 NOTE — THERAPY TREATMENT NOTE
"Subjective: Pt agreeable to therapeutic plan of care. Initially declines OOB activity, reporting \"I just got back to bed from using the bedside commode and I'm completely worn out.\" Agreeable to bed-level there-ex, then becomes agreeable to OOB activity as session progresses.    Objective:     Precautions - Falls, droplet, 02    Bed mobility - SBA (supine to/from sit)  Transfers - CGA and with rolling walker (Pt completed stand pivot sit transfer from EOB to BSC and sit to/from stand transfers with RW)  Ambulation - 30 feet CGA and with rolling walker (reduced veronica, increased SOA)    Therapeutic Exercise - Pt completed 1x10 supine ankle pumps and heel slides. Pt then completed 1x10 seated hip abduction/adduction, SLR, and LAQ.    Pt needed to use BSC at end of session, assisted pt back out of bed to/from BSC, pt able to perform pericare needs requiring setup/assist with supplies.    Vitals: WNL (on 3L 02)    Pain: 0 VAS       Education: Provided education on the importance of mobility in the acute care setting, Transfer Training, Gait Training, Energy conservation strategies, and HEP    Assessment: Annalisa Bhagat presents with functional mobility impairments which indicate the need for skilled intervention. Tolerating session today without incident, though continues to demonstrate reduced activity tolerance, fatiguing very quickly and requires frequent rest breaks. Pt required SBA for bed mobility, CGA for multiple transfers, and CGA for gait training 30 ft with RW. Pt also completed supine and seated BLE exercises with frequent rest breaks required. At this time, recommending SNF, as pt is far from baseline, lives at home with her elderly mother who is unable to provide assist/care, and at increased risk for falls. Continue seeing 3x/week at [facility] and will continue to follow and progress as tolerated.     Plan/Recommendations:   If medically appropriate, Moderate Intensity Therapy recommended post-acute care. " "This is recommended as therapy feels the patient would require 3-4 days per week and wouldn't tolerate \"3 hour daily\" rehab intensity. SNF would be the preferred choice. If the patient does not agree to SNF, arrange HH or OP depending on home bound status. If patient is medically complex, consider LTACH. Pt requires no DME at discharge.     Pt desires Skilled Rehab placement at discharge. Pt cooperative; agreeable to therapeutic recommendations and plan of care.         Basic Mobility 6-click:  Rollin = Total, A lot = 2, A little = 3; 4 = None  Supine>Sit:   1 = Total, A lot = 2, A little = 3; 4 = None   Sit>Stand with arms:  1 = Total, A lot = 2, A little = 3; 4 = None  Bed>Chair:   1 = Total, A lot = 2, A little = 3; 4 = None  Ambulate in room:  1 = Total, A lot = 2, A little = 3; 4 = None  3-5 Steps with railin = Total, A lot = 2, A little = 3; 4 = None  Score: 19      Post-Tx Position: Supine with HOB Elevated, Up in Chair, and Call light and personal items within reach  PPE: gloves and surgical mask    Therapy Charges for Today       Code Description Service Date Service Provider Modifiers Qty    35665696000  PT THERAPEUTIC ACT EA 15 MIN 1/15/2025 Ora Martinez, PT GP 1    [id number]  PT THER PROC EA 15 MIN 1/15/2025 Ora Martinez, PT GP 1    [id number] HC GAIT TRAINING EA 15 MIN 1/15/2025 Ora Martinez, PT GP 1    [id number]  PT SELF CARE/MGMT/TRAIN EA 15 MIN 1/15/2025 Ora Martinez, PT GP 1           PT Charges       Row Name 01/15/25 1537             Time Calculation    Start Time 1500  -AO      Stop Time 1536  -AO      Time Calculation (min) 36 min  -AO      PT Received On 01/15/25  -AO      PT - Next Appointment 25  -AO         Time Calculation- PT    Total Timed Code Minutes- PT 36 minute(s)  -AO                User Key  (r) = Recorded By, (t) = Taken By, (c) = Cosigned By      Initials Name Provider Type    AO Ora Martinez, PT Physical Therapist         "

## 2025-01-15 NOTE — PROGRESS NOTES
Daily Progress Note          Assessment    Multifocal pneumonia  Recent influenza A infection  Hypoxia: 6 minute walk 1/14/25 showing desat 87% RA, improved with 2L  Chronic anticoagulation on Eliquis  Old CVA        Recommendations:  Bronchoscopy done today showing mild to moderate amount of clear mucus plugging, mild widespread erythema, await cultures     Abx as per ID: on Rocephin      Prednisone   Mucinex     Oxygen supplement and titration to maintain saturation 90 to 95%: 2 L  Bronchodilators: duoneb      Eliquis      I personally reviewed the radiological studies             LOS: 6 days     Subjective     Cough and shortness of breath    Objective     Vital signs for last 24 hours:  Vitals:    01/14/25 1929 01/15/25 0040 01/15/25 0347 01/15/25 0728   BP:  118/67 136/80 148/72   BP Location:  Right arm Right arm Right arm   Patient Position:  Lying Lying Lying   Pulse: 105 91 77 75   Resp: 22 18 17 19   Temp:  98.7 °F (37.1 °C) 98 °F (36.7 °C) 98.7 °F (37.1 °C)   TempSrc:  Oral Oral    SpO2: 97% 97% 100% 95%   Weight:       Height:           Intake/Output last 3 shifts:  I/O last 3 completed shifts:  In: 360 [P.O.:360]  Out: -   Intake/Output this shift:  No intake/output data recorded.      Radiology  Imaging Results (Last 24 Hours)       ** No results found for the last 24 hours. **            Labs:  Results from last 7 days   Lab Units 01/15/25  0129   WBC 10*3/mm3 12.00*   HEMOGLOBIN g/dL 12.3   HEMATOCRIT % 39.3   PLATELETS 10*3/mm3 465*     Results from last 7 days   Lab Units 01/15/25  0129   SODIUM mmol/L 141   POTASSIUM mmol/L 3.5   CHLORIDE mmol/L 102   CO2 mmol/L 31.9*   BUN mg/dL 15   CREATININE mg/dL 0.57   CALCIUM mg/dL 9.0   BILIRUBIN mg/dL 0.2   ALK PHOS U/L 109   ALT (SGPT) U/L 64*   AST (SGOT) U/L 44*   GLUCOSE mg/dL 88         Results from last 7 days   Lab Units 01/15/25  0129 01/14/25  0015 01/13/25  1220   ALBUMIN g/dL 3.0* 3.1* 3.2*     Results from last 7 days   Lab Units  01/09/25  1627 01/09/25  1352   HSTROP T ng/L 19* 20*         Results from last 7 days   Lab Units 01/13/25  1220   MAGNESIUM mg/dL 2.3                   Meds:   SCHEDULE  [Held by provider] apixaban, 5 mg, Oral, Q12H  aspirin, 81 mg, Oral, Daily  atorvastatin, 40 mg, Oral, Nightly  cefTRIAXone, 2,000 mg, Intravenous, Q24H  cetirizine, 10 mg, Oral, Daily  guaiFENesin, 600 mg, Oral, Q12H  hydroxychloroquine, 200 mg, Oral, Q12H  ipratropium, 2 spray, Each Nare, Q12H  ipratropium-albuterol, 3 mL, Nebulization, Q6H While Awake - RT  melatonin, 10 mg, Oral, Nightly  multivitamin, 1 tablet, Oral, Daily  oxymetazoline, 2 spray, Each Nare, BID  pantoprazole, 40 mg, Oral, Q AM  potassium chloride, 40 mEq, Oral, Once  predniSONE, 40 mg, Oral, Daily With Breakfast  senna-docusate sodium, 2 tablet, Oral, BID  sodium chloride, 10 mL, Intravenous, Q12H  venlafaxine XR, 150 mg, Oral, Daily      Infusions     PRNs    acetaminophen **OR** acetaminophen **OR** acetaminophen    aluminum-magnesium hydroxide-simethicone    benzonatate    senna-docusate sodium **AND** polyethylene glycol **AND** bisacodyl **AND** bisacodyl    Calcium Replacement - Follow Nurse / BPA Driven Protocol    guaifenesin    lidocaine    Lidocaine HCl gel    Magnesium Standard Dose Replacement - Follow Nurse / BPA Driven Protocol    ondansetron ODT **OR** ondansetron    Phosphorus Replacement - Follow Nurse / BPA Driven Protocol    Potassium Replacement - Follow Nurse / BPA Driven Protocol    rimegepant sulfate    [COMPLETED] Insert Peripheral IV **AND** sodium chloride    sodium chloride    sodium chloride    Physical Exam:  General Appearance:  Alert   HEENT:  Normocephalic, without obvious abnormality, Conjunctiva/corneas clear,.   Nares normal, no drainage     Neck:  Supple, symmetrical, trachea midline.   Lungs /Chest wall:   Bilateral basal rhonchi, respirations unlabored, symmetrical wall movement.     Heart:  Regular rate and rhythm, S1 S2  normal  Abdomen: Soft, non-tender, no masses, no organomegaly.    Extremities: No edema, no clubbing or cyanosis     ROS  Constitutional: Negative for chills, fever and malaise/fatigue.   HENT: Negative.    Eyes: Negative.    Cardiovascular: Negative.    Respiratory: Positive for cough and shortness of breath.    Skin: Negative.    Musculoskeletal: Negative.    Gastrointestinal: Negative.    Genitourinary: Negative.    Neurological: Negative.    Psychiatric/Behavioral: Negative.      I reviewed the recent clinical results  I personally reviewed the latest radiological studies    Part of this note may be an electronic transcription/translation of spoken language to printed text using the Dragon Dictation System.

## 2025-01-15 NOTE — ANESTHESIA POSTPROCEDURE EVALUATION
Patient: Annalisa Bhagat    Procedure Summary       Date: 01/15/25 Room / Location: [facility] ENDOSCOPY 3 / [facility] ENDOSCOPY    Anesthesia Start: 0812 Anesthesia Stop: 0824    Procedure: BRONCHOSCOPY with bronchial washing (Bronchus) Diagnosis:       Multiple tracheobronchial mucus plugs      Pneumonia of both lower lobes due to infectious organism      (Multiple tracheobronchial mucus plugs [T17.800A])      (Pneumonia of both lower lobes due to infectious organism [J18.9])    Surgeons: Néstor Mccarty MD Provider: Christina Soto MD    Anesthesia Type: general ASA Status: 3            Anesthesia Type: general    Vitals  Vitals Value Taken Time   /53 01/15/25 0902   Temp     Pulse 82 01/15/25 0905   Resp     SpO2 93 % 01/15/25 0905   Vitals shown include unfiled device data.        Post Anesthesia Care and Evaluation    Patient location during evaluation: PACU  Patient participation: complete - patient participated  Level of consciousness: awake and alert  Pain management: satisfactory to patient    Airway patency: patent  Anesthetic complications: No anesthetic complications  PONV Status: none  Cardiovascular status: acceptable  Respiratory status: acceptable  Hydration status: acceptable

## 2025-01-15 NOTE — OP NOTE
Bronchoscopy Procedure Note    Procedure:  Bronchoscopy, Diagnostic  Bronchoscopy, Therapeutic lavage of multiple mucous plugs  Bilateral bronchial washing    Pre-Operative Diagnosis multifocal pneumonia, multiple mucous plugs    Post-Operative Diagnosis: Same    Indication: Multifocal pneumonia postviral infection, multiple mucous plugs and patient unable to clear secretions    Anesthesia: Monitored Anesthesia Care (MAC)    Procedure Details: Patient was consented for the procedure with all risk and benefit of the procedure explained in detail.  Patient was given the opportunity to ask questions and all concerns were answered.    Timeout was done in the standard manner   the bronchoscope was inserted into the main airway via the oropharynx. An anatomical survey was done of the main airways and the subsegmental bronchus of the 5 lobes.  The findings are consistent of mild to moderate amount of clear mucous plugs from 5 lobes and widespread erythema.  A therapeutic lavage was performed using aliquots of normal saline instilled into the airways then aspirated back until clear.    Estimated Blood Loss: None           Specimens: Bilateral bronchial washing                Complications:  None; patient tolerated the procedure well.           Disposition: PACU - hemodynamically stable.    Post op plan:  Resume p.o. after 2 hours  Follow-up results    Patient tolerated the procedure well.

## 2025-01-15 NOTE — PLAN OF CARE
Goal Outcome Evaluation:      Annalisa Bhagat presents with functional mobility impairments which indicate the need for skilled intervention. Tolerating session today without incident, though continues to demonstrate reduced activity tolerance, fatiguing very quickly and requires frequent rest breaks. Pt required SBA for bed mobility, CGA for multiple transfers, and CGA for gait training 30 ft with RW. Pt also completed supine and seated BLE exercises with frequent rest breaks required. At this time, recommending SNF, as pt is far from baseline, lives at home with her elderly mother who is unable to provide assist/care, and at increased risk for falls. Continue seeing 3x/week at [facility] and will continue to follow and progress as tolerated.       Ora Martinez, PT, DPT

## 2025-01-15 NOTE — PROGRESS NOTES
Infectious Diseases Progress Note      LOS: 6 days   Patient Care Team:  Caleb Whitney MD as PCP - General (Internal Medicine)  Leanne Farah APRN as PCP - Family Medicine  Maia, CHEL Stone as Physician Assistant (Physician Assistant)    Chief Complaint: Shortness of breath, cough, fatigue and weakness    Subjective       The patient has been afebrile for the last 24 hours.  The patient is on 3 L of oxygen by nasal cannula hemodynamically stable, and is tolerating antimicrobial therapy.  Patient in the chair and feeling slightly better today.  Status post bronchoscopy earlier today      Review of Systems:   Review of Systems   Constitutional:  Positive for fatigue.   HENT: Negative.     Eyes: Negative.    Respiratory:  Positive for cough and shortness of breath.    Cardiovascular: Negative.    Gastrointestinal:  Positive for nausea.   Endocrine: Negative.    Genitourinary: Negative.    Musculoskeletal: Negative.    Skin: Negative.    Neurological:  Positive for weakness.   Psychiatric/Behavioral: Negative.     All other systems reviewed and are negative.       Objective     Vital Signs  Temp:  [97.7 °F (36.5 °C)-99.6 °F (37.6 °C)] 97.7 °F (36.5 °C)  Heart Rate:  [] 81  Resp:  [15-22] 18  BP: (118-158)/(59-83) 129/59    Physical Exam:  Physical Exam  Vitals reviewed.   Constitutional:       Appearance: She is well-developed. She is obese. She is ill-appearing.   HENT:      Head: Normocephalic.   Eyes:      Pupils: Pupils are equal, round, and reactive to light.   Cardiovascular:      Rate and Rhythm: Normal rate and regular rhythm.      Heart sounds: Normal heart sounds.   Pulmonary:      Effort: Pulmonary effort is normal.      Breath sounds: Normal breath sounds.      Comments: Diminished throughout  Abdominal:      General: Bowel sounds are normal.      Palpations: Abdomen is soft.   Musculoskeletal:         General: Normal range of motion.      Cervical back: Neck supple.   Skin:     General:  Skin is warm and dry.   Neurological:      Mental Status: She is alert and oriented to person, place, and time.          Results Review:    I have reviewed all clinical data, test, lab, and imaging results.     Radiology  CT Sinus Without Contrast    Result Date: 1/15/2025  CT SINUS WO CONTRAST Date of Exam: 1/15/2025 10:38 AM EST Indication: ongoing sinus drainage and cough. Comparison: Head CT without contrast from 1/9/2025 Technique: Axial CT images were obtained from the inferior aspect of the mandible through the frontal sinuses without contrast administration.  Sagittal and coronal reconstructions were performed.  Automated exposure control and iterative reconstruction methods were used. Findings: There is partial opacification of the ethmoid air cells with mild to moderate left maxillary sinus mucosal thickening and mild right maxillary sinus and sphenoid sinus mucosal thickening. No air-fluid levels are seen. There is mucosal thickening causing marked narrowing/occlusion of the ethmoid infundibula bilaterally. The nasal septum is mildly deviated to the left. No nasal masses. Frontal sinuses are clear. No focal osseous lesions. The mastoid air cells are clear. The globes and orbital contents are  normal and symmetric.     Impression: Mild to moderate mucosal thickening in the paranasal sinuses as described above. No air-fluid levels. There is mucosal thickening causing marked narrowing/occlusion of the ethmoid infundibula bilaterally. Electronically Signed: Luis Napoles DO  1/15/2025 12:02 PM EST  Workstation ID: [id number]     Cardiology    Laboratory    Results from last 7 days   Lab Units 01/15/25  0129 01/14/25  0015 01/13/25  1220 01/12/25  0355 01/09/25  1352   WBC 10*3/mm3 12.00* 10.67 13.37* 15.44* 16.20*   HEMOGLOBIN g/dL 12.3 12.1 12.8 12.7 14.3   HEMATOCRIT % 39.3 38.2 41.3 40.3 43.3   PLATELETS 10*3/mm3 465* 415 447 503* 421     Results from last 7 days   Lab Units 01/15/25  0129 01/14/25  0015  01/13/25  1220 01/12/25  0355 01/10/25  0520 01/09/25  1352   SODIUM mmol/L 141 141 142 144 139 137   POTASSIUM mmol/L 3.5 3.2* 3.3* 3.6 3.8 3.4*   CHLORIDE mmol/L 102 101 103 104 102 100   CO2 mmol/L 31.9* 31.1* 28.4 27.9 27.1 27.4   BUN mg/dL 15 12 12 18 9 11   CREATININE mg/dL 0.57 0.56* 0.76 0.61 0.59 0.68   GLUCOSE mg/dL 88 128* 110* 100* 111* 103*   ALBUMIN g/dL 3.0* 3.1* 3.2* 3.3*  --  3.1*   BILIRUBIN mg/dL 0.2 <0.2 0.2 0.2  --  0.4   ALK PHOS U/L 109 110 123* 163*  --  144*   AST (SGOT) U/L 44* 62* 87* 81*  --  50*   ALT (SGPT) U/L 64* 79* 93* 65*  --  39*   CALCIUM mg/dL 9.0 8.5* 8.9 9.0 8.7 8.7                 Microbiology   Microbiology Results (last 10 days)       Procedure Component Value - Date/Time    Respiratory Culture - Wash, Bronchus [[id number]] Collected: 01/15/25 0819    Lab Status: Preliminary result Specimen: Wash from Bronchus Updated: 01/15/25 1108     Gram Stain Many (4+) WBCs per low power field      Many (4+) Epithelial cells per low power field      Moderate (3+) Mixed bacterial morphotypes seen on Gram Stain      Few (2+) Yeast with pseudohyphae    Respiratory Panel PCR w/COVID-19(SARS-CoV-2) [facility]UC[facility] In-House, NP Swab in UTM/VTM, 2 HR TAT - Wash, Bronchus [[id number]]  (Abnormal) Collected: 01/15/25 0819    Lab Status: Final result Specimen: Wash from Bronchus Updated: 01/15/25 1107     ADENOVIRUS, PCR Not Detected     Coronavirus 229E Not Detected     Coronavirus HKU1 Not Detected     Coronavirus NL63 Detected     Coronavirus OC43 Not Detected     COVID19 Not Detected     Human Metapneumovirus Not Detected     Human Rhinovirus/Enterovirus Not Detected     Influenza A H3 Detected     Influenza B PCR Not Detected     Parainfluenza Virus 1 Not Detected     Parainfluenza Virus 2 Not Detected     Parainfluenza Virus 3 Not Detected     Parainfluenza Virus 4 Not Detected     RSV, PCR Not Detected     Bordetella pertussis pcr Not Detected     Bordetella parapertussis PCR Not  Detected     Chlamydophila pneumoniae PCR Not Detected     Mycoplasma pneumo by PCR Not Detected    Narrative:      In the setting of a positive respiratory panel with a viral infection PLUS a negative procalcitonin without other underlying concern for bacterial infection, consider observing off antibiotics or discontinuation of antibiotics and continue supportive care. If the respiratory panel is positive for atypical bacterial infection (Bordetella pertussis, Chlamydophila pneumoniae, or Mycoplasma pneumoniae), consider antibiotic de-escalation to target atypical bacterial infection.    Legionella Antigen, Urine - Urine, Urine, Clean Catch [[id number]]  (Normal) Collected: 01/13/25 1759    Lab Status: Final result Specimen: Urine, Clean Catch Updated: 01/13/25 1821     LEGIONELLA ANTIGEN, URINE Negative    S. Pneumo Ag Urine or CSF - Urine, Urine, Clean Catch [[id number]]  (Abnormal) Collected: 01/13/25 1759    Lab Status: Final result Specimen: Urine, Clean Catch Updated: 01/13/25 1821     Strep Pneumo Ag Positive    MRSA Screen, PCR (Inpatient) - Swab, Nares [[id number]]  (Normal) Collected: 01/09/25 1639    Lab Status: Final result Specimen: Swab from Nares Updated: 01/09/25 1804     MRSA PCR No MRSA Detected    Narrative:      The negative predictive value of this diagnostic test is high and should only be used to consider de-escalating anti-MRSA therapy. A positive result may indicate colonization with MRSA and must be correlated clinically.    Blood Culture - Blood, Arm, Left [[id number]]  (Normal) Collected: 01/09/25 1627    Lab Status: Final result Specimen: Blood from Arm, Left Updated: 01/14/25 1645     Blood Culture No growth at 5 days    Blood Culture - Blood, Arm, Right [[id number]]  (Normal) Collected: 01/09/25 1615    Lab Status: Final result Specimen: Blood from Arm, Right Updated: 01/14/25 1631     Blood Culture No growth at 5 days    Narrative:      Less than seven (7) mL's of blood was collected.   Insufficient quantity may yield false negative results.            Medication Review:       Schedule Meds  apixaban, 5 mg, Oral, Q12H  aspirin, 81 mg, Oral, Daily  atorvastatin, 40 mg, Oral, Nightly  cefTRIAXone, 2,000 mg, Intravenous, Q24H  cetirizine, 10 mg, Oral, Daily  guaiFENesin, 600 mg, Oral, Q12H  hydroxychloroquine, 200 mg, Oral, Q12H  ipratropium, 2 spray, Each Nare, Q12H  ipratropium-albuterol, 3 mL, Nebulization, Q6H While Awake - RT  melatonin, 10 mg, Oral, Nightly  multivitamin, 1 tablet, Oral, Daily  oxymetazoline, 2 spray, Each Nare, BID  pantoprazole, 40 mg, Oral, Q AM  potassium chloride, 40 mEq, Oral, Once  predniSONE, 40 mg, Oral, Daily With Breakfast  senna-docusate sodium, 2 tablet, Oral, BID  sodium chloride, 10 mL, Intravenous, Q12H  venlafaxine XR, 150 mg, Oral, Daily        Infusion Meds       PRN Meds    acetaminophen **OR** acetaminophen **OR** acetaminophen    aluminum-magnesium hydroxide-simethicone    benzonatate    senna-docusate sodium **AND** polyethylene glycol **AND** bisacodyl **AND** bisacodyl    Calcium Replacement - Follow Nurse / BPA Driven Protocol    guaifenesin    Magnesium Standard Dose Replacement - Follow Nurse / BPA Driven Protocol    ondansetron ODT **OR** ondansetron    phenylephrine    Phosphorus Replacement - Follow Nurse / BPA Driven Protocol    Potassium Replacement - Follow Nurse / BPA Driven Protocol    rimegepant sulfate    [COMPLETED] Insert Peripheral IV **AND** sodium chloride    sodium chloride    sodium chloride        Assessment & Plan       Antimicrobial Therapy   1.  IV ceftriaxone        2.        3.        4.        5.            Assessment     Community-acquired pneumonia with Streptococcus pneumoniae.  Urine was positive for pneumococcal antigen.  The patient is currently on 2 L of oxygen.  This is a probably post viral bacterial pneumonia.  Status post bronchoscopy on 1/15/2025-waiting on BAL cultures     Mild leukocytosis-likely from the above  and steroid use.  Trending down     Recent hospitalization for both the coronavirus NL63 and influenza A.     Lupus/rheumatoid arthritis-patient is currently on Plaquenil but denies being on any other immunosuppressive therapy     History of CVA     Plan     Continue IV Rocephin 2 g every 24 hours for now.  Can switch to p.o. Omnicef at discharge for 10 days of treatment  Waiting on BAL cultures  Continue supportive care  A.m. labs with  CMP     No need for droplet precautions at this point      Anna Long, APRN  01/15/25  15:00 EST    Note is dictated utilizing voice recognition software/Dragon

## 2025-01-15 NOTE — PROGRESS NOTES
The patient continues to complain of ongoing nasal drainage and cough. The patient denies any complaints of chest pain or SOB. No fevers, chills, sweats. No nausea or vomiting. The patient has no other complaints currently. She had her bronch this morning. Discussed with pulmonary. Only a small amount of secretions but a great deal of inflammation. She states that her sinuses are still draining. She received a slight benefit from the nasal sprays initially. But now it continues to drain despite their treatment. Still feels extremely weak.    Vitals - BP is 148/72. Pulse rate is 75. Temperature is 98.7. R - 19. Oxygen saturations at 95 percent on 2 liters    Physical exam   Alert female able to follow commands but looks very tired  Neck - supple with no JVD or carotid bruits.   Heart - regular rate and rhythm  Lungs - Decreased breath sounds bilaterally. Poor air exchange. No wheezing  Abdomen - soft, nontender, NABS, No G/R/R/  Extremities - no edema. Pulses are intact        Acute hypoxemic respiratory failure  Coronavirus positive  Influenza positive  Abnormal CT imaging possible new CVA, MRI negative  Encephalomalacia  Remote history of CVA  Rheumatoid arthritis  Chronic anticoagulation with Eliquis     Patient continues to endorse significant weakness.  Noted therapy evaluation.  Therapy to reevaluate later this morning.  Patient does reside at home alone.  She does express reservations in regards to discharge planning.  CT chest results noted.  Continue broad-spectrum IV antibiotics for now.  Plan of care discussed and reviewed with patient at bedside.  Plan for disposition to rehab facility in morning, will transition to oral antibiotics tomorrow.  Elevated white count noted likely secondary to oral steroids.     MRI brain imaging noted.  No new CVA.  Continue routine home medications.     VTE Prophylaxis:  Pharmacologic & mechanical VTE prophylaxis orders are present.     MPRESSION:  Impression:  1.No acute  ischemic change.  2.Remote right MCA distribution infarct.  3.White matter changes compatible small vessel ischemic disease in this age group.         Impression:  1.Infiltrates are noted within the bilateral lower lobes, more pronounced on the left. Additional atelectasis versus less well-defined infiltrates are seen throughout the lungs bilaterally. The findings suggest multifocal pneumonia.  2.Coronary artery calcifications are observed.     1/13/2025 -  The patient still does not feel well. I will add afrin and Atrovent nasal spray to her regiment in addition to claritin.ID consult as the patient states she is not improving. We are waiting on discharge plans as well for her. She will need rehab placement.                 01/14/2025 - patient still does not feel well. I will consult pulmonary to see if there anything else we can do to help her. Placement has arranged but patient is just not feeling well enough to discharge.     01/15/2025 - patient is not feeling well. I have added Zyrtec and Sudafed to help with her drainage. CT of the sinuses has been ordered for today. Appreciate pulmonary assistance. Patient is very slow to improve. Discharge will be on a day to day basis depending on if she improves.

## 2025-01-16 VITALS
WEIGHT: 197.53 LBS | HEART RATE: 80 BPM | SYSTOLIC BLOOD PRESSURE: 141 MMHG | HEIGHT: 55 IN | BODY MASS INDEX: 45.71 KG/M2 | DIASTOLIC BLOOD PRESSURE: 82 MMHG | OXYGEN SATURATION: 99 % | TEMPERATURE: 98.2 F | RESPIRATION RATE: 16 BRPM

## 2025-01-16 LAB
ANION GAP SERPL CALCULATED.3IONS-SCNC: 8.7 MMOL/L (ref 5–15)
BASOPHILS # BLD AUTO: 0.03 10*3/MM3 (ref 0–0.2)
BASOPHILS NFR BLD AUTO: 0.2 % (ref 0–1.5)
BUN SERPL-MCNC: 15 MG/DL (ref 8–23)
BUN/CREAT SERPL: 24.2 (ref 7–25)
CALCIUM SPEC-SCNC: 9.1 MG/DL (ref 8.6–10.5)
CHLORIDE SERPL-SCNC: 101 MMOL/L (ref 98–107)
CO2 SERPL-SCNC: 31.3 MMOL/L (ref 22–29)
CREAT SERPL-MCNC: 0.62 MG/DL (ref 0.57–1)
DEPRECATED RDW RBC AUTO: 49.9 FL (ref 37–54)
EGFRCR SERPLBLD CKD-EPI 2021: 92.4 ML/MIN/1.73
EOSINOPHIL # BLD AUTO: 0 10*3/MM3 (ref 0–0.4)
EOSINOPHIL NFR BLD AUTO: 0 % (ref 0.3–6.2)
ERYTHROCYTE [DISTWIDTH] IN BLOOD BY AUTOMATED COUNT: 14.3 % (ref 12.3–15.4)
GLUCOSE SERPL-MCNC: 148 MG/DL (ref 65–99)
HCT VFR BLD AUTO: 39.8 % (ref 34–46.6)
HGB BLD-MCNC: 12.5 G/DL (ref 12–15.9)
IMM GRANULOCYTES # BLD AUTO: 0.1 10*3/MM3 (ref 0–0.05)
IMM GRANULOCYTES NFR BLD AUTO: 0.6 % (ref 0–0.5)
LAB AP CASE REPORT: NORMAL
LYMPHOCYTES # BLD AUTO: 0.81 10*3/MM3 (ref 0.7–3.1)
LYMPHOCYTES NFR BLD AUTO: 5.1 % (ref 19.6–45.3)
MAGNESIUM SERPL-MCNC: 2.3 MG/DL (ref 1.6–2.4)
MCH RBC QN AUTO: 30 PG (ref 26.6–33)
MCHC RBC AUTO-ENTMCNC: 31.4 G/DL (ref 31.5–35.7)
MCV RBC AUTO: 95.4 FL (ref 79–97)
MONOCYTES # BLD AUTO: 0.82 10*3/MM3 (ref 0.1–0.9)
MONOCYTES NFR BLD AUTO: 5.1 % (ref 5–12)
NEUTROPHILS NFR BLD AUTO: 14.2 10*3/MM3 (ref 1.7–7)
NEUTROPHILS NFR BLD AUTO: 89 % (ref 42.7–76)
NRBC BLD AUTO-RTO: 0 /100 WBC (ref 0–0.2)
PATH REPORT.FINAL DX SPEC: NORMAL
PATH REPORT.GROSS SPEC: NORMAL
PLATELET # BLD AUTO: 433 10*3/MM3 (ref 140–450)
PMV BLD AUTO: 9.4 FL (ref 6–12)
POTASSIUM SERPL-SCNC: 3.6 MMOL/L (ref 3.5–5.2)
RBC # BLD AUTO: 4.17 10*6/MM3 (ref 3.77–5.28)
SODIUM SERPL-SCNC: 141 MMOL/L (ref 136–145)
WBC NRBC COR # BLD AUTO: 15.96 10*3/MM3 (ref 3.4–10.8)

## 2025-01-16 PROCEDURE — 94799 UNLISTED PULMONARY SVC/PX: CPT

## 2025-01-16 PROCEDURE — 25010000002 ONDANSETRON PER 1 MG: Performed by: INTERNAL MEDICINE

## 2025-01-16 PROCEDURE — 94664 DEMO&/EVAL PT USE INHALER: CPT

## 2025-01-16 PROCEDURE — 94761 N-INVAS EAR/PLS OXIMETRY MLT: CPT

## 2025-01-16 PROCEDURE — 63710000001 PREDNISONE PER 1 MG: Performed by: INTERNAL MEDICINE

## 2025-01-16 RX ORDER — GUAIFENESIN 200 MG/10ML
200 LIQUID ORAL EVERY 4 HOURS PRN
Qty: 118 ML | Refills: 0 | Status: SHIPPED | OUTPATIENT
Start: 2025-01-16

## 2025-01-16 RX ORDER — BENZONATATE 200 MG/1
200 CAPSULE ORAL 3 TIMES DAILY PRN
Qty: 21 CAPSULE | Refills: 0 | Status: SHIPPED | OUTPATIENT
Start: 2025-01-16

## 2025-01-16 RX ORDER — ASPIRIN 81 MG/1
81 TABLET ORAL DAILY
Qty: 30 TABLET | Refills: 0 | Status: SHIPPED | OUTPATIENT
Start: 2025-01-17

## 2025-01-16 RX ORDER — GUAIFENESIN 600 MG/1
600 TABLET, EXTENDED RELEASE ORAL EVERY 12 HOURS SCHEDULED
Qty: 20 TABLET | Refills: 0 | Status: SHIPPED | OUTPATIENT
Start: 2025-01-16

## 2025-01-16 RX ORDER — PANTOPRAZOLE SODIUM 40 MG/1
40 TABLET, DELAYED RELEASE ORAL
Qty: 30 TABLET | Refills: 0 | Status: SHIPPED | OUTPATIENT
Start: 2025-01-17

## 2025-01-16 RX ORDER — PREDNISONE 20 MG/1
TABLET ORAL
Qty: 10 TABLET | Refills: 0 | Status: SHIPPED | OUTPATIENT
Start: 2025-01-17 | End: 2025-01-25

## 2025-01-16 RX ORDER — CETIRIZINE HYDROCHLORIDE 10 MG/1
10 TABLET ORAL DAILY
Qty: 15 TABLET | Refills: 0 | Status: SHIPPED | OUTPATIENT
Start: 2025-01-17

## 2025-01-16 RX ORDER — IPRATROPIUM BROMIDE AND ALBUTEROL SULFATE 2.5; .5 MG/3ML; MG/3ML
3 SOLUTION RESPIRATORY (INHALATION)
Qty: 360 ML | Refills: 0 | Status: SHIPPED | OUTPATIENT
Start: 2025-01-16

## 2025-01-16 RX ADMIN — PANTOPRAZOLE SODIUM 40 MG: 40 TABLET, DELAYED RELEASE ORAL at 05:54

## 2025-01-16 RX ADMIN — VENLAFAXINE HYDROCHLORIDE 150 MG: 75 CAPSULE, EXTENDED RELEASE ORAL at 08:55

## 2025-01-16 RX ADMIN — IPRATROPIUM BROMIDE 2 SPRAY: 21 SPRAY, METERED NASAL at 11:02

## 2025-01-16 RX ADMIN — GUAIFENESIN 600 MG: 600 TABLET ORAL at 08:56

## 2025-01-16 RX ADMIN — RIMEGEPANT SULFATE 75 MG: 75 TABLET, ORALLY DISINTEGRATING ORAL at 09:56

## 2025-01-16 RX ADMIN — HYDROXYCHLOROQUINE SULFATE 200 MG: 200 TABLET ORAL at 08:56

## 2025-01-16 RX ADMIN — ACETAMINOPHEN 650 MG: 325 TABLET, FILM COATED ORAL at 07:43

## 2025-01-16 RX ADMIN — CETIRIZINE HYDROCHLORIDE 10 MG: 10 TABLET, FILM COATED ORAL at 08:55

## 2025-01-16 RX ADMIN — ONDANSETRON 4 MG: 2 INJECTION INTRAMUSCULAR; INTRAVENOUS at 07:43

## 2025-01-16 RX ADMIN — IPRATROPIUM BROMIDE AND ALBUTEROL SULFATE 3 ML: .5; 3 SOLUTION RESPIRATORY (INHALATION) at 11:47

## 2025-01-16 RX ADMIN — APIXABAN 5 MG: 5 TABLET, FILM COATED ORAL at 08:56

## 2025-01-16 RX ADMIN — PREDNISONE 40 MG: 20 TABLET ORAL at 08:55

## 2025-01-16 RX ADMIN — THERA TABS 1 TABLET: TAB at 08:55

## 2025-01-16 RX ADMIN — Medication 10 ML: at 08:56

## 2025-01-16 NOTE — CASE MANAGEMENT/SOCIAL WORK
Continued Stay Note   Shimon     Patient Name: Annalisa Bhagat  MRN: [id number]  Today's Date: 1/16/2025    Admit Date: 1/9/2025    Plan: [facility], [facility] (accepted). [facility] approved, no precert required.   Discharge Plan       Row Name 01/16/25 1322       Plan    Plan Comments CM verified with liaison Mony that bed is ready at [facility] and their  van will be sending their van to provide DC transport. CM updated nursing via secure chat.           Ye Muñoz RN      Cell number [phone]  Office number [phone]

## 2025-01-16 NOTE — PROGRESS NOTES
Infectious Diseases Progress Note      LOS: 7 days   Patient Care Team:  Caleb Whitney MD as PCP - General (Internal Medicine)  Leanne Farah APRN as PCP - Family Medicine  Maia, CHEL Stone as Physician Assistant (Physician Assistant)    Chief Complaint: Shortness of breath, cough, fatigue and weakness    Subjective       The patient has been afebrile for the last 24 hours.  Patient is on room air and feeling much better today.  Hemodynamically stable.      Review of Systems:   Review of Systems   Constitutional:  Positive for fatigue.   HENT: Negative.     Eyes: Negative.    Respiratory:  Positive for cough and shortness of breath.         Improved   Cardiovascular: Negative.    Endocrine: Negative.    Genitourinary: Negative.    Musculoskeletal: Negative.    Skin: Negative.    Psychiatric/Behavioral: Negative.     All other systems reviewed and are negative.       Objective     Vital Signs  Temp:  [97.7 °F (36.5 °C)-98.7 °F (37.1 °C)] 98.2 °F (36.8 °C)  Heart Rate:  [] 80  Resp:  [9-20] 16  BP: (125-141)/(58-82) 141/82    Physical Exam:  Physical Exam  Vitals reviewed.   Constitutional:       Appearance: She is well-developed. She is obese. She is ill-appearing.   HENT:      Head: Normocephalic.   Eyes:      Pupils: Pupils are equal, round, and reactive to light.   Cardiovascular:      Rate and Rhythm: Normal rate and regular rhythm.      Heart sounds: Normal heart sounds.   Pulmonary:      Effort: Pulmonary effort is normal.      Breath sounds: Normal breath sounds.      Comments: Diminished throughout  Abdominal:      General: Bowel sounds are normal.      Palpations: Abdomen is soft.   Musculoskeletal:         General: Normal range of motion.      Cervical back: Neck supple.   Skin:     General: Skin is warm and dry.   Neurological:      Mental Status: She is alert and oriented to person, place, and time.          Results Review:    I have reviewed all clinical data, test, lab, and imaging  results.     Radiology  No Radiology Exams Resulted Within Past 24 Hours    Cardiology    Laboratory    Results from last 7 days   Lab Units 01/15/25  2325 01/15/25  0129 01/14/25  0015 01/13/25  1220 01/12/25  0355   WBC 10*3/mm3 15.96* 12.00* 10.67 13.37* 15.44*   HEMOGLOBIN g/dL 12.5 12.3 12.1 12.8 12.7   HEMATOCRIT % 39.8 39.3 38.2 41.3 40.3   PLATELETS 10*3/mm3 433 465* 415 447 503*     Results from last 7 days   Lab Units 01/15/25  2325 01/15/25  0129 01/14/25  0015 01/13/25  1220 01/12/25  0355 01/10/25  0520   SODIUM mmol/L 141 141 141 142 144 139   POTASSIUM mmol/L 3.6 3.5 3.2* 3.3* 3.6 3.8   CHLORIDE mmol/L 101 102 101 103 104 102   CO2 mmol/L 31.3* 31.9* 31.1* 28.4 27.9 27.1   BUN mg/dL 15 15 12 12 18 9   CREATININE mg/dL 0.62 0.57 0.56* 0.76 0.61 0.59   GLUCOSE mg/dL 148* 88 128* 110* 100* 111*   ALBUMIN g/dL  --  3.0* 3.1* 3.2* 3.3*  --    BILIRUBIN mg/dL  --  0.2 <0.2 0.2 0.2  --    ALK PHOS U/L  --  109 110 123* 163*  --    AST (SGOT) U/L  --  44* 62* 87* 81*  --    ALT (SGPT) U/L  --  64* 79* 93* 65*  --    CALCIUM mg/dL 9.1 9.0 8.5* 8.9 9.0 8.7                 Microbiology   Microbiology Results (last 10 days)       Procedure Component Value - Date/Time    AFB Culture - Wash, Bronchus [[id number]] Collected: 01/15/25 0819    Lab Status: Preliminary result Specimen: Wash from Bronchus Updated: 01/16/25 0839     AFB Stain No acid fast bacilli seen on concentrated smear    Respiratory Culture - Wash, Bronchus [[id number]] Collected: 01/15/25 0819    Lab Status: Preliminary result Specimen: Wash from Bronchus Updated: 01/16/25 1020     Respiratory Culture Light growth (2+) The culture consists of normal respiratory marilynn. This is a preliminary report; final report to follow.     Gram Stain Many (4+) WBCs per low power field      Many (4+) Epithelial cells per low power field      Moderate (3+) Mixed bacterial morphotypes seen on Gram Stain      Few (2+) Yeast with pseudohyphae    Respiratory Panel PCR  w/COVID-19(SARS-CoV-2) [facility]/SALLY/LUCILLE/[facility]/EMIL In-House, NP Swab in UTM/VTM, 2 HR TAT - Wash, Bronchus [[id number]]  (Abnormal) Collected: 01/15/25 0819    Lab Status: Final result Specimen: Wash from Bronchus Updated: 01/15/25 1107     ADENOVIRUS, PCR Not Detected     Coronavirus 229E Not Detected     Coronavirus HKU1 Not Detected     Coronavirus NL63 Detected     Coronavirus OC43 Not Detected     COVID19 Not Detected     Human Metapneumovirus Not Detected     Human Rhinovirus/Enterovirus Not Detected     Influenza A H3 Detected     Influenza B PCR Not Detected     Parainfluenza Virus 1 Not Detected     Parainfluenza Virus 2 Not Detected     Parainfluenza Virus 3 Not Detected     Parainfluenza Virus 4 Not Detected     RSV, PCR Not Detected     Bordetella pertussis pcr Not Detected     Bordetella parapertussis PCR Not Detected     Chlamydophila pneumoniae PCR Not Detected     Mycoplasma pneumo by PCR Not Detected    Narrative:      In the setting of a positive respiratory panel with a viral infection PLUS a negative procalcitonin without other underlying concern for bacterial infection, consider observing off antibiotics or discontinuation of antibiotics and continue supportive care. If the respiratory panel is positive for atypical bacterial infection (Bordetella pertussis, Chlamydophila pneumoniae, or Mycoplasma pneumoniae), consider antibiotic de-escalation to target atypical bacterial infection.    Legionella Antigen, Urine - Urine, Urine, Clean Catch [[id number]]  (Normal) Collected: 01/13/25 1759    Lab Status: Final result Specimen: Urine, Clean Catch Updated: 01/13/25 1821     LEGIONELLA ANTIGEN, URINE Negative    S. Pneumo Ag Urine or CSF - Urine, Urine, Clean Catch [[id number]]  (Abnormal) Collected: 01/13/25 1759    Lab Status: Final result Specimen: Urine, Clean Catch Updated: 01/13/25 1821     Strep Pneumo Ag Positive    MRSA Screen, PCR (Inpatient) - Swab, Nares [[id number]]  (Normal) Collected: 01/09/25  1639    Lab Status: Final result Specimen: Swab from Nares Updated: 01/09/25 1804     MRSA PCR No MRSA Detected    Narrative:      The negative predictive value of this diagnostic test is high and should only be used to consider de-escalating anti-MRSA therapy. A positive result may indicate colonization with MRSA and must be correlated clinically.    Blood Culture - Blood, Arm, Left [[id number]]  (Normal) Collected: 01/09/25 1627    Lab Status: Final result Specimen: Blood from Arm, Left Updated: 01/14/25 1645     Blood Culture No growth at 5 days    Blood Culture - Blood, Arm, Right [[id number]]  (Normal) Collected: 01/09/25 1615    Lab Status: Final result Specimen: Blood from Arm, Right Updated: 01/14/25 1631     Blood Culture No growth at 5 days    Narrative:      Less than seven (7) mL's of blood was collected.  Insufficient quantity may yield false negative results.            Medication Review:       Schedule Meds        Infusion Meds  No current facility-administered medications for this encounter.      PRN Meds          Assessment & Plan       Antimicrobial Therapy   1.  IV ceftriaxone        2.        3.        4.        5.            Assessment     Community-acquired pneumonia with Streptococcus pneumoniae.  Urine was positive for pneumococcal antigen.  The patient is currently on 2 L of oxygen.  This is a probably post viral bacterial pneumonia.  Status post bronchoscopy on 1/15/2025-waiting on BAL cultures are not growing a significant pathogen yet     Mild leukocytosis-likely from the above and steroid use.  Trending down     Recent hospitalization for both the coronavirus NL63 and influenza A.     Lupus/rheumatoid arthritis-patient is currently on Plaquenil but denies being on any other immunosuppressive therapy     History of CVA     Plan     Continue IV Rocephin 2 g every 24 hours for now.  Can switch to p.o. Omnicef at discharge for 10 days of treatment  Waiting on BAL cultures  Continue supportive  care  A.m. labs      No need for droplet precautions at this point      Anna Long, JENNY  01/16/25  18:15 EST    Note is dictated utilizing voice recognition software/Dragon

## 2025-01-16 NOTE — PLAN OF CARE
Problem: Adult Inpatient Plan of Care  Goal: Absence of Hospital-Acquired Illness or Injury  Intervention: Identify and Manage Fall Risk  Recent Flowsheet Documentation  Taken 1/16/2025 0000 by Terrence Johnson RN  Safety Promotion/Fall Prevention:   assistive device/personal items within reach   safety round/check completed  Taken 1/15/2025 2200 by Terrence Johnson RN  Safety Promotion/Fall Prevention:   assistive device/personal items within reach   nonskid shoes/slippers when out of bed   safety round/check completed  Taken 1/15/2025 2000 by Terrence Johnson RN  Safety Promotion/Fall Prevention:   assistive device/personal items within reach   nonskid shoes/slippers when out of bed   safety round/check completed  Intervention: Prevent Infection  Recent Flowsheet Documentation  Taken 1/16/2025 0000 by Terrence Johnson RN  Infection Prevention: single patient room provided  Taken 1/15/2025 2200 by Terrence Johnson RN  Infection Prevention: single patient room provided  Taken 1/15/2025 2000 by Terrence Johnson RN  Infection Prevention: single patient room provided  Goal: Optimal Comfort and Wellbeing  Intervention: Provide Person-Centered Care  Recent Flowsheet Documentation  Taken 1/15/2025 2000 by Terrence Johnson RN  Trust Relationship/Rapport:   choices provided   care explained   questions answered   Goal Outcome Evaluation:

## 2025-01-16 NOTE — PROGRESS NOTES
Daily Progress Note          Assessment    Multifocal pneumonia  Recent influenza A infection and coronavirus but not COVID-19  Hypoxia: 6 minute walk 1/14/25 showing desat 87% RA, improved with 2L  Chronic anticoagulation on Eliquis  Old CVA        Recommendations:  Respiratory status is improving, the cough might take several weeks until it improves     bronchoscopy done 1/15/2025 showing mild to moderate amount of clear mucus plugging, mild widespread erythema, await cultures     Abx as per ID: on Rocephin      Prednisone   Mucinex     Oxygen supplement and titration to maintain saturation 90 to 95%: 2-3 L  Bronchodilators: duoneb      Eliquis      I personally reviewed the radiological studies             LOS: 7 days     Subjective     Cough and shortness of breath    Objective     Vital signs for last 24 hours:  Vitals:    01/15/25 1928 01/16/25 0038 01/16/25 0323 01/16/25 0829   BP: 125/58 136/71  140/65   BP Location: Right arm Right arm  Right arm   Patient Position: Lying Lying  Sitting   Pulse: 102 91  69   Resp: 20 12 15 18   Temp: 98.7 °F (37.1 °C) 97.7 °F (36.5 °C) 98.5 °F (36.9 °C) 98.2 °F (36.8 °C)   TempSrc: Oral Oral Oral Oral   SpO2: 97% 96%  99%   Weight:       Height:           Intake/Output last 3 shifts:  I/O last 3 completed shifts:  In: 460 [P.O.:360; I.V.:100]  Out: 400 [Urine:400]  Intake/Output this shift:  I/O this shift:  In: 240 [P.O.:240]  Out: -       Radiology  Imaging Results (Last 24 Hours)       Procedure Component Value Units Date/Time    CT Sinus Without Contrast [[id number]] Collected: 01/15/25 1158     Updated: 01/15/25 1204    Narrative:      CT SINUS WO CONTRAST    Date of Exam: 1/15/2025 10:38 AM EST    Indication: ongoing sinus drainage and cough.    Comparison: Head CT without contrast from 1/9/2025    Technique: Axial CT images were obtained from the inferior aspect of the mandible through the frontal sinuses without contrast administration.  Sagittal and coronal  reconstructions were performed.  Automated exposure control and iterative reconstruction   methods were used.      Findings:  There is partial opacification of the ethmoid air cells with mild to moderate left maxillary sinus mucosal thickening and mild right maxillary sinus and sphenoid sinus mucosal thickening. No air-fluid levels are seen. There is mucosal thickening causing   marked narrowing/occlusion of the ethmoid infundibula bilaterally. The nasal septum is mildly deviated to the left. No nasal masses. Frontal sinuses are clear. No focal osseous lesions. The mastoid air cells are clear. The globes and orbital contents are   normal and symmetric.      Impression:      Impression:  Mild to moderate mucosal thickening in the paranasal sinuses as described above. No air-fluid levels. There is mucosal thickening causing marked narrowing/occlusion of the ethmoid infundibula bilaterally.        Electronically Signed: Luis Napoles DO    1/15/2025 12:02 PM EST    Workstation ID: [id number]            Labs:  Results from last 7 days   Lab Units 01/15/25  2325   WBC 10*3/mm3 15.96*   HEMOGLOBIN g/dL 12.5   HEMATOCRIT % 39.8   PLATELETS 10*3/mm3 433     Results from last 7 days   Lab Units 01/15/25  2325 01/15/25  0129   SODIUM mmol/L 141 141   POTASSIUM mmol/L 3.6 3.5   CHLORIDE mmol/L 101 102   CO2 mmol/L 31.3* 31.9*   BUN mg/dL 15 15   CREATININE mg/dL 0.62 0.57   CALCIUM mg/dL 9.1 9.0   BILIRUBIN mg/dL  --  0.2   ALK PHOS U/L  --  109   ALT (SGPT) U/L  --  64*   AST (SGOT) U/L  --  44*   GLUCOSE mg/dL 148* 88         Results from last 7 days   Lab Units 01/15/25  0129 01/14/25  0015 01/13/25  1220   ALBUMIN g/dL 3.0* 3.1* 3.2*     Results from last 7 days   Lab Units 01/09/25  1627 01/09/25  1352   HSTROP T ng/L 19* 20*         Results from last 7 days   Lab Units 01/15/25  2325   MAGNESIUM mg/dL 2.3                   Meds:   SCHEDULE  apixaban, 5 mg, Oral, Q12H  aspirin, 81 mg, Oral, Daily  atorvastatin, 40 mg,  Oral, Nightly  cefTRIAXone, 2,000 mg, Intravenous, Q24H  cetirizine, 10 mg, Oral, Daily  guaiFENesin, 600 mg, Oral, Q12H  hydroxychloroquine, 200 mg, Oral, Q12H  ipratropium, 2 spray, Each Nare, Q12H  ipratropium-albuterol, 3 mL, Nebulization, Q6H While Awake - RT  melatonin, 10 mg, Oral, Nightly  multivitamin, 1 tablet, Oral, Daily  pantoprazole, 40 mg, Oral, Q AM  potassium chloride, 40 mEq, Oral, Once  predniSONE, 40 mg, Oral, Daily With Breakfast  senna-docusate sodium, 2 tablet, Oral, BID  sodium chloride, 10 mL, Intravenous, Q12H  venlafaxine XR, 150 mg, Oral, Daily      Infusions     PRNs    acetaminophen **OR** acetaminophen **OR** acetaminophen    aluminum-magnesium hydroxide-simethicone    benzonatate    senna-docusate sodium **AND** polyethylene glycol **AND** bisacodyl **AND** bisacodyl    Calcium Replacement - Follow Nurse / BPA Driven Protocol    guaifenesin    Magnesium Standard Dose Replacement - Follow Nurse / BPA Driven Protocol    ondansetron ODT **OR** ondansetron    phenylephrine    Phosphorus Replacement - Follow Nurse / BPA Driven Protocol    Potassium Replacement - Follow Nurse / BPA Driven Protocol    rimegepant sulfate    [COMPLETED] Insert Peripheral IV **AND** sodium chloride    sodium chloride    sodium chloride    Physical Exam:  General Appearance:  Alert   HEENT:  Normocephalic, without obvious abnormality, Conjunctiva/corneas clear,.   Nares normal, no drainage     Neck:  Supple, symmetrical, trachea midline.   Lungs /Chest wall: Improved air entry, mild bilateral basal rhonchi, respirations unlabored, symmetrical wall movement.     Heart:  Regular rate and rhythm, S1 S2 normal  Abdomen: Soft, non-tender, no masses, no organomegaly.    Extremities: No edema, no clubbing or cyanosis     ROS  Constitutional: Negative for chills, fever and malaise/fatigue.   HENT: Negative.    Eyes: Negative.    Cardiovascular: Negative.    Respiratory: Positive for dry cough and mild shortness of  breath.    Skin: Negative.    Musculoskeletal: Negative.    Gastrointestinal: Negative.    Genitourinary: Negative.    Neurological: Migraine      I reviewed the recent clinical results  I personally reviewed the latest radiological studies    Part of this note may be an electronic transcription/translation of spoken language to printed text using the Dragon Dictation System.

## 2025-01-16 NOTE — CONSULTS
Nutrition Services    Patient Name: Annalisa Bhagat  YOB: 1950  MRN: [id number]  Admission date: 1/9/2025      NUTRITION SCREENING      Trending Narrative: 1/16: Nutrition screening r/t LOS x 7 days. Pt presented to ED on 1/9 with complaints of worsening SOB, productive cough with yellow sputum, nasal congestion, suspected fever, lightheadedness, and intermittent migraines. Pt was recently discharged on 1/4 after being treated for Flu A and coronavirus. Pt admitted r/t pneumonia. Pt is s/p bronch on 1/15. Pt is tolerating po diet well with good appetite at this time.        PO Diet: Diet: Regular/House; Fluid Consistency: Thin (IDDSI 0)   PO Supplements: None    Trending PO Intake:  1/16: 73% average po intake x last 12 documented meals        Nutritionally-Pertinent Medications RDN Reviewed, C/W clinical course         Labs (reviewed below): Reviewed. Management per attending.      Results from last 7 days   Lab Units 01/15/25  2325 01/15/25  0129 01/14/25  0015 01/13/25  1220   SODIUM mmol/L 141 141 141 142   POTASSIUM mmol/L 3.6 3.5 3.2* 3.3*   CHLORIDE mmol/L 101 102 101 103   CO2 mmol/L 31.3* 31.9* 31.1* 28.4   BUN mg/dL 15 15 12 12   CREATININE mg/dL 0.62 0.57 0.56* 0.76   CALCIUM mg/dL 9.1 9.0 8.5* 8.9   BILIRUBIN mg/dL  --  0.2 <0.2 0.2   ALK PHOS U/L  --  109 110 123*   ALT (SGPT) U/L  --  64* 79* 93*   AST (SGOT) U/L  --  44* 62* 87*   GLUCOSE mg/dL 148* 88 128* 110*     Results from last 7 days   Lab Units 01/15/25  2325 01/14/25  0015 01/13/25  1220 01/12/25  0355 01/10/25  0520   MAGNESIUM mg/dL 2.3  --  2.3  --  2.4   PHOSPHORUS mg/dL  --   --  2.7  --   --    HEMOGLOBIN g/dL 12.5   < > 12.8   < >  --    HEMATOCRIT % 39.8   < > 41.3   < >  --     < > = values in this interval not displayed.     Lab Results   Component Value Date    HGBA1C 5.4 06/21/2022          GI Function:  Last documented BM 1/14       Skin: 1+ edema L/R ankles/feet         Weight Review: Estimated body mass index is  "45.91 kg/m² as calculated from the following:    Height as of this encounter: 139.7 cm (55\").    Weight as of this encounter: 89.6 kg (197 lb 8.5 oz).    Comment:   1/16: (last weight 1/9) 197#  No recent weight history in the last year     Wt Readings from Last 30 Encounters:   01/09/25 1317 89.6 kg (197 lb 8.5 oz)   01/04/25 0420 90.7 kg (199 lb 15.3 oz)   01/02/25 1247 90.7 kg (200 lb)   12/06/23 1654 90.7 kg (200 lb)   09/29/22 1411 83.7 kg (184 lb 9.6 oz)   06/23/22 1620 88.5 kg (195 lb)   03/10/22 1536 88.5 kg (195 lb 3.2 oz)   09/02/21 1342 90.6 kg (199 lb 12.8 oz)   06/22/21 1606 90.7 kg (200 lb)   03/04/21 1331 92.5 kg (204 lb)   08/31/20 1344 92.5 kg (204 lb)   03/10/20 1439 93 kg (205 lb)   02/28/20 1357 92.1 kg (203 lb)   12/06/19 1417 93 kg (205 lb)   11/27/19 1437 93 kg (205 lb)   11/13/19 1332 92.5 kg (204 lb)   11/05/19 1414 91.6 kg (202 lb)   10/08/19 1408 91.7 kg (202 lb 3.2 oz)   09/18/19 1337 93.1 kg (205 lb 4 oz)   09/06/19 1432 90.7 kg (200 lb)   07/26/19 1536 93 kg (205 lb)   06/28/19 1136 93 kg (205 lb)   06/13/19 1500 93.8 kg (206 lb 12.7 oz)   06/13/19 0921 93.8 kg (206 lb 14.4 oz)   06/12/19 1127 93 kg (205 lb)   06/07/19 1518 94.8 kg (209 lb)   05/15/19 1249 93.4 kg (206 lb)   04/23/19 1443 93.6 kg (206 lb 6.1 oz)   04/17/19 1511 93.9 kg (207 lb)   04/11/19 1252 92.5 kg (204 lb)   03/14/19 0938 93 kg (205 lb)   03/01/19 1501 93.9 kg (207 lb)          --       Nutrition Problem Statement: No nutrition diagnosis at this time.         Nutrition Intervention: Continue to encourage good po intake.     Will monitor per protocol.           Monitoring/Evaluation Per protocol, I&O, PO intake, Pertinent labs, Weight, Skin status, GI status            RD to follow up per protocol.    Electronically signed by:  Yoli Moses RD  01/16/25 11:34 EST      "

## 2025-01-16 NOTE — SIGNIFICANT NOTE
01/16/25 1014   OTHER   Discipline occupational therapist   Rehab Time/Intention   Session Not Performed other (see comments)  (pt declined d/t migraine will follow up)   Recommendation   OT - Next Appointment 01/17/25

## 2025-01-16 NOTE — DISCHARGE SUMMARY
The patient is feeling better currently. She does have a slight migraine. She states that she took her normal medication for it. No light headedness or dizziness. Patients breathing has improved. She was seen by pulmonary this morning. Sinus drainage is much better after starting nasal sprays. The patient denies any chest pain. Bowels are working. Breathing has improved. Overall much better today after her bronch was done. She appears in NAD currently.     Vitals - BP is 140/65. Pulse is 80. Temperature is 98.2 R - 16. Oxygen saturations - 99 percent on 3 liters.    Physical exam   Alert female able to follow commands but looks very tired  Neck - supple with no JVD or carotid bruits.   Heart - regular rate and rhythm  Lungs - Decreased breath sounds bilaterally. Poor air exchange. No wheezing  Abdomen - soft, nontender, NABS, No G/R/R/  Extremities - no edema. Pulses are intact        Acute hypoxemic respiratory failure  Coronavirus positive  Influenza positive  Abnormal CT imaging possible new CVA, MRI negative  Encephalomalacia  Remote history of CVA  Rheumatoid arthritis  Chronic anticoagulation with Eliquis     Patient continues to endorse significant weakness.  Noted therapy evaluation.  Therapy to reevaluate later this morning.  Patient does reside at home alone.  She does express reservations in regards to discharge planning.  CT chest results noted.  Continue broad-spectrum IV antibiotics for now.  Plan of care discussed and reviewed with patient at bedside.  Plan for disposition to rehab facility in morning, will transition to oral antibiotics tomorrow.  Elevated white count noted likely secondary to oral steroids.     MRI brain imaging noted.  No new CVA.  Continue routine home medications.     VTE Prophylaxis:  Pharmacologic & mechanical VTE prophylaxis orders are present.     MPRESSION:  Impression:  1.No acute ischemic change.  2.Remote right MCA distribution infarct.  3.White matter changes compatible  small vessel ischemic disease in this age group.         Impression:  1.Infiltrates are noted within the bilateral lower lobes, more pronounced on the left. Additional atelectasis versus less well-defined infiltrates are seen throughout the lungs bilaterally. The findings suggest multifocal pneumonia.  2.Coronary artery calcifications are observed.     1/13/2025 -  The patient still does not feel well. I will add afrin and Atrovent nasal spray to her regiment in addition to claritin.ID consult as the patient states she is not improving. We are waiting on discharge plans as well for her. She will need rehab placement.                 01/14/2025 - patient still does not feel well. I will consult pulmonary to see if there anything else we can do to help her. Placement has arranged but patient is just not feeling well enough to discharge.      01/15/2025 - patient is not feeling well. I have added Zyrtec and Sudafed to help with her drainage. CT of the sinuses has been ordered for today. Appreciate pulmonary assistance. Patient is very slow to improve. Discharge will be on a day to day basis depending on if she improves.     01/16/2025 - patient is doing much better. She is ok to discharge to rehab. Total time spent with discharge. She will finish her antibiotic therapy after discharge.

## 2025-01-17 LAB
BACTERIA SPEC RESP CULT: NORMAL
GRAM STN SPEC: NORMAL

## 2025-01-17 NOTE — PROGRESS NOTES
"Enter Query Response Below      Query Response:The patient has community acquired pneumonia due to Strep Pneumonia               If applicable, please update the problem list.     Patient: Annalisa Bhagat        : 1950  Account: [id number]           Admit Date: 2025        How to Respond to this query:       a. Click New Note     b. Answer query within the yellow box.                c. Update the Problem List, if applicable.      If you have any questions about this query contact me at: [email or web address]     Dr. Ortiz,    Patient presented  for shortness of breath, cough, and fatigue. ED note includes \"patient was positive for influenza A along with coronavirus NL63 on 25 when she was admitted.\" H&P and progress notes include pneumonia. Infectious disease consult notes \"Possible postviral bacterial pneumonia with bilateral lower lobe consolidation,\" and their progress notes include \"Community-acquired pneumonia with Streptococcus pneumoniae. Urine was positive for pneumococcal antigen.\" Pulmonary notes include multifocal pneumonia. Discharge summary does not include pneumonia, but does note that patient \"will finish her antibiotic therapy after discharge.\" Patient treated with monitoring, supplemental oxygen, IV cefepime , IV ceftriaxone 1/10 -  and 1/15, and PO azithromycin 1/10 - .     Please clarify the following:     - Community-acquired pneumonia with Streptococcus pneumoniae ruled in  - Bacterial pneumonia ruled in  - ___________________ pneumonia ruled in  - Pneumonia ruled out  - Other ___________________  - Unable to clinically determine    By submitting this query, we are merely seeking further clarification of documentation to accurately reflect all conditions that you are monitoring, evaluating, treating or that extend the hospitalization or utilize additional resources of care. Please utilize your independent clinical judgment when addressing the question(s) above. "     This query and your response, once completed, will be entered into the legal medical record.    Sincerely,  Yasmani Pedro RN, CDS  Clinical Documentation Integrity Program

## 2025-01-17 NOTE — CASE MANAGEMENT/SOCIAL WORK
Case Management Discharge Note      Final Note: [facility]         Selected Continued Care - Discharged on 1/16/2025 Admission date: 1/9/2025 - Discharge disposition: Skilled Nursing Facility (DC - External)      Destination Coordination complete.      Service Provider Services Address Phone Fax Patient Preferred    [facility] [address] [place] [address] [phone] --                 Transportation Services  Ambulance: [facility]    Final Discharge Disposition Code: 03 - skilled nursing facility (SNF)

## 2025-01-21 LAB
FUNGUS WND CULT: ABNORMAL
P JIROVECII DNA L RESP QL NAA+NON-PROBE: NEGATIVE
REF LAB TEST METHOD: NORMAL

## 2025-01-22 LAB
MYCOBACTERIUM SPEC CULT: NORMAL
NIGHT BLUE STAIN TISS: NORMAL

## 2025-01-29 LAB
MYCOBACTERIUM SPEC CULT: NORMAL
NIGHT BLUE STAIN TISS: NORMAL

## 2025-02-05 LAB
MYCOBACTERIUM SPEC CULT: NORMAL
NIGHT BLUE STAIN TISS: NORMAL

## 2025-02-12 LAB
MYCOBACTERIUM SPEC CULT: NORMAL
NIGHT BLUE STAIN TISS: NORMAL

## 2025-02-13 LAB — FUNGUS WND CULT: ABNORMAL

## 2025-02-19 LAB
MYCOBACTERIUM SPEC CULT: NORMAL
NIGHT BLUE STAIN TISS: NORMAL

## 2025-02-26 LAB
MYCOBACTERIUM SPEC CULT: NORMAL
NIGHT BLUE STAIN TISS: NORMAL

## 2025-03-10 ENCOUNTER — TELEPHONE (OUTPATIENT)
Dept: NEUROLOGY | Facility: CLINIC | Age: 75
End: 2025-03-10
Payer: MEDICARE

## 2025-03-10 NOTE — TELEPHONE ENCOUNTER
PATIENT CALLING ABOUT HER TWO (2) HOSPITAL STAYS IN JANUARY 2025.    SHE SAYS HER RHUMETOLOGIES TELLS HER THAT THERE WAS MENTION OF A STROKE IN HER CHART.    PATIENT ASKED IF THERE WAS ANY NEURO F/U AS A RESULTS OF THESE VISITS.  ADVISED THAT THERE WERE NO NEURO CONSULTS WHILE SHE WAS THERE THAT ARE IN HER CHART AND NO F/U FOR HER AFTERCARE WITH NEURO.    SHE WOULD LIKE PROVIDER TO LET HER KNOW.

## 2025-03-10 NOTE — TELEPHONE ENCOUNTER
Mri showed old stroke  She is on max medical therapy for stroke with eliquis, asa and Lipitor  so, since no new or recent stroke, no work up indicated

## 2025-03-13 ENCOUNTER — HOSPITAL ENCOUNTER (INPATIENT)
Facility: HOSPITAL | Age: 75
LOS: 3 days | Discharge: HOME | End: 2025-03-17
Attending: STUDENT IN AN ORGANIZED HEALTH CARE EDUCATION/TRAINING PROGRAM | Admitting: STUDENT IN AN ORGANIZED HEALTH CARE EDUCATION/TRAINING PROGRAM
Payer: MEDICARE

## 2025-03-13 ENCOUNTER — APPOINTMENT (OUTPATIENT)
Dept: GENERAL RADIOLOGY | Facility: HOSPITAL | Age: 75
End: 2025-03-13
Payer: MEDICARE

## 2025-03-13 DIAGNOSIS — A41.9 SEPSIS, DUE TO UNSPECIFIED ORGANISM, UNSPECIFIED WHETHER ACUTE ORGAN DYSFUNCTION PRESENT: ICD-10-CM

## 2025-03-13 DIAGNOSIS — B33.8 RSV INFECTION: ICD-10-CM

## 2025-03-13 DIAGNOSIS — J21.0 RSV (ACUTE BRONCHIOLITIS DUE TO RESPIRATORY SYNCYTIAL VIRUS): ICD-10-CM

## 2025-03-13 DIAGNOSIS — J20.5 RSV BRONCHITIS: ICD-10-CM

## 2025-03-13 DIAGNOSIS — R09.02 HYPOXIA: Primary | ICD-10-CM

## 2025-03-13 LAB
ALBUMIN SERPL-MCNC: 4.5 G/DL (ref 3.5–5.2)
ALBUMIN/GLOB SERPL: 1.4 G/DL
ALP SERPL-CCNC: 136 U/L (ref 39–117)
ALT SERPL W P-5'-P-CCNC: 22 U/L (ref 1–33)
ANION GAP SERPL CALCULATED.3IONS-SCNC: 15.1 MMOL/L (ref 5–15)
AST SERPL-CCNC: 36 U/L (ref 1–32)
B PARAPERT DNA SPEC QL NAA+PROBE: NOT DETECTED
B PERT DNA SPEC QL NAA+PROBE: NOT DETECTED
BASOPHILS # BLD AUTO: 0.05 10*3/MM3 (ref 0–0.2)
BASOPHILS NFR BLD AUTO: 0.3 % (ref 0–1.5)
BILIRUB SERPL-MCNC: 0.3 MG/DL (ref 0–1.2)
BUN SERPL-MCNC: 15 MG/DL (ref 8–23)
BUN/CREAT SERPL: 20.5 (ref 7–25)
C PNEUM DNA NPH QL NAA+NON-PROBE: NOT DETECTED
CALCIUM SPEC-SCNC: 9.9 MG/DL (ref 8.6–10.5)
CHLORIDE SERPL-SCNC: 104 MMOL/L (ref 98–107)
CO2 SERPL-SCNC: 24.9 MMOL/L (ref 22–29)
CREAT SERPL-MCNC: 0.73 MG/DL (ref 0.57–1)
D DIMER PPP FEU-MCNC: 0.35 MCGFEU/ML (ref 0–0.76)
D-LACTATE SERPL-SCNC: 1.9 MMOL/L (ref 0.5–2)
D-LACTATE SERPL-SCNC: 2.1 MMOL/L (ref 0.3–2)
DEPRECATED RDW RBC AUTO: 51.8 FL (ref 37–54)
EGFRCR SERPLBLD CKD-EPI 2021: 85.4 ML/MIN/1.73
EOSINOPHIL # BLD AUTO: 0.04 10*3/MM3 (ref 0–0.4)
EOSINOPHIL NFR BLD AUTO: 0.2 % (ref 0.3–6.2)
ERYTHROCYTE [DISTWIDTH] IN BLOOD BY AUTOMATED COUNT: 14.6 % (ref 12.3–15.4)
FLUAV SUBTYP SPEC NAA+PROBE: NOT DETECTED
FLUBV RNA ISLT QL NAA+PROBE: NOT DETECTED
GEN 5 1HR TROPONIN T REFLEX: 14 NG/L
GLOBULIN UR ELPH-MCNC: 3.3 GM/DL
GLUCOSE SERPL-MCNC: 111 MG/DL (ref 65–99)
HADV DNA SPEC NAA+PROBE: NOT DETECTED
HCOV 229E RNA SPEC QL NAA+PROBE: NOT DETECTED
HCOV HKU1 RNA SPEC QL NAA+PROBE: NOT DETECTED
HCOV NL63 RNA SPEC QL NAA+PROBE: NOT DETECTED
HCOV OC43 RNA SPEC QL NAA+PROBE: NOT DETECTED
HCT VFR BLD AUTO: 47.8 % (ref 34–46.6)
HGB BLD-MCNC: 15.1 G/DL (ref 12–15.9)
HMPV RNA NPH QL NAA+NON-PROBE: NOT DETECTED
HPIV1 RNA ISLT QL NAA+PROBE: NOT DETECTED
HPIV2 RNA SPEC QL NAA+PROBE: NOT DETECTED
HPIV3 RNA NPH QL NAA+PROBE: NOT DETECTED
HPIV4 P GENE NPH QL NAA+PROBE: NOT DETECTED
IMM GRANULOCYTES # BLD AUTO: 0.1 10*3/MM3 (ref 0–0.05)
IMM GRANULOCYTES NFR BLD AUTO: 0.6 % (ref 0–0.5)
L PNEUMO1 AG UR QL IA: NEGATIVE
LYMPHOCYTES # BLD AUTO: 0.73 10*3/MM3 (ref 0.7–3.1)
LYMPHOCYTES NFR BLD AUTO: 4.2 % (ref 19.6–45.3)
M PNEUMO IGG SER IA-ACNC: NOT DETECTED
MAGNESIUM SERPL-MCNC: 1.6 MG/DL (ref 1.6–2.4)
MCH RBC QN AUTO: 30 PG (ref 26.6–33)
MCHC RBC AUTO-ENTMCNC: 31.6 G/DL (ref 31.5–35.7)
MCV RBC AUTO: 95 FL (ref 79–97)
MONOCYTES # BLD AUTO: 1.19 10*3/MM3 (ref 0.1–0.9)
MONOCYTES NFR BLD AUTO: 6.9 % (ref 5–12)
MRSA DNA SPEC QL NAA+PROBE: ABNORMAL
NEUTROPHILS NFR BLD AUTO: 15.2 10*3/MM3 (ref 1.7–7)
NEUTROPHILS NFR BLD AUTO: 87.8 % (ref 42.7–76)
NRBC BLD AUTO-RTO: 0 /100 WBC (ref 0–0.2)
NT-PROBNP SERPL-MCNC: 272 PG/ML (ref 0–1800)
PLATELET # BLD AUTO: 331 10*3/MM3 (ref 140–450)
PMV BLD AUTO: 9.8 FL (ref 6–12)
POTASSIUM SERPL-SCNC: 4.3 MMOL/L (ref 3.5–5.2)
PROCALCITONIN SERPL-MCNC: 0.05 NG/ML (ref 0–0.25)
PROT SERPL-MCNC: 7.8 G/DL (ref 6–8.5)
RBC # BLD AUTO: 5.03 10*6/MM3 (ref 3.77–5.28)
RHINOVIRUS RNA SPEC NAA+PROBE: NOT DETECTED
RSV RNA NPH QL NAA+NON-PROBE: DETECTED
S PNEUM AG SPEC QL LA: NEGATIVE
SARS-COV-2 RNA RESP QL NAA+PROBE: NOT DETECTED
SODIUM SERPL-SCNC: 144 MMOL/L (ref 136–145)
TROPONIN T % DELTA: -18
TROPONIN T NUMERIC DELTA: -3 NG/L
TROPONIN T SERPL HS-MCNC: 17 NG/L
WBC NRBC COR # BLD AUTO: 17.31 10*3/MM3 (ref 3.4–10.8)

## 2025-03-13 PROCEDURE — 94799 UNLISTED PULMONARY SVC/PX: CPT

## 2025-03-13 PROCEDURE — 85025 COMPLETE CBC W/AUTO DIFF WBC: CPT | Performed by: NURSE PRACTITIONER

## 2025-03-13 PROCEDURE — 87641 MR-STAPH DNA AMP PROBE: CPT | Performed by: STUDENT IN AN ORGANIZED HEALTH CARE EDUCATION/TRAINING PROGRAM

## 2025-03-13 PROCEDURE — 25810000003 LACTATED RINGERS SOLUTION: Performed by: STUDENT IN AN ORGANIZED HEALTH CARE EDUCATION/TRAINING PROGRAM

## 2025-03-13 PROCEDURE — G0378 HOSPITAL OBSERVATION PER HR: HCPCS

## 2025-03-13 PROCEDURE — 84484 ASSAY OF TROPONIN QUANT: CPT | Performed by: NURSE PRACTITIONER

## 2025-03-13 PROCEDURE — 94640 AIRWAY INHALATION TREATMENT: CPT

## 2025-03-13 PROCEDURE — 80053 COMPREHEN METABOLIC PANEL: CPT | Performed by: NURSE PRACTITIONER

## 2025-03-13 PROCEDURE — 94761 N-INVAS EAR/PLS OXIMETRY MLT: CPT

## 2025-03-13 PROCEDURE — 25010000002 VANCOMYCIN 1.5-0.9 GM/500ML-% SOLUTION: Performed by: NURSE PRACTITIONER

## 2025-03-13 PROCEDURE — 84145 PROCALCITONIN (PCT): CPT | Performed by: NURSE PRACTITIONER

## 2025-03-13 PROCEDURE — 87040 BLOOD CULTURE FOR BACTERIA: CPT | Performed by: NURSE PRACTITIONER

## 2025-03-13 PROCEDURE — 83605 ASSAY OF LACTIC ACID: CPT | Performed by: NURSE PRACTITIONER

## 2025-03-13 PROCEDURE — 25010000002 CEFEPIME PER 500 MG: Performed by: NURSE PRACTITIONER

## 2025-03-13 PROCEDURE — 25010000002 METHYLPREDNISOLONE PER 40 MG: Performed by: NURSE PRACTITIONER

## 2025-03-13 PROCEDURE — 83735 ASSAY OF MAGNESIUM: CPT | Performed by: STUDENT IN AN ORGANIZED HEALTH CARE EDUCATION/TRAINING PROGRAM

## 2025-03-13 PROCEDURE — 87449 NOS EACH ORGANISM AG IA: CPT | Performed by: STUDENT IN AN ORGANIZED HEALTH CARE EDUCATION/TRAINING PROGRAM

## 2025-03-13 PROCEDURE — 85379 FIBRIN DEGRADATION QUANT: CPT | Performed by: NURSE PRACTITIONER

## 2025-03-13 PROCEDURE — 36415 COLL VENOUS BLD VENIPUNCTURE: CPT

## 2025-03-13 PROCEDURE — 71045 X-RAY EXAM CHEST 1 VIEW: CPT

## 2025-03-13 PROCEDURE — 99285 EMERGENCY DEPT VISIT HI MDM: CPT

## 2025-03-13 PROCEDURE — 93005 ELECTROCARDIOGRAM TRACING: CPT | Performed by: NURSE PRACTITIONER

## 2025-03-13 PROCEDURE — 0202U NFCT DS 22 TRGT SARS-COV-2: CPT | Performed by: NURSE PRACTITIONER

## 2025-03-13 PROCEDURE — 83880 ASSAY OF NATRIURETIC PEPTIDE: CPT | Performed by: NURSE PRACTITIONER

## 2025-03-13 RX ORDER — AZELASTINE 1 MG/ML
2 SPRAY, METERED NASAL DAILY PRN
Status: DISCONTINUED | OUTPATIENT
Start: 2025-03-13 | End: 2025-03-17 | Stop reason: HOSPADM

## 2025-03-13 RX ORDER — BISACODYL 10 MG
10 SUPPOSITORY, RECTAL RECTAL DAILY PRN
Status: DISCONTINUED | OUTPATIENT
Start: 2025-03-13 | End: 2025-03-17 | Stop reason: HOSPADM

## 2025-03-13 RX ORDER — METHYLPREDNISOLONE SODIUM SUCCINATE 40 MG/ML
40 INJECTION, POWDER, LYOPHILIZED, FOR SOLUTION INTRAMUSCULAR; INTRAVENOUS EVERY 12 HOURS
Status: COMPLETED | OUTPATIENT
Start: 2025-03-14 | End: 2025-03-15

## 2025-03-13 RX ORDER — SODIUM CHLORIDE 9 MG/ML
40 INJECTION, SOLUTION INTRAVENOUS AS NEEDED
Status: DISCONTINUED | OUTPATIENT
Start: 2025-03-13 | End: 2025-03-17 | Stop reason: HOSPADM

## 2025-03-13 RX ORDER — IPRATROPIUM BROMIDE AND ALBUTEROL SULFATE 2.5; .5 MG/3ML; MG/3ML
3 SOLUTION RESPIRATORY (INHALATION) ONCE
Status: COMPLETED | OUTPATIENT
Start: 2025-03-13 | End: 2025-03-13

## 2025-03-13 RX ORDER — ACETAMINOPHEN 160 MG/5ML
650 SOLUTION ORAL EVERY 4 HOURS PRN
Status: DISCONTINUED | OUTPATIENT
Start: 2025-03-13 | End: 2025-03-17 | Stop reason: HOSPADM

## 2025-03-13 RX ORDER — ACETAMINOPHEN 500 MG
1000 TABLET ORAL ONCE
Status: COMPLETED | OUTPATIENT
Start: 2025-03-13 | End: 2025-03-13

## 2025-03-13 RX ORDER — VANCOMYCIN/0.9 % SOD CHLORIDE 1.5G/250ML
1500 PLASTIC BAG, INJECTION (ML) INTRAVENOUS ONCE
Status: COMPLETED | OUTPATIENT
Start: 2025-03-13 | End: 2025-03-13

## 2025-03-13 RX ORDER — VENLAFAXINE HYDROCHLORIDE 75 MG/1
150 CAPSULE, EXTENDED RELEASE ORAL DAILY
Status: DISCONTINUED | OUTPATIENT
Start: 2025-03-13 | End: 2025-03-17 | Stop reason: HOSPADM

## 2025-03-13 RX ORDER — NITROGLYCERIN 0.4 MG/1
0.4 TABLET SUBLINGUAL
Status: DISCONTINUED | OUTPATIENT
Start: 2025-03-13 | End: 2025-03-17 | Stop reason: HOSPADM

## 2025-03-13 RX ORDER — BENZONATATE 100 MG/1
200 CAPSULE ORAL 3 TIMES DAILY PRN
Status: DISCONTINUED | OUTPATIENT
Start: 2025-03-13 | End: 2025-03-17 | Stop reason: HOSPADM

## 2025-03-13 RX ORDER — ACETAMINOPHEN 325 MG/1
650 TABLET ORAL EVERY 4 HOURS PRN
Status: DISCONTINUED | OUTPATIENT
Start: 2025-03-13 | End: 2025-03-17 | Stop reason: HOSPADM

## 2025-03-13 RX ORDER — CETIRIZINE HYDROCHLORIDE 10 MG/1
10 TABLET ORAL DAILY
Status: DISCONTINUED | OUTPATIENT
Start: 2025-03-13 | End: 2025-03-17 | Stop reason: HOSPADM

## 2025-03-13 RX ORDER — OMEPRAZOLE 20 MG/1
20 CAPSULE, DELAYED RELEASE ORAL DAILY
COMMUNITY

## 2025-03-13 RX ORDER — METHYLPREDNISOLONE SODIUM SUCCINATE 40 MG/ML
60 INJECTION, POWDER, LYOPHILIZED, FOR SOLUTION INTRAMUSCULAR; INTRAVENOUS ONCE
Status: COMPLETED | OUTPATIENT
Start: 2025-03-13 | End: 2025-03-13

## 2025-03-13 RX ORDER — OXYBUTYNIN CHLORIDE 5 MG/1
5 TABLET, EXTENDED RELEASE ORAL DAILY
Status: DISCONTINUED | OUTPATIENT
Start: 2025-03-14 | End: 2025-03-17 | Stop reason: HOSPADM

## 2025-03-13 RX ORDER — ALBUTEROL SULFATE 90 UG/1
2 INHALANT RESPIRATORY (INHALATION) EVERY 4 HOURS PRN
COMMUNITY

## 2025-03-13 RX ORDER — PANTOPRAZOLE SODIUM 40 MG/1
40 TABLET, DELAYED RELEASE ORAL
Status: DISCONTINUED | OUTPATIENT
Start: 2025-03-14 | End: 2025-03-17 | Stop reason: HOSPADM

## 2025-03-13 RX ORDER — LORATADINE 10 MG/1
10 TABLET ORAL DAILY
COMMUNITY

## 2025-03-13 RX ORDER — IPRATROPIUM BROMIDE AND ALBUTEROL SULFATE 2.5; .5 MG/3ML; MG/3ML
3 SOLUTION RESPIRATORY (INHALATION)
Status: DISCONTINUED | OUTPATIENT
Start: 2025-03-13 | End: 2025-03-17 | Stop reason: HOSPADM

## 2025-03-13 RX ORDER — BISACODYL 5 MG/1
5 TABLET, DELAYED RELEASE ORAL DAILY PRN
Status: DISCONTINUED | OUTPATIENT
Start: 2025-03-13 | End: 2025-03-17 | Stop reason: HOSPADM

## 2025-03-13 RX ORDER — AMOXICILLIN 250 MG
2 CAPSULE ORAL 2 TIMES DAILY PRN
Status: DISCONTINUED | OUTPATIENT
Start: 2025-03-13 | End: 2025-03-17 | Stop reason: HOSPADM

## 2025-03-13 RX ORDER — METHYLPHENIDATE HYDROCHLORIDE 20 MG/1
20 TABLET ORAL DAILY
COMMUNITY

## 2025-03-13 RX ORDER — PREDNISONE 20 MG/1
20 TABLET ORAL DAILY
COMMUNITY
End: 2025-03-17 | Stop reason: HOSPADM

## 2025-03-13 RX ORDER — ATORVASTATIN CALCIUM 40 MG/1
40 TABLET, FILM COATED ORAL DAILY
Status: DISCONTINUED | OUTPATIENT
Start: 2025-03-14 | End: 2025-03-17 | Stop reason: HOSPADM

## 2025-03-13 RX ORDER — POLYETHYLENE GLYCOL 3350 17 G/17G
17 POWDER, FOR SOLUTION ORAL DAILY PRN
Status: DISCONTINUED | OUTPATIENT
Start: 2025-03-13 | End: 2025-03-17 | Stop reason: HOSPADM

## 2025-03-13 RX ORDER — MIRABEGRON 25 MG/1
25 TABLET, FILM COATED, EXTENDED RELEASE ORAL DAILY
COMMUNITY

## 2025-03-13 RX ORDER — SODIUM CHLORIDE 0.9 % (FLUSH) 0.9 %
10 SYRINGE (ML) INJECTION EVERY 12 HOURS SCHEDULED
Status: DISCONTINUED | OUTPATIENT
Start: 2025-03-13 | End: 2025-03-17 | Stop reason: HOSPADM

## 2025-03-13 RX ORDER — FLUTICASONE PROPIONATE 50 MCG
2 SPRAY, SUSPENSION (ML) NASAL DAILY PRN
Status: DISCONTINUED | OUTPATIENT
Start: 2025-03-13 | End: 2025-03-15

## 2025-03-13 RX ORDER — SODIUM CHLORIDE 0.9 % (FLUSH) 0.9 %
10 SYRINGE (ML) INJECTION AS NEEDED
Status: DISCONTINUED | OUTPATIENT
Start: 2025-03-13 | End: 2025-03-17 | Stop reason: HOSPADM

## 2025-03-13 RX ORDER — ACETAMINOPHEN 650 MG/1
650 SUPPOSITORY RECTAL EVERY 4 HOURS PRN
Status: DISCONTINUED | OUTPATIENT
Start: 2025-03-13 | End: 2025-03-17 | Stop reason: HOSPADM

## 2025-03-13 RX ADMIN — VENLAFAXINE HYDROCHLORIDE 150 MG: 75 CAPSULE, EXTENDED RELEASE ORAL at 20:11

## 2025-03-13 RX ADMIN — CETIRIZINE HYDROCHLORIDE 10 MG: 10 TABLET, FILM COATED ORAL at 20:11

## 2025-03-13 RX ADMIN — IPRATROPIUM BROMIDE AND ALBUTEROL SULFATE 3 ML: .5; 3 SOLUTION RESPIRATORY (INHALATION) at 20:05

## 2025-03-13 RX ADMIN — IPRATROPIUM BROMIDE AND ALBUTEROL SULFATE 3 ML: .5; 3 SOLUTION RESPIRATORY (INHALATION) at 16:01

## 2025-03-13 RX ADMIN — Medication 10 MG: at 20:11

## 2025-03-13 RX ADMIN — APIXABAN 5 MG: 5 TABLET, FILM COATED ORAL at 20:11

## 2025-03-13 RX ADMIN — CEFEPIME 2000 MG: 2 INJECTION, POWDER, FOR SOLUTION INTRAVENOUS at 16:18

## 2025-03-13 RX ADMIN — SODIUM CHLORIDE, SODIUM LACTATE, POTASSIUM CHLORIDE, CALCIUM CHLORIDE 500 ML: 20; 30; 600; 310 INJECTION, SOLUTION INTRAVENOUS at 20:18

## 2025-03-13 RX ADMIN — Medication 10 ML: at 20:24

## 2025-03-13 RX ADMIN — Medication 1500 MG: at 16:23

## 2025-03-13 RX ADMIN — METHYLPREDNISOLONE SODIUM SUCCINATE 60 MG: 40 INJECTION, POWDER, FOR SOLUTION INTRAMUSCULAR; INTRAVENOUS at 16:19

## 2025-03-13 RX ADMIN — ACETAMINOPHEN 1000 MG: 500 TABLET, FILM COATED ORAL at 16:19

## 2025-03-13 NOTE — ED PROVIDER NOTES
Subjective   Chief Complaint   Patient presents with    Shortness of Breath     Started yesterday, got steroid shot yesterday at . Patient was 81% on room air and wheezing. EMS got her on 15l and patient is 100%         History provided by:  Patient and EMS personnel    Patient is a 74-year-old female presents the ED for evaluation of shortness of breath, wheezing, fever at home.  She reports history of feeling poorly on Saturday, went to urgent care, received steroids, had negative COVID and flu testing yesterday.  Or shortness of breath today.  Patient was hypoxic upon EMS arrival with room air O2 saturation 81%..  Patient does not wear oxygen at home.  Review of Systems    Past Medical History:   Diagnosis Date    Fibromyalgia     GERD (gastroesophageal reflux disease)     Hx of compression fracture of spine     T12    Hyperlipidemia     Kidney stone     Loosening of hardware in spine     Lupus     Migraines     Narcolepsy 12/6/2023    Numbness and tingling     Osteoporosis 02/23/2018    [place] x 2yrs(2/2018-12/2019,3/20-4/2020), on prolia(2/28/2020)    Recurrent UTI     Rheumatoid arthritis     Scoliosis     Seasonal allergies     Shoulder pain, left     Sleep apnea     Spinal stenosis     Stress incontinence     Stroke        Allergies   Allergen Reactions    Shingrix [Zoster Vac Recomb Adjuvanted] Hallucinations     Fever, chills, redness swelling at injection site    Zolmitriptan Hemorrhagic stroke     Hx of stroke     Adhesive Tape Itching     Paper tape - redness and itching.     Hydromorphone Itching and Rash    Amoxicillin-Pot Clavulanate Itching and Rash       Past Surgical History:   Procedure Laterality Date    BACK SURGERY      BRONCHOSCOPY N/A 1/15/2025    Procedure: BRONCHOSCOPY with bronchial washing;  Surgeon: Néstor Mccarty MD;  Location: [facility] ENDOSCOPY;  Service: Pulmonary;  Laterality: N/A;  postop: pneumonia    CARDIAC CATHETERIZATION      CARPAL TUNNEL RELEASE      CATARACT EXTRACTION,  BILATERAL      CYSTOSCOPY W/ URETEROSCOPY W/ LITHOTRIPSY      HAND SURGERY      HYSTERECTOMY      RECTOVAGINAL FISTULA CLOSURE      SACRAL NERVE STIMULATOR PLACEMENT      TOTAL SHOULDER ARTHROPLASTY W/ DISTAL CLAVICLE EXCISION Left 6/13/2019    Procedure: TOTAL SHOULDER REVERSE ARTHROPLASTY;  Surgeon: Brice Mayes MD;  Location: [facility];  Service: Orthopedics       Family History   Problem Relation Age of Onset    Hypotension Mother     Hypotension Father     Colon cancer Father     Stroke Father     Colon cancer Maternal Grandmother     Heart disease Maternal Uncle     Hypertension Paternal Aunt     Diabetes Paternal Aunt     Heart failure Paternal Aunt     Malig Hyperthermia Neg Hx        Social History     Socioeconomic History    Marital status:    Tobacco Use    Smoking status: Never    Smokeless tobacco: Never   Vaping Use    Vaping status: Never Used   Substance and Sexual Activity    Alcohol use: Not Currently     Comment: OCC    Drug use: No    Sexual activity: Defer           Objective   Physical Exam  Vitals and nursing note reviewed.   Constitutional:       Appearance: She is ill-appearing.   HENT:      Head: Normocephalic and atraumatic.      Mouth/Throat:      Mouth: Mucous membranes are moist.      Pharynx: Oropharynx is clear.   Eyes:      Extraocular Movements: Extraocular movements intact.      Pupils: Pupils are equal, round, and reactive to light.   Cardiovascular:      Rate and Rhythm: Normal rate and regular rhythm.      Heart sounds: No murmur heard.     No friction rub. No gallop.   Pulmonary:      Effort: Tachypnea and respiratory distress present.      Breath sounds: Wheezing present.   Musculoskeletal:      Cervical back: Normal range of motion and neck supple.      Right lower leg: No tenderness. Edema present.      Left lower leg: No tenderness. Edema present.   Skin:     General: Skin is warm and dry.      Capillary Refill: Capillary refill takes less than 2 seconds.    Neurological:      General: No focal deficit present.      Mental Status: She is oriented to person, place, and time.         Procedures           ED Course  ED Course as of 03/13/25 1955   Thu Mar 13, 2025   1606 Pt had solumedrol yesterday at  [LB]   0942 Spoke with Dr. Tom [LB]      ED Course User Index  [LB] Deisi Ramesh APRN                                                       Medical Decision Making  Problems Addressed:  Hypoxia: complicated acute illness or injury  RSV infection: complicated acute illness or injury  Sepsis, due to unspecified organism, unspecified whether acute organ dysfunction present: complicated acute illness or injury    Amount and/or Complexity of Data Reviewed  Labs: ordered.  Radiology: ordered.  ECG/medicine tests: ordered.    Risk  OTC drugs.  Prescription drug management.  Decision regarding hospitalization.    Appropriate PPE worn during patient interactions.  EKG interpreted per ED attending physician Dr. Jennings sinus tachycardia rate 102.  Compared to previous 1/9/2025.  , QTc 502  Viewed by me.    Pt was Placed on appropriate monitoring.  Differential diagnoses considered for patient presentation, this list is not all inclusive of diagnoses considered:pneumonia, chf  Patient presents to the ED for the above complaint, underwent the above, exam and workup.  Patient with notable respiration rest, wheezing upon initial exam, sitting on side of bed purse with pursed lip breathing patient ordered breathing treatment, she did receive 125 Solu-Medrol yesterday, ordered 80 here in the ED.  She is white blood cell count of 17.3, blood cultures and lactate ordered and pending.  Lactate 2.1 no hypotension.  She is positive for RSV.  However given her leukocytosis, dyspnea and wheezing, there is concern for bacterial infection.  She placed on sepsis antibiotics for healthcare acquired pneumonia.  Will admit to hospital medicine for further evaluation management  discussed with .     I discussed my findings and plan of care with the patient.  She agrees to current treatment plan    Note Disclaimer: At [facility], we believe that sharing information builds trust and better relationships. You are receiving this note because you recently visited [facility]. It is possible you will see health information before a provider has talked with you about it. This kind of information can be easy to misunderstand. To help you fully understand what it means for your health, we urge you to discuss this note with your provider  Note dictated utilizing Dragon Dictation.          Final diagnoses:   Hypoxia   RSV infection   Sepsis, due to unspecified organism, unspecified whether acute organ dysfunction present       ED Disposition  ED Disposition       ED Disposition   Decision to Admit    Condition   --    Comment   Level of Care: Telemetry [5]   Diagnosis: RSV bronchitis [499864]   Admitting Physician: IRINEO WILSON [[id number]]   Attending Physician: IRINEO WILSON [[id number]]                 No follow-up provider specified.       Medication List        ASK your doctor about these medications      guaiFENesin 600 MG 12 hr tablet  Commonly known as: MUCINEX  Take 1 tablet by mouth Every 12 (Twelve) Hours.  Ask about: Which instructions should I use?                 Deisi Ramesh, APRN  03/13/25 1955

## 2025-03-13 NOTE — H&P
[facility] Medicine Services  History & Physical    Patient Name: Annalisa Bhagat  : 1950  MRN: [id number]  Primary Care Physician:  Caleb Whitney MD  Date of admission: 3/13/2025  Date and Time of Service: 3/13/2025 at 1800    Subjective      Chief Complaint: Shortness of air    History of Present Illness: Annlaisa Bhagat is a 74 y.o. female with a CMH of CVA without residual deficits, possible lupus/RA, obesity, hld who presented to [facility] on 3/13/2025 with  SOA. Pt reports starting  she developed cough/wheeze with associated SOA/CHAPMAN, has also had rhinorrhea/congestion. Reports recent family contacts with similar symptoms last week. She presented to urgent care last night and was given IV steroids and a prednisone taper, reports some initial relief with the steroids but overnight/am her Sx continued to worsen prompting her to present to our ED. She was admitted here in January with similar symptoms, found to have multifocal pneumonia at that time suspected superimposed on viral infection. She reports her breathing had returned to baseline following that admission, is not on oxygen or any inhalers at home, no Hx asthma/COPD.   In ED reportedly initially satting 81% on RA, improved after breathing treatments and pt presently on room air though still with significant wheeze and incr WOB. Found to be RSV positive. Hospitalist service consulted for admission.       Review of Systems   Constitutional:  Positive for appetite change and fever. Negative for activity change and chills.   HENT:  Positive for congestion and rhinorrhea. Negative for sore throat.    Eyes: Negative.    Respiratory:  Positive for cough, shortness of breath and wheezing.    Cardiovascular:  Positive for leg swelling (reports chronic, at baseline). Negative for chest pain and palpitations.   Gastrointestinal:  Negative for abdominal pain, constipation, diarrhea and nausea.   Genitourinary:  Negative for  dysuria, frequency and urgency.   Musculoskeletal:  Negative for arthralgias and myalgias.   Neurological:  Negative for dizziness, syncope, weakness, light-headedness and headaches.       Personal History     Past Medical History:   Diagnosis Date    Fibromyalgia     GERD (gastroesophageal reflux disease)     Hx of compression fracture of spine     T12    Hyperlipidemia     Kidney stone     Loosening of hardware in spine     Lupus     Migraines     Narcolepsy 12/6/2023    Numbness and tingling     Osteoporosis 02/23/2018    [place]eo x 2yrs(2/2018-12/2019,3/20-4/2020), on prolia(2/28/2020)    Recurrent UTI     Rheumatoid arthritis     Scoliosis     Seasonal allergies     Shoulder pain, left     Sleep apnea     Spinal stenosis     Stress incontinence     Stroke        Past Surgical History:   Procedure Laterality Date    BACK SURGERY      BRONCHOSCOPY N/A 1/15/2025    Procedure: BRONCHOSCOPY with bronchial washing;  Surgeon: Néstor Mccarty MD;  Location: [facility] ENDOSCOPY;  Service: Pulmonary;  Laterality: N/A;  postop: pneumonia    CARDIAC CATHETERIZATION      CARPAL TUNNEL RELEASE      CATARACT EXTRACTION, BILATERAL      CYSTOSCOPY W/ URETEROSCOPY W/ LITHOTRIPSY      HAND SURGERY      HYSTERECTOMY      RECTOVAGINAL FISTULA CLOSURE      SACRAL NERVE STIMULATOR PLACEMENT      TOTAL SHOULDER ARTHROPLASTY W/ DISTAL CLAVICLE EXCISION Left 6/13/2019    Procedure: TOTAL SHOULDER REVERSE ARTHROPLASTY;  Surgeon: Brice Mayes MD;  Location: [facility];  Service: Orthopedics       Family History: family history includes Colon cancer in her father and maternal grandmother; Diabetes in her paternal aunt; Heart disease in her maternal uncle; Heart failure in her paternal aunt; Hypertension in her paternal aunt; Hypotension in her father and mother; Stroke in her father. Otherwise pertinent FHx was reviewed and not pertinent to current issue.    Social History:  reports that she has never smoked. She has never used  smokeless tobacco. She reports that she does not currently use alcohol. She reports that she does not use drugs.    Home Medications:  Prior to Admission Medications       Prescriptions Last Dose Informant Patient Reported? Taking?    apixaban (ELIQUIS) 5 MG tablet tablet 3/13/2025  No Yes    Take 1 tablet by mouth Every 12 (Twelve) Hours. Resume 6/15/29    atorvastatin (LIPITOR) 40 MG tablet 3/13/2025 Self Yes Yes    Take 1 tablet by mouth Daily.    hydroxychloroquine (PLAQUENIL) 200 MG tablet 3/13/2025  Yes Yes    Take 1 tablet by mouth 2 (Two) Times a Day.    methylphenidate (RITALIN) 20 MG tablet 3/13/2025  Yes Yes    Take 1 tablet by mouth Daily.    Mirabegron ER (Myrbetriq) 25 MG tablet sustained-release 24 hour 24 hr tablet 3/13/2025  Yes Yes    Take 1 tablet by mouth Daily.    predniSONE (DELTASONE) 20 MG tablet 3/13/2025  Yes Yes    Take 1 tablet by mouth Daily. Take 2 tablets daily for 3 days, then 1 tablet daily for 4    Rimegepant Sulfate (NURTEC) 75 MG tablet dispersible tablet 3/13/2025  Yes Yes    Take 1 tablet by mouth Daily As Needed.    venlafaxine XR (EFFEXOR-XR) 150 MG 24 hr capsule 3/12/2025  Yes Yes    Take 1 capsule by mouth Daily.    albuterol sulfate  (90 Base) MCG/ACT inhaler   Yes No    Inhale 2 puffs Every 4 (Four) Hours As Needed for Wheezing.    Azelastine-Fluticasone 137-50 MCG/ACT suspension   Yes No    Administer 1-2 sprays into the nostril(s) as directed by provider Daily As Needed.    B Complex Vitamins (VITAMIN B COMPLEX PO)  Self Yes No    Take 1 each by mouth Daily.    benzonatate (TESSALON) 200 MG capsule   No No    Take 1 capsule by mouth 3 (Three) Times a Day As Needed for Cough.    guaiFENesin (MUCINEX) 600 MG 12 hr tablet   No No    Take 1 tablet by mouth Every 12 (Twelve) Hours.    ipratropium-albuterol (DUO-NEB) 0.5-2.5 mg/3 ml nebulizer   No No    Take 3 mL by nebulization Every 6 (Six) Hours While Awake.    loratadine (Claritin) 10 MG tablet   Yes No    Take 1  tablet by mouth Daily.    Melatonin 10 MG tablet  Self Yes No    Take 1 tablet by mouth Every Night.    Multiple Vitamins-Minerals (MULTIVITAMIN GUMMIES ADULTS PO)   Yes No    Take 1 tablet by mouth Daily.    omeprazole (priLOSEC) 20 MG capsule   Yes No    Take 1 capsule by mouth Daily.    pyridoxine (VITAMIN B-6) 200 MG tablet  Self Yes No    Take 1 tablet by mouth Daily.              Allergies:  Allergies   Allergen Reactions    Shingrix [Zoster Vac Recomb Adjuvanted] Hallucinations     Fever, chills, redness swelling at injection site    Zolmitriptan Hemorrhagic stroke     Hx of stroke     Adhesive Tape Itching     Paper tape - redness and itching.     Hydromorphone Itching and Rash    Amoxicillin-Pot Clavulanate Itching and Rash       Objective      Vitals:   Temp:  [98.3 °F (36.8 °C)] 98.3 °F (36.8 °C)  Heart Rate:  [100-104] 102  Resp:  [25-30] 26  BP: (140-165)/(73-89) 164/85  Body mass index is 40.4 kg/m².  Physical Exam  Constitutional:       General: She is in acute distress.   HENT:      Head: Normocephalic and atraumatic.      Mouth/Throat:      Mouth: Mucous membranes are moist.      Pharynx: Oropharynx is clear.   Eyes:      Extraocular Movements: Extraocular movements intact.      Conjunctiva/sclera: Conjunctivae normal.      Pupils: Pupils are equal, round, and reactive to light.   Cardiovascular:      Rate and Rhythm: Regular rhythm. Tachycardia present.      Pulses: Normal pulses.      Heart sounds: Normal heart sounds.   Pulmonary:      Effort: Respiratory distress present.      Breath sounds: Wheezing present.   Abdominal:      General: Abdomen is flat. Bowel sounds are normal.      Palpations: Abdomen is soft.      Tenderness: There is no abdominal tenderness. There is no guarding or rebound.   Musculoskeletal:         General: No swelling or tenderness.      Cervical back: Normal range of motion and neck supple.      Comments: 1+ Sergio LE edema at ankles, pt reports as baseline   Neurological:     "  General: No focal deficit present.      Mental Status: She is alert and oriented to person, place, and time. Mental status is at baseline.      Cranial Nerves: No cranial nerve deficit.      Motor: No weakness.         Diagnostic Data:  Lab Results (last 24 hours)       Procedure Component Value Units Date/Time    High Sensitivity Troponin T 1Hr [[id number]]  (Abnormal) Collected: 03/13/25 1707    Specimen: Blood Updated: 03/13/25 1739     HS Troponin T 14 ng/L      Troponin T Numeric Delta -3 ng/L      Troponin T % Delta -18    Narrative:      High Sensitive Troponin T Reference Range:  <14.0 ng/L- Negative Female for AMI  <22.0 ng/L- Negative Male for AMI  >=14 - Abnormal Female indicating possible myocardial injury.  >=22 - Abnormal Male indicating possible myocardial injury.   Clinicians would have to utilize clinical acumen, EKG, Troponin, and serial changes to determine if it is an Acute Myocardial Infarction or myocardial injury due to an underlying chronic condition.         Procalcitonin [[id number]]  (Normal) Collected: 03/13/25 1707    Specimen: Blood Updated: 03/13/25 1739     Procalcitonin 0.05 ng/mL     Narrative:      As a Marker for Sepsis (Non-Neonates):    1. <0.5 ng/mL represents a low risk of severe sepsis and/or septic shock.  2. >2 ng/mL represents a high risk of severe sepsis and/or septic shock.    As a Marker for Lower Respiratory Tract Infections that require antibiotic therapy:    PCT on Admission    Antibiotic Therapy       6-12 Hrs later    >0.5                Strongly Recommended  >0.25 - <0.5        Recommended   0.1 - 0.25          Discouraged              Remeasure/reassess PCT  <0.1                Strongly Discouraged     Remeasure/reassess PCT    As 28 day mortality risk marker: \"Change in Procalcitonin Result\" (>80% or <=80%) if Day 0 (or Day 1) and Day 4 values are available. Refer to http://www.[employer]-pct-calculator.com    Change in PCT <=80%  A decrease of PCT levels below or " equal to 80% defines a positive change in PCT test result representing a higher risk for 28-day all-cause mortality of patients diagnosed with severe sepsis for septic shock.    Change in PCT >80%  A decrease of PCT levels of more than 80% defines a negative change in PCT result representing a lower risk for 28-day all-cause mortality of patients diagnosed with severe sepsis or septic shock.       Respiratory Panel PCR w/COVID-19(SARS-CoV-2) FRANCESCO/SALLY/LUCILLE/PAD/COR/EMIL In-House, NP Swab in UTM/VTM, 2 HR TAT - Swab, Nasopharynx [[id number]]  (Abnormal) Collected: 03/13/25 1600    Specimen: Swab from Nasopharynx Updated: 03/13/25 1656     ADENOVIRUS, PCR Not Detected     Coronavirus 229E Not Detected     Coronavirus HKU1 Not Detected     Coronavirus NL63 Not Detected     Coronavirus OC43 Not Detected     COVID19 Not Detected     Human Metapneumovirus Not Detected     Human Rhinovirus/Enterovirus Not Detected     Influenza A PCR Not Detected     Influenza B PCR Not Detected     Parainfluenza Virus 1 Not Detected     Parainfluenza Virus 2 Not Detected     Parainfluenza Virus 3 Not Detected     Parainfluenza Virus 4 Not Detected     RSV, PCR Detected     Bordetella pertussis pcr Not Detected     Bordetella parapertussis PCR Not Detected     Chlamydophila pneumoniae PCR Not Detected     Mycoplasma pneumo by PCR Not Detected    Narrative:      In the setting of a positive respiratory panel with a viral infection PLUS a negative procalcitonin without other underlying concern for bacterial infection, consider observing off antibiotics or discontinuation of antibiotics and continue supportive care. If the respiratory panel is positive for atypical bacterial infection (Bordetella pertussis, Chlamydophila pneumoniae, or Mycoplasma pneumoniae), consider antibiotic de-escalation to target atypical bacterial infection.    Comprehensive Metabolic Panel [[id number]]  (Abnormal) Collected: 03/13/25 5479    Specimen: Blood from Arm, Left  Updated: 03/13/25 1652     Glucose 111 mg/dL      BUN 15 mg/dL      Creatinine 0.73 mg/dL      Sodium 144 mmol/L      Potassium 4.3 mmol/L      Comment: Slight hemolysis detected by analyzer. Result may be falsely elevated.        Chloride 104 mmol/L      CO2 24.9 mmol/L      Calcium 9.9 mg/dL      Total Protein 7.8 g/dL      Albumin 4.5 g/dL      ALT (SGPT) 22 U/L      AST (SGOT) 36 U/L      Alkaline Phosphatase 136 U/L      Total Bilirubin 0.3 mg/dL      Globulin 3.3 gm/dL      A/G Ratio 1.4 g/dL      BUN/Creatinine Ratio 20.5     Anion Gap 15.1 mmol/L      eGFR 85.4 mL/min/1.73     Narrative:      GFR Categories in Chronic Kidney Disease (CKD)      GFR Category          GFR (mL/min/1.73)    Interpretation  G1                     90 or greater         Normal or high (1)  G2                      60-89                Mild decrease (1)  G3a                   45-59                Mild to moderate decrease  G3b                   30-44                Moderate to severe decrease  G4                    15-29                Severe decrease  G5                    14 or less           Kidney failure          (1)In the absence of evidence of kidney disease, neither GFR category G1 or G2 fulfill the criteria for CKD.    eGFR calculation 2021 CKD-EPI creatinine equation, which does not include race as a factor    BNP [[id number]]  (Normal) Collected: 03/13/25 1559    Specimen: Blood from Arm, Left Updated: 03/13/25 1652     proBNP 272.0 pg/mL     Narrative:      This assay is used as an aid in the diagnosis of individuals suspected of having heart failure. It can be used as an aid in the diagnosis of acute decompensated heart failure (ADHF) in patients presenting with signs and symptoms of ADHF to the emergency department (ED). In addition, NT-proBNP of <300 pg/mL indicates ADHF is not likely.    Age Range Result Interpretation  NT-proBNP Concentration (pg/mL:      <50             Positive            >450                   Levine   "               300-450                    Negative             <300    50-75           Positive            >900                  Gray                300-900                  Negative            <300      >75             Positive            >1800                  Gray                300-1800                  Negative            <300    High Sensitivity Troponin T [[id number]]  (Abnormal) Collected: 03/13/25 1559    Specimen: Blood from Arm, Left Updated: 03/13/25 1652     HS Troponin T 17 ng/L     Narrative:      High Sensitive Troponin T Reference Range:  <14.0 ng/L- Negative Female for AMI  <22.0 ng/L- Negative Male for AMI  >=14 - Abnormal Female indicating possible myocardial injury.  >=22 - Abnormal Male indicating possible myocardial injury.   Clinicians would have to utilize clinical acumen, EKG, Troponin, and serial changes to determine if it is an Acute Myocardial Infarction or myocardial injury due to an underlying chronic condition.         D-dimer, Quantitative [[id number]]  (Normal) Collected: 03/13/25 1631    Specimen: Blood from Arm, Right Updated: 03/13/25 1648     D-Dimer, Quantitative 0.35 MCGFEU/mL     Narrative:      According to the assay 's published package insert, a normal (<0.50 MCGFEU/mL) D-dimer result in conjunction with a non-high clinical probability assessment, excludes deep vein thrombosis (DVT) and pulmonary embolism (PE) with high sensitivity.    D-dimer values increase with age and this can make VTE exclusion of an older population difficult. To address this, the [employer], based on best available evidence and recent guidelines, recommends that clinicians use age-adjusted D-dimer thresholds in patients greater than 50 years of age with: a) a low probability of PE who do not meet all Pulmonary Embolism Rule Out Criteria, or b) in those with intermediate probability of PE.   The formula for an age-adjusted D-dimer cut-off is \"age/100\".  For example, a 60 " year old patient would have an age-adjusted cut-off of 0.60 MCGFEU/mL and an 80 year old 0.80 MCGFEU/mL.    CBC & Differential [[id number]]  (Abnormal) Collected: 03/13/25 1559    Specimen: Blood from Arm, Left Updated: 03/13/25 1608    Narrative:      The following orders were created for panel order CBC & Differential.  Procedure                               Abnormality         Status                     ---------                               -----------         ------                     CBC Auto Differential[[id number]]        Abnormal            Final result                 Please view results for these tests on the individual orders.    CBC Auto Differential [[id number]]  (Abnormal) Collected: 03/13/25 1559    Specimen: Blood from Arm, Left Updated: 03/13/25 1608     WBC 17.31 10*3/mm3      RBC 5.03 10*6/mm3      Hemoglobin 15.1 g/dL      Hematocrit 47.8 %      MCV 95.0 fL      MCH 30.0 pg      MCHC 31.6 g/dL      RDW 14.6 %      RDW-SD 51.8 fl      MPV 9.8 fL      Platelets 331 10*3/mm3      Neutrophil % 87.8 %      Lymphocyte % 4.2 %      Monocyte % 6.9 %      Eosinophil % 0.2 %      Basophil % 0.3 %      Immature Grans % 0.6 %      Neutrophils, Absolute 15.20 10*3/mm3      Lymphocytes, Absolute 0.73 10*3/mm3      Monocytes, Absolute 1.19 10*3/mm3      Eosinophils, Absolute 0.04 10*3/mm3      Basophils, Absolute 0.05 10*3/mm3      Immature Grans, Absolute 0.10 10*3/mm3      nRBC 0.0 /100 WBC     Blood Culture - Blood, Arm, Left [[id number]] Collected: 03/13/25 1559    Specimen: Blood from Arm, Left Updated: 03/13/25 1606    POC Lactate [[id number]]  (Abnormal) Collected: 03/13/25 1603    Specimen: Blood Updated: 03/13/25 1605     Lactate 2.1 mmol/L      Comment: Serial Number: [id number]:  [id number]       Blood Culture - Blood, Arm, Right [[id number]] Collected: 03/13/25 1547    Specimen: Blood from Arm, Right Updated: 03/13/25 1548             Imaging Results (Last 24 Hours)       Procedure Component  Value Units Date/Time    XR Chest 1 View [[id number]] Collected: 03/13/25 1555     Updated: 03/13/25 1559    Narrative:      XR CHEST 1 VW    Date of Exam: 3/13/2025 3:54 PM EDT    Indication: dyspnea    Comparison: 1/9/2025    Findings:  Cardiac size is within normal limits for the projection. The pulmonary vascular markings are now normal. There is improved aeration at the lung bases compared with the last study. There are no acute infiltrates identified. There are postoperative changes   of prior long segment thoracolumbar fusion and left shoulder arthroplasty.      Impression:      Impression:  Improved aeration at the lung bases compared with the last study. No acute process identified.        Electronically Signed: Nomi Samuel MD    3/13/2025 3:57 PM EDT    Workstation ID: [id number]              Assessment & Plan        This is a 74 y.o. female with:    Active and Resolved Problems  There are no hospital problems to display for this patient.    Dyspnea  RSV  Sepsis  ? Pneumonia  - Reportedly initial O2 sat 81% in ED, improved following breathing treatments  - Trop 17 -> 14, BNP wnl. Dimer wnl.   - RVP positive for RSV  - SIRS 3/4 (WBC 17.3, , RR 30), pt with recent steroids. Lactic 2.1. Procal 0.05. CXR without significant opacity/consolidation appreciated  - Trending lactic, pending Bcx/Scx/strep/leg  - IVF  - Started on vanc/cefepime given recent IV Abx. Will cont cefepime overnight, MRSA nares pending. Further Abx pending course/cultures  - solumedrol  - Duonebs  - incentive spirometry  - cont pulse ox  - Consider pulm consult if pt not improving, seen and underwent bronchoscopy during previous admission    Hx CVA w/o residual deficits  Hld  GERD  Mood  RA/Lupus  - holding home hydroxychloroquine with infection, cont other home medications      VTE Prophylaxis:  No VTE prophylaxis order currently exists.        The patient desires to be as follows:    CODE STATUS:           Michael Bhagat, who can be  contacted at [phone] , is the designated person to make medical decisions on the patient's behalf if She is incapable of doing so. This was clarified with patient and/or next of kin on 3/13/2025 during the course of this H&P.    Admission Status:  I believe this patient meets observation status.    Expected Length of Stay: <@ midnights    PDMP and Medication Dispenses via Sidebar reviewed and consistent with patient reported medications.    I discussed the patient's findings and my recommendations with patient.      Signature:     This document has been electronically signed by Jameson Tom MD on March 13, 2025 18:04 EDT   [facility] Hospitalist Team

## 2025-03-13 NOTE — Clinical Note
Level of Care: Telemetry [5]   Diagnosis: RSV bronchitis [642521]   Admitting Physician: IRINEO WILSON [[id number]]   Attending Physician: IRINEO WILSON [[id number]]

## 2025-03-14 PROBLEM — J21.0 RSV (ACUTE BRONCHIOLITIS DUE TO RESPIRATORY SYNCYTIAL VIRUS): Status: ACTIVE | Noted: 2025-03-14

## 2025-03-14 LAB
ANION GAP SERPL CALCULATED.3IONS-SCNC: 11.4 MMOL/L (ref 5–15)
BASOPHILS # BLD AUTO: 0.05 10*3/MM3 (ref 0–0.2)
BASOPHILS NFR BLD AUTO: 0.3 % (ref 0–1.5)
BUN SERPL-MCNC: 16 MG/DL (ref 8–23)
BUN/CREAT SERPL: 22.2 (ref 7–25)
CALCIUM SPEC-SCNC: 8.9 MG/DL (ref 8.6–10.5)
CHLORIDE SERPL-SCNC: 106 MMOL/L (ref 98–107)
CO2 SERPL-SCNC: 25.6 MMOL/L (ref 22–29)
CREAT SERPL-MCNC: 0.72 MG/DL (ref 0.57–1)
DEPRECATED RDW RBC AUTO: 51.8 FL (ref 37–54)
EGFRCR SERPLBLD CKD-EPI 2021: 86.8 ML/MIN/1.73
EOSINOPHIL # BLD AUTO: 0.03 10*3/MM3 (ref 0–0.4)
EOSINOPHIL NFR BLD AUTO: 0.2 % (ref 0.3–6.2)
ERYTHROCYTE [DISTWIDTH] IN BLOOD BY AUTOMATED COUNT: 14.9 % (ref 12.3–15.4)
GLUCOSE SERPL-MCNC: 124 MG/DL (ref 65–99)
HCT VFR BLD AUTO: 44.4 % (ref 34–46.6)
HGB BLD-MCNC: 13.9 G/DL (ref 12–15.9)
IMM GRANULOCYTES # BLD AUTO: 0.05 10*3/MM3 (ref 0–0.05)
IMM GRANULOCYTES NFR BLD AUTO: 0.3 % (ref 0–0.5)
LYMPHOCYTES # BLD AUTO: 0.62 10*3/MM3 (ref 0.7–3.1)
LYMPHOCYTES NFR BLD AUTO: 4.1 % (ref 19.6–45.3)
MCH RBC QN AUTO: 29.8 PG (ref 26.6–33)
MCHC RBC AUTO-ENTMCNC: 31.3 G/DL (ref 31.5–35.7)
MCV RBC AUTO: 95.1 FL (ref 79–97)
MONOCYTES # BLD AUTO: 0.84 10*3/MM3 (ref 0.1–0.9)
MONOCYTES NFR BLD AUTO: 5.6 % (ref 5–12)
NEUTROPHILS NFR BLD AUTO: 13.46 10*3/MM3 (ref 1.7–7)
NEUTROPHILS NFR BLD AUTO: 89.5 % (ref 42.7–76)
NRBC BLD AUTO-RTO: 0 /100 WBC (ref 0–0.2)
PLATELET # BLD AUTO: 310 10*3/MM3 (ref 140–450)
PMV BLD AUTO: 9.7 FL (ref 6–12)
POTASSIUM SERPL-SCNC: 4.1 MMOL/L (ref 3.5–5.2)
QT INTERVAL: 384 MS
QTC INTERVAL: 502 MS
RBC # BLD AUTO: 4.67 10*6/MM3 (ref 3.77–5.28)
SODIUM SERPL-SCNC: 143 MMOL/L (ref 136–145)
WBC NRBC COR # BLD AUTO: 15.05 10*3/MM3 (ref 3.4–10.8)

## 2025-03-14 PROCEDURE — 87070 CULTURE OTHR SPECIMN AEROBIC: CPT | Performed by: STUDENT IN AN ORGANIZED HEALTH CARE EDUCATION/TRAINING PROGRAM

## 2025-03-14 PROCEDURE — 25010000002 CEFEPIME PER 500 MG: Performed by: STUDENT IN AN ORGANIZED HEALTH CARE EDUCATION/TRAINING PROGRAM

## 2025-03-14 PROCEDURE — 25010000002 METHYLPREDNISOLONE PER 40 MG: Performed by: STUDENT IN AN ORGANIZED HEALTH CARE EDUCATION/TRAINING PROGRAM

## 2025-03-14 PROCEDURE — 87205 SMEAR GRAM STAIN: CPT | Performed by: STUDENT IN AN ORGANIZED HEALTH CARE EDUCATION/TRAINING PROGRAM

## 2025-03-14 PROCEDURE — 94799 UNLISTED PULMONARY SVC/PX: CPT

## 2025-03-14 PROCEDURE — 80048 BASIC METABOLIC PNL TOTAL CA: CPT | Performed by: STUDENT IN AN ORGANIZED HEALTH CARE EDUCATION/TRAINING PROGRAM

## 2025-03-14 PROCEDURE — 94761 N-INVAS EAR/PLS OXIMETRY MLT: CPT

## 2025-03-14 PROCEDURE — 85025 COMPLETE CBC W/AUTO DIFF WBC: CPT | Performed by: STUDENT IN AN ORGANIZED HEALTH CARE EDUCATION/TRAINING PROGRAM

## 2025-03-14 RX ORDER — IPRATROPIUM BROMIDE AND ALBUTEROL SULFATE 2.5; .5 MG/3ML; MG/3ML
3 SOLUTION RESPIRATORY (INHALATION) EVERY 6 HOURS PRN
Status: DISCONTINUED | OUTPATIENT
Start: 2025-03-14 | End: 2025-03-17 | Stop reason: HOSPADM

## 2025-03-14 RX ORDER — GUAIFENESIN 200 MG/10ML
200 LIQUID ORAL ONCE
Status: COMPLETED | OUTPATIENT
Start: 2025-03-14 | End: 2025-03-14

## 2025-03-14 RX ORDER — CODEINE PHOSPHATE AND GUAIFENESIN 10; 100 MG/5ML; MG/5ML
10 SOLUTION ORAL EVERY 6 HOURS PRN
Status: DISCONTINUED | OUTPATIENT
Start: 2025-03-14 | End: 2025-03-17 | Stop reason: HOSPADM

## 2025-03-14 RX ORDER — HYDROXYCHLOROQUINE SULFATE 200 MG/1
400 TABLET, FILM COATED ORAL
Status: DISCONTINUED | OUTPATIENT
Start: 2025-03-14 | End: 2025-03-17 | Stop reason: HOSPADM

## 2025-03-14 RX ADMIN — IPRATROPIUM BROMIDE AND ALBUTEROL SULFATE 3 ML: .5; 3 SOLUTION RESPIRATORY (INHALATION) at 02:00

## 2025-03-14 RX ADMIN — Medication 10 ML: at 20:40

## 2025-03-14 RX ADMIN — CEFEPIME 2000 MG: 2 INJECTION, POWDER, FOR SOLUTION INTRAVENOUS at 09:00

## 2025-03-14 RX ADMIN — METHYLPREDNISOLONE SODIUM SUCCINATE 40 MG: 40 INJECTION, POWDER, FOR SOLUTION INTRAMUSCULAR; INTRAVENOUS at 05:33

## 2025-03-14 RX ADMIN — CETIRIZINE HYDROCHLORIDE 10 MG: 10 TABLET, FILM COATED ORAL at 09:58

## 2025-03-14 RX ADMIN — APIXABAN 5 MG: 5 TABLET, FILM COATED ORAL at 20:40

## 2025-03-14 RX ADMIN — PANTOPRAZOLE SODIUM 40 MG: 40 TABLET, DELAYED RELEASE ORAL at 05:34

## 2025-03-14 RX ADMIN — CEFEPIME 2000 MG: 2 INJECTION, POWDER, FOR SOLUTION INTRAVENOUS at 00:56

## 2025-03-14 RX ADMIN — IPRATROPIUM BROMIDE AND ALBUTEROL SULFATE 3 ML: .5; 3 SOLUTION RESPIRATORY (INHALATION) at 23:25

## 2025-03-14 RX ADMIN — ACETAMINOPHEN 650 MG: 325 TABLET, FILM COATED ORAL at 18:18

## 2025-03-14 RX ADMIN — OXYBUTYNIN CHLORIDE 5 MG: 5 TABLET, EXTENDED RELEASE ORAL at 09:59

## 2025-03-14 RX ADMIN — METHYLPREDNISOLONE SODIUM SUCCINATE 40 MG: 40 INJECTION, POWDER, FOR SOLUTION INTRAMUSCULAR; INTRAVENOUS at 17:09

## 2025-03-14 RX ADMIN — IPRATROPIUM BROMIDE AND ALBUTEROL SULFATE 3 ML: .5; 3 SOLUTION RESPIRATORY (INHALATION) at 18:57

## 2025-03-14 RX ADMIN — HYDROXYCHLOROQUINE SULFATE 400 MG: 200 TABLET ORAL at 09:58

## 2025-03-14 RX ADMIN — Medication 10 ML: at 10:19

## 2025-03-14 RX ADMIN — GUAIFENESIN AND CODEINE PHOSPHATE 10 ML: 100; 10 SOLUTION ORAL at 22:09

## 2025-03-14 RX ADMIN — ATORVASTATIN CALCIUM 40 MG: 40 TABLET, FILM COATED ORAL at 09:59

## 2025-03-14 RX ADMIN — Medication 10 MG: at 20:40

## 2025-03-14 RX ADMIN — ACETAMINOPHEN 650 MG: 325 TABLET, FILM COATED ORAL at 10:01

## 2025-03-14 RX ADMIN — IPRATROPIUM BROMIDE AND ALBUTEROL SULFATE 3 ML: .5; 3 SOLUTION RESPIRATORY (INHALATION) at 06:58

## 2025-03-14 RX ADMIN — BENZONATATE 200 MG: 100 CAPSULE ORAL at 09:58

## 2025-03-14 RX ADMIN — APIXABAN 5 MG: 5 TABLET, FILM COATED ORAL at 09:59

## 2025-03-14 RX ADMIN — BENZONATATE 200 MG: 100 CAPSULE ORAL at 17:59

## 2025-03-14 RX ADMIN — VENLAFAXINE HYDROCHLORIDE 150 MG: 75 CAPSULE, EXTENDED RELEASE ORAL at 09:58

## 2025-03-14 RX ADMIN — IPRATROPIUM BROMIDE AND ALBUTEROL SULFATE 3 ML: .5; 3 SOLUTION RESPIRATORY (INHALATION) at 11:58

## 2025-03-14 RX ADMIN — GUAIFENESIN 200 MG: 200 SOLUTION ORAL at 01:48

## 2025-03-14 NOTE — PROGRESS NOTES
[facility] MEDICINE SERVICE  DAILY PROGRESS NOTE    NAME: Annalisa Bhagat  : 1950  MRN: [id number]      LOS: 0 days     PROVIDER OF SERVICE: Rashad Marshall MD    Chief Complaint: RSV bronchitis    Subjective:     Interval History:  History taken from: patient    History of Present Illness: Annalisa Bhagat is a 74 y.o. female with a CMH of CVA without residual deficits, possible lupus/RA, obesity, hld who presented to [facility] on 3/13/2025 with  SOA. Pt reports starting  she developed cough/wheeze with associated SOA/CHAPMAN, has also had rhinorrhea/congestion. Reports recent family contacts with similar symptoms last week. She presented to urgent care last night and was given IV steroids and a prednisone taper, reports some initial relief with the steroids but overnight/am her Sx continued to worsen prompting her to present to our ED. She was admitted here in January with similar symptoms, found to have multifocal pneumonia at that time suspected superimposed on viral infection. She reports her breathing had returned to baseline following that admission, is not on oxygen or any inhalers at home, no Hx asthma/COPD.   In ED reportedly initially satting 81% on RA, improved after breathing treatments and pt presently on room air though still with significant wheeze and incr WOB. Found to be RSV positive. Hospitalist service consulted for admission.         3/14 Patient seen and examined in bed NAD, complaining of dyspnea on 2 L, fatigue, weakness and cough.    Review of Systems  Constitutional:  Positive for appetite change and fever. Negative for activity change and chills.   HENT:  Positive for congestion and rhinorrhea. Negative for sore throat.    Eyes: Negative.    Respiratory:  Positive for cough, shortness of breath and wheezing.    Cardiovascular:  Positive for leg swelling (reports chronic, at baseline). Negative for chest pain and palpitations.   Gastrointestinal:  Negative  for abdominal pain, constipation, diarrhea and nausea.   Genitourinary:  Negative for dysuria, frequency and urgency.   Musculoskeletal:  Negative for arthralgias and myalgias.   Neurological:  Negative for dizziness, syncope, weakness, light-headedness and headaches.   Objective:     Vital Signs  Temp:  [97.8 °F (36.6 °C)-99.7 °F (37.6 °C)] 97.8 °F (36.6 °C)  Heart Rate:  [] 88  Resp:  [14-30] 21  BP: (110-187)/() 143/66  Flow (L/min) (Oxygen Therapy):  [2-3] 2   Body mass index is 40.4 kg/m².    Physical Exam     General: She is in acute distress.   HENT:      Head: Normocephalic and atraumatic.      Mouth/Throat:      Mouth: Mucous membranes are moist.      Pharynx: Oropharynx is clear.   Eyes:      Extraocular Movements: Extraocular movements intact.      Conjunctiva/sclera: Conjunctivae normal.      Pupils: Pupils are equal, round, and reactive to light.   Cardiovascular:      Rate and Rhythm: Regular rhythm. Tachycardia present.      Pulses: Normal pulses.      Heart sounds: Normal heart sounds.   Pulmonary:      Effort: Respiratory distress present.      Breath sounds: Wheezing present.   Abdominal:      General: Abdomen is flat. Bowel sounds are normal.      Palpations: Abdomen is soft.      Tenderness: There is no abdominal tenderness. There is no guarding or rebound.   Musculoskeletal:         General: No swelling or tenderness.      Cervical back: Normal range of motion and neck supple.      Comments: 1+ Sergio LE edema at ankles, pt reports as baseline   Neurological:      General: No focal deficit present.      Mental Status: She is alert and oriented to person, place, and time. Mental status is at baseline.      Cranial Nerves: No cranial nerve deficit.      Motor: No weakness.     Diagnostic Data    Results from last 7 days   Lab Units 03/14/25  0218 03/13/25  1559   WBC 10*3/mm3 15.05* 17.31*   HEMOGLOBIN g/dL 13.9 15.1   HEMATOCRIT % 44.4 47.8*   PLATELETS 10*3/mm3 310 331   GLUCOSE mg/dL  124* 111*   CREATININE mg/dL 0.72 0.73   BUN mg/dL 16 15   SODIUM mmol/L 143 144   POTASSIUM mmol/L 4.1 4.3   AST (SGOT) U/L  --  36*   ALT (SGPT) U/L  --  22   ALK PHOS U/L  --  136*   BILIRUBIN mg/dL  --  0.3   ANION GAP mmol/L 11.4 15.1*       XR Chest 1 View  Result Date: 3/13/2025  Impression: Improved aeration at the lung bases compared with the last study. No acute process identified. Electronically Signed: Nomi Samuel MD  3/13/2025 3:57 PM EDT  Workstation ID: [id number]    Scheduled Meds:apixaban, 5 mg, Oral, Q12H  atorvastatin, 40 mg, Oral, Daily  cefepime, 2,000 mg, Intravenous, Q8H  cetirizine, 10 mg, Oral, Daily  hydroxychloroquine, 400 mg, Oral, Q24H  ipratropium-albuterol, 3 mL, Nebulization, 4x Daily - RT  melatonin, 10 mg, Oral, Nightly  methylPREDNISolone sodium succinate, 40 mg, Intravenous, Q12H  oxybutynin XL, 5 mg, Oral, Daily  pantoprazole, 40 mg, Oral, Q AM  sodium chloride, 10 mL, Intravenous, Q12H  venlafaxine XR, 150 mg, Oral, Daily      Continuous Infusions:   PRN Meds:.  acetaminophen **OR** acetaminophen **OR** acetaminophen    azelastine **AND** fluticasone    benzonatate    senna-docusate sodium **AND** polyethylene glycol **AND** bisacodyl **AND** bisacodyl    Calcium Replacement - Follow Nurse / BPA Driven Protocol    ipratropium-albuterol    Magnesium Standard Dose Replacement - Follow Nurse / BPA Driven Protocol    nitroglycerin    Phosphorus Replacement - Follow Nurse / BPA Driven Protocol    Potassium Replacement - Follow Nurse / BPA Driven Protocol    sodium chloride    sodium chloride     I reviewed the patient's new clinical results.    Assessment/Plan:     Active and Resolved Problems  Active Hospital Problems    Diagnosis  POA    **RSV bronchitis [J20.5]  Yes    RSV (acute bronchiolitis due to respiratory syncytial virus) [J21.0]  Yes      Resolved Hospital Problems   No resolved problems to display.     Dyspnea  RSV  Sepsis  ? Pneumonia  - Reportedly initial O2 sat 81% in  ED, improved following breathing treatments  - Trop 17 -> 14, BNP wnl. Dimer wnl.   - RVP positive for RSV  - SIRS 3/4 (WBC 17.3, , RR 30), pt with recent steroids. Lactic 2.1. Procal 0.05. CXR without significant opacity/consolidation appreciated  - Trending lactic, pending Bcx/Scx/strep/leg  - IVF  - Started on vanc/cefepime given recent IV Abx. Will DC cefepime, MRSA nares pending. Further Abx pending course/cultures  - solumedrol  - Duonebs  - incentive spirometry  - cont pulse ox  - Consider pulm consult if pt not improving, seen and underwent bronchoscopy during previous admission     Hx CVA w/o residual deficits  Hld  GERD  Mood  RA/Lupus  - holding home hydroxychloroquine with infection, cont other home medications    VTE Prophylaxis:  Pharmacologic VTE prophylaxis orders are present.    Disposition Planning:   Barriers to Discharge:dyspnea  Anticipated Date of Discharge: 3/15  Place of Discharge: home  Time: 35 minutes     Code Status and Medical Interventions: CPR (Attempt to Resuscitate); Full Support   Ordered at: 03/13/25 1853     Code Status (Patient has no pulse and is not breathing):    CPR (Attempt to Resuscitate)     Medical Interventions (Patient has pulse or is breathing):    Full Support     Level Of Support Discussed With:    Patient       Signature: Electronically signed by Rashad Marshall MD, 03/14/25, 14:33 EDT.  [facility] Hospitalist Team

## 2025-03-14 NOTE — PLAN OF CARE
Goal Outcome Evaluation:  Plan of Care Reviewed With: patient        Progress: no change  Outcome Evaluation: Patient was a transfer from ER for RSV and hypoxia. On IV cefepime and IV solu-medrol. Currently on 2L of oxygen. Blood cultures pending. Did trigger sepsis in the last 48 hours. Gets up to BSC d/t SOA on exertion. LE edema. Alert and oriented x4. No other issues at this time. Continuing to monitor.

## 2025-03-14 NOTE — CASE MANAGEMENT/SOCIAL WORK
Discharge Planning Assessment   [facility]     Patient Name: Annalisa Bhagat  MRN: [id number]  Today's Date: 3/14/2025    Admit Date: 3/13/2025    Plan: Anticipate return home. Home CPAP w/ [facility].   Discharge Needs Assessment       Row Name 03/14/25 1633       Living Environment    People in Home parent(s)    Name(s) of People in Home Mother-Pau (98 years old)    Current Living Arrangements home    Potentially Unsafe Housing Conditions none    In the past 12 months has the electric, gas, oil, or water company threatened to shut off services in your home? No    Primary Care Provided by self    Provides Primary Care For parent(s)    Family Caregiver if Needed child(michael), adult    Family Caregiver Names Son-Michael, GAIL-Darlyn    Quality of Family Relationships helpful;involved;supportive    Able to Return to Prior Arrangements yes       Resource/Environmental Concerns    Resource/Environmental Concerns none    Transportation Concerns none       Transportation Needs    In the past 12 months, has lack of transportation kept you from medical appointments or from getting medications? no    In the past 12 months, has lack of transportation kept you from meetings, work, or from getting things needed for daily living? No       Food Insecurity    Within the past 12 months, you worried that your food would run out before you got the money to buy more. Never true    Within the past 12 months, the food you bought just didn't last and you didn't have money to get more. Never true       Transition Planning    Patient/Family Anticipates Transition to home with family;home with help/services    Patient/Family Anticipated Services at Transition none    Transportation Anticipated family or friend will provide;car, drives self       Discharge Needs Assessment    Readmission Within the Last 30 Days no previous admission in last 30 days    Equipment Currently Used at Home rollator;cane, straight;shower chair;cpap;bp cuff    Concerns to be  Addressed denies needs/concerns at this time;no discharge needs identified    Do you want help finding or keeping work or a job? Patient declined    Do you want help with school or training? For example, starting or completing job training or getting a high school diploma, GED or equivalent No    Anticipated Changes Related to Illness none    Equipment Needed After Discharge none    Provided Post Acute Provider List? N/A    Provided Post Acute Provider Quality & Resource List? N/A                   Discharge Plan       Row Name 03/14/25 3802       Plan    Plan Anticipate return home. Home CPAP w/ [facility].    Patient/Family in Agreement with Plan yes    Plan Comments CM met with patient at bedside. Pt lives at home and helps care for her 98 year old mother. Pt drives and is independent with ADL's. PCP and pharmacy confirmed- agreeable to use [facility]. DME at home includes canes, rollator, shower chair, BP cuff, and CPAP HS supplied by [facility]. Pt was previously at SNF and had HHC with [facility] but they are no longer seeing pt. Son or DIL will provide transportation at [place].              Demographic Summary       Row Name 03/14/25 2394       General Information    Admission Type inpatient    Arrived From emergency department    Referral Source admission list    Reason for Consult care coordination/care conference;discharge planning    Preferred Language English       Contact Information    Permission Granted to Share Info With                    Functional Status       Row Name 03/14/25 6140       Functional Status    Usual Activity Tolerance moderate    Current Activity Tolerance moderate       Functional Status, IADL    Medications independent    Meal Preparation independent    Housekeeping independent    Laundry independent    Shopping independent    If for any reason you need help with day-to-day activities such as bathing, preparing meals, shopping, managing finances, etc., do you get the  help you need? I don't need any help             Megan Naegele, RN      Office Phone: [phone]  Office Cell: [phone]

## 2025-03-15 LAB
ANION GAP SERPL CALCULATED.3IONS-SCNC: 10.1 MMOL/L (ref 5–15)
BACTERIA SPEC RESP CULT: NORMAL
BUN SERPL-MCNC: 16 MG/DL (ref 8–23)
BUN/CREAT SERPL: 22.5 (ref 7–25)
CALCIUM SPEC-SCNC: 8.5 MG/DL (ref 8.6–10.5)
CHLORIDE SERPL-SCNC: 103 MMOL/L (ref 98–107)
CO2 SERPL-SCNC: 28.9 MMOL/L (ref 22–29)
CREAT SERPL-MCNC: 0.71 MG/DL (ref 0.57–1)
DEPRECATED RDW RBC AUTO: 53.1 FL (ref 37–54)
EGFRCR SERPLBLD CKD-EPI 2021: 88.2 ML/MIN/1.73
ERYTHROCYTE [DISTWIDTH] IN BLOOD BY AUTOMATED COUNT: 15.2 % (ref 12.3–15.4)
GLUCOSE SERPL-MCNC: 116 MG/DL (ref 65–99)
GRAM STN SPEC: NORMAL
HCT VFR BLD AUTO: 39.9 % (ref 34–46.6)
HGB BLD-MCNC: 12.5 G/DL (ref 12–15.9)
LARGE PLATELETS: ABNORMAL
LYMPHOCYTES # BLD MANUAL: 1.77 10*3/MM3 (ref 0.7–3.1)
LYMPHOCYTES NFR BLD MANUAL: 6 % (ref 5–12)
MCH RBC QN AUTO: 29.6 PG (ref 26.6–33)
MCHC RBC AUTO-ENTMCNC: 31.3 G/DL (ref 31.5–35.7)
MCV RBC AUTO: 94.5 FL (ref 79–97)
MONOCYTES # BLD: 0.63 10*3/MM3 (ref 0.1–0.9)
NEUTROPHILS # BLD AUTO: 8.02 10*3/MM3 (ref 1.7–7)
NEUTROPHILS NFR BLD MANUAL: 74 % (ref 42.7–76)
NEUTS BAND NFR BLD MANUAL: 3 % (ref 0–5)
PLATELET # BLD AUTO: 304 10*3/MM3 (ref 140–450)
PMV BLD AUTO: 9.9 FL (ref 6–12)
POTASSIUM SERPL-SCNC: 4.1 MMOL/L (ref 3.5–5.2)
RBC # BLD AUTO: 4.22 10*6/MM3 (ref 3.77–5.28)
RBC MORPH BLD: NORMAL
SCAN SLIDE: NORMAL
SODIUM SERPL-SCNC: 142 MMOL/L (ref 136–145)
TOXIC GRANULATION: ABNORMAL
VARIANT LYMPHS NFR BLD MANUAL: 15 % (ref 19.6–45.3)
VARIANT LYMPHS NFR BLD MANUAL: 2 % (ref 0–5)
WBC NRBC COR # BLD AUTO: 10.42 10*3/MM3 (ref 3.4–10.8)

## 2025-03-15 PROCEDURE — 94664 DEMO&/EVAL PT USE INHALER: CPT

## 2025-03-15 PROCEDURE — 85007 BL SMEAR W/DIFF WBC COUNT: CPT | Performed by: STUDENT IN AN ORGANIZED HEALTH CARE EDUCATION/TRAINING PROGRAM

## 2025-03-15 PROCEDURE — 85025 COMPLETE CBC W/AUTO DIFF WBC: CPT | Performed by: STUDENT IN AN ORGANIZED HEALTH CARE EDUCATION/TRAINING PROGRAM

## 2025-03-15 PROCEDURE — 94761 N-INVAS EAR/PLS OXIMETRY MLT: CPT

## 2025-03-15 PROCEDURE — 97162 PT EVAL MOD COMPLEX 30 MIN: CPT

## 2025-03-15 PROCEDURE — 25010000002 METHYLPREDNISOLONE PER 40 MG: Performed by: STUDENT IN AN ORGANIZED HEALTH CARE EDUCATION/TRAINING PROGRAM

## 2025-03-15 PROCEDURE — 80048 BASIC METABOLIC PNL TOTAL CA: CPT | Performed by: STUDENT IN AN ORGANIZED HEALTH CARE EDUCATION/TRAINING PROGRAM

## 2025-03-15 PROCEDURE — 94799 UNLISTED PULMONARY SVC/PX: CPT

## 2025-03-15 RX ADMIN — GUAIFENESIN AND CODEINE PHOSPHATE 10 ML: 100; 10 SOLUTION ORAL at 16:17

## 2025-03-15 RX ADMIN — VENLAFAXINE HYDROCHLORIDE 150 MG: 75 CAPSULE, EXTENDED RELEASE ORAL at 09:15

## 2025-03-15 RX ADMIN — SENNOSIDES AND DOCUSATE SODIUM 2 TABLET: 50; 8.6 TABLET ORAL at 12:02

## 2025-03-15 RX ADMIN — METHYLPREDNISOLONE SODIUM SUCCINATE 40 MG: 40 INJECTION, POWDER, FOR SOLUTION INTRAMUSCULAR; INTRAVENOUS at 06:06

## 2025-03-15 RX ADMIN — APIXABAN 5 MG: 5 TABLET, FILM COATED ORAL at 09:15

## 2025-03-15 RX ADMIN — Medication 10 MG: at 21:10

## 2025-03-15 RX ADMIN — PANTOPRAZOLE SODIUM 40 MG: 40 TABLET, DELAYED RELEASE ORAL at 06:06

## 2025-03-15 RX ADMIN — OXYBUTYNIN CHLORIDE 5 MG: 5 TABLET, EXTENDED RELEASE ORAL at 09:15

## 2025-03-15 RX ADMIN — Medication 10 ML: at 09:15

## 2025-03-15 RX ADMIN — IPRATROPIUM BROMIDE AND ALBUTEROL SULFATE 3 ML: .5; 3 SOLUTION RESPIRATORY (INHALATION) at 11:40

## 2025-03-15 RX ADMIN — ATORVASTATIN CALCIUM 40 MG: 40 TABLET, FILM COATED ORAL at 09:15

## 2025-03-15 RX ADMIN — CETIRIZINE HYDROCHLORIDE 10 MG: 10 TABLET, FILM COATED ORAL at 09:15

## 2025-03-15 RX ADMIN — APIXABAN 5 MG: 5 TABLET, FILM COATED ORAL at 21:10

## 2025-03-15 RX ADMIN — GUAIFENESIN AND CODEINE PHOSPHATE 10 ML: 100; 10 SOLUTION ORAL at 09:15

## 2025-03-15 RX ADMIN — METHYLPREDNISOLONE SODIUM SUCCINATE 40 MG: 40 INJECTION, POWDER, FOR SOLUTION INTRAMUSCULAR; INTRAVENOUS at 17:16

## 2025-03-15 RX ADMIN — BENZONATATE 200 MG: 100 CAPSULE ORAL at 12:06

## 2025-03-15 RX ADMIN — HYDROXYCHLOROQUINE SULFATE 400 MG: 200 TABLET ORAL at 09:15

## 2025-03-15 RX ADMIN — ACETAMINOPHEN 650 MG: 325 TABLET, FILM COATED ORAL at 12:02

## 2025-03-15 RX ADMIN — Medication 10 ML: at 21:10

## 2025-03-15 RX ADMIN — IPRATROPIUM BROMIDE AND ALBUTEROL SULFATE 3 ML: .5; 3 SOLUTION RESPIRATORY (INHALATION) at 06:40

## 2025-03-15 RX ADMIN — IPRATROPIUM BROMIDE AND ALBUTEROL SULFATE 3 ML: .5; 3 SOLUTION RESPIRATORY (INHALATION) at 19:38

## 2025-03-15 RX ADMIN — BENZONATATE 200 MG: 100 CAPSULE ORAL at 21:10

## 2025-03-15 NOTE — PROGRESS NOTES
[facility] MEDICINE SERVICE  DAILY PROGRESS NOTE    NAME: Annalisa Bhagat  : 1950  MRN: [id number]      LOS: 1 day     PROVIDER OF SERVICE: Guero Barillas MD    Chief Complaint: RSV bronchitis    Subjective:     Interval History:  History taken from: patient    Patient states she is not feeling well, still feels congested in her sinuses, ears and lungs.  She still has poor appetite not eating well and is only able to get out of bed and sit in the chair briefly.  Plan communicated to her.  All questions answered.        Review of Systems:   Review of Systems   Constitutional:  Positive for appetite change. Negative for chills and fever.   Respiratory:  Positive for chest tightness and shortness of breath.    Cardiovascular:  Negative for chest pain.   Gastrointestinal:  Negative for abdominal pain.       Objective:     Vital Signs  Temp:  [97.6 °F (36.4 °C)-98.3 °F (36.8 °C)] 97.6 °F (36.4 °C)  Heart Rate:  [] 87  Resp:  [16-25] 20  BP: (140-176)/(75-91) 140/76  Flow (L/min) (Oxygen Therapy):  [2-4] 2   Body mass index is 40.4 kg/m².    Physical Exam  Physical Exam  Constitutional:       General: She is not in acute distress.  Cardiovascular:      Rate and Rhythm: Normal rate.      Heart sounds: No murmur heard.  Pulmonary:      Effort: Pulmonary effort is normal. No respiratory distress.      Breath sounds: Rhonchi present.   Abdominal:      General: Bowel sounds are normal.      Palpations: Abdomen is soft.   Neurological:      Mental Status: She is alert.            Diagnostic Data    Results from last 7 days   Lab Units 03/15/25  0429 25  0218 25  1559   WBC 10*3/mm3 10.42   < > 17.31*   HEMOGLOBIN g/dL 12.5   < > 15.1   HEMATOCRIT % 39.9   < > 47.8*   PLATELETS 10*3/mm3 304   < > 331   GLUCOSE mg/dL 116*   < > 111*   CREATININE mg/dL 0.71   < > 0.73   BUN mg/dL 16   < > 15   SODIUM mmol/L 142   < > 144   POTASSIUM mmol/L 4.1   < > 4.3   AST (SGOT) U/L  --   --  36*   ALT  (SGPT) U/L  --   --  22   ALK PHOS U/L  --   --  136*   BILIRUBIN mg/dL  --   --  0.3   ANION GAP mmol/L 10.1   < > 15.1*    < > = values in this interval not displayed.       No radiology results for the last day      I reviewed the patient's new clinical results.    Assessment/Plan:     Active and Resolved Problems  Active Hospital Problems    Diagnosis  POA    **RSV bronchitis [J20.5]  Yes    RSV (acute bronchiolitis due to respiratory syncytial virus) [J21.0]  Yes      Resolved Hospital Problems   No resolved problems to display.       Diagnoses  RSV infection  Acute bronchiolitis  Leukocytosis with left shift  Acute hypoxic respiratory failure  History of CVA without residual deficits  History of hyperlipidemia  History of GERD  History of mood disorder  History of RA/lupus    PLAN  -Patient with acute bronchiolitis in setting of RSV infection.  Still requiring oxygen, currently on Solu-Medrol, DuoNebs.  Consider stopping home med hydroxychloroquine if patient does not improve as well as consider pulm consult.      VTE Prophylaxis:  Pharmacologic VTE prophylaxis orders are present.             Disposition Planning:     Barriers to Discharge: Medically cleared  Anticipated Date of Discharge: 24-48 hours  Place of Discharge: Pending PT evaluation      Time: 35 + minutes     Code Status and Medical Interventions: CPR (Attempt to Resuscitate); Full Support   Ordered at: 03/13/25 1853     Code Status (Patient has no pulse and is not breathing):    CPR (Attempt to Resuscitate)     Medical Interventions (Patient has pulse or is breathing):    Full Support     Level Of Support Discussed With:    Patient       Signature: Electronically signed by Guero Barillas MD, 03/15/25, 17:12 EDT.  [facility] Hospitalist Team

## 2025-03-15 NOTE — PLAN OF CARE
Goal Outcome Evaluation:  Plan of Care Reviewed With: patient           Outcome Evaluation: Pt is a 73 YO F admitted with hypoxia, RSV (+). Pt initally on 2L O2 this date, titrated to RA during evaluation and mainatained >90%. Pt reports living athome with 99 YO mother who she helps care for. Pt is typically independent with all ADLs, and ambulation without falls. Pt reports having RWx at home that she will use in community as needed. This date pt demonstrates good mobitliy, requiring MIN A to ambluate in room with HHA. Pt globally weak and would benefit from RWx usage at this time. PT to follow and current recommendation is HHPT.

## 2025-03-15 NOTE — THERAPY EVALUATION
Patient Name: Annalisa Bhagat  : 1950    MRN: [id number]                              Today's Date: 3/15/2025       Admit Date: 3/13/2025    Visit Dx:     ICD-10-CM ICD-9-CM   1. Hypoxia  R09.02 799.02   2. RSV infection  B33.8 079.6   3. Sepsis, due to unspecified organism, unspecified whether acute organ dysfunction present  A41.9 038.9     995.91     Patient Active Problem List   Diagnosis    Tear of left rotator cuff    Abnormal laboratory test    Back pain    Fibromyalgia    Neck pain    Compression fracture of vertebra    Constipation    CPAP (continuous positive airway pressure) dependence    Degeneration of intervertebral disc of lumbar region    Depression    Dermatitis    Psoriasis    Encounter for therapeutic drug monitoring    ESBL (extended spectrum beta-lactamase) producing bacteria infection    Family history of cerebrovascular accident (CVA)    Family history of diabetes mellitus    Fatigue    History of ischemic stroke    Hx of pathological fracture    Hypersomnolence    Hypoxemia    Incontinence    Inflammatory arthritis    Lupus    Rheumatoid arthritis    Kyphosis of thoracic region    Lumbar radiculopathy    Lumbar spondylosis with myelopathy    Lumbar stenosis    Spinal stenosis, lumbar    Migraine headache    Mitral prolapse    Nephrolithiasis    Neuropathy    Obesity    Other mechanical complication of internal fixation device of other bones, subsequent encounter    Osteoporosis    Postmenopausal    Pseudoarthrosis    RBBB    Recurrent UTI    Rotator cuff syndrome, left    Scoliosis    Seasonal allergies    Shoulder pain, right    Sleep apnea    Snoring    Stress incontinence    Vitamin D deficiency    High risk medication use    Hx of compression fracture of spine    Narcolepsy    Acute bronchitis    Influenza A    Pneumonia    Chronic anticoagulation    Class 3 obesity    Mixed hyperlipidemia    Hypokalemia    Multiple tracheobronchial mucus plugs    RSV bronchitis    RSV (acute  bronchiolitis due to respiratory syncytial virus)     Past Medical History:   Diagnosis Date    Fibromyalgia     GERD (gastroesophageal reflux disease)     Hx of compression fracture of spine     T12    Hyperlipidemia     Kidney stone     Loosening of hardware in spine     Lupus     Migraines     Narcolepsy 12/6/2023    Numbness and tingling     Osteoporosis 02/23/2018    Forteo x 2yrs(2/2018-12/2019,3/20-4/2020), on prolia(2/28/2020)    Recurrent UTI     Rheumatoid arthritis     Scoliosis     Seasonal allergies     Shoulder pain, left     Sleep apnea     Spinal stenosis     Stress incontinence     Stroke      Past Surgical History:   Procedure Laterality Date    BACK SURGERY      BRONCHOSCOPY N/A 1/15/2025    Procedure: BRONCHOSCOPY with bronchial washing;  Surgeon: Néstor Mccarty MD;  Location: [facility] ENDOSCOPY;  Service: Pulmonary;  Laterality: N/A;  postop: pneumonia    CARDIAC CATHETERIZATION      CARPAL TUNNEL RELEASE      CATARACT EXTRACTION, BILATERAL      CYSTOSCOPY W/ URETEROSCOPY W/ LITHOTRIPSY      HAND SURGERY      HYSTERECTOMY      RECTOVAGINAL FISTULA CLOSURE      SACRAL NERVE STIMULATOR PLACEMENT      TOTAL SHOULDER ARTHROPLASTY W/ DISTAL CLAVICLE EXCISION Left 6/13/2019    Procedure: TOTAL SHOULDER REVERSE ARTHROPLASTY;  Surgeon: Brice Mayes MD;  Location: [facility];  Service: Orthopedics      General Information       Row Name 03/15/25 1910          Physical Therapy Time and Intention    Document Type evaluation  -EL     Mode of Treatment individual therapy;physical therapy  -EL       Row Name 03/15/25 1910          General Information    Patient Profile Reviewed yes  -EL     Prior Level of Function independent:  -EL       Row Name 03/15/25 1910          Living Environment    Current Living Arrangements home  -EL     People in Home parent(s);other (see comments)  helps care for elderly mother  -EL       Row Name 03/15/25 1910          Cognition    Orientation Status (Cognition) oriented  x 4  -EL       Row Name 03/15/25 1910          Safety Issues/Impairments Affecting Functional Mobility    Impairments Affecting Function (Mobility) endurance/activity tolerance;shortness of breath  -EL               User Key  (r) = Recorded By, (t) = Taken By, (c) = Cosigned By      Initials Name Provider Type    Aki Jolly, PT Physical Therapist                   Mobility       Row Name 03/15/25 1911          Bed Mobility    Comment, (Bed Mobility) in chair when PT entered  -       Row Name 03/15/25 1911          Gait/Stairs (Locomotion)    [place] Level (Gait) minimum assist (75% patient effort)  -EL     Patient was able to Ambulate yes  -EL     Comment, (Gait/Stairs) HHA provided. Pt will benefit from RWx  -EL               User Key  (r) = Recorded By, (t) = Taken By, (c) = Cosigned By      Initials Name Provider Type    Aki Jolly, PT Physical Therapist                   Obj/Interventions       Row Name 03/15/25 1911          Range of Motion Comprehensive    General Range of Motion bilateral lower extremity ROM WFL  -       Row Name 03/15/25 1911          Strength Comprehensive (MMT)    General Manual Muscle Testing (MMT) Assessment lower extremity strength deficits identified  -EL     Comment, General Manual Muscle Testing (MMT) Assessment BLE 4-/5 gross  -EL       Row Name 03/15/25 1911          Sensory Assessment (Somatosensory)    Sensory Assessment (Somatosensory) sensation intact  -EL               User Key  (r) = Recorded By, (t) = Taken By, (c) = Cosigned By      Initials Name Provider Type    Aki Jolly, PT Physical Therapist                   Goals/Plan       Row Name 03/15/25 1916          Bed Mobility Goal 1 (PT)    Activity/Assistive Device (Bed Mobility Goal 1, PT) bed mobility activities, all  -EL     [place] Level/Cues Needed (Bed Mobility Goal 1, PT) independent  -EL     Time Frame (Bed Mobility Goal 1, PT) long term goal (LTG);2 weeks  -       Row Name 03/15/25 1916           Transfer Goal 1 (PT)    Activity/Assistive Device (Transfer Goal 1, PT) transfers, all;walker, rolling  -EL     [place] Level/Cues Needed (Transfer Goal 1, PT) modified independence  -EL     Time Frame (Transfer Goal 1, PT) long term goal (LTG);2 weeks  -EL       Row Name 03/15/25 1916          Gait Training Goal 1 (PT)    Activity/Assistive Device (Gait Training Goal 1, PT) gait (walking locomotion);walker, rolling  -EL     [place] Level (Gait Training Goal 1, PT) modified independence  -EL     Distance (Gait Training Goal 1, PT) 150  -EL     Time Frame (Gait Training Goal 1, PT) long term goal (LTG);2 weeks  -EL       Row Name 03/15/25 1916          Therapy Assessment/Plan (PT)    Planned Therapy Interventions (PT) neuromuscular re-education;bed mobility training;transfer training;gait training;patient/family education;strengthening  -EL               User Key  (r) = Recorded By, (t) = Taken By, (c) = Cosigned By      Initials Name Provider Type    Aki Jolly, PT Physical Therapist                   Clinical Impression       Row Name 03/15/25 1912          Pain    Pain Side/Orientation generalized;other (see comments)  generalized soreness from coughing  -EL     Additional Documentation Pain Scale: FACES Pre/Post-Treatment (Group)  -EL       Row Name 03/15/25 1912          Pain Scale: FACES Pre/Post-Treatment    Pain: FACES Scale, Pretreatment 2-->hurts little bit  -EL     Posttreatment Pain Rating 2-->hurts little bit  -EL       Row Name 03/15/25 1912          Plan of Care Review    Plan of Care Reviewed With patient  -EL     Outcome Evaluation Pt is a 73 YO F admitted with hypoxia, RSV (+). Pt initally on 2L O2 this date, titrated to RA during evaluation and mainatained >90%. Pt reports living athome with 99 YO mother who she helps care for. Pt is typically independent with all ADLs, and ambulation without falls. Pt reports having RWx at home that she will use in community as needed. This date  pt demonstrates good mobitliy, requiring MIN A to ambluate in room with HHA. Pt globally weak and would benefit from RWx usage at this time. PT to follow and current recommendation is HHPT.  -EL       Row Name 03/15/25 1912          Therapy Assessment/Plan (PT)    Rehab Potential (PT) good  -EL     Criteria for Skilled Interventions Met (PT) yes  -EL     Therapy Frequency (PT) 5 times/wk  -EL     Predicted Duration of Therapy Intervention (PT) Until d/c  -EL       Row Name 03/15/25 1912          Vital Signs    Pre SpO2 (%) 97  -EL     O2 Delivery Pre Treatment supplemental O2  -EL     Intra SpO2 (%) 98  -EL     O2 Delivery Intra Treatment room air  -EL     Post SpO2 (%) 98  -EL     O2 Delivery Post Treatment room air  -EL     Pre Patient Position Sitting  -EL     Intra Patient Position Standing  -EL     Post Patient Position Sitting  -EL       Row Name 03/15/25 1912          Positioning and Restraints    Pre-Treatment Position sitting in chair/recliner  -EL     Post Treatment Position chair  -EL     In Chair notified nsg;reclined;call light within reach;encouraged to call for assist  -EL               User Key  (r) = Recorded By, (t) = Taken By, (c) = Cosigned By      Initials Name Provider Type    EL Aki Ackerman, PT Physical Therapist                   Outcome Measures       Row Name 03/15/25 1916 03/15/25 0900       How much help from another person do you currently need...    Turning from your back to your side while in flat bed without using bedrails? 4  -EL 4  -FA    Moving from lying on back to sitting on the side of a flat bed without bedrails? 4  -EL 4  -FA    Moving to and from a bed to a chair (including a wheelchair)? 3  -EL 4  -FA    Standing up from a chair using your arms (e.g., wheelchair, bedside chair)? 3  -EL 3  -FA    Climbing 3-5 steps with a railing? 3  -EL 3  -FA    To walk in hospital room? 3  -EL 3  -FA    AM-PAC 6 Clicks Score (PT) 20  -EL 21  -FA    Highest Level of Mobility Goal 6 --> Walk  10 steps or more  - 6 --> Walk 10 steps or more  -      Row Name 03/15/25 1916          Functional Assessment    Outcome Measure Options AM-PAC 6 Clicks Basic Mobility (PT)  -               User Key  (r) = Recorded By, (t) = Taken By, (c) = Cosigned By      Initials Name Provider Type    EL Aki Ackerman, PT Physical Therapist    Lakhwinder Reynolds LPN Licensed Nurse                                 Physical Therapy Education       Title: PT OT SLP Therapies (In Progress)       Topic: Physical Therapy (In Progress)       Point: Mobility training (Done)       Learning Progress Summary            Patient Acceptance, E,TB, VU by  at 3/15/2025 1917                      Point: Body mechanics (Not Started)       Learner Progress:  Not documented in this visit.              Point: Precautions (Done)       Learning Progress Summary            Patient Acceptance, E,TB, VU by  at 3/15/2025 1917                                      User Key       Initials Effective Dates Name Provider Type Discipline     06/23/20 -  Aki Ackerman, PT Physical Therapist PT                  PT Recommendation and Plan  Planned Therapy Interventions (PT): neuromuscular re-education, bed mobility training, transfer training, gait training, patient/family education, strengthening  Outcome Evaluation: Pt is a 75 YO F admitted with hypoxia, RSV (+). Pt initally on 2L O2 this date, titrated to RA during evaluation and mainatained >90%. Pt reports living athome with 99 YO mother who she helps care for. Pt is typically independent with all ADLs, and ambulation without falls. Pt reports having RWx at home that she will use in community as needed. This date pt demonstrates good mobitliy, requiring MIN A to ambluate in room with HHA. Pt globally weak and would benefit from RWx usage at this time. PT to follow and current recommendation is HHPT.     Time Calculation:   PT Evaluation Complexity  History, PT Evaluation Complexity: 1-2 personal factors  and/or comorbidities  Examination of Body Systems (PT Eval Complexity): total of 3 or more elements  Clinical Presentation (PT Evaluation Complexity): evolving  Clinical Decision Making (PT Evaluation Complexity): moderate complexity  Overall Complexity (PT Evaluation Complexity): moderate complexity     PT Charges       Row Name 03/15/25 1917             Time Calculation    Start Time 1553  -EL      Stop Time 1611  -EL      Time Calculation (min) 18 min  -EL      PT Received On 03/15/25  -EL      PT - Next Appointment 03/17/25  -EL      PT Goal Re-Cert Due Date 03/29/25  -EL                User Key  (r) = Recorded By, (t) = Taken By, (c) = Cosigned By      Initials Name Provider Type    Aki Jolly PT Physical Therapist                  Therapy Charges for Today       Code Description Service Date Service Provider Modifiers Qty    20494238344 HC PT EVAL MOD COMPLEXITY 3 3/15/2025 Aki Ackerman PT GP 1            PT G-Codes  Outcome Measure Options: AM-PAC 6 Clicks Basic Mobility (PT)  AM-PAC 6 Clicks Score (PT): 20       Aki Ackerman PT  3/15/2025

## 2025-03-16 LAB
ANION GAP SERPL CALCULATED.3IONS-SCNC: 9.6 MMOL/L (ref 5–15)
BUN SERPL-MCNC: 16 MG/DL (ref 8–23)
BUN/CREAT SERPL: 25 (ref 7–25)
CALCIUM SPEC-SCNC: 8.4 MG/DL (ref 8.6–10.5)
CHLORIDE SERPL-SCNC: 103 MMOL/L (ref 98–107)
CO2 SERPL-SCNC: 27.4 MMOL/L (ref 22–29)
CREAT SERPL-MCNC: 0.64 MG/DL (ref 0.57–1)
DEPRECATED RDW RBC AUTO: 52.1 FL (ref 37–54)
EGFRCR SERPLBLD CKD-EPI 2021: 91.7 ML/MIN/1.73
ERYTHROCYTE [DISTWIDTH] IN BLOOD BY AUTOMATED COUNT: 14.8 % (ref 12.3–15.4)
GLUCOSE SERPL-MCNC: 151 MG/DL (ref 65–99)
HCT VFR BLD AUTO: 41.1 % (ref 34–46.6)
HGB BLD-MCNC: 12.8 G/DL (ref 12–15.9)
LYMPHOCYTES # BLD MANUAL: 0.87 10*3/MM3 (ref 0.7–3.1)
LYMPHOCYTES NFR BLD MANUAL: 3 % (ref 5–12)
MCH RBC QN AUTO: 29.4 PG (ref 26.6–33)
MCHC RBC AUTO-ENTMCNC: 31.1 G/DL (ref 31.5–35.7)
MCV RBC AUTO: 94.5 FL (ref 79–97)
MONOCYTES # BLD: 0.29 10*3/MM3 (ref 0.1–0.9)
NEUTROPHILS # BLD AUTO: 8.55 10*3/MM3 (ref 1.7–7)
NEUTROPHILS NFR BLD MANUAL: 88 % (ref 42.7–76)
PLAT MORPH BLD: NORMAL
PLATELET # BLD AUTO: 307 10*3/MM3 (ref 140–450)
PMV BLD AUTO: 10 FL (ref 6–12)
POTASSIUM SERPL-SCNC: 3.7 MMOL/L (ref 3.5–5.2)
RBC # BLD AUTO: 4.35 10*6/MM3 (ref 3.77–5.28)
RBC MORPH BLD: NORMAL
SCAN SLIDE: NORMAL
SODIUM SERPL-SCNC: 140 MMOL/L (ref 136–145)
VARIANT LYMPHS NFR BLD MANUAL: 1 % (ref 0–5)
VARIANT LYMPHS NFR BLD MANUAL: 8 % (ref 19.6–45.3)
WBC MORPH BLD: NORMAL
WBC NRBC COR # BLD AUTO: 9.72 10*3/MM3 (ref 3.4–10.8)

## 2025-03-16 PROCEDURE — 63710000001 PREDNISONE PER 5 MG: Performed by: STUDENT IN AN ORGANIZED HEALTH CARE EDUCATION/TRAINING PROGRAM

## 2025-03-16 PROCEDURE — 94799 UNLISTED PULMONARY SVC/PX: CPT

## 2025-03-16 PROCEDURE — 94761 N-INVAS EAR/PLS OXIMETRY MLT: CPT

## 2025-03-16 PROCEDURE — 94664 DEMO&/EVAL PT USE INHALER: CPT

## 2025-03-16 PROCEDURE — 63710000001 PREDNISONE PER 1 MG: Performed by: STUDENT IN AN ORGANIZED HEALTH CARE EDUCATION/TRAINING PROGRAM

## 2025-03-16 RX ORDER — PREDNISONE 20 MG/1
20 TABLET ORAL DAILY
Status: DISCONTINUED | OUTPATIENT
Start: 2025-03-18 | End: 2025-03-17 | Stop reason: HOSPADM

## 2025-03-16 RX ORDER — LOSARTAN POTASSIUM 25 MG/1
25 TABLET ORAL
Status: DISCONTINUED | OUTPATIENT
Start: 2025-03-16 | End: 2025-03-17 | Stop reason: HOSPADM

## 2025-03-16 RX ORDER — PREDNISONE 10 MG/1
10 TABLET ORAL DAILY
Status: DISCONTINUED | OUTPATIENT
Start: 2025-03-20 | End: 2025-03-17 | Stop reason: HOSPADM

## 2025-03-16 RX ADMIN — APIXABAN 5 MG: 5 TABLET, FILM COATED ORAL at 21:22

## 2025-03-16 RX ADMIN — BENZONATATE 200 MG: 100 CAPSULE ORAL at 12:55

## 2025-03-16 RX ADMIN — IPRATROPIUM BROMIDE AND ALBUTEROL SULFATE 3 ML: .5; 3 SOLUTION RESPIRATORY (INHALATION) at 11:15

## 2025-03-16 RX ADMIN — PANTOPRAZOLE SODIUM 40 MG: 40 TABLET, DELAYED RELEASE ORAL at 05:20

## 2025-03-16 RX ADMIN — GUAIFENESIN AND CODEINE PHOSPHATE 10 ML: 100; 10 SOLUTION ORAL at 08:39

## 2025-03-16 RX ADMIN — IPRATROPIUM BROMIDE AND ALBUTEROL SULFATE 3 ML: .5; 3 SOLUTION RESPIRATORY (INHALATION) at 07:18

## 2025-03-16 RX ADMIN — ACETAMINOPHEN 650 MG: 325 TABLET, FILM COATED ORAL at 14:07

## 2025-03-16 RX ADMIN — APIXABAN 5 MG: 5 TABLET, FILM COATED ORAL at 08:39

## 2025-03-16 RX ADMIN — Medication 10 MG: at 21:22

## 2025-03-16 RX ADMIN — IPRATROPIUM BROMIDE AND ALBUTEROL SULFATE 3 ML: .5; 3 SOLUTION RESPIRATORY (INHALATION) at 15:25

## 2025-03-16 RX ADMIN — LOSARTAN POTASSIUM 25 MG: 25 TABLET, FILM COATED ORAL at 08:40

## 2025-03-16 RX ADMIN — Medication 10 ML: at 08:40

## 2025-03-16 RX ADMIN — GUAIFENESIN AND CODEINE PHOSPHATE 10 ML: 100; 10 SOLUTION ORAL at 01:58

## 2025-03-16 RX ADMIN — Medication 10 ML: at 21:22

## 2025-03-16 RX ADMIN — CETIRIZINE HYDROCHLORIDE 10 MG: 10 TABLET, FILM COATED ORAL at 08:39

## 2025-03-16 RX ADMIN — OXYBUTYNIN CHLORIDE 5 MG: 5 TABLET, EXTENDED RELEASE ORAL at 08:39

## 2025-03-16 RX ADMIN — IPRATROPIUM BROMIDE AND ALBUTEROL SULFATE 3 ML: .5; 3 SOLUTION RESPIRATORY (INHALATION) at 19:35

## 2025-03-16 RX ADMIN — PREDNISONE 30 MG: 10 TABLET ORAL at 14:07

## 2025-03-16 RX ADMIN — ATORVASTATIN CALCIUM 40 MG: 40 TABLET, FILM COATED ORAL at 08:39

## 2025-03-16 RX ADMIN — VENLAFAXINE HYDROCHLORIDE 150 MG: 75 CAPSULE, EXTENDED RELEASE ORAL at 08:39

## 2025-03-16 NOTE — PLAN OF CARE
Goal Outcome Evaluation:   Pt rested well overnight. Pt continues to have a dry unproductive cough.

## 2025-03-16 NOTE — PLAN OF CARE
Goal Outcome Evaluation:              Outcome Evaluation: Pt has been on room air iin the chair for most of the day and has been tolerating it well. Pt had instance of increased blood pressure which MD was notified, see orders. C/O headache but is stable at this time.

## 2025-03-16 NOTE — CASE MANAGEMENT/SOCIAL WORK
Continued Stay Note   Shimon     Patient Name: Annalisa Bhagat  MRN: [id number]  Today's Date: 3/16/2025    Admit Date: 3/13/2025    Plan: Anticipate return home w/HH. Home CPAP w/[facility].   Discharge Plan       Row Name 03/16/25 0910       Plan    Plan Anticipate return home w/HH. Home CPAP w/[facility].    Plan Comments CM received message from Lakhwinder BUENO stating pt is expressing concerns about going home and feels she needs a little rehab. CM reviewed pt's chart and noted PT recs for HHPT. CM inquired about how pt feels about HHPT and Chaohenri reports pt states it would depend on how soon they could see her. CM informed Lakhwinder that referrals would need to be placed for agencies to review for approval and check staffing. Pt reports to Lakhwinder that she has used [facility] in the past. CM placed referral in epic and notified liaisonElba.                   Discharge Codes    No documentation.                 Apple Dee RN     Office Phone: [phone]  Office Cell: [phone]

## 2025-03-16 NOTE — PROGRESS NOTES
[facility] MEDICINE SERVICE  DAILY PROGRESS NOTE    NAME: Annalisa Bhagat  : 1950  MRN: [id number]      LOS: 2 days     PROVIDER OF SERVICE: Chance Martinez MD    Chief Complaint: RSV bronchitis    Subjective:     Interval History:  History taken from: patient    Oxygen improving somewhat but still very weak.        Review of Systems:   Review of Systems   Constitutional:  Positive for appetite change. Negative for chills and fever.   Respiratory:  Positive for chest tightness and shortness of breath.    Cardiovascular:  Negative for chest pain.   Gastrointestinal:  Negative for abdominal pain.       Objective:     Vital Signs  Temp:  [97.6 °F (36.4 °C)-98.5 °F (36.9 °C)] 98.5 °F (36.9 °C)  Heart Rate:  [71-88] 71  Resp:  [16-20] 16  BP: (145-172)/(74-89) 152/76  Flow (L/min) (Oxygen Therapy):  [2] 2   Body mass index is 40.4 kg/m².    Physical Exam  Physical Exam  Constitutional:       General: She is not in acute distress.  Cardiovascular:      Rate and Rhythm: Normal rate.      Heart sounds: No murmur heard.  Pulmonary:      Effort: Pulmonary effort is normal. No respiratory distress.      Breath sounds: Rhonchi present.   Abdominal:      General: Bowel sounds are normal.      Palpations: Abdomen is soft.   Neurological:      Mental Status: She is alert.            Diagnostic Data    Results from last 7 days   Lab Units 03/15/25  2323 25  0218 25  1559   WBC 10*3/mm3 9.72   < > 17.31*   HEMOGLOBIN g/dL 12.8   < > 15.1   HEMATOCRIT % 41.1   < > 47.8*   PLATELETS 10*3/mm3 307   < > 331   GLUCOSE mg/dL 151*   < > 111*   CREATININE mg/dL 0.64   < > 0.73   BUN mg/dL 16   < > 15   SODIUM mmol/L 140   < > 144   POTASSIUM mmol/L 3.7   < > 4.3   AST (SGOT) U/L  --   --  36*   ALT (SGPT) U/L  --   --  22   ALK PHOS U/L  --   --  136*   BILIRUBIN mg/dL  --   --  0.3   ANION GAP mmol/L 9.6   < > 15.1*    < > = values in this interval not displayed.       No radiology results for the last day      I  reviewed the patient's new clinical results.    Assessment/Plan:     Active and Resolved Problems  Active Hospital Problems    Diagnosis  POA    **RSV bronchitis [J20.5]  Yes    RSV (acute bronchiolitis due to respiratory syncytial virus) [J21.0]  Yes      Resolved Hospital Problems   No resolved problems to display.       Diagnoses  RSV infection  Acute bronchiolitis  Leukocytosis with left shift  Acute hypoxic respiratory failure  History of CVA without residual deficits  History of hyperlipidemia  History of GERD  History of mood disorder  History of RA/lupus    PLAN  -Patient with acute bronchiolitis in setting of RSV infection.  Still requiring oxygen, currently on Solu-Medrol, DuoNebs.      I have paused the home Plaquenil given the acute infection.  Tapering dose of prednisone ordered for patient.  Does not feel like walking oximetry today.  Will need PT evaluation.  Small dose of losartan 25 mg this morning for hypertension.      VTE Prophylaxis:  Pharmacologic VTE prophylaxis orders are present.             Disposition Planning:     Barriers to Discharge: Medically cleared  Anticipated Date of Discharge: 24-48 hours  Place of Discharge: Pending PT evaluation      Time: 35 + minutes     Code Status and Medical Interventions: CPR (Attempt to Resuscitate); Full Support   Ordered at: 03/13/25 1853     Code Status (Patient has no pulse and is not breathing):    CPR (Attempt to Resuscitate)     Medical Interventions (Patient has pulse or is breathing):    Full Support     Level Of Support Discussed With:    Patient       Signature: Electronically signed by Chance Martinez MD, 03/16/25, 13:32 EDT.  [facility] Hospitalist Team

## 2025-03-16 NOTE — DISCHARGE PLACEMENT REQUEST
"Annalisa Bhagat (74 y.o. Female)       Date of Birth   1950    Social Security Number       Address   [address] [place] [address] [place] [facility]    Home Phone   [phone]    MRN   [id number]       Caodaism   Jain    Marital Status                               Admission Date   3/13/2025    Admission Type   Emergency    Admitting Provider   Jameson Tom MD    Attending Provider   Chance Martinez MD    Department, Room/Bed   [facility] INPATIENT, 355/1       Discharge Date       Discharge Disposition       Discharge Destination                                 Attending Provider: Chance Martinez MD    Allergies: Shingrix [Zoster Vac Recomb Adjuvanted], Zolmitriptan, Adhesive Tape, Hydromorphone, Amoxicillin-pot Clavulanate    Isolation: Contact Drop   Infection: RSV (03/13/25), MRSA (03/13/25)   Code Status: CPR    Ht: 149.9 cm (59\")   Wt: 90.7 kg (200 lb)    Admission Cmt: None   Principal Problem: RSV bronchitis [J20.5]                   Active Insurance as of 3/13/2025       Primary Coverage       Payor Plan Insurance Group Employer/Plan Group    MEDICARE MEDICARE A & B        Payor Plan Address Payor Plan Phone Number Payor Plan Fax Number Effective Dates    [address] [phone]  6/1/2013 - None Entered    [facility] [id number]         Subscriber Name Subscriber Birth Date Member ID       ANNALISA BHAGAT 1950 [id number]               Secondary Coverage       Payor Plan Insurance Group Employer/Plan Group    [facility] [employer]        Payor Plan Address Payor Plan Phone Number Payor Plan Fax Number Effective Dates    [facility] [phone]  1/1/2019 - None Entered    [address]       [facility] [address]         Subscriber Name Subscriber Birth Date Member ID       ANNALISA BHAGAT 1950 [id number]                     Emergency Contacts        (Rel.) Home Phone Work Phone Mobile Phone    LeolaMichael (Son) [phone] " -- [phone]    ELLA SERNA (Mother) [phone] -- --    ELOISA TALBERT (Daughter) [phone] -- [phone]

## 2025-03-17 ENCOUNTER — READMISSION MANAGEMENT (OUTPATIENT)
Dept: CALL CENTER | Facility: HOSPITAL | Age: 75
End: 2025-03-17
Payer: MEDICARE

## 2025-03-17 ENCOUNTER — TRANSCRIBE ORDERS (OUTPATIENT)
Dept: HOME HEALTH SERVICES | Facility: HOME HEALTHCARE | Age: 75
End: 2025-03-17
Payer: MEDICARE

## 2025-03-17 VITALS
TEMPERATURE: 99.4 F | OXYGEN SATURATION: 95 % | WEIGHT: 200 LBS | HEIGHT: 59 IN | HEART RATE: 83 BPM | DIASTOLIC BLOOD PRESSURE: 71 MMHG | RESPIRATION RATE: 16 BRPM | SYSTOLIC BLOOD PRESSURE: 143 MMHG | BODY MASS INDEX: 40.32 KG/M2

## 2025-03-17 DIAGNOSIS — J20.5 RSV BRONCHITIS: Primary | ICD-10-CM

## 2025-03-17 LAB
ANION GAP SERPL CALCULATED.3IONS-SCNC: 8.9 MMOL/L (ref 5–15)
BUN SERPL-MCNC: 17 MG/DL (ref 8–23)
BUN/CREAT SERPL: 26.6 (ref 7–25)
CALCIUM SPEC-SCNC: 8.4 MG/DL (ref 8.6–10.5)
CHLORIDE SERPL-SCNC: 103 MMOL/L (ref 98–107)
CO2 SERPL-SCNC: 30.1 MMOL/L (ref 22–29)
CREAT SERPL-MCNC: 0.64 MG/DL (ref 0.57–1)
DEPRECATED RDW RBC AUTO: 53.2 FL (ref 37–54)
EGFRCR SERPLBLD CKD-EPI 2021: 91.7 ML/MIN/1.73
EOSINOPHIL # BLD MANUAL: 0.32 10*3/MM3 (ref 0–0.4)
EOSINOPHIL NFR BLD MANUAL: 3 % (ref 0.3–6.2)
ERYTHROCYTE [DISTWIDTH] IN BLOOD BY AUTOMATED COUNT: 14.8 % (ref 12.3–15.4)
GLUCOSE SERPL-MCNC: 82 MG/DL (ref 65–99)
HCT VFR BLD AUTO: 43.8 % (ref 34–46.6)
HGB BLD-MCNC: 13.5 G/DL (ref 12–15.9)
LYMPHOCYTES # BLD MANUAL: 2.91 10*3/MM3 (ref 0.7–3.1)
LYMPHOCYTES NFR BLD MANUAL: 11 % (ref 5–12)
MCH RBC QN AUTO: 29.5 PG (ref 26.6–33)
MCHC RBC AUTO-ENTMCNC: 30.8 G/DL (ref 31.5–35.7)
MCV RBC AUTO: 95.8 FL (ref 79–97)
MONOCYTES # BLD: 1.18 10*3/MM3 (ref 0.1–0.9)
NEUTROPHILS # BLD AUTO: 6.25 10*3/MM3 (ref 1.7–7)
NEUTROPHILS NFR BLD MANUAL: 51 % (ref 42.7–76)
NEUTS BAND NFR BLD MANUAL: 7 % (ref 0–5)
PLASMA CELL PREC NFR BLD MANUAL: 1 % (ref 0–0)
PLAT MORPH BLD: NORMAL
PLATELET # BLD AUTO: 321 10*3/MM3 (ref 140–450)
PMV BLD AUTO: 9.9 FL (ref 6–12)
POTASSIUM SERPL-SCNC: 3.6 MMOL/L (ref 3.5–5.2)
RBC # BLD AUTO: 4.57 10*6/MM3 (ref 3.77–5.28)
SCAN SLIDE: NORMAL
SODIUM SERPL-SCNC: 142 MMOL/L (ref 136–145)
SPHEROCYTES BLD QL SMEAR: ABNORMAL
VARIANT LYMPHS NFR BLD MANUAL: 19 % (ref 19.6–45.3)
VARIANT LYMPHS NFR BLD MANUAL: 8 % (ref 0–5)
WBC NRBC COR # BLD AUTO: 10.77 10*3/MM3 (ref 3.4–10.8)

## 2025-03-17 PROCEDURE — 80048 BASIC METABOLIC PNL TOTAL CA: CPT | Performed by: STUDENT IN AN ORGANIZED HEALTH CARE EDUCATION/TRAINING PROGRAM

## 2025-03-17 PROCEDURE — 97530 THERAPEUTIC ACTIVITIES: CPT

## 2025-03-17 PROCEDURE — 94799 UNLISTED PULMONARY SVC/PX: CPT

## 2025-03-17 PROCEDURE — 94761 N-INVAS EAR/PLS OXIMETRY MLT: CPT

## 2025-03-17 PROCEDURE — 63710000001 PREDNISONE PER 5 MG: Performed by: STUDENT IN AN ORGANIZED HEALTH CARE EDUCATION/TRAINING PROGRAM

## 2025-03-17 PROCEDURE — 97116 GAIT TRAINING THERAPY: CPT

## 2025-03-17 PROCEDURE — 63710000001 PREDNISONE PER 1 MG: Performed by: STUDENT IN AN ORGANIZED HEALTH CARE EDUCATION/TRAINING PROGRAM

## 2025-03-17 PROCEDURE — 94664 DEMO&/EVAL PT USE INHALER: CPT

## 2025-03-17 PROCEDURE — 97110 THERAPEUTIC EXERCISES: CPT

## 2025-03-17 PROCEDURE — 85025 COMPLETE CBC W/AUTO DIFF WBC: CPT | Performed by: STUDENT IN AN ORGANIZED HEALTH CARE EDUCATION/TRAINING PROGRAM

## 2025-03-17 PROCEDURE — 94618 PULMONARY STRESS TESTING: CPT

## 2025-03-17 PROCEDURE — 85007 BL SMEAR W/DIFF WBC COUNT: CPT | Performed by: STUDENT IN AN ORGANIZED HEALTH CARE EDUCATION/TRAINING PROGRAM

## 2025-03-17 RX ORDER — PREDNISONE 10 MG/1
TABLET ORAL
Qty: 6 TABLET | Refills: 0 | Status: SHIPPED | OUTPATIENT
Start: 2025-03-18 | End: 2025-03-22

## 2025-03-17 RX ORDER — LOSARTAN POTASSIUM 25 MG/1
25 TABLET ORAL
Qty: 30 TABLET | Refills: 0 | Status: SHIPPED | OUTPATIENT
Start: 2025-03-18

## 2025-03-17 RX ORDER — ALBUTEROL SULFATE 0.83 MG/ML
2.5 SOLUTION RESPIRATORY (INHALATION) EVERY 6 HOURS PRN
Qty: 90 ML | Refills: 12 | Status: SHIPPED | OUTPATIENT
Start: 2025-03-17

## 2025-03-17 RX ADMIN — Medication 10 ML: at 09:45

## 2025-03-17 RX ADMIN — LOSARTAN POTASSIUM 25 MG: 25 TABLET, FILM COATED ORAL at 08:30

## 2025-03-17 RX ADMIN — IPRATROPIUM BROMIDE AND ALBUTEROL SULFATE 3 ML: .5; 3 SOLUTION RESPIRATORY (INHALATION) at 15:16

## 2025-03-17 RX ADMIN — PANTOPRAZOLE SODIUM 40 MG: 40 TABLET, DELAYED RELEASE ORAL at 05:31

## 2025-03-17 RX ADMIN — GUAIFENESIN AND CODEINE PHOSPHATE 10 ML: 100; 10 SOLUTION ORAL at 05:31

## 2025-03-17 RX ADMIN — ACETAMINOPHEN 650 MG: 325 TABLET, FILM COATED ORAL at 14:16

## 2025-03-17 RX ADMIN — APIXABAN 5 MG: 5 TABLET, FILM COATED ORAL at 08:30

## 2025-03-17 RX ADMIN — PREDNISONE 30 MG: 10 TABLET ORAL at 08:30

## 2025-03-17 RX ADMIN — IPRATROPIUM BROMIDE AND ALBUTEROL SULFATE 3 ML: .5; 3 SOLUTION RESPIRATORY (INHALATION) at 07:47

## 2025-03-17 RX ADMIN — BENZONATATE 200 MG: 100 CAPSULE ORAL at 08:30

## 2025-03-17 RX ADMIN — VENLAFAXINE HYDROCHLORIDE 150 MG: 75 CAPSULE, EXTENDED RELEASE ORAL at 08:30

## 2025-03-17 RX ADMIN — ATORVASTATIN CALCIUM 40 MG: 40 TABLET, FILM COATED ORAL at 08:30

## 2025-03-17 RX ADMIN — CETIRIZINE HYDROCHLORIDE 10 MG: 10 TABLET, FILM COATED ORAL at 08:30

## 2025-03-17 RX ADMIN — OXYBUTYNIN CHLORIDE 5 MG: 5 TABLET, EXTENDED RELEASE ORAL at 08:30

## 2025-03-17 RX ADMIN — BENZONATATE 200 MG: 100 CAPSULE ORAL at 14:16

## 2025-03-17 NOTE — PLAN OF CARE
Goal Outcome Evaluation:   Patient slept throughout the night no problems found. Will continue with current care plan.

## 2025-03-17 NOTE — THERAPY TREATMENT NOTE
"Subjective: Pt/nursing agreeable to therapeutic plan of care. On supplemental oxygen upon entry.     Objective:     Precautions - Fall precautions, supplemental oxygen     Bed mobility - In recliner upon entry   Transfers - SUP with FWW  Able to complete perineal hygiene and doff/don pull up with SUP after using bathroom  Ambulation - 50 feet and additional 15 ft from C to recliner with FWW and SBA     Therapeutic Exercise - 15 Reps B LE AROM supported sitting / chair; PT cuing provided for proper technique     Discussed d/c plans and equipment needs. Pt reports has FWW, 4WW, canes, walk-in shower with shower chair and multiple GBs, and elevated commodes reporting no equipment needs upon d/c. Pt reports assist available from son and daughter in-law as needed upon d/c reporting no concern regarding d/c home with HH PT and support from family as needed.     Vitals: WNL    Pain: 0     Education: Verbal/Tactile Cues    Assessment: Annalisa Bhagat presents with functional mobility impairments which indicate the need for skilled intervention. Tolerating session today without incident. Progressing well with improved functional performance observed this date. Will continue to follow and progress as tolerated.     Plan/Recommendations:   If medically appropriate, Low Intensity Therapy recommended post-acute care - This is recommended as therapy feels this patient would require 2-3 visits per week. OP or HH would be the best option depending on patient's home bound status. Consider, if the patient has other  \"skilled\" needs such as wounds, IV antibiotics, etc. Combined with \"low intensity\" could also equate to a SNF. If patient is medically complex, consider LTAC. Pt requires no DME at discharge.     Pt desires Home with Home Health and assist from family as needed at discharge. Pt cooperative; agreeable to therapeutic recommendations and plan of care.         Basic Mobility 6-click:  Rollin = Total, A lot = 2, " A little = 3; 4 = None  Supine>Sit:   1 = Total, A lot = 2, A little = 3; 4 = None   Sit>Stand with arms:  1 = Total, A lot = 2, A little = 3; 4 = None  Bed>Chair:   1 = Total, A lot = 2, A little = 3; 4 = None  Ambulate in room:  1 = Total, A lot = 2, A little = 3; 4 = None  3-5 Steps with railin = Total, A lot = 2, A little = 3; 4 = None  Score: 24        Post-Tx Position: Up in Chair, Alarms activated, and Call light and personal items within reach  PPE: gloves, surgical mask, and gown

## 2025-03-17 NOTE — PLAN OF CARE
"Goal Outcome Evaluation:               Assessment: Annalisa Bhagat presents with functional mobility impairments which indicate the need for skilled intervention. Tolerating session today without incident. Progressing well with improved functional performance observed this date. Will continue to follow and progress as tolerated.     Plan/Recommendations:   If medically appropriate, Low Intensity Therapy recommended post-acute care - This is recommended as therapy feels this patient would require 2-3 visits per week. OP or HH would be the best option depending on patient's home bound status. Consider, if the patient has other  \"skilled\" needs such as wounds, IV antibiotics, etc. Combined with \"low intensity\" could also equate to a SNF. If patient is medically complex, consider LTAC. Pt requires no DME at discharge.     Pt desires Home with Home Health and assist from family as needed at discharge. Pt cooperative; agreeable to therapeutic recommendations and plan of care.                              "

## 2025-03-17 NOTE — PROCEDURES
Exercise Oximetry    Patient Name:Annalisa Bhagat   MRN: [id number]   Date: 03/17/25             ROOM AIR BASELINE   SpO2% 94   Heart Rate 103   Blood Pressure      EXERCISE ON ROOM AIR SpO2% EXERCISE ON O2 @  LPM SpO2%   1 MINUTE 94 1 MINUTE    2 MINUTES 93 2 MINUTES    3 MINUTES 93 3 MINUTES    4 MINUTES 92 4 MINUTES    5 MINUTES 92 5 MINUTES    6 MINUTES 90 6 MINUTES               Distance Walked   Distance Walked   Dyspnea (Burton Scale)   Dyspnea (Burton Scale)   Fatigue (Burton Scale)   Fatigue (Burton Scale)   SpO2% Post Exercise  94 SpO2% Post Exercise   HR Post Exercise  116 HR Post Exercise   Time to Recovery  2 min Time to Recovery     Comments: Found patient on room air at 94%. Walked patient for 6 min, her saturation never fell below 90%. Her heart rate was 116 on the final lap. She was not labored or fatigued. Patient does not qualify for home oxygen at this time.

## 2025-03-17 NOTE — DISCHARGE SUMMARY
"[facility]  Discharge Summary    Date of Service: 3/17/2025  Patient Name: Annalisa Bhagat  : 1950  MRN: [id number]    Date of Admission: 3/13/2025  Discharge Diagnosis: RSV bronchitis  Date of Discharge: 3/17/2025  Primary Care Physician: Caleb Whitney MD      Presenting Problem:   RSV (acute bronchiolitis due to respiratory syncytial virus) [J21.0]  Hypoxia [R09.02]  RSV bronchitis [J20.5]  RSV infection [B33.8]  Sepsis, due to unspecified organism, unspecified whether acute organ dysfunction present [A41.9]    Active and Resolved Hospital Problems:  Active Hospital Problems    Diagnosis POA    **RSV bronchitis [J20.5] Yes    RSV (acute bronchiolitis due to respiratory syncytial virus) [J21.0] Yes      Resolved Hospital Problems   No resolved problems to display.         Hospital Course     HPI:    \"Annalisa Bhagat is a 74 y.o. female with a CMH of CVA without residual deficits, possible lupus/RA, obesity, hld who presented to [facility] on 3/13/2025 with  SOA. Pt reports starting  she developed cough/wheeze with associated SOA/CHAPMAN, has also had rhinorrhea/congestion. Reports recent family contacts with similar symptoms last week. She presented to urgent care last night and was given IV steroids and a prednisone taper, reports some initial relief with the steroids but overnight/am her Sx continued to worsen prompting her to present to our ED. She was admitted here in January with similar symptoms, found to have multifocal pneumonia at that time suspected superimposed on viral infection. She reports her breathing had returned to baseline following that admission, is not on oxygen or any inhalers at home, no Hx asthma/COPD.   In ED reportedly initially satting 81% on RA, improved after breathing treatments and pt presently on room air though still with significant wheeze and incr WOB. Found to be RSV positive. Hospitalist service consulted for " "admission. \"    Hospital Course:    Patient was hospitalized with RSV bronchitis and hypoxic respiratory failure with generalized weakness.  With supportive care she improved gradually, she completed a 6-minute walking oximetry before discharge which did not show the need for supplemental oxygen.  Her generalized weakness and diet improved.  Physical therapy and Occupational Therapy evaluated the patient before discharge and recommended home with home health and assistance with family.  Patient discharged 3/17/2025        DISCHARGE Follow Up Recommendations for labs and diagnostics:     PCP        Day of Discharge     Vital Signs:  Temp:  [97.6 °F (36.4 °C)-99.4 °F (37.4 °C)] 99.4 °F (37.4 °C)  Heart Rate:  [] 87  Resp:  [15-18] 15  BP: (132-174)/(68-82) 143/71  Flow (L/min) (Oxygen Therapy):  [1] 1    Physical Exam:  Physical Exam  Constitutional:       Appearance: Normal appearance.   Cardiovascular:      Rate and Rhythm: Normal rate and regular rhythm.      Pulses: Normal pulses.      Heart sounds: Normal heart sounds.   Pulmonary:      Effort: Pulmonary effort is normal.      Breath sounds: Normal breath sounds.   Neurological:      Mental Status: She is alert.            Pertinent  and/or Most Recent Results     LAB RESULTS:      Lab 03/17/25  0539 03/15/25  2323 03/15/25  0429 03/14/25  0218 03/13/25 2021 03/13/25  1707 03/13/25  1631 03/13/25  1603 03/13/25  1559   WBC 10.77 9.72 10.42 15.05*  --   --   --   --  17.31*   HEMOGLOBIN 13.5 12.8 12.5 13.9  --   --   --   --  15.1   HEMATOCRIT 43.8 41.1 39.9 44.4  --   --   --   --  47.8*   PLATELETS 321 307 304 310  --   --   --   --  331   NEUTROS ABS 6.25 8.55* 8.02* 13.46*  --   --   --   --  15.20*   IMMATURE GRANS (ABS)  --   --   --  0.05  --   --   --   --  0.10*   LYMPHS ABS  --   --   --  0.62*  --   --   --   --  0.73   MONOS ABS  --   --   --  0.84  --   --   --   --  1.19*   EOS ABS 0.32  --   --  0.03  --   --   --   --  0.04   MCV 95.8 94.5 " 94.5 95.1  --   --   --   --  95.0   PROCALCITONIN  --   --   --   --   --  0.05  --   --   --    LACTATE  --   --   --   --  1.9  --   --  2.1*  --    D DIMER QUANT  --   --   --   --   --   --  0.35  --   --          Lab 03/17/25  0539 03/15/25  2323 03/15/25  0429 03/14/25  0218 03/13/25  1707 03/13/25  1559   SODIUM 142 140 142 143  --  144   POTASSIUM 3.6 3.7 4.1 4.1  --  4.3   CHLORIDE 103 103 103 106  --  104   CO2 30.1* 27.4 28.9 25.6  --  24.9   ANION GAP 8.9 9.6 10.1 11.4  --  15.1*   BUN 17 16 16 16  --  15   CREATININE 0.64 0.64 0.71 0.72  --  0.73   EGFR 91.7 91.7 88.2 86.8  --  85.4   GLUCOSE 82 151* 116* 124*  --  111*   CALCIUM 8.4* 8.4* 8.5* 8.9  --  9.9   MAGNESIUM  --   --   --   --  1.6  --          Lab 03/13/25  1559   TOTAL PROTEIN 7.8   ALBUMIN 4.5   GLOBULIN 3.3   ALT (SGPT) 22   AST (SGOT) 36*   BILIRUBIN 0.3   ALK PHOS 136*         Lab 03/13/25  1707 03/13/25  1559   PROBNP  --  272.0   HSTROP T 14* 17*                 Brief Urine Lab Results  (Last result in the past 365 days)        Color   Clarity   Blood   Leuk Est   Nitrite   Protein   CREAT   Urine HCG        01/09/25 1440 Yellow   Clear   Negative   Trace   Negative   Negative                 Microbiology Results (last 10 days)       Procedure Component Value - Date/Time    Respiratory Culture - Sputum, Cough [[id number]] Collected: 03/14/25 1712    Lab Status: Final result Specimen: Sputum from Cough Updated: 03/15/25 1300     Respiratory Culture Scant growth (1+) Normal respiratory marilynn. No S. aureus or Pseudomonas aeruginosa detected. Final report.     Gram Stain Moderate (3+) WBCs per low power field      Rare (1+) Epithelial cells per low power field      Few (2+) Mixed bacterial morphotypes seen on Gram Stain    S. Pneumo Ag Urine or CSF - Urine, Urine, Clean Catch [[id number]]  (Normal) Collected: 03/13/25 2118    Lab Status: Final result Specimen: Urine, Clean Catch Updated: 03/13/25 2140     Strep Pneumo Ag Negative     Legionella Antigen, Urine - Urine, Urine, Clean Catch [[id number]]  (Normal) Collected: 03/13/25 2118    Lab Status: Final result Specimen: Urine, Clean Catch Updated: 03/13/25 2140     LEGIONELLA ANTIGEN, URINE Negative    MRSA Screen, PCR (Inpatient) - Swab, Nares [[id number]]  (Abnormal) Collected: 03/13/25 2020    Lab Status: Final result Specimen: Swab from Nares Updated: 03/13/25 2203     MRSA PCR MRSA Detected    Narrative:      The negative predictive value of this diagnostic test is high and should only be used to consider de-escalating anti-MRSA therapy. A positive result may indicate colonization with MRSA and must be correlated clinically.    Respiratory Panel PCR w/COVID-19(SARS-CoV-2) [facility] In-House, NP Swab in UTM/VTM, 2 HR TAT - Swab, Nasopharynx [[id number]]  (Abnormal) Collected: 03/13/25 1600    Lab Status: Final result Specimen: Swab from Nasopharynx Updated: 03/13/25 1656     ADENOVIRUS, PCR Not Detected     Coronavirus 229E Not Detected     Coronavirus HKU1 Not Detected     Coronavirus NL63 Not Detected     Coronavirus OC43 Not Detected     COVID19 Not Detected     Human Metapneumovirus Not Detected     Human Rhinovirus/Enterovirus Not Detected     Influenza A PCR Not Detected     Influenza B PCR Not Detected     Parainfluenza Virus 1 Not Detected     Parainfluenza Virus 2 Not Detected     Parainfluenza Virus 3 Not Detected     Parainfluenza Virus 4 Not Detected     RSV, PCR Detected     Bordetella pertussis pcr Not Detected     Bordetella parapertussis PCR Not Detected     Chlamydophila pneumoniae PCR Not Detected     Mycoplasma pneumo by PCR Not Detected    Narrative:      In the setting of a positive respiratory panel with a viral infection PLUS a negative procalcitonin without other underlying concern for bacterial infection, consider observing off antibiotics or discontinuation of antibiotics and continue supportive care. If the respiratory panel is positive for atypical  bacterial infection (Bordetella pertussis, Chlamydophila pneumoniae, or Mycoplasma pneumoniae), consider antibiotic de-escalation to target atypical bacterial infection.    Blood Culture - Blood, Arm, Left [[id number]]  (Normal) Collected: 03/13/25 1559    Lab Status: Preliminary result Specimen: Blood from Arm, Left Updated: 03/16/25 1616     Blood Culture No growth at 3 days    Blood Culture - Blood, Arm, Right [[id number]]  (Normal) Collected: 03/13/25 1547    Lab Status: Preliminary result Specimen: Blood from Arm, Right Updated: 03/16/25 1600     Blood Culture No growth at 3 days    Narrative:      Less than seven (7) mL's of blood was collected.  Insufficient quantity may yield false negative results.            XR Chest 1 View  Result Date: 3/13/2025  Impression: Impression: Improved aeration at the lung bases compared with the last study. No acute process identified. Electronically Signed: Nomi Samuel MD  3/13/2025 3:57 PM EDT  Workstation ID: [id number]              Results for orders placed during the hospital encounter of 01/09/25    Adult Transthoracic Echo Complete W/ Cont if Necessary Per Protocol    Interpretation Summary    Left ventricular ejection fraction appears to be 56 - 60%.    Left ventricular diastolic function is consistent with (grade I) impaired relaxation.    Small patent foramen ovale present with bi-directional shunting indicated by saline contrast.    Saline test results are positive for right to left atrial level shunt.    Estimated right ventricular systolic pressure from tricuspid regurgitation is normal (<35 mmHg).      Labs Pending at Discharge:  Pending Results       None            Procedures Performed    03/17 1049 Note By: Vanessa Malloy, RRT      Consults:   Consults       Date and Time Order Name Status Description    3/13/2025  5:13 PM Hospitalist (on-call MD unless specified)                Discharge Details        Discharge Medications        New Medications         Instructions Start Date   losartan 25 MG tablet  Commonly known as: COZAAR   25 mg, Oral, Every 24 Hours Scheduled   Start Date: March 18, 2025            Changes to Medications        Instructions Start Date   albuterol (2.5 MG/3ML) 0.083% nebulizer solution  Commonly known as: PROVENTIL  What changed: You were already taking a medication with the same name, and this prescription was added. Make sure you understand how and when to take each.   2.5 mg, Nebulization, Every 6 Hours PRN      albuterol sulfate  (90 Base) MCG/ACT inhaler  Commonly known as: PROVENTIL HFA;VENTOLIN HFA;PROAIR HFA  What changed: Another medication with the same name was added. Make sure you understand how and when to take each.   2 puffs, Inhalation, Every 4 Hours PRN      predniSONE 10 MG tablet  Commonly known as: DELTASONE  What changed:   medication strength  See the new instructions.   Take 2 tablets by mouth Daily for 2 days, THEN 1 tablet Daily for 2 days.   Start Date: March 18, 2025            Continue These Medications        Instructions Start Date   apixaban 5 MG tablet tablet  Commonly known as: ELIQUIS   5 mg, Oral, Every 12 Hours Scheduled, Resume 6/15/29      atorvastatin 40 MG tablet  Commonly known as: LIPITOR   40 mg, Daily      Azelastine-Fluticasone 137-50 MCG/ACT suspension   1-2 sprays, Daily PRN      benzonatate 200 MG capsule  Commonly known as: TESSALON   200 mg, Oral, 3 Times Daily PRN      Claritin 10 MG tablet  Generic drug: loratadine   10 mg, Oral, Daily      guaiFENesin 600 MG 12 hr tablet  Commonly known as: MUCINEX   600 mg, Oral, Every 12 Hours Scheduled      hydroxychloroquine 200 MG tablet  Commonly known as: PLAQUENIL   1 tablet, 2 Times Daily      ipratropium-albuterol 0.5-2.5 mg/3 ml nebulizer  Commonly known as: DUO-NEB   3 mL, Nebulization, Every 6 Hours While Awake - RT      Melatonin 10 MG tablet   1 tablet, Nightly      methylphenidate 20 MG tablet  Commonly known as: RITALIN   20 mg,  Daily      MULTIVITAMIN GUMMIES ADULTS PO   1 tablet, Oral, Daily      Myrbetriq 25 MG tablet sustained-release 24 hour 24 hr tablet  Generic drug: Mirabegron ER   25 mg, Daily      omeprazole 20 MG capsule  Commonly known as: priLOSEC   20 mg, Oral, Daily      pyridoxine 200 MG tablet  Commonly known as: VITAMIN B-6   200 mg, Daily      rimegepant sulfate ODT 75 MG disintegrating tablet  Commonly known as: NURTEC-ODT   1 tablet, Daily PRN      venlafaxine  MG 24 hr capsule  Commonly known as: EFFEXOR-XR   150 mg, Daily      VITAMIN B COMPLEX PO   1 each, Oral, Daily               Allergies   Allergen Reactions    Shingrix [Zoster Vac Recomb Adjuvanted] Hallucinations     Fever, chills, redness swelling at injection site    Zolmitriptan Hemorrhagic stroke     Hx of stroke     Adhesive Tape Itching     Paper tape - redness and itching.     Hydromorphone Itching and Rash    Amoxicillin-Pot Clavulanate Itching and Rash         Discharge Disposition:   Home or Self Care    Diet:  Hospital:  Diet Order   Procedures    Diet: Regular/House; Fluid Consistency: Thin (IDDSI 0)         Discharge Activity:         CODE STATUS:  Code Status and Medical Interventions: CPR (Attempt to Resuscitate); Full Support   Ordered at: 03/13/25 1853     Code Status (Patient has no pulse and is not breathing):    CPR (Attempt to Resuscitate)     Medical Interventions (Patient has pulse or is breathing):    Full Support     Level Of Support Discussed With:    Patient         Future Appointments   Date Time Provider Department Center   7/8/2025  4:00 PM Seipel, Joseph F, MD [facility]           Time spent on Discharge including face to face service:  50 minutes    Signature: Electronically signed by Chance Martinez MD, 03/17/25, 13:14 EDT.  [facility], [place], operated by [facility] Hospitalist Team

## 2025-03-18 LAB
BACTERIA SPEC AEROBE CULT: NORMAL
BACTERIA SPEC AEROBE CULT: NORMAL

## 2025-03-18 NOTE — OUTREACH NOTE
Prep Survey      Flowsheet Row Responses   [facility] facility patient discharged from? [facility]   Is LACE score < 7 ? No   Eligibility Readm Mgmt   Discharge diagnosis RSV bronchitis, hypoxia   Does the patient have one of the following disease processes/diagnoses(primary or secondary)? Other   Does the patient have Home health ordered? Yes   What is the Home health agency?  [facility]   Is there a DME ordered? Yes   What DME was ordered? home nebulizer   Prep survey completed? Yes            Ivana Ni Registered Nurse

## 2025-03-18 NOTE — CASE MANAGEMENT/SOCIAL WORK
Case Management Discharge Note      Final Note: Home w/ [facility] to admit.      Selected Continued Care - Discharged on 3/17/2025 Admission date: 3/13/2025 - Discharge disposition: Home or Self Care      Durable Medical Equipment Coordination complete.      Service Provider Services Address Phone Fax Patient Preferred    [facility] [address], [place] [facility] [id number] [phone] --              Home Medical Care Coordination complete.      Service Provider Services Address Phone Fax Patient Preferred    [facility] [address], [address], [place] [id number] [phone] --             Transportation Services  Private: Car    Final Discharge Disposition Code: 06 - home with home health care

## 2025-03-21 ENCOUNTER — READMISSION MANAGEMENT (OUTPATIENT)
Dept: CALL CENTER | Facility: HOSPITAL | Age: 75
End: 2025-03-21
Payer: MEDICARE

## 2025-03-21 NOTE — OUTREACH NOTE
Medical Week 1 Survey      Flowsheet Row Responses   [facility] patient discharged from? [facility]   Does the patient have one of the following disease processes/diagnoses(primary or secondary)? Other   Week 1 attempt successful? No   Unsuccessful attempts Attempt 1  [pt declines as currently at MD f/u]            BROOKLYN CHILDERS - Registered Nurse

## 2025-03-28 ENCOUNTER — READMISSION MANAGEMENT (OUTPATIENT)
Dept: CALL CENTER | Facility: HOSPITAL | Age: 75
End: 2025-03-28
Payer: MEDICARE

## 2025-03-28 NOTE — OUTREACH NOTE
Medical Week 2 Survey      Flowsheet Row Responses   [facility] patient discharged from? [facility]   Does the patient have one of the following disease processes/diagnoses(primary or secondary)? Other   Week 2 attempt successful? Yes   Call start time 1618   Discharge diagnosis RSV bronchitis, hypoxia   Call end time 1621   Medication alerts for this patient pt completed steroids, per her report and she utilizes the nebulizer   Meds reviewed with patient/caregiver? Yes   What DME was ordered? home nebulizer   Has all DME been delivered? Yes   Comments The patient reports that the cough/wheezing has improved.   Week 2 Call Completed? Yes   Revoked No further contact(revokes)-requires comment   Graduated/Revoked comments The pt stated that she was doing fine, she is currently at the store and it is not a good time for her to talk.   Call end time 1621            Regina CRISTINA - Registered Nurse

## 2025-03-30 NOTE — PROGRESS NOTES
Enter Query Response Below      Query Response: Sepsis due to RSV bronchitis ruled in with associated acute hypoxic respiratory failure              If applicable, please update the problem list.

## 2025-07-07 NOTE — PROGRESS NOTES
Chief Complaint  Follow-up (MIGRAINES,LILY AND NARCOLEPSY)    Subjective          Annalisa Bhagat presents to [facility]  History of Present Illness      LILY compliance f/u, patient states doing well with pap therapy, she uses a nasal pillows and goes through [facility] for supplies. Patient states she has noticed that wakes up more at night    NARCOLEPSY-  patient states she has not been taking provigil x's 3-4 months due to pcp putting her on ritalin     MIGRAINES- better since last visit currently take nurtec prn which seems to help.    SLEEP TESTING HISTORY:  On NPSG at [facility] [place] , 1- patient had Moderate obstructive sleep apnea syndrome with apnea-hypopnea index of 15.4 per sleep hour, minimum SpO2 of 73%    PAP download:  The patient is on CPAP therapy at 14-18 cm/H2O.   Data indicates Excellent compliance. With 100% usage for more than 4 hours with an average usage of 7 hours 3 minutes. AHI down to 4.1 .  Average pressures 14.1.  Average large leak 1hr.     The patient's hypersomnia has managed with ritalin     [place] Sleepiness Scale:  Sitting and reading JS [place] Sleepiness: 2 WatchingTV JS [place] Sleepiness: 2  Sitting, inactive, in a public place JS [place] Sleepiness: 1  As a passenger in a car for 1 hour w/o a break  JS [place] Sleepiness: 2  Lying down to rest in the afternoon  JS [place] Sleepiness: 3   Sitting and talking to someone  JS [place] Sleepiness: 0  Sitting quietly after a lunch  JS [place] Sleepiness: 2  In a car, while stopped for traffic or a light  JS [place] Sleepiness: 0  Total 12    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    History of right MCA stroke. Now on asa   Migraine relieved with nurtec    Review of Systems   Constitutional:  Positive for fatigue.   Respiratory:  Negative for apnea and shortness of breath.    Psychiatric/Behavioral:  Positive for dysphoric mood. Negative for sleep disturbance.   "        Objective   Vital Signs:   /74   Pulse 94   Resp 18   Ht 149.9 cm (59\")   Wt 93 kg (205 lb)   BMI 41.40 kg/m²     Physical Exam  Vitals reviewed.   Constitutional:       Appearance: Normal appearance.   Pulmonary:      Effort: Pulmonary effort is normal. No respiratory distress.   Neurological:      Mental Status: She is alert and oriented to person, place, and time.   Psychiatric:         Mood and Affect: Mood normal.        Result Review :                 Assessment and Plan    Diagnoses and all orders for this visit:    1. Obstructive sleep apnea syndrome (Primary)    2. History of ischemic stroke    3. CPAP (continuous positive airway pressure) dependence    4. Hypersomnolence      Continue [id number]  The patient is compliant with and benefiting from PAP therapy.    For sleepiness continue ritalin can resume the nuvigil if neede    Continue statin , bp medication, and asa with history of stroke          Follow Up   Return in about 1 year (around 7/8/2026).    Patient was given instructions and counseling regarding her condition or for health maintenance advice. Please see specific information pulled into the AVS if appropriate.       This document has been electronically signed by Joseph Seipel, MD on July 8, 2025 13:50 EDT  "

## 2025-07-08 ENCOUNTER — OFFICE VISIT (OUTPATIENT)
Dept: NEUROLOGY | Facility: CLINIC | Age: 75
End: 2025-07-08
Payer: MEDICARE

## 2025-07-08 VITALS
SYSTOLIC BLOOD PRESSURE: 116 MMHG | WEIGHT: 205 LBS | BODY MASS INDEX: 41.33 KG/M2 | RESPIRATION RATE: 18 BRPM | HEART RATE: 94 BPM | HEIGHT: 59 IN | DIASTOLIC BLOOD PRESSURE: 74 MMHG

## 2025-07-08 DIAGNOSIS — Z86.73 HISTORY OF ISCHEMIC STROKE: ICD-10-CM

## 2025-07-08 DIAGNOSIS — Z99.89 CPAP (CONTINUOUS POSITIVE AIRWAY PRESSURE) DEPENDENCE: ICD-10-CM

## 2025-07-08 DIAGNOSIS — G47.10 HYPERSOMNOLENCE: ICD-10-CM

## 2025-07-08 DIAGNOSIS — G47.33 OBSTRUCTIVE SLEEP APNEA SYNDROME: Primary | ICD-10-CM

## 2025-07-08 PROCEDURE — 1160F RVW MEDS BY RX/DR IN RCRD: CPT | Performed by: PSYCHIATRY & NEUROLOGY

## 2025-07-08 PROCEDURE — 1159F MED LIST DOCD IN RCRD: CPT | Performed by: PSYCHIATRY & NEUROLOGY

## 2025-07-08 RX ORDER — VENLAFAXINE HYDROCHLORIDE 75 MG/1
75 CAPSULE, EXTENDED RELEASE ORAL DAILY
COMMUNITY
Start: 2025-04-18 | End: 2026-04-18

## 2025-07-08 RX ORDER — ASPIRIN 81 MG/1
81 TABLET, CHEWABLE ORAL DAILY
COMMUNITY

## 2025-08-28 ENCOUNTER — TELEPHONE (OUTPATIENT)
Dept: NEUROLOGY | Facility: CLINIC | Age: 75
End: 2025-08-28
Payer: MEDICARE

## 2025-08-29 DIAGNOSIS — G47.33 OBSTRUCTIVE SLEEP APNEA SYNDROME: ICD-10-CM

## 2025-08-29 RX ORDER — ARMODAFINIL 250 MG/1
250 TABLET ORAL DAILY
Qty: 30 TABLET | Refills: 5 | Status: SHIPPED | OUTPATIENT
Start: 2025-08-29

## (undated) DEVICE — SKIN PREP TRAY W/CHG: Brand: MEDLINE INDUSTRIES, INC.

## (undated) DEVICE — POOLE SUCTION HANDLE: Brand: CARDINAL HEALTH

## (undated) DEVICE — DRAPE,U/ SHT,SPLIT,PLAS,STERIL: Brand: MEDLINE

## (undated) DEVICE — PIN STNMAN 3.2MM 9IN
Type: IMPLANTABLE DEVICE | Site: SHOULDER | Status: NON-FUNCTIONAL
Removed: 2019-06-13

## (undated) DEVICE — HANDPIECE SET WITH COAXIAL HIGH FLOW TIP AND SUCTION TUBE: Brand: INTERPULSE

## (undated) DEVICE — BAPTIST FLOYD BRONCHOSCOPY: Brand: MEDLINE INDUSTRIES, INC.

## (undated) DEVICE — TUBING, SUCTION, 1/4" X 20', STRAIGHT: Brand: MEDLINE INDUSTRIES, INC.

## (undated) DEVICE — SYR LUERLOK 30CC

## (undated) DEVICE — ADHS SKIN DERMABOND TOP ADVANCED

## (undated) DEVICE — SOL ISO/ALC RUB 70PCT 4OZ

## (undated) DEVICE — ARM SLING: Brand: DEROYAL

## (undated) DEVICE — DRSNG TELFA PAD NONADH STR 1S 3X8IN

## (undated) DEVICE — 3M™ IOBAN™ 2 ANTIMICROBIAL INCISE DRAPE 6640EZ: Brand: IOBAN™ 2

## (undated) DEVICE — DRSNG SURESITE WNDW 4X4.5

## (undated) DEVICE — APPL CHLORAPREP W/TINT 26ML ORNG

## (undated) DEVICE — MAT FLR ABSORBENT LG 4FT 10 2.5FT

## (undated) DEVICE — DRSNG TELFA ILND ADH 4X6IN

## (undated) DEVICE — PREP SOL POVIDONE/IODINE BT 4OZ

## (undated) DEVICE — SUT VIC 2/0 CT2 27IN J269H

## (undated) DEVICE — SUT SILK 2/0 TIES 18IN A185H

## (undated) DEVICE — 2108 SERIES SAGITTAL BLADE (19.5 X 1.27 X 81.0MM)

## (undated) DEVICE — PK SHLDR OPN 40

## (undated) DEVICE — GLV SURG TRIUMPH CLASSIC PF LTX 8.5 STRL

## (undated) DEVICE — BIT DRL SCRW PERIPH 2.7MM

## (undated) DEVICE — NDL SPINE 22G 31/2IN BLK

## (undated) DEVICE — GLV SURG BIOGEL LTX PF 8 1/2